# Patient Record
Sex: MALE | Race: WHITE | NOT HISPANIC OR LATINO | Employment: OTHER | URBAN - METROPOLITAN AREA
[De-identification: names, ages, dates, MRNs, and addresses within clinical notes are randomized per-mention and may not be internally consistent; named-entity substitution may affect disease eponyms.]

---

## 2017-01-17 ENCOUNTER — TELEPHONE (OUTPATIENT)
Dept: CARDIOLOGY | Facility: CLINIC | Age: 82
End: 2017-01-17

## 2017-01-17 DIAGNOSIS — E78.5 HYPERLIPIDEMIA, UNSPECIFIED HYPERLIPIDEMIA TYPE: Primary | ICD-10-CM

## 2017-01-17 NOTE — TELEPHONE ENCOUNTER
Pt would like to restart Praluent he is willing to pay the extra cost till deductable met. Informed him would message DR Newton. Pt is also on livalo. TIANA Painter RN

## 2017-01-25 ENCOUNTER — ANTICOAGULATION THERAPY VISIT (OUTPATIENT)
Dept: CARDIOLOGY | Facility: CLINIC | Age: 82
End: 2017-01-25
Payer: COMMERCIAL

## 2017-01-25 ENCOUNTER — OFFICE VISIT (OUTPATIENT)
Dept: CARDIOLOGY | Facility: CLINIC | Age: 82
End: 2017-01-25
Attending: NURSE PRACTITIONER
Payer: COMMERCIAL

## 2017-01-25 VITALS
DIASTOLIC BLOOD PRESSURE: 60 MMHG | WEIGHT: 121.3 LBS | BODY MASS INDEX: 22.32 KG/M2 | HEART RATE: 66 BPM | SYSTOLIC BLOOD PRESSURE: 130 MMHG | HEIGHT: 62 IN

## 2017-01-25 DIAGNOSIS — I25.10 CORONARY ARTERY DISEASE INVOLVING NATIVE CORONARY ARTERY OF NATIVE HEART WITHOUT ANGINA PECTORIS: Primary | ICD-10-CM

## 2017-01-25 DIAGNOSIS — Z79.01 LONG-TERM (CURRENT) USE OF ANTICOAGULANTS: Primary | ICD-10-CM

## 2017-01-25 DIAGNOSIS — I25.10 CORONARY ARTERY DISEASE INVOLVING NATIVE CORONARY ARTERY OF NATIVE HEART WITHOUT ANGINA PECTORIS: ICD-10-CM

## 2017-01-25 DIAGNOSIS — G45.9 TRANSIENT CEREBRAL ISCHEMIA, UNSPECIFIED TYPE: ICD-10-CM

## 2017-01-25 DIAGNOSIS — I51.3 LEFT VENTRICULAR THROMBUS: ICD-10-CM

## 2017-01-25 LAB
CHOLEST SERPL-MCNC: 160 MG/DL
HDLC SERPL-MCNC: 55 MG/DL
INR POINT OF CARE: 1.9 (ref 0.86–1.14)
LDLC SERPL CALC-MCNC: 81 MG/DL
NONHDLC SERPL-MCNC: 105 MG/DL
TRIGL SERPL-MCNC: 118 MG/DL

## 2017-01-25 PROCEDURE — 80061 LIPID PANEL: CPT | Performed by: NURSE PRACTITIONER

## 2017-01-25 PROCEDURE — 99214 OFFICE O/P EST MOD 30 MIN: CPT | Performed by: NURSE PRACTITIONER

## 2017-01-25 PROCEDURE — 85610 PROTHROMBIN TIME: CPT | Mod: QW | Performed by: INTERNAL MEDICINE

## 2017-01-25 PROCEDURE — 36416 COLLJ CAPILLARY BLOOD SPEC: CPT | Performed by: INTERNAL MEDICINE

## 2017-01-25 NOTE — MR AVS SNAPSHOT
Sawyer Renteria   1/25/2017 11:40 AM   Anticoagulation Therapy Visit    Description:  82 year old male   Provider:   ANTICOAGULATION   Department:  [facility]           INR as of 1/25/2017     Selected INR 1.9! (1/25/2017)      Anticoagulation Summary as of 1/25/2017     INR goal 2.0-3.0   Selected INR 1.9! 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]  Transient cerebral ischemia [G45.9]  Long-term (current) use of anticoagulants [Z79.01] [Z79.01]         Your next Anticoagulation Clinic appointment(s)     Jan 25, 2017 11:40 AM   Anticoagulation Visit with  ANTICOAGULATION   [facility] ([facility])    [address]  [place] [facility] [address]   [phone]            Feb 06, 2017 10:40 AM   Anticoagulation Visit with  ANTICOAGULATION   [facility] ([facility])    [id number]  [facility] [address]   [phone]              Contact Numbers     Anticoagulant (INR) Clinic Number: [phone]          January 2017 Details    Sun Mon Tue Wed Thu Fri Sat     1               2               3               4               5               6               7                 8               9               10               11               12               13               14                 15               16               17               18               19               20               21                 22               23               24               25      7.5 mg   See details      26      2.5 mg         27      5 mg         28      5 mg           29      5 mg         30      2.5 mg         31      5 mg              Date Details   01/25 This INR check               How to take your warfarin dose     To take:  2.5 mg Take 0.5 of a 5  mg tablet.    To take:  5 mg Take 1 of the 5 mg tablets.    To take:  7.5 mg Take 1.5 of the 5 mg tablets.           February 2017 Details    Sun Mon Tue Wed Thu Fri Sat        1      5 mg         2      2.5 mg         3      5 mg         4      5 mg           5      5 mg         6            7               8               9               10               11                 12               13               14               15               16               17               18                 19               20               21               22               23               24               25                 26               27               28                    Date Details   No additional details    Date of next INR:  2/6/2017         How to take your warfarin dose     To take:  2.5 mg Take 0.5 of a 5 mg tablet.    To take:  5 mg Take 1 of the 5 mg tablets.

## 2017-01-25 NOTE — PROGRESS NOTES
ANTICOAGULATION FOLLOW-UP CLINIC VISIT    Patient Name:  Sawyer Renteria  Date:  1/25/2017  Contact Type:  Face to Face    SUBJECTIVE:     Patient Findings     Positives Medication Changes (not taking Praluent at this time (waiting for approval), taking 1/4 tab of Livalo 3x/wk), Diet Changes (ate large amount of spinach and mustard greens 2x this week)           OBJECTIVE    INR PROTIME   Date Value Ref Range Status   01/25/2017 1.9* 0.86 - 1.14 Final       ASSESSMENT / PLAN  INR assessment THER    Recheck INR In: 2 WEEKS    INR Location Clinic      Anticoagulation Summary as of 1/25/2017     INR goal 2.0-3.0   Selected INR 1.9! 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 Hasn't been taking Praluent (still awaiting insurance authorization for the med). Taking Livalo 1/4 tab 3x/week. No abnormal bleeding or bruising. Will boost today only to 7.5 mg then resume his usual dosing of 2.5 mg MTh and 5 mg all other days with recheck in 2 weeks.  See the Encounter Report to view Anticoagulation Flowsheet and Dosing Calendar (Go to Encounters tab in chart review, and find the Anticoagulation Therapy  Visit)    Dosage adjustment made based on physician directed care plan.    Donna Colby RN

## 2017-01-25 NOTE — MR AVS SNAPSHOT
After Visit Summary   1/25/2017    Sawyer Renteria    MRN: [id number]           Patient Information     Date Of Birth          3/24/1934        Visit Information        Provider Department      1/25/2017 12:30 PM Randell Izaguirre APRN CNP [facility] [place]        Today's Diagnoses     Coronary artery disease involving native coronary artery of native heart without angina pectoris    -  1       Care Instructions    No changes      See us back in April with an echocardiogram and labs    Stay on Livalo and start praluent when you get it        Follow-ups after your visit        Your next 10 appointments already scheduled     Feb 06, 2017 10:40 AM   Anticoagulation Visit with MAJANO ANTICOAGULATION   [facility] ([facility])    [address]  [place] [address] [phone]              Who to contact     If you have questions or need follow up information about today's clinic visit or your schedule please contact [facility] directly at [phone].  Normal or non-critical lab and imaging results will be communicated to you by [employer], letter or phone within 4 business days after the clinic has received the results. If you do not hear from us within 7 days, please contact the clinic through [employer] or phone. If you have a critical or abnormal lab result, we will notify you by phone as soon as possible.  Submit refill requests through [employer] or call your pharmacy and they will forward the refill request to us. Please allow 3 business days for your refill to be completed.          Additional Information About Your Visit        [employer] Information     [employer] gives you secure access to your electronic health record. If you see a primary care provider, you can also send messages to your care team and make appointments. If you have questions, please call your  "primary care clinic.  If you do not have a primary care provider, please call [phone] and they will assist you.        Care EveryWhere ID     This is your Care EveryWhere ID. This could be used by other organizations to access your [facility] medical records  [id number]        Your Vitals Were     Pulse Height BMI (Body Mass Index)             66 1.575 m (5' 2\") 22.18 kg/m2          Blood Pressure from Last 3 Encounters:   01/25/17 130/60   11/18/16 130/48   07/18/16 128/58    Weight from Last 3 Encounters:   01/25/17 55.021 kg (121 lb 4.8 oz)   11/18/16 53.887 kg (118 lb 12.8 oz)   07/18/16 54.432 kg (120 lb)              We Performed the Following     Follow-Up with Cardiac Advanced Practice Provider        Primary Care Provider Office Phone # Fax #    Robbie Bhatti [phone]       [facility] [address]  [facility] [address]        Thank you!     Thank you for choosing [facility] [place]  for your care. Our goal is always to provide you with excellent care. Hearing back from our patients is one way we can continue to improve our services. Please take a few minutes to complete the written survey that you may receive in the mail after your visit with us. Thank you!             Your Updated Medication List - Protect others around you: Learn how to safely use, store and throw away your medicines at www.disposemymeds.org.          This list is accurate as of: 1/25/17  1:02 PM.  Always use your most recent med list.                   Brand Name Dispense Instructions for use    alirocumab 75 MG/ML injectable pen    PRALUENT    2 mL    Inject 1 mL (75 mg) Subcutaneous every 14 days       aspirin 81 MG tablet      Take 81 mg by mouth daily       AVODART 0.5 MG capsule   Generic drug:  dutasteride      Take 0.5 mg by mouth daily       FISH OIL + D3 3335-0861 MG-UNIT Caps      Take 2 tablets by mouth daily       lisinopril 2.5 MG tablet    " PRINIVIL/Zestril    90 tablet    Take 1 tablet (2.5 mg) by mouth daily       LIVALO 4 MG Tabs tablet   Generic drug:  pitavastatin     30 tablet    1/4th tablet three days per week on Monday, Wednesday, Friday       melatonin 5 MG tablet      Take 5 mg by mouth nightly as needed for sleep       metoprolol 25 MG 24 hr tablet    TOPROL-XL    45 tablet    Take 0.5 tablets (12.5 mg) by mouth daily       nitroglycerin 0.4 MG sublingual tablet    NITROSTAT    25 tablet    Place 1 tablet (0.4 mg) under the tongue every 5 minutes as needed for chest pain Take as directed       vitamin D3 2000 UNITS Caps      Take by mouth daily       warfarin 5 MG tablet    COUMADIN    90 tablet    Take 2.5mg on Mondays and Thursdays and 5mg the rest of the days or as directed by INR clinic, Coumadin SHIKHA

## 2017-01-25 NOTE — PROGRESS NOTES
HPI and Plan:   See dictation  [id number]  No orders of the defined types were placed in this encounter.       No orders of the defined types were placed in this encounter.       There are no discontinued medications.      Encounter Diagnosis   Name Primary?     Coronary artery disease involving native coronary artery of native heart without angina pectoris Yes       CURRENT MEDICATIONS:  Current Outpatient Prescriptions   Medication Sig Dispense Refill     alirocumab (PRALUENT) 75 MG/ML injectable pen Inject 1 mL (75 mg) Subcutaneous every 14 days 2 mL 11     warfarin (COUMADIN) 5 MG tablet Take 2.5mg on Mondays and Thursdays and 5mg the rest of the days or as directed by INR clinic, Coumadin SHIKHA 90 tablet 1     pitavastatin (LIVALO) 4 MG TABS tablet 1/4th tablet three days per week on Monday, Wednesday, Friday 30 tablet      metoprolol (TOPROL-XL) 25 MG 24 hr tablet Take 0.5 tablets (12.5 mg) by mouth daily 45 tablet 3     lisinopril (PRINIVIL,ZESTRIL) 2.5 MG tablet Take 1 tablet (2.5 mg) by mouth daily 90 tablet 3     dutasteride (AVODART) 0.5 MG capsule Take 0.5 mg by mouth daily       melatonin 5 MG tablet Take 5 mg by mouth nightly as needed for sleep       Fish Oil-Cholecalciferol (FISH OIL + D3) 2701-6579 MG-UNIT CAPS Take 2 tablets by mouth daily       Cholecalciferol (VITAMIN D3) 2000 UNITS CAPS Take by mouth daily       aspirin 81 MG tablet Take 81 mg by mouth daily       nitroglycerin (NITROSTAT) 0.4 MG SL tablet Place 1 tablet (0.4 mg) under the tongue every 5 minutes as needed for chest pain Take as directed 25 tablet 0       ALLERGIES     Allergies   Allergen Reactions     Flomax [Tamsulosin]      Weakness and dizzyness     Carvedilol      Colesevelam      Epigastric pain     Ezetimibe      Lisinopril      Hyperkalemia and inc. Creat. At 20 mg daily     Pravastatin      Abdominal pain     Rosuvastatin      Simvastatin        PAST MEDICAL HISTORY:  Past Medical History   Diagnosis Date     Cardiomyopathy,  "ischemic      EF 49% by cardiac MRI 1/15/2014     Left ventricular thrombus (H)      TIA (transient ischaemic attack)      BPH (benign prostatic hyperplasia)      Hyperlipidaemia      Hypertension      Gastro-oesophageal reflux disease      Coronary artery disease 1/24/06     Cypher CANDIDA to LAD     Myocardial infarction (H) 1/2006     Anterior       PAST SURGICAL HISTORY:  Past Surgical History   Procedure Laterality Date     Tonsillectomy & adenoidectomy       Heart cath, angioplasty  1/2006     Cypher CANDIDA to LAD     Coronary angiography adult order  1/24/06     Cypher CANDIDA to LAD       FAMILY HISTORY:  Family History   Problem Relation Age of Onset     HEART DISEASE Mother      heart failure     HEART DISEASE Brother        SOCIAL HISTORY:  Social History     Social History     Marital Status:      Spouse Name: N/A     Number of Children: N/A     Years of Education: N/A     Social History Main Topics     Smoking status: Never Smoker      Smokeless tobacco: Never Used     Alcohol Use: No     Drug Use: None     Sexual Activity: Not Asked     Other Topics Concern     Parent/Sibling W/ Cabg, Mi Or Angioplasty Before 65f 55m? No     Caffeine Concern No     Sleep Concern No     Stress Concern No     Weight Concern Yes     weight loss     Special Diet No     Exercise Yes     bicycle 10 min, walking, 3 days week     Seat Belt Yes     Social History Narrative       Review of Systems:  Skin:  Negative       Eyes:  Positive for   reading glasses  ENT:  Negative      Respiratory:  Negative       Cardiovascular:  Negative      Gastroenterology: Negative      Genitourinary:  Positive for nocturia    Musculoskeletal:  Negative      Neurologic:  Negative      Psychiatric:  Positive for sleep disturbances    Heme/Lymph/Imm:  Positive for weight loss;allergies    Endocrine:  Negative        Physical Exam:  Vitals: /60 mmHg  Pulse 66  Ht 1.575 m (5' 2\")  Wt 55.021 kg (121 lb 4.8 oz)  BMI 22.18 kg/m2    Constitutional: "  cooperative;alert and oriented        Skin:  warm and dry to the touch        Head:           Eyes:  pupils equal and round        ENT:  no pallor or cyanosis        Neck:  carotid pulses are full and equal bilaterally        Chest:  clear to auscultation          Cardiac: normal S1 and S2   S4 no presence of murmur systolic murmur;grade 2;apical          Abdomen:  abdomen soft;BS normoactive        Vascular: not assessed this visit                                        Extremities and Back:  no edema              Neurological:  no gross motor deficits              CC  Randell Izaguirre, APRN CNP   [facility]  [address]  [place] [address]

## 2017-01-25 NOTE — LETTER
1/25/2017    [facility]   [address]  [place] [address]    RE: Sawyer Renteria       Dear Colleague,    I had the pleasure of seeing Sawyer Garsiar in the [facility].    JANIE Renteria is a very pleasant 82-year-old gentleman, patient of Dr. Escamilla who looks much younger than his stated age.  In the first part of 2006, he suffered an anterior wall MI with an LAD stent.  He developed an apical thrombus in the left ventricle and subsequently had a TIA.  He has been on warfarin since that time.  He has an ongoing apical scar.  At the time of his LAD stent, he also underwent angioplasty to the diagonal.  Ejection fraction by echocardiogram in April of this year was 45%-50%.  He has done very well on medical therapy with no heart failure admissions.      He has been quite statin intolerant over the years.  He is also intolerant to Zetia.  He went on Praluent for a period of time, and his LDL dropped into the 50s.  However, it then became affordable.  Soon he will be able to restart this again, and in the interim, he has tolerated Livalo 1 mg 3 days a week with now LDLs of 81, HDL 55, triglycerides normal.  Carotid ultrasounds in 2009 showed minimal plaque.  He exercises routinely, watches his diet, keeps his weight down.  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  He has no shortness of breath or chest pain.  His blood pressure is reasonably controlled.  His weight is up a few pounds but certainly within acceptable limits with a BMI of 22.2.  He remains on low-dose metoprolol and lisinopril.     Outpatient Encounter Prescriptions as of 1/25/2017   Medication Sig Dispense Refill     alirocumab (PRALUENT) 75 MG/ML injectable pen Inject 1 mL (75 mg) Subcutaneous every 14 days 2 mL 11     warfarin (COUMADIN) 5 MG tablet Take 2.5mg on Mondays and Thursdays  and 5mg the rest of the days or as directed by INR clinic, Coumadin SHIKHA 90 tablet 1     pitavastatin (LIVALO) 4 MG TABS tablet 1/4th tablet three days per week on Monday, Wednesday, Friday 30 tablet      metoprolol (TOPROL-XL) 25 MG 24 hr tablet Take 0.5 tablets (12.5 mg) by mouth daily 45 tablet 3     lisinopril (PRINIVIL,ZESTRIL) 2.5 MG tablet Take 1 tablet (2.5 mg) by mouth daily 90 tablet 3     dutasteride (AVODART) 0.5 MG capsule Take 0.5 mg by mouth daily       melatonin 5 MG tablet Take 5 mg by mouth nightly as needed for sleep       Fish Oil-Cholecalciferol (FISH OIL + D3) 8898-5777 MG-UNIT CAPS Take 2 tablets by mouth daily       Cholecalciferol (VITAMIN D3) 2000 UNITS CAPS Take by mouth daily       aspirin 81 MG tablet Take 81 mg by mouth daily       nitroglycerin (NITROSTAT) 0.4 MG SL tablet Place 1 tablet (0.4 mg) under the tongue every 5 minutes as needed for chest pain Take as directed 25 tablet 0     No facility-administered encounter medications on file as of 1/25/2017.       IMPRESSION AND PLAN:   1.  Anterior wall MI with LAD stenting and diagonal angioplasty in 2006.  He suffered an LV apical thrombus with TIA and will remain on warfarin.  He will see Dr. Newton back in April and repeat his ejection fraction by echocardiogram, which was estimated at 45%-50% last visit.  Continue medical therapy.   2.  Statin intolerant; however, he is doing fairly well on Livalo 1 mg Monday, Wednesday, Friday.  His Praluent is now affordable again, and he plans to resume this.  We will repeat a lipid profile in April.   3.  Minimal carotid plaque in 2009.  This should be repeated at some point.  I would defer to Dr. Newton to decide.      As always, it has been a pleasure seeing JANIE     Sincerely,    JAE Jones [facility]

## 2017-01-25 NOTE — PATIENT INSTRUCTIONS
No changes      See us back in April with an echocardiogram and labs    Stay on Livalo and start praluent when you get it  
lower abd

## 2017-01-26 NOTE — PROGRESS NOTES
HISTORY OF PRESENT ILLNESS:  JANIE Renteria is a very pleasant 82-year-old gentleman, patient of Dr. Newton's who looks much younger than his stated age.  In the first part of 2006, he suffered an anterior wall MI with an LAD stent.  He developed an apical thrombus in the left ventricle and subsequently had a TIA.  He has been on warfarin since that time.  He has an ongoing apical scar.  At the time of his LAD stent, he also underwent angioplasty to the diagonal.  Ejection fraction by echocardiogram in April of this year was 45%-50%.  He has done very well on medical therapy with no heart failure admissions.      He has been quite statin intolerant over the years.  He is also intolerant to Zetia.  He went on Praluent for a period of time, and his LDL dropped into the 50s.  However, it then became affordable.  Soon he will be able to restart this again, and in the interim, he has tolerated Livalo 1 mg 3 days a week with now LDLs of 81, HDL 55, triglycerides normal.  Carotid ultrasounds in 2009 showed minimal plaque.  He exercises routinely, watches his diet, keeps his weight down.  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  He has no shortness of breath or chest pain.  His blood pressure is reasonably controlled.  His weight is up a few pounds but certainly within acceptable limits with a BMI of 22.2.  He remains on low-dose metoprolol and lisinopril.      IMPRESSION AND PLAN:   1.  Anterior wall MI with LAD stenting and diagonal angioplasty in 2006.  He suffered an LV apical thrombus with TIA and will remain on warfarin.  He will see Dr. Newton back in April and repeat his ejection fraction by echocardiogram, which was estimated at 45%-50% last visit.  Continue medical therapy.   2.  Statin intolerant; however, he is doing fairly well on Livalo 1 mg Monday, Wednesday, Friday.  His Praluent is now affordable again, and he plans  to resume this.  We will repeat a lipid profile in April.   3.  Minimal carotid plaque in .  This should be repeated at some point.  I would defer to Dr. Newton to decide.      As always, it has been a pleasure seeing JANIE PABLO NP             D: 2017 14:04   T: 2017 22:46   MT: VICTORIA      Name:     DAVIE RIVERA   MRN:      [id number]        Account:      [id number]   :      1934           Service Date: 2017      Document: [id number]

## 2017-02-06 ENCOUNTER — ANTICOAGULATION THERAPY VISIT (OUTPATIENT)
Dept: CARDIOLOGY | Facility: CLINIC | Age: 82
End: 2017-02-06
Payer: COMMERCIAL

## 2017-02-06 DIAGNOSIS — I51.3 LEFT VENTRICULAR THROMBUS: ICD-10-CM

## 2017-02-06 DIAGNOSIS — G45.9 TRANSIENT CEREBRAL ISCHEMIA, UNSPECIFIED TYPE: ICD-10-CM

## 2017-02-06 DIAGNOSIS — Z79.01 LONG-TERM (CURRENT) USE OF ANTICOAGULANTS: Primary | ICD-10-CM

## 2017-02-06 LAB — INR POINT OF CARE: 3 (ref 0.86–1.14)

## 2017-02-06 PROCEDURE — 85610 PROTHROMBIN TIME: CPT | Mod: QW | Performed by: INTERNAL MEDICINE

## 2017-02-06 PROCEDURE — 36416 COLLJ CAPILLARY BLOOD SPEC: CPT | Performed by: INTERNAL MEDICINE

## 2017-02-06 NOTE — PROGRESS NOTES
ANTICOAGULATION FOLLOW-UP CLINIC VISIT    Patient Name:  Sawyer Renteria  Date:  2/6/2017  Contact Type:  Face to Face    SUBJECTIVE:     Patient Findings     Positives Medication Changes (restarting Praluent injections today), Diet Changes (not eating many greens last week)           OBJECTIVE    INR PROTIME   Date Value Ref Range Status   02/06/2017 3.0* 0.86 - 1.14 Final       ASSESSMENT / PLAN  INR assessment THER    Recheck INR In: 3 WEEKS    INR Location Clinic      Anticoagulation Summary as of 2/6/2017     INR goal 2.0-3.0   Selected INR 3.0 (2/6/2017)   Maintenance plan 2.5 mg (5 mg x 0.5) on Mon, Thu; 5 mg (5 mg x 1) all other days   Full instructions 2.5 mg on Mon, Thu; 5 mg all other days   Weekly total 30 mg   No change documented Donna Colby, OKSANA   Plan last modified Penfield, Lynette E, RN (6/2/2016)   Next INR check 2/27/2017   Target end date Indefinite    Indications   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]  Transient cerebral ischemia [G45.9]  Long-term (current) use of anticoagulants [Z79.01] [Z79.01]         Anticoagulation Episode Summary     INR check location     Preferred lab     Send INR reminders to [facility] N[facility]    Comments       Anticoagulation Care Providers     Provider Role Specialty Phone number    Satya Newton MD Responsible Cardiology [phone]            See the Encounter Report to view Anticoagulation Flowsheet and Dosing Calendar (Go to Encounters tab in chart review, and find the Anticoagulation Therapy Visit)    INR 3.0 Not eating many greens last week. He resumed taking Praluent injections today. No abnormal bleeding or bruising. Will continue current dosing of 2.5 mg MTh and 5 mg all other days with recheck in 3 weeks. He will resume his usual diet. Dosage adjustment made based on physician directed care plan.    Donna Colby, OKSANA

## 2017-02-06 NOTE — MR AVS SNAPSHOT
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]  Transient cerebral ischemia [G45.9]  Long-term (current) use of anticoagulants [Z79.01] [Z79.01]         Your next Anticoagulation Clinic appointment(s)     Feb 27, 2017 10:40 AM   Anticoagulation Visit with  ANTICOAGULATION   [facility] [place] ([facility])    [address] [place] [id number]   [phone]              Contact Numbers     Anticoagulant (INR) Clinic Number: [phone]          February 2017 Details    Sun Mon Tue Wed Thu Fri Sat        1               2               3               4                 5               6      2.5 mg   See details      7      5 mg         8      5 mg         9      2.5 mg         10      5 mg         11      5 mg           12      5 mg         13      2.5 mg         14      5 mg         15      5 mg         16      2.5 mg         17      5 mg         18      5 mg           19      5 mg         20      2.5 mg         21      5 mg         22      5 mg         23      2.5 mg         24      5 mg         25      5 mg           26      5 mg         27            28                    Date Details   02/06 This INR check       Date of next INR:  2/27/2017         How to take your warfarin dose     To take:  2.5 mg Take 0.5 of a 5 mg tablet.    To take:  5 mg Take 1 of the 5 mg tablets.

## 2017-02-27 ENCOUNTER — ANTICOAGULATION THERAPY VISIT (OUTPATIENT)
Dept: CARDIOLOGY | Facility: CLINIC | Age: 82
End: 2017-02-27
Payer: COMMERCIAL

## 2017-02-27 DIAGNOSIS — I51.3 LEFT VENTRICULAR THROMBUS: ICD-10-CM

## 2017-02-27 DIAGNOSIS — Z79.01 LONG-TERM (CURRENT) USE OF ANTICOAGULANTS: ICD-10-CM

## 2017-02-27 LAB — INR POINT OF CARE: 2.6 (ref 0.86–1.14)

## 2017-02-27 PROCEDURE — 36416 COLLJ CAPILLARY BLOOD SPEC: CPT | Performed by: INTERNAL MEDICINE

## 2017-02-27 PROCEDURE — 85610 PROTHROMBIN TIME: CPT | Mod: QW | Performed by: INTERNAL MEDICINE

## 2017-02-27 NOTE — PROGRESS NOTES
ANTICOAGULATION FOLLOW-UP CLINIC VISIT    Patient Name:  Sawyer Renteria  Date:  2/27/2017  Contact Type:  Face to Face    SUBJECTIVE:     Patient Findings     Positives No Problem Findings           OBJECTIVE    INR Protime   Date Value Ref Range Status   02/27/2017 2.6 (A) 0.86 - 1.14 Final       ASSESSMENT / PLAN  INR assessment THER    Recheck INR In: 3 WEEKS    INR Location Clinic      Anticoagulation Summary as of 2/27/2017     INR goal 2.0-3.0   Today's INR 2.6   Maintenance plan 2.5 mg (5 mg x 0.5) on Mon, Thu; 5 mg (5 mg x 1) all other days   Full instructions 2.5 mg on Mon, Thu; 5 mg all other days   Weekly total 30 mg   No change documented Tayla Antonio RN   Plan last modified Penfield, Lynette E, RN (6/2/2016)   Next INR check 3/17/2017   Target end date Indefinite    Indications   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  No major changes.  He is back to giving himself the praulent injections and wants to coordinate his INR checks with those if possible.  Continue same schedule and recheck in about 3 weeks.  Turner Antonio RN

## 2017-02-27 NOTE — MR AVS SNAPSHOT
Sawyer Renteria   2/27/2017 10:40 AM   Anticoagulation Therapy Visit    Description:  82 year old male   Provider:  GRETEL ANTICOAGULATION   Department:  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]  Transient cerebral ischemia [G45.9]  Long-term (current) use of anticoagulants [Z79.01] [Z79.01]         Your next Anticoagulation Clinic appointment(s)     Mar 17, 2017 10:40 AM CDT   Anticoagulation Visit with  ANTICOAGULATION   [facility] ([facility])    [address] [place] [id number]   [phone]              Contact Numbers     Anticoagulant (INR) Clinic Number: [id number]          February 2017 Details    Sun Mon Tue Wed Thu Fri Sat        1               2               3               4                 5               6               7               8               9               10               11                 12               13               14               15               16               17               18                 19               20               21               22               23               24               25                 26               27      2.5 mg   See details      28      5 mg              Date Details   02/27 This INR check               How to take your warfarin dose     To take:  2.5 mg Take 0.5 of a 5 mg tablet.    To take:  5 mg Take 1 of the 5 mg tablets.           March 2017 Details    Sun Mon Tue Wed Thu Fri Sat        1      5 mg         2      2.5 mg         3      5 mg         4      5 mg           5      5 mg         6      2.5 mg         7      5 mg         8      5 mg         9      2.5 mg         10      5 mg         11      5 mg            12      5 mg         13      2.5 mg         14      5 mg         15      5 mg         16      2.5 mg         17            18                 19               20               21               22               23               24               25                 26               27               28               29               30               31                 Date Details   No additional details    Date of next INR:  3/17/2017         How to take your warfarin dose     To take:  2.5 mg Take 0.5 of a 5 mg tablet.    To take:  5 mg Take 1 of the 5 mg tablets.

## 2017-03-17 ENCOUNTER — ANTICOAGULATION THERAPY VISIT (OUTPATIENT)
Dept: CARDIOLOGY | Facility: CLINIC | Age: 82
End: 2017-03-17
Payer: COMMERCIAL

## 2017-03-17 DIAGNOSIS — G45.9 TRANSIENT CEREBRAL ISCHEMIA: ICD-10-CM

## 2017-03-17 DIAGNOSIS — Z79.01 LONG-TERM (CURRENT) USE OF ANTICOAGULANTS: ICD-10-CM

## 2017-03-17 DIAGNOSIS — I51.3 LEFT VENTRICULAR THROMBUS: ICD-10-CM

## 2017-03-17 LAB — INR POINT OF CARE: 3 (ref 0.86–1.14)

## 2017-03-17 PROCEDURE — 36416 COLLJ CAPILLARY BLOOD SPEC: CPT | Performed by: INTERNAL MEDICINE

## 2017-03-17 PROCEDURE — 85610 PROTHROMBIN TIME: CPT | Mod: QW | Performed by: INTERNAL MEDICINE

## 2017-03-17 NOTE — PROGRESS NOTES
ANTICOAGULATION FOLLOW-UP CLINIC VISIT    Patient Name:  Sawyer Renteria  Date:  3/17/2017  Contact Type:  Face to Face    SUBJECTIVE:     Patient Findings     Positives No Problem Findings           OBJECTIVE    INR Protime   Date Value Ref Range Status   03/17/2017 3.0 (A) 0.86 - 1.14 Final       ASSESSMENT / PLAN  INR assessment THER    Recheck INR In: 4 WEEKS    INR Location Clinic      Anticoagulation Summary as of 3/17/2017     INR goal 2.0-3.0   Today's INR 3.0   Maintenance plan 2.5 mg (5 mg x 0.5) on Mon, Thu; 5 mg (5 mg x 1) all other days   Full instructions 2.5 mg on Mon, Thu; 5 mg all other days   Weekly total 30 mg   No change documented Donna Colby RN   Plan last modified Penfield, Lynette E, RN (6/2/2016)   Next INR check 4/17/2017   Target end date Indefinite    Indications   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]  Transient cerebral ischemia [G45.9]  Long-term (current) use of anticoagulants [Z79.01] [Z79.01]         Anticoagulation Episode Summary     INR check location     Preferred lab     Send INR reminders to [facility] HEART INR NURSE    Comments       Anticoagulation Care Providers     Provider Role Specialty Phone number    Satya Newton MD Responsible Cardiology [phone]            See the Encounter Report to view Anticoagulation Flowsheet and Dosing Calendar (Go to Encounters tab in chart review, and find the Anticoagulation Therapy Visit)    INR 3.0 No change in meds or diet. No abnormal bleeding or bruising. Will eat a serving of spinach today then resume his usual diet. Continue current dosing of 2.5 mg MTh and 5 mg all other days with recheck in 4 weeks when he is here for lab and Echo appt. Dosage adjustment made based on physician directed care plan.    Donna Colby, OKSANA

## 2017-03-17 NOTE — MR AVS SNAPSHOT
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]  Transient cerebral ischemia [G45.9]  Long-term (current) use of anticoagulants [Z79.01] [Z79.01]         Your next Anticoagulation Clinic appointment(s)     Mar 17, 2017 10:40 AM CDT   Anticoagulation Visit with  ANTICOAGULATION   [facility] ([facility])    [address] [place] [id number]   [phone]            Apr 17, 2017 11:20 AM CDT   Anticoagulation Visit with  ANTICOAGULATION   [facility] ([facility])    [address] [place], [place] [id number]  [facility] [address]   [phone]              Contact Numbers     Anticoagulant (INR) Clinic Number: [id number]          March 2017 Details    Sun Mon Tue Wed Thu Fri Sat        1               2               3               4                 5               6               7               8               9               10               11                 12               13               14               15               16               17      5 mg   See details      18      5 mg           19      5 mg         20      2.5 mg         21      5 mg         22      5 mg         23      2.5 mg         24      5 mg         25      5 mg           26      5 mg         27      2.5 mg         28      5 mg         29      5 mg         30      2.5 mg         31      5 mg           Date Details   03/17 This INR check               How to take your warfarin dose     To take:  2.5 mg Take 0.5 of a 5 mg tablet.     To take:  5 mg Take 1 of the 5 mg tablets.           April 2017 Details    Sun Mon Tue Wed Thu Fri Sat           1      5 mg           2      5 mg         3      2.5 mg         4      5 mg         5      5 mg         6      2.5 mg         7      5 mg         8      5 mg           9      5 mg         10      2.5 mg         11      5 mg         12      5 mg         13      2.5 mg         14      5 mg         15      5 mg           16      5 mg         17            18               19               20               21               22                 23               24               25               26               27               28               29                 30                      Date Details   No additional details    Date of next INR:  4/17/2017         How to take your warfarin dose     To take:  2.5 mg Take 0.5 of a 5 mg tablet.    To take:  5 mg Take 1 of the 5 mg tablets.

## 2017-04-17 ENCOUNTER — HOSPITAL ENCOUNTER (OUTPATIENT)
Dept: CARDIOLOGY | Facility: CLINIC | Age: 82
Discharge: HOME | End: 2017-04-17
Attending: NURSE PRACTITIONER | Admitting: NURSE PRACTITIONER
Payer: MEDICARE

## 2017-04-17 ENCOUNTER — ANTICOAGULATION THERAPY VISIT (OUTPATIENT)
Dept: CARDIOLOGY | Facility: CLINIC | Age: 82
End: 2017-04-17
Payer: COMMERCIAL

## 2017-04-17 DIAGNOSIS — E78.2 MIXED HYPERLIPIDEMIA: ICD-10-CM

## 2017-04-17 DIAGNOSIS — I25.5 CARDIOMYOPATHY, ISCHEMIC: ICD-10-CM

## 2017-04-17 DIAGNOSIS — G45.9 TRANSIENT CEREBRAL ISCHEMIA: ICD-10-CM

## 2017-04-17 DIAGNOSIS — Z79.01 LONG-TERM (CURRENT) USE OF ANTICOAGULANTS: ICD-10-CM

## 2017-04-17 DIAGNOSIS — I51.3 LEFT VENTRICULAR THROMBUS: ICD-10-CM

## 2017-04-17 LAB
ALT SERPL W P-5'-P-CCNC: <5 U/L (ref 5–30)
ANION GAP SERPL CALCULATED.3IONS-SCNC: 14.1 MMOL/L (ref 6–17)
BUN SERPL-MCNC: 21 MG/DL (ref 7–30)
CALCIUM SERPL-MCNC: 8.5 MG/DL (ref 8.5–10.5)
CHLORIDE SERPL-SCNC: 108 MMOL/L (ref 98–107)
CHOLEST SERPL-MCNC: 157 MG/DL
CO2 SERPL-SCNC: 25 MMOL/L (ref 23–29)
CREAT SERPL-MCNC: 1.22 MG/DL (ref 0.7–1.3)
GFR SERPL CREATININE-BSD FRML MDRD: 57 ML/MIN/1.7M2
GLUCOSE SERPL-MCNC: 120 MG/DL (ref 70–105)
HDLC SERPL-MCNC: 51 MG/DL
INR POINT OF CARE: 3.5 (ref 0.86–1.14)
LDLC SERPL CALC-MCNC: 74 MG/DL
NONHDLC SERPL-MCNC: 106 MG/DL
POTASSIUM SERPL-SCNC: 4.1 MMOL/L (ref 3.5–5.1)
SODIUM SERPL-SCNC: 143 MMOL/L (ref 136–145)
TRIGL SERPL-MCNC: 159 MG/DL

## 2017-04-17 PROCEDURE — 85610 PROTHROMBIN TIME: CPT | Mod: QW | Performed by: INTERNAL MEDICINE

## 2017-04-17 PROCEDURE — 80061 LIPID PANEL: CPT | Performed by: NURSE PRACTITIONER

## 2017-04-17 PROCEDURE — 36416 COLLJ CAPILLARY BLOOD SPEC: CPT | Performed by: INTERNAL MEDICINE

## 2017-04-17 PROCEDURE — 93306 TTE W/DOPPLER COMPLETE: CPT | Mod: 26 | Performed by: INTERNAL MEDICINE

## 2017-04-17 PROCEDURE — 84460 ALANINE AMINO (ALT) (SGPT): CPT | Performed by: NURSE PRACTITIONER

## 2017-04-17 PROCEDURE — 40000264 ECHO COMPLETE WITH LUMASON

## 2017-04-17 PROCEDURE — 25500064 ZZH RX 255 OP 636: Performed by: NURSE PRACTITIONER

## 2017-04-17 PROCEDURE — 80048 BASIC METABOLIC PNL TOTAL CA: CPT | Performed by: NURSE PRACTITIONER

## 2017-04-17 RX ADMIN — SULFUR HEXAFLUORIDE 5 ML: KIT at 11:15

## 2017-04-17 NOTE — PROGRESS NOTES
ANTICOAGULATION FOLLOW-UP CLINIC VISIT    Patient Name:  Sawyer Renteria  Date:  4/17/2017  Contact Type:  Face to Face    SUBJECTIVE:     Patient Findings     Positives Change in medications (he might have taken an extra dose of warfarin), OTC meds (takes Tylenol at bedtime and PRN (more often recently - 1 tab every other day))           OBJECTIVE    INR Protime   Date Value Ref Range Status   04/17/2017 3.5 (A) 0.86 - 1.14 Final       ASSESSMENT / PLAN  INR assessment SUPRA    Recheck INR In: 1 WEEK    INR Location Clinic      Anticoagulation Summary as of 4/17/2017     INR goal 2.0-3.0   Today's INR 3.5!   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]  Transient cerebral ischemia [G45.9]  Long-term (current) use of anticoagulants [Z79.01] [Z79.01]         Anticoagulation Episode Summary     INR check location     Preferred lab     Send INR reminders to [facility] HEART INR NURSE    Comments       Anticoagulation Care Providers     Provider Role Specialty Phone number    Satya Newton MD Responsible Cardiology [phone]            See the Encounter Report to view Anticoagulation Flowsheet and Dosing Calendar (Go to Encounters tab in chart review, and find the Anticoagulation Therapy Visit)    INR 3.5 He has been taking Tylenol more regularly recently (daily at bedtime and every other day for leg aching). He is not sure if he may have taken an extra dose of warfarin one day. He has a pillbox but doesn't use it. Advised that he should start using the pillbox. No change in diet. Will hold today's 2.5 mg dose then resume his usual dosing of 2.5 mg MTh and 5 mg all other days with recheck in 1 week prior to OV  with Dr Newton. He will eat greens today then resume his usual diet. Dosage adjustment made based on physician directed care plan.    Donna Colby RN

## 2017-04-17 NOTE — MR AVS SNAPSHOT
Sawyer S Myra   4/17/2017 11:20 AM   Anticoagulation Therapy Visit    Description:  83 year old male   Provider:  GRETEL ANTICOAGULATION   Department:  [facility]           INR as of 4/17/2017     Today's INR 3.5!      Anticoagulation Summary as of 4/17/2017     INR goal 2.0-3.0   Today's INR 3.5!   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 [place] ([facility])    [address] [place] [address]   [phone]              Contact Numbers     Anticoagulant (INR) Clinic Number: [id number]          April 2017 Details    Sun Mon Tue Wed Thu Fri Sat           1                 2               3               4               5               6               7               8                 9               10               11               12               13               14               15                 16               17      Hold   See details      18      5 mg         19      5 mg         20      2.5 mg         21      5 mg         22      5 mg           23      5 mg         24            25               26               27               28               29                 30                      Date Details   04/17 This INR check       Date of next INR:  4/24/2017         How to take your warfarin dose     To take:  2.5 mg Take 0.5 of a 5 mg tablet.    To take:  5 mg Take 1 of the 5 mg tablets.    Hold Do not take your warfarin dose. See the Details table to the right for additional instructions.

## 2017-04-20 ENCOUNTER — TELEPHONE (OUTPATIENT)
Dept: CARDIOLOGY | Facility: CLINIC | Age: 82
End: 2017-04-20

## 2017-04-20 NOTE — TELEPHONE ENCOUNTER
Pt is seeing DR Newton 4/24/17. Per Pt is on Praluent, but no longer taking Livalo. Pt reports great stiffness/pain in legs that he is using advil for. Pt drove INR up to 3.5. Informed Pt he should not be using advil and he needs to inform DR Newton at OV of leg issues. Pt may need to stop Praluent.TIANA Painter RN

## 2017-04-24 ENCOUNTER — ANTICOAGULATION THERAPY VISIT (OUTPATIENT)
Dept: CARDIOLOGY | Facility: CLINIC | Age: 82
End: 2017-04-24
Payer: COMMERCIAL

## 2017-04-24 ENCOUNTER — OFFICE VISIT (OUTPATIENT)
Dept: CARDIOLOGY | Facility: CLINIC | Age: 82
End: 2017-04-24
Attending: NURSE PRACTITIONER
Payer: COMMERCIAL

## 2017-04-24 VITALS — SYSTOLIC BLOOD PRESSURE: 127 MMHG | HEIGHT: 62 IN | HEART RATE: 72 BPM | DIASTOLIC BLOOD PRESSURE: 63 MMHG

## 2017-04-24 DIAGNOSIS — E78.2 MIXED HYPERLIPIDEMIA: ICD-10-CM

## 2017-04-24 DIAGNOSIS — Z79.01 LONG-TERM (CURRENT) USE OF ANTICOAGULANTS: ICD-10-CM

## 2017-04-24 DIAGNOSIS — I25.10 CORONARY ARTERY DISEASE INVOLVING NATIVE CORONARY ARTERY OF NATIVE HEART WITHOUT ANGINA PECTORIS: Primary | ICD-10-CM

## 2017-04-24 DIAGNOSIS — I25.5 CARDIOMYOPATHY, ISCHEMIC: ICD-10-CM

## 2017-04-24 DIAGNOSIS — I51.3 LEFT VENTRICULAR THROMBUS: ICD-10-CM

## 2017-04-24 DIAGNOSIS — G45.9 TRANSIENT CEREBRAL ISCHEMIA: ICD-10-CM

## 2017-04-24 LAB
CK SERPL-CCNC: 88 U/L (ref 30–300)
INR POINT OF CARE: 1.5 (ref 0.86–1.14)

## 2017-04-24 PROCEDURE — 99214 OFFICE O/P EST MOD 30 MIN: CPT | Performed by: INTERNAL MEDICINE

## 2017-04-24 PROCEDURE — 99207 ZZC NO CHARGE NURSE ONLY: CPT | Performed by: INTERNAL MEDICINE

## 2017-04-24 PROCEDURE — 36416 COLLJ CAPILLARY BLOOD SPEC: CPT | Performed by: INTERNAL MEDICINE

## 2017-04-24 PROCEDURE — 85610 PROTHROMBIN TIME: CPT | Mod: QW | Performed by: INTERNAL MEDICINE

## 2017-04-24 PROCEDURE — 82550 ASSAY OF CK (CPK): CPT | Performed by: INTERNAL MEDICINE

## 2017-04-24 NOTE — PROGRESS NOTES
ANTICOAGULATION FOLLOW-UP CLINIC VISIT    Patient Name:  Sawyer Renteria  Date:  4/24/2017  Contact Type:  Face to Face    SUBJECTIVE:     Patient Findings     Positives No Problem Findings           OBJECTIVE    INR Protime   Date Value Ref Range Status   04/24/2017 1.5 (A) 0.86 - 1.14 Final       ASSESSMENT / PLAN  INR assessment SUB    Recheck INR In: 2 WEEKS    INR Location Clinic      Anticoagulation Summary as of 4/24/2017     INR goal 2.0-3.0   Today's INR 1.5!   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 He said he has been eating spinach salads 2-3 times per week, however, he said he missed one dose last week and didn't make it up.He is still taking a Tylenol for body aches or when he can't sleep.No bleeding or bruising issues. He will take 7.5 mg today only, then resume his normal warfarin dosing of 2.5 mg M/Th and 5 mg the other days. Recheck in 2 weeks. He self administered his Praluent injection today. He Dosage adjustment made based on physician directed care plan.    Daija Santiago RN

## 2017-04-24 NOTE — PROGRESS NOTES
HPI and Plan:   See dictation  [id number]  Orders Placed This Encounter   Procedures     CK total     Follow-Up with Cardiac Advanced Practice Provider       No orders of the defined types were placed in this encounter.      Medications Discontinued During This Encounter   Medication Reason     pitavastatin (LIVALO) 4 MG TABS tablet Therapy completed         Encounter Diagnoses   Name Primary?     Cardiomyopathy, ischemic      Coronary artery disease involving native coronary artery of native heart without angina pectoris Yes     Mixed hyperlipidemia        CURRENT MEDICATIONS:  Current Outpatient Prescriptions   Medication Sig Dispense Refill     alirocumab (PRALUENT) 75 MG/ML injectable pen Inject 1 mL (75 mg) Subcutaneous every 14 days 2 mL 11     warfarin (COUMADIN) 5 MG tablet Take 2.5mg on Mondays and Thursdays and 5mg the rest of the days or as directed by INR clinic, Coumadin SHIKHA 90 tablet 1     metoprolol (TOPROL-XL) 25 MG 24 hr tablet Take 0.5 tablets (12.5 mg) by mouth daily 45 tablet 3     lisinopril (PRINIVIL,ZESTRIL) 2.5 MG tablet Take 1 tablet (2.5 mg) by mouth daily 90 tablet 3     dutasteride (AVODART) 0.5 MG capsule Take 0.5 mg by mouth daily       melatonin 5 MG tablet Take 5 mg by mouth nightly as needed for sleep       Fish Oil-Cholecalciferol (FISH OIL + D3) 1694-4690 MG-UNIT CAPS Take 2 tablets by mouth daily       Cholecalciferol (VITAMIN D3) 2000 UNITS CAPS Take by mouth daily       aspirin 81 MG tablet Take 81 mg by mouth daily       nitroglycerin (NITROSTAT) 0.4 MG SL tablet Place 1 tablet (0.4 mg) under the tongue every 5 minutes as needed for chest pain Take as directed 25 tablet 0       ALLERGIES     Allergies   Allergen Reactions     Flomax [Tamsulosin]      Weakness and dizzyness     Carvedilol      Colesevelam      Epigastric pain     Ezetimibe      Lisinopril      Hyperkalemia and inc. Creat. At 20 mg daily     Pravastatin      Abdominal pain     Rosuvastatin      Simvastatin   "      PAST MEDICAL HISTORY:  Past Medical History:   Diagnosis Date     BPH (benign prostatic hyperplasia)      Cardiomyopathy, ischemic     EF 49% by cardiac MRI 1/15/2014     Coronary artery disease 1/24/06    Cypher CANDIDA to LAD     Gastro-oesophageal reflux disease      Hyperlipidaemia      Hypertension      Left ventricular thrombus (H)      Myocardial infarction (H) 1/2006    Anterior     TIA (transient ischaemic attack)        PAST SURGICAL HISTORY:  Past Surgical History:   Procedure Laterality Date     CORONARY ANGIOGRAPHY ADULT ORDER  1/24/06    Cypher CANDIDA to LAD     HEART CATH, ANGIOPLASTY  1/2006    Cypher CANDIDA to LAD     TONSILLECTOMY & ADENOIDECTOMY         FAMILY HISTORY:  Family History   Problem Relation Age of Onset     HEART DISEASE Mother      heart failure     HEART DISEASE Brother        SOCIAL HISTORY:  Social History     Social History     Marital status:      Spouse name: N/A     Number of children: N/A     Years of education: N/A     Social History Main Topics     Smoking status: Never Smoker     Smokeless tobacco: Never Used     Alcohol use No     Drug use: None     Sexual activity: Not Asked     Other Topics Concern     Parent/Sibling W/ Cabg, Mi Or Angioplasty Before 65f 55m? No     Caffeine Concern No     Sleep Concern No     Stress Concern No     Weight Concern Yes     weight loss     Special Diet No     Exercise Yes     bicycle 10 min, walking, 3 days week     Seat Belt Yes     Social History Narrative       Review of Systems:  Skin:  Negative       Eyes:  Negative      ENT:  Negative      Respiratory:  Negative       Cardiovascular:  Negative      Gastroenterology: Negative      Genitourinary:  Negative      Musculoskeletal:  Positive for joint pain stiffness in legs  Neurologic:  Negative      Psychiatric:  Negative      Heme/Lymph/Imm:  Negative      Endocrine:  Negative        Physical Exam:  Vitals: /63  Pulse 72  Ht 1.575 m (5' 2\")    Constitutional:  " cooperative;alert and oriented        Skin:  warm and dry to the touch        Head:           Eyes:  pupils equal and round        ENT:  no pallor or cyanosis        Neck:  carotid pulses are full and equal bilaterally        Chest:  clear to auscultation          Cardiac: normal S1 and S2   S4 no presence of murmur systolic murmur;grade 2;apical          Abdomen:  abdomen soft;BS normoactive        Vascular: not assessed this visit                                        Extremities and Back:  no edema              Neurological:  no gross motor deficits              CC  Randell Izaguirre, APRN CNP   [facility]  [address]  [place] [address]

## 2017-04-24 NOTE — MR AVS SNAPSHOT
Sawyer Renteria   4/24/2017 1:20 PM   Anticoagulation Therapy Visit    Description:  83 year old male   Provider:  MAJANO ANTICOAGULATION   Department:  [facility]           INR as of 4/24/2017     Today's INR 1.5!      Anticoagulation Summary as of 4/24/2017     INR goal 2.0-3.0   Today's INR 1.5!   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]  Transient cerebral ischemia [G45.9]  Long-term (current) use of anticoagulants [Z79.01] [Z79.01]         Your next Anticoagulation Clinic appointment(s)     May 08, 2017 10:40 AM CDT   Anticoagulation Visit with  ANTICOAGULATION   [facility] ([facility])    [address] [place] [address]   [phone]              Contact Numbers     Anticoagulant (INR) Clinic Number: [phone]          April 2017 Details    Sun Mon Tue Wed Thu Fri Sat           1                 2               3               4               5               6               7               8                 9               10               11               12               13               14               15                 16               17               18               19               20               21               22                 23               24      7.5 mg   See details      25      5 mg         26      5 mg         27      2.5 mg         28      5 mg         29      5 mg           30      5 mg                Date Details   04/24 This INR check               How to take your warfarin dose     To take:  2.5 mg Take 0.5 of a 5 mg tablet.    To take:  5 mg Take 1 of the 5 mg tablets.    To take:  7.5 mg Take 1.5 of the 5 mg tablets.           May 2017 Details    Sun Mon Tue Wed Thu Fri Sat      1      2.5 mg         2      5 mg         3      5 mg         4      2.5 mg         5      5  mg         6      5 mg           7      5 mg         8            9               10               11               12               13                 14               15               16               17               18               19               20                 21               22               23               24               25               26               27                 28               29               30               31                   Date Details   No additional details    Date of next INR:  5/8/2017         How to take your warfarin dose     To take:  2.5 mg Take 0.5 of a 5 mg tablet.    To take:  5 mg Take 1 of the 5 mg tablets.

## 2017-04-24 NOTE — LETTER
4/24/2017    [facility]   [address]    [facility] [address]    RE: Sawyer DOLAN Myra       Dear Colleague,    I had the pleasure of seeing Mr. Renteria in Cardiology Clinic for a history of coronary artery disease.  He has had a remote anterior wall myocardial infarction in 2006 when he had an LAD stent.  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  Since then he has been on warfarin.  His ejection fraction has stabilized at 45%-50%.  Echocardiogram done on 04/17 confirms EF again at 45%-50% with apical wall motion abnormalities.  There is no significant change compared to prior study.  He has been intolerant to several statins.  He finally was on Livalo, which also he was not able to continue at a higher dose.  Finally, he was able to be on intravenous PCSK9 inhibitor, Praluent.  Initially he took it for a few months and then had to stop because of cost, but now he is able to afford it again and is back on it.  With that, his LDL now is 74, HDL is in the 50s.  This is the best cholesterol he has had.  His triglycerides are slightly elevated.  He does have some feeling of stiffness in his muscles intermittently with occasional spasm-like feeling.  He usually takes Advil once every 2-3 days, which helps with relief.  He wonders why this is happening.  It actually gets better with Advil or with walking.  I reviewed the possibility of side effects with Praluent, there is a 3%-4% chance of muscle spasms and occasional muscle aches with this medication.      PHYSICAL EXAMINATION:   VITAL SIGNS:  Blood pressure 127/63, pulse 72 per minute and regular.   CARDIAC:  Regular S1, S2, with no significant murmurs.  There is a soft systolic murmur at the apex.   CHEST:  Clear to auscultation.     Outpatient Encounter Prescriptions as of 4/24/2017   Medication Sig Dispense Refill     alirocumab (PRALUENT) 75 MG/ML injectable pen Inject 1 mL (75 mg) Subcutaneous every  14 days 2 mL 11     warfarin (COUMADIN) 5 MG tablet Take 2.5mg on Mondays and Thursdays and 5mg the rest of the days or as directed by INR clinic, Coumadin SHIKHA 90 tablet 1     metoprolol (TOPROL-XL) 25 MG 24 hr tablet Take 0.5 tablets (12.5 mg) by mouth daily 45 tablet 3     lisinopril (PRINIVIL,ZESTRIL) 2.5 MG tablet Take 1 tablet (2.5 mg) by mouth daily 90 tablet 3     dutasteride (AVODART) 0.5 MG capsule Take 0.5 mg by mouth daily       melatonin 5 MG tablet Take 5 mg by mouth nightly as needed for sleep       Fish Oil-Cholecalciferol (FISH OIL + D3) 2446-1912 MG-UNIT CAPS Take 2 tablets by mouth daily       Cholecalciferol (VITAMIN D3) 2000 UNITS CAPS Take by mouth daily       aspirin 81 MG tablet Take 81 mg by mouth daily       nitroglycerin (NITROSTAT) 0.4 MG SL tablet Place 1 tablet (0.4 mg) under the tongue every 5 minutes as needed for chest pain Take as directed 25 tablet 0     [DISCONTINUED] pitavastatin (LIVALO) 4 MG TABS tablet 1/4th tablet three days per week on Monday, Wednesday, Friday 30 tablet      No facility-administered encounter medications on file as of 4/24/2017.       IMPRESSION:   1.  Coronary artery disease with remote anterior wall myocardial infarction, status post LAD stent and balloon angioplasty of diagonal branch in 2006.  His echocardiogram shows stable ejection of 45%-50%.  Given previous cardioembolic episode with LV apical thrombus, he has continued on Coumadin.  We will continue the beta blocker and lisinopril at the same dose.  In the past with higher dose of lisinopril, he has had dizziness and low blood pressures.   2.  Hyperlipidemia, not tolerant of any statin.  Therefore, he is on injectable Praluent.  He has been able to afford that, and the cholesterol profile is now optimal.  I am wondering if some of his muscle spasm and stiffness is related to Praluent.  He tells me it is not a big issue and is not affecting his quality of life.  To make sure there is no muscle  damage, I will draw a CPK today.   3.  He will return to see my nurse practitioner in the Cardiology Clinic in 4 months and with me in a year.  Prior to my visit with him, I would like to do an exercise stress nuclear study, which would be about 4 years since his last one.      Again, thank you for allowing me to participate in the care of your patient.      Sincerely,    Satya Newton MD     [facility]

## 2017-04-24 NOTE — MR AVS SNAPSHOT
After Visit Summary   4/24/2017    Sawyer Renteria    MRN: [id number]           Patient Information     Date Of Birth          3/24/1934        Visit Information        Provider Department      4/24/2017 1:45 PM Satya Newton MD [facility]ART [facility]        Today's Diagnoses     Coronary artery disease involving native coronary artery of native heart without angina pectoris    -  1    Cardiomyopathy, ischemic        Mixed hyperlipidemia           Follow-ups after your visit        Additional Services     Follow-Up with Cardiac Advanced Practice Provider                 Your next 10 appointments already scheduled     May 08, 2017 10:40 AM CDT   Anticoagulation Visit with MAJANO ANTICOAGULATION   [facility] ([facility] Clinics)    [address] [place], [place] [id number]  [facility] [address] [phone]              Future tests that were ordered for you today     Open Future Orders        Priority Expected Expires Ordered    Follow-Up with Cardiac Advanced Practice Provider Routine 8/22/2017 4/24/2018 4/24/2017            Who to contact     If you have questions or need follow up information about today's clinic visit or your schedule please contact [facility] directly at [phone].  Normal or non-critical lab and imaging results will be communicated to you by MyChart, letter or phone within 4 business days after the clinic has received the results. If you do not hear from us within 7 days, please contact the clinic through MyChart or phone. If you have a critical or abnormal lab result, we will notify you by phone as soon as possible.  Submit refill requests through [employer] or call your pharmacy and they will forward the refill request to us. Please allow 3 business days for your refill to be completed.          Additional Information About Your Visit        MyChart Information      "[facility] gives you secure access to your electronic health record. If you see a primary care provider, you can also send messages to your care team and make appointments. If you have questions, please call your primary care clinic.  If you do not have a primary care provider, please call [phone] and they will assist you.        Care EveryWhere ID     This is your Care EveryWhere ID. This could be used by other organizations to access your [place] medical records  [id number]        Your Vitals Were     Pulse Height                72 1.575 m (5' 2\")           Blood Pressure from Last 3 Encounters:   04/24/17 127/63   01/25/17 130/60   11/18/16 130/48    Weight from Last 3 Encounters:   01/25/17 55 kg (121 lb 4.8 oz)   11/18/16 53.9 kg (118 lb 12.8 oz)   07/18/16 54.4 kg (120 lb)              We Performed the Following     Follow-Up with Cardiologist        Primary Care Provider Office Phone # Fax #    Francisco Doshi [phone]       [facility] [address]    [facility] [address]        Thank you!     Thank you for choosing [facility] [place]  for your care. Our goal is always to provide you with excellent care. Hearing back from our patients is one way we can continue to improve our services. Please take a few minutes to complete the written survey that you may receive in the mail after your visit with us. Thank you!             Your Updated Medication List - Protect others around you: Learn how to safely use, store and throw away your medicines at www.disposemymeds.org.          This list is accurate as of: 4/24/17  2:28 PM.  Always use your most recent med list.                   Brand Name Dispense Instructions for use    alirocumab 75 MG/ML injectable pen    PRALUENT    2 mL    Inject 1 mL (75 mg) Subcutaneous every 14 days       aspirin 81 MG tablet      Take 81 mg by mouth daily       AVODART 0.5 MG capsule   Generic drug:  dutasteride "      Take 0.5 mg by mouth daily       FISH OIL + D3 9625-0088 MG-UNIT Caps      Take 2 tablets by mouth daily       lisinopril 2.5 MG tablet    PRINIVIL/Zestril    90 tablet    Take 1 tablet (2.5 mg) by mouth daily       melatonin 5 MG tablet      Take 5 mg by mouth nightly as needed for sleep       metoprolol 25 MG 24 hr tablet    TOPROL-XL    45 tablet    Take 0.5 tablets (12.5 mg) by mouth daily       nitroglycerin 0.4 MG sublingual tablet    NITROSTAT    25 tablet    Place 1 tablet (0.4 mg) under the tongue every 5 minutes as needed for chest pain Take as directed       vitamin D3 2000 UNITS Caps      Take by mouth daily       warfarin 5 MG tablet    COUMADIN    90 tablet    Take 2.5mg on Mondays and Thursdays and 5mg the rest of the days or as directed by INR clinic, Coumadin SHIKHA

## 2017-04-25 NOTE — PROGRESS NOTES
HISTORY OF PRESENT ILLNESS:  I had the pleasure of seeing Mr. Renteria in Cardiology Clinic for a history of coronary artery disease.  He has had a remote anterior wall myocardial infarction in 2006 when he had an LAD stent.  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  Since then he has been on warfarin.  His ejection fraction has stabilized at 45%-50%.  Echocardiogram done on 04/17 confirms EF again at 45%-50% with apical wall motion abnormalities.  There is no significant change compared to prior study.  He has been intolerant to several statins.  He finally was on Livalo, which also he was not able to continue at a higher dose.  Finally, he was able to be on intravenous PCSK9 inhibitor, Praluent.  Initially he took it for a few months and then had to stop because of cost, but now he is able to afford it again and is back on it.  With that, his LDL now is 74, HDL is in the 50s.  This is the best cholesterol he has had.  His triglycerides are slightly elevated.  He does have some feeling of stiffness in his muscles intermittently with occasional spasm-like feeling.  He usually takes Advil once every 2-3 days, which helps with relief.  He wonders why this is happening.  It actually gets better with Advil or with walking.  I reviewed the possibility of side effects with Praluent, there is a 3%-4% chance of muscle spasms and occasional muscle aches with this medication.      PHYSICAL EXAMINATION:   VITAL SIGNS:  Blood pressure 127/63, pulse 72 per minute and regular.   CARDIAC:  Regular S1, S2, with no significant murmurs.  There is a soft systolic murmur at the apex.   CHEST:  Clear to auscultation.      IMPRESSION:   1.  Coronary artery disease with remote anterior wall myocardial infarction, status post LAD stent and balloon angioplasty of diagonal branch in 2006.  His echocardiogram shows stable ejection of 45%-50%.  Given previous cardioembolic episode with LV apical thrombus, he has  continued on Coumadin.  We will continue the beta blocker and lisinopril at the same dose.  In the past with higher dose of lisinopril, he has had dizziness and low blood pressures.   2.  Hyperlipidemia, not tolerant of any statin.  Therefore, he is on injectable Praluent.  He has been able to afford that, and the cholesterol profile is now optimal.  I am wondering if some of his muscle spasm and stiffness is related to Praluent.  He tells me it is not a big issue and is not affecting his quality of life.  To make sure there is no muscle damage, I will draw a CPK today.   3.  He will return to see my nurse practitioner in the Cardiology Clinic in 4 months and with me in a year.  Prior to my visit with him, I would like to do an exercise stress nuclear study, which would be about 4 years since his last one.      cc:   Francisco Doshi MD    [facility]    [address] [place] [address], [address] [place] [address]         TALI WEATHERS MD             D: 2017 14:28   T: 2017 03:46   MT: VICTORIA      Name:     DAVIE RIVERA   MRN:      [id number]        Account:      [id number]   :      1934           Service Date: 2017      Document: [id number]

## 2017-04-27 ENCOUNTER — TELEPHONE (OUTPATIENT)
Dept: CARDIOLOGY | Facility: CLINIC | Age: 82
End: 2017-04-27

## 2017-04-27 NOTE — TELEPHONE ENCOUNTER
Call from patient states he was called with results of CK, but with muscle aches he is unsure if he should remain on injections due to muscles aches in his thighs.  Attempted to reassure patient - see DR Newton comments on labs results and his office note 4/24/2017 w/ plan.  Patient requests Dr Newton review and further advise if he should remain on injections.    Orders Only on 04/24/2017   Component Date Value Ref Range Status     CK Total 04/24/2017 88  30 - 300 U/L Final      Vashti Riggins RN

## 2017-05-01 NOTE — TELEPHONE ENCOUNTER
Called to patient w/ Dr Newton comments and recommendations to stay on injections.  Patient agrees to call if muscle aches worsen. Reviewed again CK results - normal.  Patient states understanding and compliance.  Vashti Riggins RN

## 2017-05-08 ENCOUNTER — ANTICOAGULATION THERAPY VISIT (OUTPATIENT)
Dept: CARDIOLOGY | Facility: CLINIC | Age: 82
End: 2017-05-08
Payer: COMMERCIAL

## 2017-05-08 DIAGNOSIS — Z79.01 LONG-TERM (CURRENT) USE OF ANTICOAGULANTS: ICD-10-CM

## 2017-05-08 DIAGNOSIS — I51.3 LEFT VENTRICULAR THROMBUS: ICD-10-CM

## 2017-05-08 LAB — INR POINT OF CARE: 2.4 (ref 0.86–1.14)

## 2017-05-08 PROCEDURE — 36416 COLLJ CAPILLARY BLOOD SPEC: CPT | Performed by: INTERNAL MEDICINE

## 2017-05-08 PROCEDURE — 85610 PROTHROMBIN TIME: CPT | Mod: QW | Performed by: INTERNAL MEDICINE

## 2017-05-08 PROCEDURE — 99207 ZZC NO CHARGE NURSE ONLY: CPT | Performed by: INTERNAL MEDICINE

## 2017-05-08 NOTE — MR AVS SNAPSHOT
Sawyer Renteria   5/8/2017 10:40 AM   Anticoagulation Therapy Visit    Description:  83 year old male   Provider:  GRETEL ANTICOAGULATION   Department:  [facility]           INR as of 5/8/2017     Today's INR 2.4      Anticoagulation Summary as of 5/8/2017     INR goal 2.0-3.0   Today's INR 2.4   Full instructions 2.5 mg on Mon, Thu; 5 mg all other days   Next INR check 5/31/2017    Indications   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]  Transient cerebral ischemia [G45.9]  Long-term (current) use of anticoagulants [Z79.01] [Z79.01]         Your next Anticoagulation Clinic appointment(s)     May 31, 2017 10:40 AM CDT   Anticoagulation Visit with  ANTICOAGULATION   [facility] ([facility] Clin[facility])    [address] [place] [id number]   [phone]              Contact Numbers     Anticoagulant (INR) Clinic Number: [phone]          May 2017 Details    Sun Mon Tue Wed Thu Fri Sat      1               2               3               4               5               6                 7               8      2.5 mg   See details      9      5 mg         10      5 mg         11      2.5 mg         12      5 mg         13      5 mg           14      5 mg         15      2.5 mg         16      5 mg         17      5 mg         18      2.5 mg         19      5 mg         20      5 mg           21      5 mg         22      2.5 mg         23      5 mg         24      5 mg         25      2.5 mg         26      5 mg         27      5 mg           28      5 mg         29      2.5 mg         30      5 mg         31                Date Details   05/08 This INR check       Date of next INR:  5/31/2017         How to take your warfarin dose     To take:  2.5 mg Take 0.5 of a 5 mg tablet.    To take:  5 mg Take 1 of the 5 mg tablets.

## 2017-05-08 NOTE — PROGRESS NOTES
ANTICOAGULATION FOLLOW-UP CLINIC VISIT    Patient Name:  Sawyer Renteria  Date:  5/8/2017  Contact Type:  Face to Face    SUBJECTIVE:     Patient Findings     Positives No Problem Findings           OBJECTIVE    INR Protime   Date Value Ref Range Status   05/08/2017 2.4 (A) 0.86 - 1.14 Final       ASSESSMENT / PLAN  INR assessment THER    Recheck INR In: 2 WEEKS    INR Location Clinic      Anticoagulation Summary as of 5/8/2017     INR goal 2.0-3.0   Today's INR 2.4   Maintenance plan 2.5 mg (5 mg x 0.5) on Mon, Thu; 5 mg (5 mg x 1) all other days   Full instructions 2.5 mg on Mon, Thu; 5 mg all other days   Weekly total 30 mg   No change documented Tayla Antonio RN   Plan last modified Penfield, Lynette E, RN (6/2/2016)   Next INR check 5/31/2017   Target end date Indefinite    Indications   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]  Transient cerebral ischemia [G45.9]  Long-term (current) use of anticoagulants [Z79.01] [Z79.01]         Anticoagulation Episode Summary     INR check location     Preferred lab     Send INR reminders to [facility] HEART INR NURSE    Comments       Anticoagulation Care Providers     Provider Role Specialty Phone number    Satya Newton MD Responsible Cardiology [phone]            See the Encounter Report to view Anticoagulation Flowsheet and Dosing Calendar (Go to Encounters tab in chart review, and find the Anticoagulation Therapy Visit)    INR 2.4.  Back in range after missing a dose and increased amount of spinach.  He would like to eat spinach about 1x per week for his normal routine.  Continue normal dosing and recheck in 2 weeks.  He gave himself his praulent injection in his thigh.  Turner Antonio, RN

## 2017-05-09 DIAGNOSIS — I25.10 CORONARY ARTERY DISEASE INVOLVING NATIVE CORONARY ARTERY OF NATIVE HEART WITHOUT ANGINA PECTORIS: ICD-10-CM

## 2017-05-09 DIAGNOSIS — I25.5 CARDIOMYOPATHY, ISCHEMIC: ICD-10-CM

## 2017-05-09 RX ORDER — METOPROLOL SUCCINATE 25 MG/1
12.5 TABLET, EXTENDED RELEASE ORAL DAILY
Qty: 45 TABLET | Refills: 3 | Status: SHIPPED | OUTPATIENT
Start: 2017-05-09 | End: 2017-08-28

## 2017-05-09 RX ORDER — LISINOPRIL 2.5 MG/1
2.5 TABLET ORAL DAILY
Qty: 90 TABLET | Refills: 3 | Status: SHIPPED | OUTPATIENT
Start: 2017-05-09 | End: 2017-08-28

## 2017-05-31 ENCOUNTER — ANTICOAGULATION THERAPY VISIT (OUTPATIENT)
Dept: CARDIOLOGY | Facility: CLINIC | Age: 82
End: 2017-05-31
Payer: COMMERCIAL

## 2017-05-31 DIAGNOSIS — Z79.01 LONG-TERM (CURRENT) USE OF ANTICOAGULANTS: ICD-10-CM

## 2017-05-31 DIAGNOSIS — G45.9 TRANSIENT CEREBRAL ISCHEMIA: ICD-10-CM

## 2017-05-31 DIAGNOSIS — I51.3 LEFT VENTRICULAR THROMBUS: ICD-10-CM

## 2017-05-31 LAB — INR POINT OF CARE: 2.4 (ref 0.86–1.14)

## 2017-05-31 PROCEDURE — 36416 COLLJ CAPILLARY BLOOD SPEC: CPT | Performed by: INTERNAL MEDICINE

## 2017-05-31 PROCEDURE — 85610 PROTHROMBIN TIME: CPT | Mod: QW | Performed by: INTERNAL MEDICINE

## 2017-05-31 NOTE — PROGRESS NOTES
ANTICOAGULATION FOLLOW-UP CLINIC VISIT    Patient Name:  Sawyer Renteria  Date:  5/31/2017  Contact Type:  Face to Face    SUBJECTIVE:        OBJECTIVE    INR Protime   Date Value Ref Range Status   05/31/2017 2.4 (A) 0.86 - 1.14 Final       ASSESSMENT / PLAN  INR assessment THER    Recheck INR In: 2 WEEKS    INR Location Clinic      Anticoagulation Summary as of 5/31/2017     INR goal 2.0-3.0   Today's INR 2.4   Maintenance plan 2.5 mg (5 mg x 0.5) on Mon, Thu; 5 mg (5 mg x 1) all other days   Full instructions 2.5 mg on Mon, Thu; 5 mg all other days   Weekly total 30 mg   No change documented Daija Santiago RN   Plan last modified Penfield, Lynette E, RN (6/2/2016)   Next INR check 6/14/2017   Target end date Indefinite    Indications   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]  Transient cerebral ischemia [G45.9]  Long-term (current) use of anticoagulants [Z79.01] [Z79.01]         Anticoagulation Episode Summary     INR check location     Preferred lab     Send INR reminders to [facility] HEART INR NURSE    Comments       Anticoagulation Care Providers     Provider Role Specialty Phone number    Satya Newton MD Responsible Cardiology [phone]            See the Encounter Report to view Anticoagulation Flowsheet and Dosing Calendar (Go to Encounters tab in chart review, and find the Anticoagulation Therapy Visit)     INR 2.4. No diet or medication changes. No bleeding or bruising issues reported. He will continue with his current warfarin dosing of 2.5 mg M/Th and 5 mg the other days. He self administered his Praluent injection to his left thigh. Recheck inr in 2 weeks.Dosage adjustment made based on physician directed care plan.    Daija Santiago RN

## 2017-06-08 ENCOUNTER — TELEPHONE (OUTPATIENT)
Dept: CARDIOLOGY | Facility: CLINIC | Age: 82
End: 2017-06-08

## 2017-06-08 ENCOUNTER — HOSPITAL ENCOUNTER (EMERGENCY)
Facility: CLINIC | Age: 82
Discharge: HOME | End: 2017-06-08
Attending: EMERGENCY MEDICINE | Admitting: EMERGENCY MEDICINE
Payer: MEDICARE

## 2017-06-08 VITALS
SYSTOLIC BLOOD PRESSURE: 157 MMHG | DIASTOLIC BLOOD PRESSURE: 52 MMHG | RESPIRATION RATE: 14 BRPM | HEIGHT: 62 IN | OXYGEN SATURATION: 98 % | BODY MASS INDEX: 22.63 KG/M2 | TEMPERATURE: 98.2 F | WEIGHT: 123 LBS

## 2017-06-08 DIAGNOSIS — Z79.01 CHRONIC ANTICOAGULATION: ICD-10-CM

## 2017-06-08 LAB — INR PPP: 2.94 (ref 0.86–1.14)

## 2017-06-08 PROCEDURE — 85610 PROTHROMBIN TIME: CPT | Performed by: EMERGENCY MEDICINE

## 2017-06-08 PROCEDURE — 99283 EMERGENCY DEPT VISIT LOW MDM: CPT

## 2017-06-08 ASSESSMENT — ENCOUNTER SYMPTOMS: COLOR CHANGE: 0

## 2017-06-08 NOTE — ED AVS SNAPSHOT
Emergency Department    [address] [facility] [address]    Phone:  [phone]    Fax:  [phone]                                       Sawyer Renteria   MRN: [id number]    Department:   Emergency Department   Date of Visit:  6/8/2017           Patient Information     Date Of Birth          3/24/1934        Your diagnoses for this visit were:     Chronic anticoagulation        You were seen by Rina Reyes MD.      Follow-up Information     Follow up with Francisco Doshi MD. Call in 1 day.    Specialties:  Family Practice, Obstetrics    Contact information:    [facility]  [address] [id number] [phone]          Follow up with  Emergency Department.    Specialty:  EMERGENCY MEDICINE    Why:  As needed, If symptoms worsen    Contact information:    [address] [facility] [id number] [phone]        Discharge Instructions       Call your primary tomorrow to let him know that your INR was 2.9.  Return with concerns for bleeding, lightheadedness, or any other complaints.    Future Appointments        Provider Department Dept Phone Center    6/14/2017 11:40 AM MAJANO P Anticoagulation Check [facility]ART AT [place] [phone] [facility] PSA CLIN      24 Hour Appointment Hotline       To make an appointment at any [facility] clinic, call [phone] ([phone]). If you don't have a family doctor or clinic, we will help you find one. [facility] clinics are conveniently located to serve the needs of you and your family.             Review of your medicines      Our records show that you are taking the medicines listed below. If these are incorrect, please call your family doctor or clinic.        Dose / Directions Last dose taken    alirocumab 75 MG/ML injectable pen   Commonly known as:  PRALUENT   Dose:  75 mg   Quantity:  2 mL        Inject 1 mL (75 mg) Subcutaneous every 14 days   Refills:  11         aspirin 81 MG tablet   Dose:  81 mg        Take 81 mg by mouth daily   Refills:  0        AVODART 0.5 MG capsule   Dose:  0.5 mg   Generic drug:  dutasteride        Take 0.5 mg by mouth daily   Refills:  0        FISH OIL + D3 3208-3465 MG-UNIT Caps   Dose:  2 tablet        Take 2 tablets by mouth daily   Refills:  0        lisinopril 2.5 MG tablet   Commonly known as:  PRINIVIL/Zestril   Dose:  2.5 mg   Quantity:  90 tablet        Take 1 tablet (2.5 mg) by mouth daily   Refills:  3        melatonin 5 MG tablet   Dose:  5 mg        Take 5 mg by mouth nightly as needed for sleep   Refills:  0        metoprolol 25 MG 24 hr tablet   Commonly known as:  TOPROL-XL   Dose:  12.5 mg   Quantity:  45 tablet        Take 0.5 tablets (12.5 mg) by mouth daily   Refills:  3        nitroglycerin 0.4 MG sublingual tablet   Commonly known as:  NITROSTAT   Dose:  0.4 mg   Quantity:  25 tablet        Place 1 tablet (0.4 mg) under the tongue every 5 minutes as needed for chest pain Take as directed   Refills:  0        vitamin D3 2000 UNITS Caps        Take by mouth daily   Refills:  0        warfarin 5 MG tablet   Commonly known as:  COUMADIN   Quantity:  90 tablet        Take 2.5mg on Mondays and Thursdays and 5mg the rest of the days or as directed by INR clinic, Coumadin SHIKHA   Refills:  1                Procedures and tests performed during your visit     INR      Orders Needing Specimen Collection     None      Pending Results     No orders found from 6/6/2017 to 6/9/2017.            Pending Culture Results     No orders found from 6/6/2017 to 6/9/2017.            Pending Results Instructions     If you had any lab results that were not finalized at the time of your Discharge, you can call the ED Lab Result RN at [phone]. You will be contacted by this team for any positive Lab results or changes in treatment. The nurses are available 7 days a week from 10A to 6:30P.  You can leave a message 24 hours per day and they will  return your call.        Test Results From Your Hospital Stay        6/8/2017  7:37 PM      Component Results     Component Value Ref Range & Units Status    INR 2.94 (H) 0.86 - 1.14 Final                Clinical Quality Measure: Blood Pressure Screening     Your blood pressure was checked while you were in the emergency department today. The last reading we obtained was  BP: 157/52 . Please read the guidelines below about what these numbers mean and what you should do about them.  If your systolic blood pressure (the top number) is less than 120 and your diastolic blood pressure (the bottom number) is less than 80, then your blood pressure is normal. There is nothing more that you need to do about it.  If your systolic blood pressure (the top number) is 120-139 or your diastolic blood pressure (the bottom number) is 80-89, your blood pressure may be higher than it should be. You should have your blood pressure rechecked within a year by a primary care provider.  If your systolic blood pressure (the top number) is 140 or greater or your diastolic blood pressure (the bottom number) is 90 or greater, you may have high blood pressure. High blood pressure is treatable, but if left untreated over time it can put you at risk for heart attack, stroke, or kidney failure. You should have your blood pressure rechecked by a primary care provider within the next 4 weeks.  If your provider in the emergency department today gave you specific instructions to follow-up with your doctor or provider even sooner than that, you should follow that instruction and not wait for up to 4 weeks for your follow-up visit.        Thank you for choosing [facility]       Thank you for choosing [facility] for your care. Our goal is always to provide you with excellent care. Hearing back from our patients is one way we can continue to improve our services. Please take a few minutes to complete the written survey that you may receive in the mail after  you visit with us. Thank you!        [employer] Information     "[employer] gives you secure access to your electronic health record. If you see a primary care provider, you can also send messages to your care team and make appointments. If you have questions, please call your primary care clinic.  If you do not have a primary care provider, please call [phone] and they will assist you.        Care EveryWhere ID     This is your Care EveryWhere ID. This could be used by other organizations to access your [facility] medical records  [id number]        After Visit Summary       This is your record. Keep this with you and show to your community pharmacist(s) and doctor(s) at your next visit.

## 2017-06-08 NOTE — ED AVS SNAPSHOT
Emergency Department    [address] [place] [address]    Phone:  [phone]    Fax:  [phone]                                       Sawyer Renteria   MRN: [id number]    Department:   Emergency Department   Date of Visit:  6/8/2017           After Visit Summary Signature Page     I have received my discharge instructions, and my questions have been answered. I have discussed any challenges I see with this plan with the nurse or doctor.    ..........................................................................................................................................  Patient/Patient Representative Signature      ..........................................................................................................................................  Patient Representative Print Name and Relationship to Patient    ..................................................               ................................................  Date                                            Time    ..........................................................................................................................................  Reviewed by Signature/Title    ...................................................              ..............................................  Date                                                            Time

## 2017-06-08 NOTE — TELEPHONE ENCOUNTER
Patient called and reported that he was brushing his tongue and began to notice bleeding form that area. Patient reported this as he takes warfarin and was concerned. Patient denied any current active bleeding from tongue. Patient denied any bleeding from his gums or any other part of his body. Patient denied any bruising on his body and confirmed that he is taking his warfarin as rx. Patient's last INR was 2.4 on 5/30/17. Patient scheduled for next INR on 6/14/17. RN advised patient to call clinic if bleeding returns or if he has any further concerns prior to his INR apt on 6/14/17. Patient acknowledged understanding and agreed with plan.

## 2017-06-09 ENCOUNTER — ANTICOAGULATION THERAPY VISIT (OUTPATIENT)
Dept: CARDIOLOGY | Facility: CLINIC | Age: 82
End: 2017-06-09
Payer: COMMERCIAL

## 2017-06-09 DIAGNOSIS — I51.3 LEFT VENTRICULAR THROMBUS: ICD-10-CM

## 2017-06-09 DIAGNOSIS — Z79.01 LONG-TERM (CURRENT) USE OF ANTICOAGULANTS: ICD-10-CM

## 2017-06-09 DIAGNOSIS — G45.9 TRANSIENT CEREBRAL ISCHEMIA: ICD-10-CM

## 2017-06-09 PROCEDURE — 99207 ZZC NO CHARGE LOS: CPT | Performed by: INTERNAL MEDICINE

## 2017-06-09 NOTE — PROGRESS NOTES
ANTICOAGULATION FOLLOW-UP CLINIC VISIT    Patient Name:  Sawyer Renteria  Date:  6/9/2017  Contact Type:  Telephone/ patient    SUBJECTIVE:     Patient Findings     Positives Other complaints           OBJECTIVE    INR   Date Value Ref Range Status   06/08/2017 2.94 (H) 0.86 - 1.14 Final       ASSESSMENT / PLAN  INR assessment THER    Recheck INR In: 5 DAYS    INR Location Clinic      Anticoagulation Summary as of 6/9/2017     INR goal 2.0-3.0   Today's INR 2.94 (6/8/2017)   Maintenance plan 2.5 mg (5 mg x 0.5) on Mon, Thu; 5 mg (5 mg x 1) all other days   Full instructions 6/9: 2.5 mg; Otherwise 2.5 mg on Mon, Thu; 5 mg all other days   Weekly total 30 mg   Plan last modified Penfield, Lynette E, RN (6/2/2016)   Next INR check 6/14/2017   Target end date Indefinite    Indications   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]  Transient cerebral ischemia [G45.9]  Long-term (current) use of anticoagulants [Z79.01] [Z79.01]         Anticoagulation Episode Summary     INR check location     Preferred lab     Send INR reminders to [facility] HEART INR NURSE    Comments       Anticoagulation Care Providers     Provider Role Specialty Phone number    Satya Newton MD Responsible Cardiology [phone]            See the Encounter Report to view Anticoagulation Flowsheet and Dosing Calendar (Go to Encounters tab in chart review, and find the Anticoagulation Therapy Visit)    Pt calling to report that he had bleeding from his tongue again last evening so he went to ER for evaluation. INR was 2.94. He initially noted bleeding from his tongue after he had brushed his tongue with a toothbrush. He doesn't think he has any cuts or infection on his tongue. Will decrease dosing tonight to 2.5 mg then resume usual dosing of 2.5 mg MTh and 5 mg all other days. Advised that he should not brush his tongue for the next week and eat only soft nonabrasive foods. Advised that if the bleeding occurs again then he should have it evaluated  by his PMD. Dosage adjustment made based on physician directed care plan.    Donna Colby RN

## 2017-06-09 NOTE — DISCHARGE INSTRUCTIONS
Call your primary tomorrow to let him know that your INR was 2.9.  Return with concerns for bleeding, lightheadedness, or any other complaints.

## 2017-06-09 NOTE — ED PROVIDER NOTES
"  History     Chief Complaint:  Abnormal Labs     HPI:  Sawyer Renteria is a 83 year old male with a history of CAD, MI, amongst others, and is on Coumadin who presents with abnormal laboratory values. The patient has a history of long-term anticoagulant use. His INR was last checked on 5/31 and was within normal limits. Today though, he noticed bleeding in his gums and tongue and became concerned about is INR. He contacted his physician who encouraged him to visit the ED if he was concerned about the amount of bleeding. He denies blood in his stool, excessive bruising, or any other bleeding.     Allergies:  Flomax [Tamsulosin]  Carvedilol  Colesevelam  Ezetimibe  Lisinopril  Pravastatin  Rosuvastatin  Simvastatin      Medications:    Metoprolol  Lisinopril  Praluent  Coumadin  Avodart  Melatonin  Fish Oil  Vitamin D3  Aspirin  Nitroglycerin     Past Medical History:    TIA  Myocardial infarction  Benign prostatic hyperplasia  Cardiomyopathy  CAD  GERD  Hyperlipidemia  Hypertension  Left ventricular thrombus    Past Surgical History:    Coronary angiography  Heart catheterization  Tonsillectomy/Adenoidectomy    Family History:    Heart Disease- Mother, Brother  CHF- Mother    Social History:  Smoking status: Never Smoker  Alcohol use: No   Marital Status:        Review of Systems   Constitutional:        Bleeding gums   Skin: Negative for color change.   All other systems reviewed and are negative.      Physical Exam     Patient Vitals for the past 24 hrs:   BP Temp Temp src Heart Rate Resp SpO2 Height Weight   06/08/17 1839 157/52 98.2  F (36.8  C) Oral 77 14 98 % 1.575 m (5' 2\") 55.8 kg (123 lb)      Physical Exam:  General: Resting on the gurney, appears comfortable  Head:  The scalp, face, and head appear normal  Mouth/Throat: Mucus membranes are moist.  No pallor.  No bleeding of the tongue or gums  CV:  Regular rate    Normal S1 and S2  No pathological murmur   Resp:  Breath sounds clear and equal " bilaterally    Non-labored, no retractions or accessory muscle use    No coarseness    No wheezing   GI:  Abdomen is soft, no rigidity    No tenderness to palpation  MS:  Normal motor assessment of all extremities.    Good capillary refill noted.  Skin:  No bruising or pallor.  No rash or lesions noted.  Neuro:  Speech is normal and fluent. No apparent deficit.  Psych: Awake. Alert.  Normal affect.      Appropriate interactions.      Emergency Department Course     Laboratory:  INR: 2.94    Emergency Department Course:  Past medical records, nursing notes, and vitals reviewed.  1903: I performed an exam of the patient and obtained history, as documented above.    Blood drawn.     I rechecked the patient.  Findings and plan explained to the Patient. Patient discharged home with instructions regarding supportive care, medications, and reasons to return. The importance of close follow-up was reviewed.      Impression & Plan      Medical Decision Making:  Sawyer Renteria is a 83 year old male who presents to have his INR checked as he was quite concerned oit may be elevated because he had bleeding form his tongue.  His INR was at goal when checked 9 days ago.  He has not noted any bruising, does not feel lightheaded, has not had chest pain, shortness of breath, or any other concerns.  His doctor recommended expectant manaagement but he was worried and chose to come to the ED to have his INR checked.  It was 2.94, therefore no changes needed.  He will contact his PCP tomorrow.       Diagnosis:    ICD-10-CM    1. Chronic anticoagulation Z79.01        Disposition:  discharged to home        Pat Ogden  6/8/2017    EMERGENCY DEPARTMENT    IPat am serving as a scribe at 7:03 PM on 6/8/2017 to document services personally performed by Rina Reyes MD based on my observations and the provider's statements to me.       Rina Reyes MD  06/08/17 9908

## 2017-06-09 NOTE — MR AVS SNAPSHOT
Sawyer Renetria   6/9/2017   Anticoagulation Therapy Visit    Description:  83 year old male   Provider:  Donna Colby, RN   Department:  [facility]           INR as of 6/9/2017     Today's INR 2.94 (6/8/2017)      Anticoagulation Summary as of 6/9/2017     INR goal 2.0-3.0   Today's INR 2.94 (6/8/2017)   Full instructions 6/9: 2.5 mg; Otherwise 2.5 mg on Mon, Thu; 5 mg all other days   Next INR check 6/14/2017    Indications   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]  Transient cerebral ischemia [G45.9]  Long-term (current) use of anticoagulants [Z79.01] [Z79.01]         Your next Anticoagulation Clinic appointment(s)     Jun 14, 2017 11:40 AM CDT   Anticoagulation Visit with MAJANO ANTICOAGULATION   [facility] ([facility])    [address] [place] [address]   [phone]              Contact Numbers     Anticoagulant (INR) Clinic Number: [phone]          June 2017 Details    Sun Mon Tue Wed Thu Fri Sat         1               2               3                 4               5               6               7               8               9      2.5 mg   See details      10      5 mg           11      5 mg         12      2.5 mg         13      5 mg         14            15               16               17                 18               19               20               21               22               23               24                 25               26               27               28               29               30                 Date Details   06/09 This INR check       Date of next INR:  6/14/2017         How to take your warfarin dose     To take:  2.5 mg Take 0.5 of a 5 mg tablet.    To take:  5 mg Take 1 of the 5 mg tablets.

## 2017-06-14 ENCOUNTER — TELEPHONE (OUTPATIENT)
Dept: CARDIOLOGY | Facility: CLINIC | Age: 82
End: 2017-06-14

## 2017-06-14 ENCOUNTER — ANTICOAGULATION THERAPY VISIT (OUTPATIENT)
Dept: CARDIOLOGY | Facility: CLINIC | Age: 82
End: 2017-06-14
Payer: COMMERCIAL

## 2017-06-14 DIAGNOSIS — I51.3 LEFT VENTRICULAR THROMBUS: ICD-10-CM

## 2017-06-14 DIAGNOSIS — G45.9 TRANSIENT CEREBRAL ISCHEMIA: ICD-10-CM

## 2017-06-14 DIAGNOSIS — Z79.01 LONG-TERM (CURRENT) USE OF ANTICOAGULANTS: ICD-10-CM

## 2017-06-14 LAB — INR POINT OF CARE: 1.3 (ref 0.86–1.14)

## 2017-06-14 PROCEDURE — 36416 COLLJ CAPILLARY BLOOD SPEC: CPT | Performed by: INTERNAL MEDICINE

## 2017-06-14 PROCEDURE — 85610 PROTHROMBIN TIME: CPT | Mod: QW | Performed by: INTERNAL MEDICINE

## 2017-06-14 NOTE — TELEPHONE ENCOUNTER
Pt called in he says he has some areas on arm that are very swollen. Pt asking RN to look at bites. Informed Pt to have nurse called when he is in clinic today. TIANA Painter RN

## 2017-06-14 NOTE — MR AVS SNAPSHOT
Sawyer Renteria   6/14/2017 11:40 AM   Anticoagulation Therapy Visit    Description:  83 year old male   Provider:  [facility]   Department:  [facility]           INR as of 6/14/2017     Today's INR 1.3!      Anticoagulation Summary as of 6/14/2017     INR goal 2.0-3.0   Today's INR 1.3!   Full instructions 6/14: 7.5 mg; 6/15: 5 mg; Otherwise 2.5 mg on Mon, Thu; 5 mg all other days   Next INR check 6/16/2017    Indications   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]  Transient cerebral ischemia [G45.9]  Long-term (current) use of anticoagulants [Z79.01] [Z79.01]         Your next Anticoagulation Clinic appointment(s)     Jun 16, 2017 10:00 AM CDT   Anticoagulation Visit with  ANTICOAGULATION   [facility] PHYSICIANS [facility] ([facility] Clin[facility])    [address] [place] [address]   [phone]              Contact Numbers     Anticoagulant (INR) Clinic Number: [phone]          June 2017 Details    Sun Mon Tue Wed Thu Fri Sat         1               2               3                 4               5               6               7               8               9               10                 11               12               13               14      7.5 mg   See details      15      5 mg         16            17                 18               19               20               21               22               23               24                 25               26               27               28               29               30                 Date Details   06/14 This INR check       Date of next INR:  6/16/2017         How to take your warfarin dose     To take:  5 mg Take 1 of the 5 mg tablets.    To take:  7.5 mg Take 1.5 of the 5 mg tablets.

## 2017-06-14 NOTE — PROGRESS NOTES
ANTICOAGULATION FOLLOW-UP CLINIC VISIT    Patient Name:  Sawyer Renteria  Date:  6/14/2017  Contact Type:  Face to Face    SUBJECTIVE:        OBJECTIVE    INR Protime   Date Value Ref Range Status   06/14/2017 1.3 (A) 0.86 - 1.14 Final       ASSESSMENT / PLAN  INR assessment SUB    Recheck INR In: 2 DAYS    INR Location Clinic      Anticoagulation Summary as of 6/14/2017     INR goal 2.0-3.0   Today's INR 1.3!   Maintenance plan 2.5 mg (5 mg x 0.5) on Mon, Thu; 5 mg (5 mg x 1) all other days   Full instructions 6/14: 7.5 mg; 6/15: 5 mg; Otherwise 2.5 mg on Mon, Thu; 5 mg all other days   Weekly total 30 mg   Plan last modified Penfield, Lynette E, RN (6/2/2016)   Next INR check 6/16/2017   Target end date Indefinite    Indications   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]  Transient cerebral ischemia [G45.9]  Long-term (current) use of anticoagulants [Z79.01] [Z79.01]         Anticoagulation Episode Summary     INR check location     Preferred lab     Send INR reminders to [facility] HEART INR NURSE    Comments       Anticoagulation Care Providers     Provider Role Specialty Phone number    Satya Newton MD Responsible Cardiology [phone]            See the Encounter Report to view Anticoagulation Flowsheet and Dosing Calendar (Go to Encounters tab in chart review, and find the Anticoagulation Therapy Visit)    INR 1.3. He is very anxious today. He was working in his garden a couple of days ago and ended up with multiple bug bites on both forearms. He had some swelling along his veins so he went to the ED, no treatment, he was sent home. His inr was 2.9, someone told him it was high and he became anxious so he held to doses on his own. I explained that an inr of 2.9 in normal for him as it is within his range of 2-3. He also reported bleeding from his mouth this morning. He denied brushing his teeth and tongue to hard. He said it did stop with rinsing. He will take 7.5 mg today, and 5 mg tomorrow. Recheck inr  on Friday. Praluent injection witnessed. He gave it IM in his right thigh.Dosage adjustment made based on physician directed care plan.    Daija Santiago RN

## 2017-06-16 ENCOUNTER — ANTICOAGULATION THERAPY VISIT (OUTPATIENT)
Dept: CARDIOLOGY | Facility: CLINIC | Age: 82
End: 2017-06-16
Payer: COMMERCIAL

## 2017-06-16 DIAGNOSIS — Z79.01 LONG-TERM (CURRENT) USE OF ANTICOAGULANTS: ICD-10-CM

## 2017-06-16 DIAGNOSIS — G45.9 TRANSIENT CEREBRAL ISCHEMIA: ICD-10-CM

## 2017-06-16 DIAGNOSIS — I51.3 LEFT VENTRICULAR THROMBUS: ICD-10-CM

## 2017-06-16 LAB — INR POINT OF CARE: 1.6 (ref 0.86–1.14)

## 2017-06-16 PROCEDURE — 36416 COLLJ CAPILLARY BLOOD SPEC: CPT | Performed by: INTERNAL MEDICINE

## 2017-06-16 PROCEDURE — 85610 PROTHROMBIN TIME: CPT | Mod: QW | Performed by: INTERNAL MEDICINE

## 2017-06-16 NOTE — PROGRESS NOTES
ANTICOAGULATION FOLLOW-UP CLINIC VISIT    Patient Name:  Sawyer Renteria  Date:  6/16/2017  Contact Type:  Face to Face    SUBJECTIVE:     Patient Findings     Positives Missed doses (pt had held 2 days dosing within the last week (on his own))           OBJECTIVE    INR Protime   Date Value Ref Range Status   06/16/2017 1.6 (A) 0.86 - 1.14 Final       ASSESSMENT / PLAN  INR assessment SUB    Recheck INR In: 1 WEEK    INR Location Clinic      Anticoagulation Summary as of 6/16/2017     INR goal 2.0-3.0   Today's INR 1.6!   Maintenance plan 2.5 mg (5 mg x 0.5) on Mon, Thu; 5 mg (5 mg x 1) all other days   Full instructions 2.5 mg on Mon, Thu; 5 mg all other days   Weekly total 30 mg   Plan last modified Penfield, Lynette E, RN (6/2/2016)   Next INR check 6/23/2017   Target end date Indefinite    Indications   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]  Transient cerebral ischemia [G45.9]  Long-term (current) use of anticoagulants [Z79.01] [Z79.01]         Anticoagulation Episode Summary     INR check location     Preferred lab     Send INR reminders to [facility] HEART INR NURSE    Comments       Anticoagulation Care Providers     Provider Role Specialty Phone number    Satya Newton MD Responsible Cardiology [phone]            See the Encounter Report to view Anticoagulation Flowsheet and Dosing Calendar (Go to Encounters tab in chart review, and find the Anticoagulation Therapy Visit)    INR 1.6 INR coming up after boost in dosing this week. He had noted bleeding from his mouth last week (?gums or tongue) so he went to ER for evaluation. INR was 2.94. Last Friday there was a one time decrease in dosing by 2.5 mg but then pt also held 2.5 mg on 2 other days during the week and INR fell to 1.3 on 6/14. No further bleeding noted. Not seeing the effect of yesterday's boost in dosing yet so will continue his usual dosing of 2.5 mg MTh and 5 mg all other days (taking larger dosing of 5 mg daily for the next 3 days)  with recheck in 1 week. Dosage adjustment made based on physician directed care plan.    Donna Colby RN

## 2017-06-16 NOTE — MR AVS SNAPSHOT
Sawyer Renteria   6/16/2017 10:00 AM   Anticoagulation Therapy Visit    Description:  83 year old male   Provider:  RGETEL ANTICOAGULATION   Department:  [facility]           INR as of 6/16/2017     Today's INR 1.6!      Anticoagulation Summary as of 6/16/2017     INR goal 2.0-3.0   Today's INR 1.6!   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]  Transient cerebral ischemia [G45.9]  Long-term (current) use of anticoagulants [Z79.01] [Z79.01]         Your next Anticoagulation Clinic appointment(s)     Jun 23, 2017 10:40 AM CDT   Anticoagulation Visit with  ANTICOAGULATION   [facility] ([facility])    [address] [place] [address]   [phone]              Contact Numbers     Anticoagulant (INR) Clinic Number: [id number]          June 2017 Details    Sun Mon Tue Wed Thu Fri Sat         1               2               3                 4               5               6               7               8               9               10                 11               12               13               14               15               16      5 mg   See details      17      5 mg           18      5 mg         19      2.5 mg         20      5 mg         21      5 mg         22      2.5 mg         23            24                 25               26               27               28               29               30                 Date Details   06/16 This INR check       Date of next INR:  6/23/2017         How to take your warfarin dose     To take:  2.5 mg Take 0.5 of a 5 mg tablet.    To take:  5 mg Take 1 of the 5 mg tablets.

## 2017-06-23 ENCOUNTER — ANTICOAGULATION THERAPY VISIT (OUTPATIENT)
Dept: CARDIOLOGY | Facility: CLINIC | Age: 82
End: 2017-06-23
Payer: COMMERCIAL

## 2017-06-23 DIAGNOSIS — Z79.01 LONG-TERM (CURRENT) USE OF ANTICOAGULANTS: ICD-10-CM

## 2017-06-23 DIAGNOSIS — G45.9 TRANSIENT CEREBRAL ISCHEMIA: ICD-10-CM

## 2017-06-23 DIAGNOSIS — I51.3 LEFT VENTRICULAR THROMBUS: ICD-10-CM

## 2017-06-23 LAB — INR POINT OF CARE: 3.3 (ref 0.86–1.14)

## 2017-06-23 PROCEDURE — 85610 PROTHROMBIN TIME: CPT | Mod: QW | Performed by: INTERNAL MEDICINE

## 2017-06-23 PROCEDURE — 36416 COLLJ CAPILLARY BLOOD SPEC: CPT | Performed by: INTERNAL MEDICINE

## 2017-06-23 NOTE — MR AVS SNAPSHOT
Sawyer Renteria   6/23/2017 10:40 AM   Anticoagulation Therapy Visit    Description:  83 year old male   Provider:   ANTICOAGULATION   Department:  [facility]           INR as of 6/23/2017     Today's INR 3.3!      Anticoagulation Summary as of 6/23/2017     INR goal 2.0-3.0   Today's INR 3.3!   Full instructions 6/23: 2.5 mg; Otherwise 2.5 mg on Mon, Thu; 5 mg all other days   Next INR check 6/30/2017    Indications   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]  Transient cerebral ischemia [G45.9]  Long-term (current) use of anticoagulants [Z79.01] [Z79.01]         Your next Anticoagulation Clinic appointment(s)     Jun 23, 2017 10:40 AM CDT   Anticoagulation Visit with  ANTICOAGULATION   [facility] ([facility])    [address] [place] [id number]   [phone]            Jun 30, 2017 10:40 AM CDT   Anticoagulation Visit with  ANTICOAGULATION   [facility] ([facility])    [address] [place] [id number]   [phone]              Contact Numbers     Anticoagulant (INR) Clinic Number: [id number]          June 2017 Details    Sun Mon Tue Wed Thu Fri Sat         1               2               3                 4               5               6               7               8               9               10                 11               12               13               14               15               16               17                 18               19               20               21               22               23      2.5 mg   See details      24      5 mg           25      5 mg         26      2.5 mg         27      5 mg         28      5 mg         29      2.5 mg         30              Date Details   06/23 This INR check       Date of next INR:  6/30/2017         How to take your warfarin dose     To take:  2.5 mg Take 0.5 of a 5 mg tablet.     To take:  5 mg Take 1 of the 5 mg tablets.

## 2017-06-23 NOTE — PROGRESS NOTES
ANTICOAGULATION FOLLOW-UP CLINIC VISIT    Patient Name:  Sawyer Renteria  Date:  6/23/2017  Contact Type:  Face to Face    SUBJECTIVE:        OBJECTIVE    INR Protime   Date Value Ref Range Status   06/23/2017 3.3 (A) 0.86 - 1.14 Final       ASSESSMENT / PLAN  INR assessment SUPRA    Recheck INR In: 1 WEEK    INR Location Clinic      Anticoagulation Summary as of 6/23/2017     INR goal 2.0-3.0   Today's INR 3.3!   Maintenance plan 2.5 mg (5 mg x 0.5) on Mon, Thu; 5 mg (5 mg x 1) all other days   Full instructions 6/23: 2.5 mg; Otherwise 2.5 mg on Mon, Thu; 5 mg all other days   Weekly total 30 mg   Plan last modified Penfield, Lynette E, RN (6/2/2016)   Next INR check 6/30/2017   Target end date Indefinite    Indications   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]  Transient cerebral ischemia [G45.9]  Long-term (current) use of anticoagulants [Z79.01] [Z79.01]         Anticoagulation Episode Summary     INR check location     Preferred lab     Send INR reminders to [facility] HEART INR NURSE    Comments       Anticoagulation Care Providers     Provider Role Specialty Phone number    Satya Newton MD Responsible Cardiology [phone]          INR 3.3. Pt said he has been taking 5 mg daily,but was instructed to go back to taking his normal dosing of 2.5 mg M,Th and 5 mg ROW last time. Pt did not remember to do this. No bleeding or bruising issues. He said he has been eating a lot of spinach this week and INR still elevated. Will have him decrease today to 2.5 mg then resume taking 2.5 mg M,Th and 5 mg ROW. Pt requested INR check in one week so he can also bring his PCSK9 injection to do at that time.  See the Encounter Report to view Anticoagulation Flowsheet and Dosing Calendar (Go to Encounters tab in chart review, and find the Anticoagulation Therapy Visit)    Dosage adjustment made based on physician directed care plan.    Tena Tran RN

## 2017-06-30 ENCOUNTER — ANTICOAGULATION THERAPY VISIT (OUTPATIENT)
Dept: CARDIOLOGY | Facility: CLINIC | Age: 82
End: 2017-06-30
Payer: COMMERCIAL

## 2017-06-30 DIAGNOSIS — G45.9 TRANSIENT CEREBRAL ISCHEMIA: ICD-10-CM

## 2017-06-30 DIAGNOSIS — Z79.01 LONG-TERM (CURRENT) USE OF ANTICOAGULANTS: ICD-10-CM

## 2017-06-30 LAB — INR POINT OF CARE: 3.2 (ref 0.86–1.14)

## 2017-06-30 PROCEDURE — 85610 PROTHROMBIN TIME: CPT | Mod: QW | Performed by: INTERNAL MEDICINE

## 2017-06-30 PROCEDURE — 36416 COLLJ CAPILLARY BLOOD SPEC: CPT | Performed by: INTERNAL MEDICINE

## 2017-06-30 PROCEDURE — 99207 ZZC NO CHARGE NURSE ONLY: CPT | Performed by: INTERNAL MEDICINE

## 2017-06-30 NOTE — PROGRESS NOTES
ANTICOAGULATION FOLLOW-UP CLINIC VISIT    Patient Name:  Sawyer Renteria  Date:  6/30/2017  Contact Type:  Face to Face    SUBJECTIVE:     Patient Findings     Positives No Problem Findings           OBJECTIVE    INR Protime   Date Value Ref Range Status   06/30/2017 3.2 (A) 0.86 - 1.14 Final       ASSESSMENT / PLAN  INR assessment SUPRA    Recheck INR In: 2 WEEKS    INR Location Clinic      Anticoagulation Summary as of 6/30/2017     INR goal 2.0-3.0   Today's INR 3.2!   Maintenance plan 2.5 mg (5 mg x 0.5) on Mon, Thu; 5 mg (5 mg x 1) all other days   Full instructions 6/30: 2.5 mg; Otherwise 2.5 mg on Mon, Thu; 5 mg all other days   Weekly total 30 mg   Plan last modified Penfield, Lynette E, RN (6/2/2016)   Next INR check 7/14/2017   Target end date Indefinite    Indications   Left ventricular thrombus [OUR5311]  Transient cerebral ischemia [G45.9]  Long-term (current) use of anticoagulants [Z79.01] [Z79.01]         Anticoagulation Episode Summary     INR check location     Preferred lab     Send INR reminders to [facility] HEART INR NURSE    Comments       Anticoagulation Care Providers     Provider Role Specialty Phone number    Satya Newton MD Responsible Cardiology [phone]            See the Encounter Report to view Anticoagulation Flowsheet and Dosing Calendar (Go to Encounters tab in chart review, and find the Anticoagulation Therapy Visit)    INR 3.2, he was 3.3 last ov. He has been taking one ES tylenol for the past 3 nights. States he needs this to help him sleep. No diet changes. No bleeding or unusual bruising issues reported. He will take 2.5 mg today, then resume his normal warfarin dosing of 2.5 mg M/Th and 5 mg the other days. He gave himself his Praluent injection in left thigh. Recheck inr in 2 weeks. Dosage adjustment made based on physician directed care plan.    Daija Santiago RN

## 2017-06-30 NOTE — MR AVS SNAPSHOT
Sawyer Renteria   6/30/2017 10:40 AM   Anticoagulation Therapy Visit    Description:  83 year old male   Provider:  GRETEL ANTICOAGULATION   Department:  [facility]           INR as of 6/30/2017     Today's INR 3.2!      Anticoagulation Summary as of 6/30/2017     INR goal 2.0-3.0   Today's INR 3.2!   Full instructions 6/30: 2.5 mg; Otherwise 2.5 mg on Mon, Thu; 5 mg all other days   Next INR check 7/14/2017    Indications   Left ventricular thrombus [MIW9550]  Transient cerebral ischemia [G45.9]  Long-term (current) use of anticoagulants [Z79.01] [Z79.01]         Your next Anticoagulation Clinic appointment(s)     Jul 14, 2017 10:40 AM CDT   Anticoagulation Visit with  ANTICOAGULATION   [facility] ([facility])    [address] [place] [id number]   [phone]              Contact Numbers     Anticoagulant (INR) Clinic Number: [id number]          June 2017 Details    Sun Mon Tue Wed Thu Fri Sat         1               2               3                 4               5               6               7               8               9               10                 11               12               13               14               15               16               17                 18               19               20               21               22               23               24                 25               26               27               28               29               30      2.5 mg   See details        Date Details   06/30 This INR check               How to take your warfarin dose     To take:  2.5 mg Take 0.5 of a 5 mg tablet.           July 2017 Details    Sun Mon Tue Wed Thu Fri Sat           1      5 mg           2      5 mg         3      2.5 mg         4      5 mg         5      5 mg         6      2.5 mg         7      5 mg         8      5 mg           9      5 mg         10      2.5 mg         11      5  mg         12      5 mg         13      2.5 mg         14            15                 16               17               18               19               20               21               22                 23               24               25               26               27               28               29                 30               31                     Date Details   No additional details    Date of next INR:  7/14/2017         How to take your warfarin dose     To take:  2.5 mg Take 0.5 of a 5 mg tablet.    To take:  5 mg Take 1 of the 5 mg tablets.

## 2017-07-14 ENCOUNTER — ANTICOAGULATION THERAPY VISIT (OUTPATIENT)
Dept: CARDIOLOGY | Facility: CLINIC | Age: 82
End: 2017-07-14
Payer: COMMERCIAL

## 2017-07-14 DIAGNOSIS — Z79.01 LONG-TERM (CURRENT) USE OF ANTICOAGULANTS: ICD-10-CM

## 2017-07-14 DIAGNOSIS — G45.9 TRANSIENT CEREBRAL ISCHEMIA: ICD-10-CM

## 2017-07-14 LAB — INR POINT OF CARE: 1.5 (ref 0.86–1.14)

## 2017-07-14 PROCEDURE — 36416 COLLJ CAPILLARY BLOOD SPEC: CPT | Performed by: INTERNAL MEDICINE

## 2017-07-14 PROCEDURE — 99207 ZZC NO CHARGE NURSE ONLY: CPT | Performed by: INTERNAL MEDICINE

## 2017-07-14 PROCEDURE — 85610 PROTHROMBIN TIME: CPT | Mod: QW | Performed by: INTERNAL MEDICINE

## 2017-07-14 NOTE — PROGRESS NOTES
ANTICOAGULATION FOLLOW-UP CLINIC VISIT    Patient Name:  Sawyer Renteria  Date:  7/14/2017  Contact Type:  Face to Face    SUBJECTIVE:     Patient Findings     Positives No Problem Findings           OBJECTIVE    INR Protime   Date Value Ref Range Status   07/14/2017 1.5 (A) 0.86 - 1.14 Final       ASSESSMENT / PLAN  INR assessment SUB    Recheck INR In: 2 WEEKS    INR Location Clinic      Anticoagulation Summary as of 7/14/2017     INR goal 2.0-3.0   Today's INR 1.5!   Maintenance plan 2.5 mg (5 mg x 0.5) on Mon, Thu; 5 mg (5 mg x 1) all other days   Full instructions 7/14: 7.5 mg; Otherwise 2.5 mg on Mon, Thu; 5 mg all other days   Weekly total 30 mg   Plan last modified Penfield, Lynette E, RN (6/2/2016)   Next INR check 7/31/2017   Target end date Indefinite    Indications   Left ventricular thrombus [DLI7933]  Transient cerebral ischemia [G45.9]  Long-term (current) use of anticoagulants [Z79.01] [Z79.01]         Anticoagulation Episode Summary     INR check location     Preferred lab     Send INR reminders to [facility] INR NURSE    Comments       Anticoagulation Care Providers     Provider Role Specialty Phone number    Satya Newton MD Responsible Cardiology [phone]            See the Encounter Report to view Anticoagulation Flowsheet and Dosing Calendar (Go to Encounters tab in chart review, and find the Anticoagulation Therapy Visit)    INR 1.5 No complications noted. No med changes. INR was high last two visits and a 1x dose decrease was instructed at last visit two weeks ago. He also ate a lot of spinach this week ( 3 large servings). He will avoid spinach x 3 days and take a 1x boost tonight (7.5 mg) then continue usual 2.5mg MonThur and 5mg ROW. Recheck in 2 weeks.    He self-administered his Praluent injection in left thigh today.     Tatiana Avalos RN

## 2017-07-14 NOTE — MR AVS SNAPSHOT
Sawyer Renteria   7/14/2017 10:40 AM   Anticoagulation Therapy Visit    Description:  83 year old male   Provider:  GRETEL ANTICOAGULATION   Department:  [facility]           INR as of 7/14/2017     Today's INR 1.5!      Anticoagulation Summary as of 7/14/2017     INR goal 2.0-3.0   Today's INR 1.5!   Full instructions 7/14: 7.5 mg; Otherwise 2.5 mg on Mon, Thu; 5 mg all other days   Next INR check 7/31/2017    Indications   Left ventricular thrombus [XGD9720]  Transient cerebral ischemia [G45.9]  Long-term (current) use of anticoagulants [Z79.01] [Z79.01]         Your next Anticoagulation Clinic appointment(s)     Jul 31, 2017 10:40 AM CDT   Anticoagulation Visit with  ANTICOAGULATION   [facility] [place] ([facility])    [address] [place] [phone]   [phone]              Contact Numbers     Anticoagulant (INR) Clinic Number: [id number]          July 2017 Details    Sun Mon Tue Wed Thu Fri Sat           1                 2               3               4               5               6               7               8                 9               10               11               12               13               14      7.5 mg   See details      15      5 mg           16      5 mg         17      2.5 mg         18      5 mg         19      5 mg         20      2.5 mg         21      5 mg         22      5 mg           23      5 mg         24      2.5 mg         25      5 mg         26      5 mg         27      2.5 mg         28      5 mg         29      5 mg           30      5 mg         31                  Date Details   07/14 This INR check       Date of next INR:  7/31/2017         How to take your warfarin dose     To take:  2.5 mg Take 0.5 of a 5 mg tablet.    To take:  5 mg Take 1 of the 5 mg tablets.    To take:  7.5 mg Take 1.5 of the 5 mg tablets.

## 2017-07-25 ENCOUNTER — TRANSFERRED RECORDS (OUTPATIENT)
Dept: CARDIOLOGY | Facility: CLINIC | Age: 82
End: 2017-07-25

## 2017-07-25 LAB
ALBUMIN SERPL-MCNC: 4 G/DL
ALP SERPL-CCNC: 68 U/L
ALT SERPL-CCNC: 14 U/L
ANION GAP SERPL CALCULATED.3IONS-SCNC: NORMAL MMOL/L
AST SERPL-CCNC: 21 U/L
BILIRUB SERPL-MCNC: 0.4 MG/DL
BUN SERPL-MCNC: 20 MG/DL
CALCIUM SERPL-MCNC: 8.7 MG/DL
CHLORIDE SERPLBLD-SCNC: 105 MMOL/L
CHOLEST SERPL-MCNC: 155 MG/DL
CO2 SERPL-SCNC: 23 MMOL/L
CREAT SERPL-MCNC: 1.22 MG/DL
GFR SERPL CREATININE-BSD FRML MDRD: 54 ML/MIN/1.73M2
GLUCOSE SERPL-MCNC: 95 MG/DL (ref 70–99)
HDLC SERPL-MCNC: 59 MG/DL
LDLC SERPL CALC-MCNC: 70 MG/DL
POTASSIUM SERPL-SCNC: 4.7 MMOL/L
PROT SERPL-MCNC: 6.5 G/DL
SODIUM SERPL-SCNC: 143 MMOL/L
TRIGL SERPL-MCNC: 129 MG/DL
VLDL CHOLESTEROL: 26 MG/DL

## 2017-07-31 ENCOUNTER — ANTICOAGULATION THERAPY VISIT (OUTPATIENT)
Dept: CARDIOLOGY | Facility: CLINIC | Age: 82
End: 2017-07-31
Payer: COMMERCIAL

## 2017-07-31 DIAGNOSIS — Z79.01 LONG-TERM (CURRENT) USE OF ANTICOAGULANTS: ICD-10-CM

## 2017-07-31 LAB — INR POINT OF CARE: 2.8 (ref 0.86–1.14)

## 2017-07-31 PROCEDURE — 36416 COLLJ CAPILLARY BLOOD SPEC: CPT | Performed by: INTERNAL MEDICINE

## 2017-07-31 PROCEDURE — 85610 PROTHROMBIN TIME: CPT | Mod: QW | Performed by: INTERNAL MEDICINE

## 2017-07-31 NOTE — MR AVS SNAPSHOT
Sawyer Renteria   7/31/2017 10:40 AM   Anticoagulation Therapy Visit    Description:  83 year old male   Provider:  MAJANO ANTICOAGULATION   Department:  [facility]           INR as of 7/31/2017     Today's INR 2.8      Anticoagulation Summary as of 7/31/2017     INR goal 2.0-3.0   Today's INR 2.8   Full instructions 2.5 mg on Mon, Thu; 5 mg all other days   Next INR check 8/14/2017    Indications   Left ventricular thrombus [WYB4151]  Transient cerebral ischemia [G45.9]  Long-term (current) use of anticoagulants [Z79.01] [Z79.01]         Your next Anticoagulation Clinic appointment(s)     Jul 31, 2017 10:40 AM CDT   Anticoagulation Visit with  ANTICOAGULATION   [facility] ([facility])    [address] [place] [phone]   [phone]            Aug 14, 2017 10:20 AM CDT   Anticoagulation Visit with  ANTICOAGULATION   [facility] ([facility])    [address] [place] [id number]   [phone]              Contact Numbers     Anticoagulant (INR) Clinic Number: [id number]          July 2017 Details    Sun Mon Tue Wed Thu Fri Sat           1                 2               3               4               5               6               7               8                 9               10               11               12               13               14               15                 16               17               18               19               20               21               22                 23               24               25               26               27               28               29                 30               31      2.5 mg   See details            Date Details   07/31 This INR check               How to take your warfarin dose     To take:  2.5 mg Take 0.5 of a 5 mg tablet.           August 2017 Details    Sun Mon Tue Wed Thu Fri  Sat       1      5 mg         2      5 mg         3      2.5 mg         4      5 mg         5      5 mg           6      5 mg         7      2.5 mg         8      5 mg         9      5 mg         10      2.5 mg         11      5 mg         12      5 mg           13      5 mg         14            15               16               17               18               19                 20               21               22               23               24               25               26                 27               28               29               30               31                  Date Details   No additional details    Date of next INR:  8/14/2017         How to take your warfarin dose     To take:  2.5 mg Take 0.5 of a 5 mg tablet.    To take:  5 mg Take 1 of the 5 mg tablets.

## 2017-07-31 NOTE — PROGRESS NOTES
ANTICOAGULATION FOLLOW-UP CLINIC VISIT    Patient Name:  Sawyer Renteria  Date:  7/31/2017  Contact Type:  Face to Face    SUBJECTIVE:     Patient Findings     Positives No Problem Findings           OBJECTIVE    INR Protime   Date Value Ref Range Status   07/31/2017 2.8 (A) 0.86 - 1.14 Final       ASSESSMENT / PLAN  INR assessment THER    Recheck INR In: 2 WEEKS    INR Location Clinic      Anticoagulation Summary as of 7/31/2017     INR goal 2.0-3.0   Today's INR 2.8   Maintenance plan 2.5 mg (5 mg x 0.5) on Mon, Thu; 5 mg (5 mg x 1) all other days   Full instructions 2.5 mg on Mon, Thu; 5 mg all other days   Weekly total 30 mg   No change documented Tayla Antonio RN   Plan last modified Penfield, Lynette E, RN (6/2/2016)   Next INR check 8/14/2017   Target end date Indefinite    Indications   Left ventricular thrombus [NHO6183]  Transient cerebral ischemia [G45.9]  Long-term (current) use of anticoagulants [Z79.01] [Z79.01]         Anticoagulation Episode Summary     INR check location     Preferred lab     Send INR reminders to [facility] HEART INR NURSE    Comments       Anticoagulation Care Providers     Provider Role Specialty Phone number    Satya Newton MD Responsible Cardiology [phone]            See the Encounter Report to view Anticoagulation Flowsheet and Dosing Calendar (Go to Encounters tab in chart review, and find the Anticoagulation Therapy Visit)    INR 2.8.  Back in range after 1x boost.  He has been avoiding greens but will add back greens 1x each week now.  Will continue the normal schedule and recheck in 2 weeks then again 2 weeks later after seeing Dr. Newton.  He gave himself his praulent injection in his right thigh.  Turner Antonio, RN

## 2017-08-01 DIAGNOSIS — I25.10 CORONARY ARTERY DISEASE INVOLVING NATIVE CORONARY ARTERY OF NATIVE HEART WITHOUT ANGINA PECTORIS: Primary | ICD-10-CM

## 2017-08-14 ENCOUNTER — ANTICOAGULATION THERAPY VISIT (OUTPATIENT)
Dept: CARDIOLOGY | Facility: CLINIC | Age: 82
End: 2017-08-14
Payer: COMMERCIAL

## 2017-08-14 DIAGNOSIS — G45.9 TRANSIENT CEREBRAL ISCHEMIA, UNSPECIFIED TYPE: ICD-10-CM

## 2017-08-14 DIAGNOSIS — Z79.01 LONG-TERM (CURRENT) USE OF ANTICOAGULANTS: ICD-10-CM

## 2017-08-14 LAB — INR POINT OF CARE: 3.4 (ref 0.86–1.14)

## 2017-08-14 PROCEDURE — 85610 PROTHROMBIN TIME: CPT | Mod: QW | Performed by: INTERNAL MEDICINE

## 2017-08-14 PROCEDURE — 36416 COLLJ CAPILLARY BLOOD SPEC: CPT | Performed by: INTERNAL MEDICINE

## 2017-08-14 NOTE — MR AVS SNAPSHOT
Sawyer Renteria   8/14/2017 10:20 AM   Anticoagulation Therapy Visit    Description:  83 year old male   Provider:  MAJANO ANTICOAGULATION   Department:  [facility] Card[facility]           INR as of 8/14/2017     Today's INR 3.4!      Anticoagulation Summary as of 8/14/2017     INR goal 2.0-3.0   Today's INR 3.4!   Full instructions 8/14: Hold; Otherwise 2.5 mg on Mon, Thu; 5 mg all other days   Next INR check 8/28/2017    Indications   Left ventricular thrombus [AJH3636]  Transient cerebral ischemia [G45.9]  Long-term (current) use of anticoagulants [Z79.01] [Z79.01]         Your next Anticoagulation Clinic appointment(s)     Aug 14, 2017 10:20 AM CDT   Anticoagulation Visit with  ANTICOAGULATION   [facility] ([facility])    [address] [place] [phone]   [phone]            Aug 28, 2017  3:00 PM CDT   Anticoagulation Visit with  ANTICOAGULATION   [facility] ([facility])    [address] [place] [address]   [phone]              Contact Numbers     Anticoagulant (INR) Clinic Number: [id number]          August 2017 Details    Sun Mon Tue Wed Thu Fri Sat       1               2               3               4               5                 6               7               8               9               10               11               12                 13               14      Hold   See details      15      5 mg         16      5 mg         17      2.5 mg         18      5 mg         19      5 mg           20      5 mg         21      2.5 mg         22      5 mg         23      5 mg         24      2.5 mg         25      5 mg         26      5 mg           27      5 mg         28            29               30               31                  Date Details   08/14 This INR check       Date of next INR:  8/28/2017         How to take your warfarin dose     To  take:  2.5 mg Take 0.5 of a 5 mg tablet.    To take:  5 mg Take 1 of the 5 mg tablets.    Hold Do not take your warfarin dose. See the Details table to the right for additional instructions.

## 2017-08-14 NOTE — PROGRESS NOTES
ANTICOAGULATION FOLLOW-UP CLINIC VISIT    Patient Name:  Sawyer Renteria  Date:  8/14/2017  Contact Type:  Face to Face    SUBJECTIVE:     Patient Findings     Positives Change in diet/appetite (eating less greens this week), OTC meds (Taking Tylenol for sleep several times and Tyl #3 twice for jaw pain (dental pain))           OBJECTIVE    INR Protime   Date Value Ref Range Status   08/14/2017 3.4 (A) 0.86 - 1.14 Final       ASSESSMENT / PLAN  INR assessment SUPRA    Recheck INR In: 2 WEEKS    INR Location Clinic      Anticoagulation Summary as of 8/14/2017     INR goal 2.0-3.0   Today's INR 3.4!   Maintenance plan 2.5 mg (5 mg x 0.5) on Mon, Thu; 5 mg (5 mg x 1) all other days   Full instructions 8/14: Hold; Otherwise 2.5 mg on Mon, Thu; 5 mg all other days   Weekly total 30 mg   Plan last modified Penfield, Lynette E, RN (6/2/2016)   Next INR check 8/28/2017   Target end date Indefinite    Indications   Left ventricular thrombus [BQW7134]  Transient cerebral ischemia [G45.9]  Long-term (current) use of anticoagulants [Z79.01] [Z79.01]         Anticoagulation Episode Summary     INR check location     Preferred lab     Send INR reminders to [facility] HEART INR NURSE    Comments       Anticoagulation Care Providers     Provider Role Specialty Phone number    Satya Newton MD Responsible Cardiology [phone]            See the Encounter Report to view Anticoagulation Flowsheet and Dosing Calendar (Go to Encounters tab in chart review, and find the Anticoagulation Therapy Visit)    INR 3.4 He ate fewer greens last week. Took Tylenol several times for sleep and took Tyl #3 twice for dental pain. No abnormal bleeding or bruising. Will hold today's dose then resume usual dosing of 2.5 mg MTh and 5 mg all other days . He will eat greens today then resume usual diet. Recheck in 2 weeks after OV with Dr Newton. Pt administered a Praluent injection today. Dosage adjustment made based on physician directed care  plan.    Donna Colby RN

## 2017-08-28 ENCOUNTER — ANTICOAGULATION THERAPY VISIT (OUTPATIENT)
Dept: CARDIOLOGY | Facility: CLINIC | Age: 82
End: 2017-08-28
Payer: COMMERCIAL

## 2017-08-28 ENCOUNTER — OFFICE VISIT (OUTPATIENT)
Dept: CARDIOLOGY | Facility: CLINIC | Age: 82
End: 2017-08-28
Attending: INTERNAL MEDICINE
Payer: COMMERCIAL

## 2017-08-28 VITALS
SYSTOLIC BLOOD PRESSURE: 138 MMHG | BODY MASS INDEX: 22.82 KG/M2 | HEIGHT: 62 IN | HEART RATE: 66 BPM | DIASTOLIC BLOOD PRESSURE: 62 MMHG | WEIGHT: 124 LBS

## 2017-08-28 DIAGNOSIS — I10 ESSENTIAL HYPERTENSION: ICD-10-CM

## 2017-08-28 DIAGNOSIS — I25.5 CARDIOMYOPATHY, ISCHEMIC: Primary | ICD-10-CM

## 2017-08-28 DIAGNOSIS — E78.2 MIXED HYPERLIPIDEMIA: ICD-10-CM

## 2017-08-28 DIAGNOSIS — Z79.01 LONG-TERM (CURRENT) USE OF ANTICOAGULANTS: ICD-10-CM

## 2017-08-28 DIAGNOSIS — I25.10 CORONARY ARTERY DISEASE INVOLVING NATIVE CORONARY ARTERY OF NATIVE HEART WITHOUT ANGINA PECTORIS: ICD-10-CM

## 2017-08-28 LAB — INR POINT OF CARE: 2.5 (ref 0.86–1.14)

## 2017-08-28 PROCEDURE — 99214 OFFICE O/P EST MOD 30 MIN: CPT | Performed by: INTERNAL MEDICINE

## 2017-08-28 PROCEDURE — 36416 COLLJ CAPILLARY BLOOD SPEC: CPT | Performed by: INTERNAL MEDICINE

## 2017-08-28 PROCEDURE — 85610 PROTHROMBIN TIME: CPT | Mod: QW | Performed by: INTERNAL MEDICINE

## 2017-08-28 PROCEDURE — 99207 ZZC NO CHARGE NURSE ONLY: CPT | Performed by: INTERNAL MEDICINE

## 2017-08-28 RX ORDER — DUTASTERIDE 0.5 MG/1
0.5 CAPSULE, LIQUID FILLED ORAL DAILY
Qty: 30 CAPSULE | Refills: 11 | Status: SHIPPED | OUTPATIENT
Start: 2017-08-28

## 2017-08-28 RX ORDER — LISINOPRIL 2.5 MG/1
2.5 TABLET ORAL 2 TIMES DAILY
Qty: 180 TABLET | Refills: 3 | Status: SHIPPED | OUTPATIENT
Start: 2017-08-28 | End: 2017-10-04

## 2017-08-28 RX ORDER — METOPROLOL SUCCINATE 25 MG/1
12.5 TABLET, EXTENDED RELEASE ORAL DAILY
Qty: 45 TABLET | Refills: 3 | Status: SHIPPED | OUTPATIENT
Start: 2017-08-28 | End: 2018-08-08

## 2017-08-28 NOTE — MR AVS SNAPSHOT
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]  Transient cerebral ischemia [G45.9]  Long-term (current) use of anticoagulants [Z79.01] [Z79.01]         Your next Anticoagulation Clinic appointment(s)     Aug 28, 2017  3:00 PM CDT   Anticoagulation Visit with  ANTICOAGULATION   [facility] ([facility])    [address] [place] [id number]   [phone]            Sep 14, 2017 11:20 AM CDT   Anticoagulation Visit with  ANTICOAGULATION   [facility] ([facility])    [address] [place] [id number]   [phone]              Contact Numbers     Anticoagulant (INR) Clinic Number: [phone]          August 2017 Details    Sun Mon Tue Wed Thu Fri Sat       1               2               3               4               5                 6               7               8               9               10               11               12                 13               14               15               16               17               18               19                 20               21               22               23               24               25               26                 27               28      2.5 mg   See details      29      5 mg         30      5 mg         31      2.5 mg            Date Details   08/28 This INR check               How to take your warfarin dose     To take:  2.5 mg Take 0.5 of a 5 mg tablet.    To take:  5 mg Take 1 of the 5 mg tablets.            September 2017 Details    Sun Mon Tue Wed Thu Fri Sat          1      5 mg         2      5 mg           3      5 mg         4      2.5 mg         5      5 mg         6      5 mg         7      2.5 mg         8      5 mg         9      5 mg           10      5 mg         11      2.5 mg         12      5 mg         13      5 mg         14            15               16                 17               18               19               20               21               22               23                 24               25               26               27               28               29               30                Date Details   No additional details    Date of next INR:  9/14/2017         How to take your warfarin dose     To take:  2.5 mg Take 0.5 of a 5 mg tablet.    To take:  5 mg Take 1 of the 5 mg tablets.

## 2017-08-28 NOTE — PROGRESS NOTES
HPI and Plan:   I had the pleasure of seeing Mr. Renteria in Cardiology Clinic for a history of coronary artery disease.  He has had a remote anterior wall myocardial infarction in 2006 when he had an LAD stent.  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  Since then he has been on warfarin.  His ejection fraction has stabilized at 45%-50%.  Echocardiogram done on 04/17 confirms EF again at 45%-50% with apical wall motion abnormalities.  There is no significant change compared to prior study.      He has been intolerant to several statins.  He finally was on Livalo, which also he was not able to continue at a higher dose.  Finally, he was able to be on intravenous PCSK9 inhibitor, Praluent.  Initially he took it for a few months and then had to stop because of cost, but now he is able to afford it again and is back on it.  With that, his LDL now is 70, HDL 59.  This is the best cholesterol he has had.  His triglycerides are 129.        PHYSICAL EXAMINATION:   VITAL SIGNS:  Blood pressure 138/62, pulse 66 per minute and regular.   CARDIAC:  Regular S1, S2, with no significant murmurs.  There is a soft systolic murmur at the apex.   CHEST:  Clear to auscultation.       IMPRESSION:   1.  Coronary artery disease with remote anterior wall myocardial infarction, status post LAD stent and balloon angioplasty of diagonal branch in 2006.  His echocardiogram shows stable ejection of 45%-50%.  Given previous cardioembolic episode with LV apical thrombus, he has continued on Coumadin.  We will continue the beta blocker and lisinopril but will increase his lisinopril due to somewhat elevated blood pressures. His recent potassium was 4.7. In the past she's had some elevated potassium levels and I reviewed with him the importance of eliminating hypertension containing foods especially bananas which he is taking at present. Lisinopril dose will be increased to 2.5 twice daily. We'll repeat a BMP in 6  weeks.   we will also watch for worsening dizziness with increased dose of lisinopril.     2.  Hyperlipidemia, not tolerant of any statin.  Therefore, he is on injectable Praluent.  He has been able to afford that, and the cholesterol profile is now optimal.      3.  He will return to see my nurse practitioner in the Cardiology Clinic in 6 weeks with a repeat BMP to reassess blood pressure and potassium. I'll see him in April with a repeat stress nuclear study prior to visit with me.     cc:   Francisco Doshi MD    [facility]    [address], [address]    [place] [address]           TALI WEATHERS MD      Orders Placed This Encounter   Procedures     Basic metabolic panel     Follow-Up with Cardiac Advanced Practice Provider       Orders Placed This Encounter   Medications     metoprolol (TOPROL-XL) 25 MG 24 hr tablet     Sig: Take 0.5 tablets (12.5 mg) by mouth daily     Dispense:  45 tablet     Refill:  3     lisinopril (PRINIVIL/ZESTRIL) 2.5 MG tablet     Sig: Take 1 tablet (2.5 mg) by mouth 2 times daily     Dispense:  180 tablet     Refill:  3     dutasteride (AVODART) 0.5 MG capsule     Sig: Take 1 capsule (0.5 mg) by mouth daily     Dispense:  30 capsule     Refill:  11       Medications Discontinued During This Encounter   Medication Reason     metoprolol (TOPROL-XL) 25 MG 24 hr tablet Reorder     lisinopril (PRINIVIL/ZESTRIL) 2.5 MG tablet Reorder     dutasteride (AVODART) 0.5 MG capsule Reorder         Encounter Diagnoses   Name Primary?     Cardiomyopathy, ischemic Yes     Coronary artery disease involving native coronary artery of native heart without angina pectoris      Essential hypertension      Mixed hyperlipidemia        CURRENT MEDICATIONS:  Current Outpatient Prescriptions   Medication Sig Dispense Refill     metoprolol (TOPROL-XL) 25 MG 24 hr tablet Take 0.5 tablets (12.5 mg) by mouth daily 45 tablet 3     lisinopril (PRINIVIL/ZESTRIL) 2.5 MG tablet Take 1 tablet  (2.5 mg) by mouth 2 times daily 180 tablet 3     dutasteride (AVODART) 0.5 MG capsule Take 1 capsule (0.5 mg) by mouth daily 30 capsule 11     alirocumab (PRALUENT) 75 MG/ML injectable pen Inject 1 mL (75 mg) Subcutaneous every 14 days 2 mL 11     warfarin (COUMADIN) 5 MG tablet Take 2.5mg on Mondays and Thursdays and 5mg the rest of the days or as directed by INR clinic, Coumadin SHIKHA 90 tablet 1     melatonin 5 MG tablet Take 5 mg by mouth nightly as needed for sleep       Fish Oil-Cholecalciferol (FISH OIL + D3) 1675-9298 MG-UNIT CAPS Take 2 tablets by mouth daily       Cholecalciferol (VITAMIN D3) 2000 UNITS CAPS Take by mouth daily       aspirin 81 MG tablet Take 81 mg by mouth daily       nitroglycerin (NITROSTAT) 0.4 MG SL tablet Place 1 tablet (0.4 mg) under the tongue every 5 minutes as needed for chest pain Take as directed 25 tablet 0     [DISCONTINUED] metoprolol (TOPROL-XL) 25 MG 24 hr tablet Take 0.5 tablets (12.5 mg) by mouth daily 45 tablet 3     [DISCONTINUED] lisinopril (PRINIVIL/ZESTRIL) 2.5 MG tablet Take 1 tablet (2.5 mg) by mouth daily 90 tablet 3       ALLERGIES     Allergies   Allergen Reactions     Flomax [Tamsulosin]      Weakness and dizzyness     Carvedilol      Colesevelam      Epigastric pain     Ezetimibe      Lisinopril      Hyperkalemia and inc. Creat. At 20 mg daily     Pravastatin      Abdominal pain     Rosuvastatin      Simvastatin        PAST MEDICAL HISTORY:  Past Medical History:   Diagnosis Date     BPH (benign prostatic hyperplasia)      Cardiomyopathy, ischemic     EF 49% by cardiac MRI 1/15/2014     Coronary artery disease 1/24/06    Cypher CANDIDA to LAD     Gastro-oesophageal reflux disease      Hyperlipidaemia      Hypertension      Left ventricular thrombus      Myocardial infarction (H) 1/2006    Anterior     TIA (transient ischaemic attack)        PAST SURGICAL HISTORY:  Past Surgical History:   Procedure Laterality Date     CORONARY ANGIOGRAPHY ADULT ORDER  1/24/06     "Cypher CANDIDA to LAD     HEART CATH, ANGIOPLASTY  1/2006    Cypher CANDIDA to LAD     TONSILLECTOMY & ADENOIDECTOMY         FAMILY HISTORY:  Family History   Problem Relation Age of Onset     HEART DISEASE Mother      heart failure     HEART DISEASE Brother        SOCIAL HISTORY:  Social History     Social History     Marital status:      Spouse name: N/A     Number of children: N/A     Years of education: N/A     Social History Main Topics     Smoking status: Never Smoker     Smokeless tobacco: Never Used     Alcohol use No     Drug use: None     Sexual activity: Not Asked     Other Topics Concern     Parent/Sibling W/ Cabg, Mi Or Angioplasty Before 65f 55m? No     Caffeine Concern No     Sleep Concern No     Stress Concern No     Weight Concern Yes     weight loss     Special Diet No     Exercise Yes     bicycle 10 min, walking, 3 days week     Seat Belt Yes     Social History Narrative       Review of Systems:  Skin:  Negative       Eyes:  Negative      ENT:  Negative      Respiratory:  Negative       Cardiovascular:  Negative      Gastroenterology: Negative      Genitourinary:  Negative      Musculoskeletal:  Positive for joint pain stiffness in legs  Neurologic:  Negative      Psychiatric:  Negative      Heme/Lymph/Imm:  Negative      Endocrine:  Negative        Physical Exam:  Vitals: /62  Pulse 66  Ht 1.575 m (5' 2\")  Wt 56.2 kg (124 lb)  BMI 22.68 kg/m2    Constitutional:  cooperative;alert and oriented        Skin:  warm and dry to the touch        Head:           Eyes:  pupils equal and round        ENT:  no pallor or cyanosis        Neck:  carotid pulses are full and equal bilaterally        Chest:  clear to auscultation          Cardiac: normal S1 and S2   S4 no presence of murmur systolic murmur;grade 2;apical          Abdomen:  abdomen soft;BS normoactive        Vascular: not assessed this visit                                        Extremities and Back:  no edema              Neurological:  " no gross motor deficits              CC  Satya Newton MD  [address]  [address]  [place] [id number]

## 2017-08-28 NOTE — MR AVS SNAPSHOT
After Visit Summary   8/28/2017    Sawyer Renteria    MRN: [id number]           Patient Information     Date Of Birth          3/24/1934        Visit Information        Provider Department      8/28/2017 2:15 PM Satya Newton MD [facility]        Today's Diagnoses     Cardiomyopathy, ischemic    -  1    Coronary artery disease involving native coronary artery of native heart without angina pectoris        Essential hypertension        Mixed hyperlipidemia           Follow-ups after your visit        Additional Services     Follow-Up with Cardiac Advanced Practice Provider                 Your next 10 appointments already scheduled     Aug 28, 2017  3:00 PM CDT   Anticoagulation Visit with MAJANO ANTICOAGULATION   [facility] ([facility])    [address] [place] [id number] [phone]              Future tests that were ordered for you today     Open Future Orders        Priority Expected Expires Ordered    Basic metabolic panel Routine 10/9/2017 8/28/2018 8/28/2017    Follow-Up with Cardiac Advanced Practice Provider Routine 10/9/2017 8/28/2018 8/28/2017            Who to contact     If you have questions or need follow up information about today's clinic visit or your schedule please contact [facility] directly at [phone].  Normal or non-critical lab and imaging results will be communicated to you by MyChart, letter or phone within 4 business days after the clinic has received the results. If you do not hear from us within 7 days, please contact the clinic through MyChart or phone. If you have a critical or abnormal lab result, we will notify you by phone as soon as possible.  Submit refill requests through [employer] or call your pharmacy and they will forward the refill request to us. Please allow 3 business days for your refill to be  "completed.          Additional Information About Your Visit        [employer] Information     [employer] gives you secure access to your electronic health record. If you see a primary care provider, you can also send messages to your care team and make appointments. If you have questions, please call your primary care clinic.  If you do not have a primary care provider, please call [phone] and they will assist you.        Care EveryWhere ID     This is your Care EveryWhere ID. This could be used by other organizations to access your [facility] medical records  [id number]        Your Vitals Were     Pulse Height BMI (Body Mass Index)             66 1.575 m (5' 2\") 22.68 kg/m2          Blood Pressure from Last 3 Encounters:   08/28/17 138/62   06/08/17 157/52   04/24/17 127/63    Weight from Last 3 Encounters:   08/28/17 56.2 kg (124 lb)   06/08/17 55.8 kg (123 lb)   01/25/17 55 kg (121 lb 4.8 oz)              We Performed the Following     Follow-Up with Cardiac Advanced Practice Provider          Today's Medication Changes          These changes are accurate as of: 8/28/17  2:42 PM.  If you have any questions, ask your nurse or doctor.               These medicines have changed or have updated prescriptions.        Dose/Directions    lisinopril 2.5 MG tablet   Commonly known as:  PRINIVIL/Zestril   This may have changed:  when to take this   Used for:  Coronary artery disease involving native coronary artery of native heart without angina pectoris   Changed by:  Satya Newton MD        Dose:  2.5 mg   Take 1 tablet (2.5 mg) by mouth 2 times daily   Quantity:  180 tablet   Refills:  3            Where to get your medicines      These medications were sent to [facility] - [place], [place] - [address]   [address], [facility] [address]     Phone:  [phone]     dutasteride 0.5 MG capsule    lisinopril 2.5 MG tablet    metoprolol 25 MG 24 hr tablet                Primary Care " Provider Office Phone # Fax #    Francisco Doshi [phone] [address] [address]        Equal Access to Services     BARLISA KAITLIN : Hadsherie la braun susana Andrade, wasonyada luqbatool, qaybta kaalmada chelsie, harry sellers laByronchristelle flores. So [facility] [phone].    ATENCIÓN: Si habla español, tiene a roy disposición servicios gratuitos de asistencia lingüística. Llame al [phone].    We comply with applicable federal civil rights laws and [place] laws. We do not discriminate on the basis of race, color, national origin, age, disability sex, sexual orientation or gender identity.            Thank you!     Thank you for choosing [facility] [place]  for your care. Our goal is always to provide you with excellent care. Hearing back from our patients is one way we can continue to improve our services. Please take a few minutes to complete the written survey that you may receive in the mail after your visit with us. Thank you!             Your Updated Medication List - Protect others around you: Learn how to safely use, store and throw away your medicines at www.disposemymeds.org.          This list is accurate as of: 8/28/17  2:42 PM.  Always use your most recent med list.                   Brand Name Dispense Instructions for use Diagnosis    alirocumab 75 MG/ML injectable pen    PRALUENT    2 mL    Inject 1 mL (75 mg) Subcutaneous every 14 days    Hyperlipidemia, unspecified hyperlipidemia type       aspirin 81 MG tablet      Take 81 mg by mouth daily        dutasteride 0.5 MG capsule    AVODART    30 capsule    Take 1 capsule (0.5 mg) by mouth daily    Cardiomyopathy, ischemic       FISH OIL + D3 [id number] MG-UNIT Caps      Take 2 tablets by mouth daily        lisinopril 2.5 MG tablet    PRINIVIL/Zestril    180 tablet    Take 1 tablet (2.5 mg) by mouth 2 times daily    Coronary artery disease involving native coronary artery of  native heart without angina pectoris       melatonin 5 MG tablet      Take 5 mg by mouth nightly as needed for sleep        metoprolol 25 MG 24 hr tablet    TOPROL-XL    45 tablet    Take 0.5 tablets (12.5 mg) by mouth daily    Cardiomyopathy, ischemic       nitroGLYcerin 0.4 MG sublingual tablet    NITROSTAT    25 tablet    Place 1 tablet (0.4 mg) under the tongue every 5 minutes as needed for chest pain Take as directed    Cardiomyopathy, ischemic       vitamin D3 2000 UNITS Caps      Take by mouth daily        warfarin 5 MG tablet    COUMADIN    90 tablet    Take 2.5mg on Mondays and Thursdays and 5mg the rest of the days or as directed by INR clinic, Coumadin SHIKHA    Left ventricular thrombus

## 2017-08-28 NOTE — LETTER
8/28/2017    Francisco Doshi MD  [address]  [facility] [address]    RE: Sawyer Renteria       Dear Colleague,    I had the pleasure of seeing Sawyer Renteria in the [facility].    HPI and Plan:   I had the pleasure of seeing Mr. Renteria in Cardiology Clinic for a history of coronary artery disease.  He has had a remote anterior wall myocardial infarction in 2006 when he had an LAD stent.  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  Since then he has been on warfarin.  His ejection fraction has stabilized at 45%-50%.  Echocardiogram done on 04/17 confirms EF again at 45%-50% with apical wall motion abnormalities.  There is no significant change compared to prior study.      He has been intolerant to several statins.  He finally was on Livalo, which also he was not able to continue at a higher dose.  Finally, he was able to be on intravenous PCSK9 inhibitor, Praluent.  Initially he took it for a few months and then had to stop because of cost, but now he is able to afford it again and is back on it.  With that, his LDL now is 70, HDL 59.  This is the best cholesterol he has had.  His triglycerides are 129.        PHYSICAL EXAMINATION:   VITAL SIGNS:  Blood pressure 138/62, pulse 66 per minute and regular.   CARDIAC:  Regular S1, S2, with no significant murmurs.  There is a soft systolic murmur at the apex.   CHEST:  Clear to auscultation.       IMPRESSION:   1.  Coronary artery disease with remote anterior wall myocardial infarction, status post LAD stent and balloon angioplasty of diagonal branch in 2006.  His echocardiogram shows stable ejection of 45%-50%.  Given previous cardioembolic episode with LV apical thrombus, he has continued on Coumadin.  We will continue the beta blocker and lisinopril but will increase his lisinopril due to somewhat elevated blood pressures. His recent potassium was 4.7. In the past she's had some elevated potassium levels  and I reviewed with him the importance of eliminating hypertension containing foods especially bananas which he is taking at present. Lisinopril dose will be increased to 2.5 twice daily. We'll repeat a BMP in 6 weeks.    we will also watch for worsening dizziness with increased dose of lisinopril.     2.  Hyperlipidemia, not tolerant of any statin.  Therefore, he is on injectable Praluent.  He has been able to afford that, and the cholesterol profile is now optimal.      3.  He will return to see my nurse practitioner in the Cardiology Clinic in 6 weeks with a repeat BMP to reassess blood pressure and potassium. I'll see him in April with a repeat stress nuclear study prior to visit with me.     cc:   Francisco Doshi MD    [facility]    [address], [address]    [place] [address]           TALI WEATHERS MD      Orders Placed This Encounter   Procedures     Basic metabolic panel     Follow-Up with Cardiac Advanced Practice Provider       Orders Placed This Encounter   Medications     metoprolol (TOPROL-XL) 25 MG 24 hr tablet     Sig: Take 0.5 tablets (12.5 mg) by mouth daily     Dispense:  45 tablet     Refill:  3     lisinopril (PRINIVIL/ZESTRIL) 2.5 MG tablet     Sig: Take 1 tablet (2.5 mg) by mouth 2 times daily     Dispense:  180 tablet     Refill:  3     dutasteride (AVODART) 0.5 MG capsule     Sig: Take 1 capsule (0.5 mg) by mouth daily     Dispense:  30 capsule     Refill:  11       Medications Discontinued During This Encounter   Medication Reason     metoprolol (TOPROL-XL) 25 MG 24 hr tablet Reorder     lisinopril (PRINIVIL/ZESTRIL) 2.5 MG tablet Reorder     dutasteride (AVODART) 0.5 MG capsule Reorder         Encounter Diagnoses   Name Primary?     Cardiomyopathy, ischemic Yes     Coronary artery disease involving native coronary artery of native heart without angina pectoris      Essential hypertension      Mixed hyperlipidemia        CURRENT MEDICATIONS:  Current Outpatient  Prescriptions   Medication Sig Dispense Refill     metoprolol (TOPROL-XL) 25 MG 24 hr tablet Take 0.5 tablets (12.5 mg) by mouth daily 45 tablet 3     lisinopril (PRINIVIL/ZESTRIL) 2.5 MG tablet Take 1 tablet (2.5 mg) by mouth 2 times daily 180 tablet 3     dutasteride (AVODART) 0.5 MG capsule Take 1 capsule (0.5 mg) by mouth daily 30 capsule 11     alirocumab (PRALUENT) 75 MG/ML injectable pen Inject 1 mL (75 mg) Subcutaneous every 14 days 2 mL 11     warfarin (COUMADIN) 5 MG tablet Take 2.5mg on Mondays and Thursdays and 5mg the rest of the days or as directed by INR clinic, Coumadin SHIKHA 90 tablet 1     melatonin 5 MG tablet Take 5 mg by mouth nightly as needed for sleep       Fish Oil-Cholecalciferol (FISH OIL + D3) 6160-0912 MG-UNIT CAPS Take 2 tablets by mouth daily       Cholecalciferol (VITAMIN D3) 2000 UNITS CAPS Take by mouth daily       aspirin 81 MG tablet Take 81 mg by mouth daily       nitroglycerin (NITROSTAT) 0.4 MG SL tablet Place 1 tablet (0.4 mg) under the tongue every 5 minutes as needed for chest pain Take as directed 25 tablet 0     [DISCONTINUED] metoprolol (TOPROL-XL) 25 MG 24 hr tablet Take 0.5 tablets (12.5 mg) by mouth daily 45 tablet 3     [DISCONTINUED] lisinopril (PRINIVIL/ZESTRIL) 2.5 MG tablet Take 1 tablet (2.5 mg) by mouth daily 90 tablet 3       ALLERGIES     Allergies   Allergen Reactions     Flomax [Tamsulosin]      Weakness and dizzyness     Carvedilol      Colesevelam      Epigastric pain     Ezetimibe      Lisinopril      Hyperkalemia and inc. Creat. At 20 mg daily     Pravastatin      Abdominal pain     Rosuvastatin      Simvastatin        PAST MEDICAL HISTORY:  Past Medical History:   Diagnosis Date     BPH (benign prostatic hyperplasia)      Cardiomyopathy, ischemic     EF 49% by cardiac MRI 1/15/2014     Coronary artery disease 1/24/06    Cypher CANDIDA to LAD     Gastro-oesophageal reflux disease      Hyperlipidaemia      Hypertension      Left ventricular thrombus       "Myocardial infarction (H) 1/2006    Anterior     TIA (transient ischaemic attack)        PAST SURGICAL HISTORY:  Past Surgical History:   Procedure Laterality Date     CORONARY ANGIOGRAPHY ADULT ORDER  1/24/06    Cypher CANDIDA to LAD     HEART CATH, ANGIOPLASTY  1/2006    Cypher CANDIDA to LAD     TONSILLECTOMY & ADENOIDECTOMY         FAMILY HISTORY:  Family History   Problem Relation Age of Onset     HEART DISEASE Mother      heart failure     HEART DISEASE Brother        SOCIAL HISTORY:  Social History     Social History     Marital status:      Spouse name: N/A     Number of children: N/A     Years of education: N/A     Social History Main Topics     Smoking status: Never Smoker     Smokeless tobacco: Never Used     Alcohol use No     Drug use: None     Sexual activity: Not Asked     Other Topics Concern     Parent/Sibling W/ Cabg, Mi Or Angioplasty Before 65f 55m? No     Caffeine Concern No     Sleep Concern No     Stress Concern No     Weight Concern Yes     weight loss     Special Diet No     Exercise Yes     bicycle 10 min, walking, 3 days week     Seat Belt Yes     Social History Narrative       Review of Systems:  Skin:  Negative       Eyes:  Negative      ENT:  Negative      Respiratory:  Negative       Cardiovascular:  Negative      Gastroenterology: Negative      Genitourinary:  Negative      Musculoskeletal:  Positive for joint pain stiffness in legs  Neurologic:  Negative      Psychiatric:  Negative      Heme/Lymph/Imm:  Negative      Endocrine:  Negative        Physical Exam:  Vitals: /62  Pulse 66  Ht 1.575 m (5' 2\")  Wt 56.2 kg (124 lb)  BMI 22.68 kg/m2    Constitutional:  cooperative;alert and oriented        Skin:  warm and dry to the touch        Head:           Eyes:  pupils equal and round        ENT:  no pallor or cyanosis        Neck:  carotid pulses are full and equal bilaterally        Chest:  clear to auscultation          Cardiac: normal S1 and S2   S4 no presence of murmur " systolic murmur;grade 2;apical          Abdomen:  abdomen soft;BS normoactive        Vascular: not assessed this visit                                        Extremities and Back:  no edema              Neurological:  no gross motor deficits        Thank you for allowing me to participate in the care of your patient.    Sincerely,     Satya Newton MD     [facility]

## 2017-09-06 DIAGNOSIS — I23.6 LV (LEFT VENTRICULAR) MURAL THROMBUS FOLLOWING MI (H): Primary | ICD-10-CM

## 2017-09-06 RX ORDER — WARFARIN SODIUM 5 MG/1
TABLET ORAL
Qty: 90 TABLET | Refills: 1 | Status: SHIPPED | OUTPATIENT
Start: 2017-09-06 | End: 2017-09-12

## 2017-09-12 DIAGNOSIS — I23.6 LV (LEFT VENTRICULAR) MURAL THROMBUS FOLLOWING MI (H): ICD-10-CM

## 2017-09-12 RX ORDER — WARFARIN SODIUM 5 MG/1
TABLET ORAL
Qty: 10 TABLET | Refills: 0 | Status: SHIPPED | OUTPATIENT
Start: 2017-09-12 | End: 2017-10-04

## 2017-09-12 NOTE — TELEPHONE ENCOUNTER
Received call from team 6 nurse (OKSANA Fowler) that patient needs warfarin refill. Contacted patient, he is waiting for his warfarin from [employer] mail order, has not received it yet and he only has 2 pills left. I told him medication was e-scripted on 9/6/17. Bridged him with 10 tablets of warfarin (5 mg) until he receives his mail order medications. Medication e-scripted to [facility] on [address], [place].

## 2017-09-14 ENCOUNTER — ANTICOAGULATION THERAPY VISIT (OUTPATIENT)
Dept: CARDIOLOGY | Facility: CLINIC | Age: 82
End: 2017-09-14
Payer: COMMERCIAL

## 2017-09-14 DIAGNOSIS — G45.9 TRANSIENT CEREBRAL ISCHEMIA: ICD-10-CM

## 2017-09-14 DIAGNOSIS — Z79.01 LONG-TERM (CURRENT) USE OF ANTICOAGULANTS: ICD-10-CM

## 2017-09-14 LAB — INR POINT OF CARE: 2.8 (ref 0.86–1.14)

## 2017-09-14 PROCEDURE — 85610 PROTHROMBIN TIME: CPT | Mod: QW | Performed by: INTERNAL MEDICINE

## 2017-09-14 PROCEDURE — 36416 COLLJ CAPILLARY BLOOD SPEC: CPT | Performed by: INTERNAL MEDICINE

## 2017-09-14 NOTE — MR AVS SNAPSHOT
Sawyer Renteria   9/14/2017 11:20 AM   Anticoagulation Therapy Visit    Description:  83 year old male   Provider:  [facility]   Department:  [facility]           INR as of 9/14/2017     Today's INR 2.8      Anticoagulation Summary as of 9/14/2017     INR goal 2.0-3.0   Today's INR 2.8   Full instructions 2.5 mg on Mon, Thu; 5 mg all other days   Next INR check 9/29/2017    Indications   Left ventricular thrombus [KEF3744]  Transient cerebral ischemia [G45.9]  Long-term (current) use of anticoagulants [Z79.01] [Z79.01]         Your next Anticoagulation Clinic appointment(s)     Sep 29, 2017 10:20 AM CDT   Anticoagulation Visit with  ANTICOAGULATION   [facility] ([facility])    [address] [place] [id number]   [phone]              Contact Numbers     Anticoagulant (INR) Clinic Number: [phone]          September 2017 Details    Sun Mon Tue Wed Thu Fri Sat          1               2                 3               4               5               6               7               8               9                 10               11               12               13               14      2.5 mg   See details      15      5 mg         16      5 mg           17      5 mg         18      2.5 mg         19      5 mg         20      5 mg         21      2.5 mg         22      5 mg         23      5 mg           24      5 mg         25      2.5 mg         26      5 mg         27      5 mg         28      2.5 mg         29            30                Date Details   09/14 This INR check       Date of next INR:  9/29/2017         How to take your warfarin dose     To take:  2.5 mg Take 0.5 of a 5 mg tablet.    To take:  5 mg Take 1 of the 5 mg tablets.

## 2017-09-14 NOTE — PROGRESS NOTES
ANTICOAGULATION FOLLOW-UP CLINIC VISIT    Patient Name:  Sawyer Renteria  Date:  9/14/2017  Contact Type:  Face to Face    SUBJECTIVE:     Patient Findings     Positives No Problem Findings           OBJECTIVE    INR Protime   Date Value Ref Range Status   09/14/2017 2.8 (A) 0.86 - 1.14 Final       ASSESSMENT / PLAN  INR assessment THER    Recheck INR In: 2 WEEKS    INR Location Clinic      Anticoagulation Summary as of 9/14/2017     INR goal 2.0-3.0   Today's INR 2.8   Maintenance plan 2.5 mg (5 mg x 0.5) on Mon, Thu; 5 mg (5 mg x 1) all other days   Full instructions 2.5 mg on Mon, Thu; 5 mg all other days   Weekly total 30 mg   No change documented Tena Tran RN   Plan last modified Penfield, Lynette E, RN (6/2/2016)   Next INR check 9/29/2017   [place] end date Indefinite    Indications   Left ventricular thrombus [[id number]]  Transient cerebral ischemia [G45.9]  Long-term (current) use of anticoagulants [Z79.01] [Z79.01]         Anticoagulation Episode Summary     INR check location     Preferred lab     Send INR reminders to [facility] HEART INR NURSE    Comments       Anticoagulation Care Providers     Provider Role Specialty Phone number    Satya Newton MD Responsible Cardiology [phone]          INR 2.8. No changes to diet or medications. Will continue same dosing of 2.5 mg M,Th and 5 mg ROW. Recheck INR in 2 weeks so pt can give himself his praluent injection.  See the Encounter Report to view Anticoagulation Flowsheet and Dosing Calendar (Go to Encounters tab in chart review, and find the Anticoagulation Therapy Visit)    Dosage adjustment made based on physician directed care plan.    Tena Tran RN

## 2017-09-19 ENCOUNTER — TELEPHONE (OUTPATIENT)
Dept: CARDIOLOGY | Facility: CLINIC | Age: 82
End: 2017-09-19

## 2017-09-19 NOTE — TELEPHONE ENCOUNTER
Pt called in stating he ate tomatoes last night and took his lisinopril and woke up last night with a rash.  Pt thinks it is because of lisinopril. Informed Pt he was told to start taking it two weeks ago. Pt said he just started it a week ago, and last night was first reaction. Asked Pt if anything else has been new like food or laundry detergent. Pt says he has been eating a lot of tomatoes because he grew so many. Informed Pt he needs to cut back on tomatoes to decrease his potassium intake. Told Pt to call later today after he takes his morning dose of lisinopril and say if he has had further itching.  TIANA Painter Rn

## 2017-09-20 NOTE — TELEPHONE ENCOUNTER
Called Pt he was fine today no rash and did take his lisinopril. He now has cut back on tomatoes.  TIANA Painter RN

## 2017-09-29 ENCOUNTER — ANTICOAGULATION THERAPY VISIT (OUTPATIENT)
Dept: CARDIOLOGY | Facility: CLINIC | Age: 82
End: 2017-09-29
Payer: COMMERCIAL

## 2017-09-29 DIAGNOSIS — Z79.01 LONG-TERM (CURRENT) USE OF ANTICOAGULANTS: ICD-10-CM

## 2017-09-29 DIAGNOSIS — G45.9 TRANSIENT CEREBRAL ISCHEMIA: ICD-10-CM

## 2017-09-29 LAB — INR POINT OF CARE: 3.4 (ref 0.86–1.14)

## 2017-09-29 PROCEDURE — 85610 PROTHROMBIN TIME: CPT | Mod: QW | Performed by: INTERNAL MEDICINE

## 2017-09-29 PROCEDURE — 36416 COLLJ CAPILLARY BLOOD SPEC: CPT | Performed by: INTERNAL MEDICINE

## 2017-09-29 NOTE — MR AVS SNAPSHOT
Sawyer Renteria   9/29/2017 10:20 AM   Anticoagulation Therapy Visit    Description:  83 year old male   Provider:  [facility]   Department:  [facility]           INR as of 9/29/2017     Today's INR 3.4!      Anticoagulation Summary as of 9/29/2017     INR goal 2.0-3.0   Today's INR 3.4!   Full instructions 9/29: 2.5 mg; Otherwise 2.5 mg on Mon, Thu; 5 mg all other days   Next INR check 10/13/2017    Indications   Left ventricular thrombus [[id number]]  Transient cerebral ischemia [G45.9]  Long-term (current) use of anticoagulants [Z79.01] [Z79.01]         Your next Anticoagulation Clinic appointment(s)     Oct 13, 2017 10:20 AM CDT   Anticoagulation Visit with  ANTICOAGULATION   [facility] ([facility])    [address] [place] [id number]   [phone]              Contact Numbers     Anticoagulant (INR) Clinic Number: [phone]          September 2017 Details    Sun Mon Tue Wed Thu Fri Sat          1               2                 3               4               5               6               7               8               9                 10               11               12               13               14               15               16                 17               18               19               20               21               22               23                 24               25               26               27               28               29      2.5 mg   See details      30      5 mg          Date Details   09/29 This INR check               How to take your warfarin dose     To take:  2.5 mg Take 0.5 of a 5 mg tablet.    To take:  5 mg Take 1 of the 5 mg tablets.           October 2017 Details    Sun Mon Tue Wed Thu Fri Sat     1      5 mg         2      2.5 mg         3      5 mg         4      5 mg         5      2.5 mg         6      5 mg         7      5 mg           8      5 mg         9       2.5 mg         10      5 mg         11      5 mg         12      2.5 mg         13            14                 15               16               17               18               19               20               21                 22               23               24               25               26               27               28                 29               30               31                    Date Details   No additional details    Date of next INR:  10/13/2017         How to take your warfarin dose     To take:  2.5 mg Take 0.5 of a 5 mg tablet.    To take:  5 mg Take 1 of the 5 mg tablets.

## 2017-09-29 NOTE — PROGRESS NOTES
ANTICOAGULATION FOLLOW-UP CLINIC VISIT    Patient Name:  Sawyer Renteria  Date:  9/29/2017  Contact Type:  Face to Face    SUBJECTIVE:     Patient Findings     Positives Change in diet/appetite           OBJECTIVE    INR Protime   Date Value Ref Range Status   09/29/2017 3.4 (A) 0.86 - 1.14 Final       ASSESSMENT / PLAN  INR assessment SUPRA    Recheck INR In: 2 WEEKS    INR Location Clinic      Anticoagulation Summary as of 9/29/2017     INR goal 2.0-3.0   Today's INR 3.4!   Maintenance plan 2.5 mg (5 mg x 0.5) on Mon, Thu; 5 mg (5 mg x 1) all other days   Full instructions 9/29: 2.5 mg; Otherwise 2.5 mg on Mon, Thu; 5 mg all other days   Weekly total 30 mg   Plan last modified Penfield, Lynette E, RN (6/2/2016)   Next INR check 10/13/2017   Target end date Indefinite    Indications   Left ventricular thrombus [[id number]]  Transient cerebral ischemia [G45.9]  Long-term (current) use of anticoagulants [Z79.01] [Z79.01]         Anticoagulation Episode Summary     INR check location     Preferred lab     Send INR reminders to [facility] HEART INR NURSE    Comments       Anticoagulation Care Providers     Provider Role Specialty Phone number    Satya Newton MD Responsible Cardiology [phone]            See the Encounter Report to view Anticoagulation Flowsheet and Dosing Calendar (Go to Encounters tab in chart review, and find the Anticoagulation Therapy Visit)    INR 3.4. No diet or medication changes. No bleeding or bruising issues. He will continue with his current warfarin dosing of 2.5 mg M/TH and 5 mg the other days. Recheck in 2 weeks. He self administered his Praluent injection to his left thigh.Dosage adjustment made based on physician directed care plan.    Daija Santiago RN

## 2017-10-04 ENCOUNTER — OFFICE VISIT (OUTPATIENT)
Dept: CARDIOLOGY | Facility: CLINIC | Age: 82
End: 2017-10-04
Attending: INTERNAL MEDICINE
Payer: COMMERCIAL

## 2017-10-04 VITALS
HEIGHT: 62 IN | WEIGHT: 123 LBS | DIASTOLIC BLOOD PRESSURE: 68 MMHG | HEART RATE: 70 BPM | BODY MASS INDEX: 22.63 KG/M2 | SYSTOLIC BLOOD PRESSURE: 136 MMHG

## 2017-10-04 DIAGNOSIS — I23.6 LV (LEFT VENTRICULAR) MURAL THROMBUS FOLLOWING MI (H): ICD-10-CM

## 2017-10-04 DIAGNOSIS — I25.10 CORONARY ARTERY DISEASE INVOLVING NATIVE CORONARY ARTERY OF NATIVE HEART WITHOUT ANGINA PECTORIS: ICD-10-CM

## 2017-10-04 DIAGNOSIS — I25.5 CARDIOMYOPATHY, ISCHEMIC: Primary | ICD-10-CM

## 2017-10-04 DIAGNOSIS — I25.5 CARDIOMYOPATHY, ISCHEMIC: ICD-10-CM

## 2017-10-04 LAB
ANION GAP SERPL CALCULATED.3IONS-SCNC: 12.6 MMOL/L (ref 6–17)
BUN SERPL-MCNC: 24 MG/DL (ref 7–30)
CALCIUM SERPL-MCNC: 8.8 MG/DL (ref 8.5–10.5)
CHLORIDE SERPL-SCNC: 106 MMOL/L (ref 98–107)
CO2 SERPL-SCNC: 25 MMOL/L (ref 23–29)
CREAT SERPL-MCNC: 1.18 MG/DL (ref 0.7–1.3)
GFR SERPL CREATININE-BSD FRML MDRD: 59 ML/MIN/1.7M2
GLUCOSE SERPL-MCNC: 98 MG/DL (ref 70–105)
POTASSIUM SERPL-SCNC: 4.6 MMOL/L (ref 3.5–5.1)
SODIUM SERPL-SCNC: 139 MMOL/L (ref 136–145)

## 2017-10-04 PROCEDURE — 80048 BASIC METABOLIC PNL TOTAL CA: CPT | Performed by: INTERNAL MEDICINE

## 2017-10-04 PROCEDURE — 99214 OFFICE O/P EST MOD 30 MIN: CPT | Performed by: NURSE PRACTITIONER

## 2017-10-04 PROCEDURE — 36415 COLL VENOUS BLD VENIPUNCTURE: CPT | Performed by: INTERNAL MEDICINE

## 2017-10-04 RX ORDER — LISINOPRIL 2.5 MG/1
TABLET ORAL
Qty: 270 TABLET | Refills: 3 | Status: SHIPPED | OUTPATIENT
Start: 2017-10-04 | End: 2017-11-07

## 2017-10-04 RX ORDER — WARFARIN SODIUM 5 MG/1
TABLET ORAL
Qty: 90 TABLET | Refills: 3 | Status: SHIPPED | OUTPATIENT
Start: 2017-10-04 | End: 2018-05-04

## 2017-10-04 NOTE — LETTER
10/4/2017    Francisco Doshi MD  [address]  [facility] [address]    RE: Sawyer Renteria       Dear Colleague,    I had the pleasure of seeing Sawyer Renteria in the [facility].    History of Present Illness:    Sawyer Renteria is a 83 year old male followed here by Dr. Newton. He has a history of an anterior wall MI in 2006 resulting in an LAD stent and angioplasty to the diagonal. 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 Ejection fraction has waxed and waned a bit over the years but most recently is 45-50% with an ongoing apical wall motion abnormality    He has statin intolerance and has been on Praluent which he tolerates well. His lipids look the best they have been quite some time with an LDL at 70 HDL 59 triglycerides 129.    He saw Dr. Newton in follow-up approximately one month ago and his blood pressure was high. His lisinopril was increased to 2.5 mg twice daily.    He called our office 7-10 days ago with complaints of a rash but as it turns out he was eating tomatoes 3-4 times per day which was a change. He was counseled to reduce or eliminate the tomatoes and his rash actually disappeared-along with a dose of hydrocortisone cream. It has not recurred on the higher dose lisinopril. His basic metabolic panel looks fine today.    On exam his JVP is not elevated, lungs are clear. He has no activity intolerance. He has an S4 gallop which is unchanged.    He denies orthopnea PND. Weight is stable        Impression/Plan:     1. Coronary artery disease with anterior wall MI status post LAD stenting with the balloon angioplasty to the diagonal branch. Ejection fraction is stable at 45-50%. I will try to increase his lisinopril to 5 mg in the evening and 2.5 mg in the morning as his blood pressure is still suboptimal. We will recheck a basic metabolic panel and see him in a month. I counseled him to limit high potassium foods  such as bananas, potatoes, tomatoes, and citrus fruit.  2. Cardiomyopathy with no heart failure  3. 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 He uses brand name and I did refill his medication today. INR's have been therapeutic  4. Dyslipidemia with statin intolerance. He is tolerating Praluent with excellent lipid profile.    I will see him back in approximately one month. He will need a nuclear stress test and lab work and a visit with Dr. Newton in April which I have also ordered.    It has been a pleasure seeing Sawyer Renteria today as always.      Randell Izaguirre, MSN, APRN-BC, CNP  Cardiology    Orders Placed This Encounter   Procedures     NM Exercise stress test (nuc card)     Basic metabolic panel     Lipid Profile     Basic metabolic panel     Lipid Profile     ALT     Follow-Up with Cardiac Advanced Practice Provider     Orders Placed This Encounter   Medications     lisinopril (PRINIVIL/ZESTRIL) 2.5 MG tablet     Sig: One tablet in am; two tablets in pm     Dispense:  270 tablet     Refill:  3     warfarin (COUMADIN) 5 MG tablet     Sig: Take 1/2 tab on Mondays and Thursdays and 1 tab all other days or as directed by INR clinic, Coumadin SHIKHA     Dispense:  90 tablet     Refill:  3     Medications Discontinued During This Encounter   Medication Reason     lisinopril (PRINIVIL/ZESTRIL) 2.5 MG tablet Reorder     warfarin (COUMADIN) 5 MG tablet Reorder         Encounter Diagnoses   Name Primary?     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        CURRENT MEDICATIONS:  Current Outpatient Prescriptions   Medication Sig Dispense Refill     lisinopril (PRINIVIL/ZESTRIL) 2.5 MG tablet One tablet in am; two tablets in pm 270 tablet 3     warfarin (COUMADIN) 5 MG tablet Take 1/2 tab on Mondays and Thursdays and 1 tab all  other days or as directed by INR clinic, Coumadin SHIKHA 90 tablet 3     metoprolol (TOPROL-XL) 25 MG 24 hr tablet Take 0.5 tablets (12.5 mg) by mouth daily 45 tablet 3     dutasteride (AVODART) 0.5 MG capsule Take 1 capsule (0.5 mg) by mouth daily 30 capsule 11     alirocumab (PRALUENT) 75 MG/ML injectable pen Inject 1 mL (75 mg) Subcutaneous every 14 days 2 mL 11     melatonin 5 MG tablet Take 5 mg by mouth nightly as needed for sleep       Fish Oil-Cholecalciferol (FISH OIL + D3) 3341-6643 MG-UNIT CAPS Take 2 tablets by mouth daily       Cholecalciferol (VITAMIN D3) 2000 UNITS CAPS Take by mouth daily       aspirin 81 MG tablet Take 81 mg by mouth daily       nitroglycerin (NITROSTAT) 0.4 MG SL tablet Place 1 tablet (0.4 mg) under the tongue every 5 minutes as needed for chest pain Take as directed 25 tablet 0     [DISCONTINUED] warfarin (COUMADIN) 5 MG tablet Take 1/2 tab on Mondays and Thursdays and 1 tab all other days or as directed by INR clinic, Coumadin SHIKHA 10 tablet 0     [DISCONTINUED] lisinopril (PRINIVIL/ZESTRIL) 2.5 MG tablet Take 1 tablet (2.5 mg) by mouth 2 times daily 180 tablet 3       ALLERGIES     Allergies   Allergen Reactions     Flomax [Tamsulosin]      Weakness and dizzyness     Carvedilol      Colesevelam      Epigastric pain     Ezetimibe      Lisinopril Itching     Hyperkalemia and inc. Creat. At 20 mg daily, itching , skin red when dose goes above 2.5 mg a day     Pravastatin      Abdominal pain     Rosuvastatin      Simvastatin        PAST MEDICAL HISTORY:  Past Medical History:   Diagnosis Date     BPH (benign prostatic hyperplasia)      Cardiomyopathy, ischemic     EF 49% by cardiac MRI 1/15/2014     Coronary artery disease 1/24/06    Cypher CANDIDA to LAD     Gastro-oesophageal reflux disease      Hyperlipidaemia      Hypertension      Left ventricular thrombus      Myocardial infarction 1/2006    Anterior     TIA (transient ischaemic attack)        PAST SURGICAL HISTORY:  Past Surgical  "History:   Procedure Laterality Date     CORONARY ANGIOGRAPHY ADULT ORDER  1/24/06    Cypher CANDIDA to LAD     HEART CATH, ANGIOPLASTY  1/2006    Cypher CANDIDA to LAD     TONSILLECTOMY & ADENOIDECTOMY         FAMILY HISTORY:  Family History   Problem Relation Age of Onset     HEART DISEASE Mother      heart failure     HEART DISEASE Brother        SOCIAL HISTORY:  Social History     Social History     Marital status:      Spouse name: N/A     Number of children: N/A     Years of education: N/A     Social History Main Topics     Smoking status: Never Smoker     Smokeless tobacco: Never Used     Alcohol use No     Drug use: None     Sexual activity: Not Asked     Other Topics Concern     Parent/Sibling W/ Cabg, Mi Or Angioplasty Before 65f 55m? No     Caffeine Concern No     Sleep Concern No     Stress Concern No     Weight Concern Yes     weight loss     Special Diet No     Exercise Yes     bicycle 10 min, walking, 3 days week     Seat Belt Yes     Social History Narrative       Review of Systems:  Skin:  Negative     Eyes:  Negative    ENT:  Negative    Respiratory:  Positive for    Cardiovascular:  Negative fatigue;Positive for  Gastroenterology: Negative    Genitourinary:  Negative nocturia (2-3 times per night)  Musculoskeletal:  Positive for joint pain  Neurologic:  Negative    Psychiatric:  Negative sleep disturbances  Heme/Lymph/Imm:  Negative weight loss;allergies  Endocrine:  Negative      Physical Exam:  Vitals: /68  Pulse 70  Ht 1.575 m (5' 2\")  Wt 55.8 kg (123 lb)  BMI 22.5 kg/m2    Constitutional:  cooperative;alert and oriented        Skin:  warm and dry to the touch        Head:           Eyes:  pupils equal and round        ENT:  no pallor or cyanosis        Neck:  carotid pulses are full and equal bilaterally        Chest:  clear to auscultation        Cardiac: normal S1 and S2   S4 no presence of murmur systolic murmur;grade 2;apical          Abdomen:  abdomen soft;BS normoactive    "     Vascular: not assessed this visit                                      Extremities and Back:  no edema   ecchymosis over left flank following fall at home    Neurological:  no gross motor deficits          Recent Lab Results:  LIPID RESULTS:  Lab Results   Component Value Date    CHOL 155 07/25/2017    HDL 59 07/25/2017    LDL 70 07/25/2017    TRIG 129 07/25/2017    CHOLHDLRATIO 4.3 10/13/2015       LIVER ENZYME RESULTS:  Lab Results   Component Value Date    AST 21 07/25/2017    ALT 14 07/25/2017       CBC RESULTS:  Lab Results   Component Value Date    WBC 5.6 06/23/2015    RBC 4.25 (L) 06/23/2015    HGB 12.7 (L) 06/23/2015    HCT 37.9 (L) 06/23/2015    MCV 89 06/23/2015    MCH 29.9 06/23/2015    MCHC 33.5 06/23/2015    RDW 13.6 06/23/2015     06/23/2015       BMP RESULTS:  Lab Results   Component Value Date     10/04/2017    POTASSIUM 4.6 10/04/2017    CHLORIDE 106 10/04/2017    CO2 25 10/04/2017    ANIONGAP 12.6 10/04/2017    GLC 98 10/04/2017    BUN 24 10/04/2017    CR 1.18 10/04/2017    GFRESTIMATED 59 (L) 10/04/2017    GFRESTBLACK 71 10/04/2017    AURORA 8.8 10/04/2017        A1C RESULTS:  No results found for: A1C    INR RESULTS:  Lab Results   Component Value Date    INR 3.4 (A) 09/29/2017    INR 2.8 (A) 09/14/2017    INR 2.94 (H) 06/08/2017    INR 2.8 05/09/2014     Thank you for allowing me to participate in the care of your patient.    Sincerely,     JAE Jones CNP     [facility]

## 2017-10-04 NOTE — PATIENT INSTRUCTIONS
Increase Lisinopril to 2 tablets in evening and one tablet in am    See me in one month with fasting labs    My nurses number is [phone]

## 2017-10-04 NOTE — MR AVS SNAPSHOT
After Visit Summary   10/4/2017    Sawyer Renteria    MRN: [id number]           Patient Information     Date Of Birth          3/24/1934        Visit Information        Provider Department      10/4/2017 3:10 PM Randell Izaguirre APRN [facility]        Today's Diagnoses     Cardiomyopathy, ischemic        Coronary artery disease involving native coronary artery of native heart without angina pectoris        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          Care Instructions    Increase Lisinopril to 2 tablets in evening and one tablet in am    See me in one month with fasting labs    My nurses number is [phone]          Follow-ups after your visit        Additional Services     Follow-Up with Cardiac Advanced Practice Provider                 Your next 10 appointments already scheduled     Oct 13, 2017 10:20 AM CDT   Anticoagulation Visit with MAJANO ANTICOAGULATION   [facility] ([facility])    [address] [place] [id number]   [phone]            Nov 07, 2017 10:00 AM CST   LAB with MAJANO LAB   [facility] ([facility])    [address] [place] [id number]   [phone]           Patient must bring picture ID. Patient should be prepared to give a urine specimen  Please do not eat 10-12 hours before your appointment if you are coming in fasting for labs on lipids, cholesterol, or glucose (sugar). Pregnant women should follow their Care Team instructions. Water with medications is okay. Do not drink coffee or other fluids. If you have concerns about taking  your medications, please ask at office or if scheduling via [employer], send a message by clicking on Secure Messaging, Message Your Care Team.            Nov 07, 2017 10:50 AM CST   Return Visit with JAE Jones [facility]  [facility] [place] ([facility])    [id number]  [place] [id number] [phone]              Future tests that were ordered for you today     Open Future Orders        Priority Expected Expires Ordered    Basic metabolic panel Routine 11/3/2017 10/4/2018 10/4/2017    Follow-Up with Cardiac Advanced Practice Provider Routine 11/3/2017 10/4/2018 10/4/2017    Lipid Profile Routine 11/3/2017 10/4/2018 10/4/2017    ALT Routine 11/3/2017 10/4/2018 10/4/2017    NM Exercise stress test (nuc card) Routine 4/2/2018 10/4/2018 10/4/2017    Basic metabolic panel Routine 4/2/2018 10/4/2018 10/4/2017    Lipid Profile Routine 4/2/2018 10/4/2018 10/4/2017            Who to contact     If you have questions or need follow up information about today's clinic visit or your schedule please contact [facility] directly at [phone].  Normal or non-critical lab and imaging results will be communicated to you by [employer], letter or phone within 4 business days after the clinic has received the results. If you do not hear from us within 7 days, please contact the clinic through [employer] or phone. If you have a critical or abnormal lab result, we will notify you by phone as soon as possible.  Submit refill requests through [facility] or call your pharmacy and they will forward the refill request to us. Please allow 3 business days for your refill to be completed.          Additional Information About Your Visit        [employer] Information     [facility] gives you secure access to your electronic health record. If you see a primary care provider, you can also send messages to your care team and make appointments. If you have questions, please call your primary care clinic.  If you do not have a primary care provider, please call [phone] and they will assist you.        Care EveryWhere ID     This is your Care EveryWhere ID. This could be used by  "other organizations to access your [facility] medical records  [id number]        Your Vitals Were     Pulse Height BMI (Body Mass Index)             70 1.575 m (5' 2\") 22.5 kg/m2          Blood Pressure from Last 3 Encounters:   10/04/17 136/68   08/28/17 138/62   06/08/17 157/52    Weight from Last 3 Encounters:   10/04/17 55.8 kg (123 lb)   08/28/17 56.2 kg (124 lb)   06/08/17 55.8 kg (123 lb)              We Performed the Following     Follow-Up with Cardiac Advanced Practice Provider          Today's Medication Changes          These changes are accurate as of: 10/4/17  3:58 PM.  If you have any questions, ask your nurse or doctor.               These medicines have changed or have updated prescriptions.        Dose/Directions    lisinopril 2.5 MG tablet   Commonly known as:  PRINIVIL/Zestril   This may have changed:    - how much to take  - how to take this  - when to take this  - additional instructions   Used for:  Coronary artery disease involving native coronary artery of native heart without angina pectoris   Changed by:  Randell Izaguirre, APRN CNP        One tablet in am; two tablets in pm   Quantity:  270 tablet   Refills:  3            Where to get your medicines      These medications were sent to [facility] [address]  [address], [facility] [address]     Phone:  [phone]     lisinopril 2.5 MG tablet    warfarin 5 MG tablet                Primary Care Provider Office Phone # Fax #    Francisco Doshi [phone] [address] [address]        Equal Access to Services     [facility]: Hadii la meza Sokimber, waaxda luqadaha, qaybta kaalmada chelsie, harry flores. So [facility] [phone].    ATENCIÓN: Si habla español, tiene a roy disposición servicios gratuitos de asistencia lingüística. Yanick al [phone].    We comply with applicable federal civil rights laws " and [place] laws. We do not discriminate on the basis of race, color, national origin, age, disability, sex, sexual orientation, or gender identity.            Thank you!     Thank you for choosing [facility] [place]  for your care. Our goal is always to provide you with excellent care. Hearing back from our patients is one way we can continue to improve our services. Please take a few minutes to complete the written survey that you may receive in the mail after your visit with us. Thank you!             Your Updated Medication List - Protect others around you: Learn how to safely use, store and throw away your medicines at www.disposemymeds.org.          This list is accurate as of: 10/4/17  3:58 PM.  Always use your most recent med list.                   Brand Name Dispense Instructions for use Diagnosis    alirocumab 75 MG/ML injectable pen    PRALUENT    2 mL    Inject 1 mL (75 mg) Subcutaneous every 14 days    Hyperlipidemia, unspecified hyperlipidemia type       aspirin 81 MG tablet      Take 81 mg by mouth daily        dutasteride 0.5 MG capsule    AVODART    30 capsule    Take 1 capsule (0.5 mg) by mouth daily    Cardiomyopathy, ischemic       FISH OIL + D3 7052-0608 MG-UNIT Caps      Take 2 tablets by mouth daily        lisinopril 2.5 MG tablet    PRINIVIL/Zestril    270 tablet    One tablet in am; two tablets in pm    Coronary artery disease involving native coronary artery of native heart without angina pectoris       melatonin 5 MG tablet      Take 5 mg by mouth nightly as needed for sleep        metoprolol 25 MG 24 hr tablet    TOPROL-XL    45 tablet    Take 0.5 tablets (12.5 mg) by mouth daily    Cardiomyopathy, ischemic       nitroGLYcerin 0.4 MG sublingual tablet    NITROSTAT    25 tablet    Place 1 tablet (0.4 mg) under the tongue every 5 minutes as needed for chest pain Take as directed    Cardiomyopathy, ischemic       vitamin D3 2000 UNITS Caps      Take by  mouth daily        warfarin 5 MG tablet    COUMADIN    90 tablet    Take 1/2 tab on Mondays and Thursdays and 1 tab all other days or as directed by INR clinic, Coumadin SHIKHA    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

## 2017-10-04 NOTE — PROGRESS NOTES
History of Present Illness:    Sawyer Renteria is a 83 year old male followed here by Dr. Newton. He has a history of an anterior wall MI in 2006 resulting in an LAD stent and angioplasty to the diagonal. 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 Ejection fraction has waxed and waned a bit over the years but most recently is 45-50% with an ongoing apical wall motion abnormality    He has statin intolerance and has been on Praluent which he tolerates well. His lipids look the best they have been quite some time with an LDL at 70 HDL 59 triglycerides 129.    He saw Dr. Newton in follow-up approximately one month ago and his blood pressure was high. His lisinopril was increased to 2.5 mg twice daily.    He called our office 7-10 days ago with complaints of a rash but as it turns out he was eating tomatoes 3-4 times per day which was a change. He was counseled to reduce or eliminate the tomatoes and his rash actually disappeared-along with a dose of hydrocortisone cream. It has not recurred on the higher dose lisinopril. His basic metabolic panel looks fine today.    On exam his JVP is not elevated, lungs are clear. He has no activity intolerance. He has an S4 gallop which is unchanged.    He denies orthopnea PND. Weight is stable        Impression/Plan:     1. Coronary artery disease with anterior wall MI status post LAD stenting with the balloon angioplasty to the diagonal branch. Ejection fraction is stable at 45-50%. I will try to increase his lisinopril to 5 mg in the evening and 2.5 mg in the morning as his blood pressure is still suboptimal. We will recheck a basic metabolic panel and see him in a month. I counseled him to limit high potassium foods such as bananas, potatoes, tomatoes, and citrus fruit.  2. Cardiomyopathy with no heart failure  3. 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 He uses brand name and I did  refill his medication today. INR's have been therapeutic  4. Dyslipidemia with statin intolerance. He is tolerating Praluent with excellent lipid profile.    I will see him back in approximately one month. He will need a nuclear stress test and lab work and a visit with Dr. Newton in April which I have also ordered.    It has been a pleasure seeing Sawyer Renteria today as always.      Randell Izaguirre, MSN, APRN-BC, CNP  Cardiology    Orders Placed This Encounter   Procedures     NM Exercise stress test (nuc card)     Basic metabolic panel     Lipid Profile     Basic metabolic panel     Lipid Profile     ALT     Follow-Up with Cardiac Advanced Practice Provider     Orders Placed This Encounter   Medications     lisinopril (PRINIVIL/ZESTRIL) 2.5 MG tablet     Sig: One tablet in am; two tablets in pm     Dispense:  270 tablet     Refill:  3     warfarin (COUMADIN) 5 MG tablet     Sig: Take 1/2 tab on Mondays and Thursdays and 1 tab all other days or as directed by INR clinic, Coumadin SHIKHA     Dispense:  90 tablet     Refill:  3     Medications Discontinued During This Encounter   Medication Reason     lisinopril (PRINIVIL/ZESTRIL) 2.5 MG tablet Reorder     warfarin (COUMADIN) 5 MG tablet Reorder         Encounter Diagnoses   Name Primary?     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        CURRENT MEDICATIONS:  Current Outpatient Prescriptions   Medication Sig Dispense Refill     lisinopril (PRINIVIL/ZESTRIL) 2.5 MG tablet One tablet in am; two tablets in pm 270 tablet 3     warfarin (COUMADIN) 5 MG tablet Take 1/2 tab on Mondays and Thursdays and 1 tab all other days or as directed by INR clinic, Coumadin SHIKHA 90 tablet 3     metoprolol (TOPROL-XL) 25 MG 24 hr tablet Take 0.5 tablets (12.5 mg) by mouth daily 45 tablet 3     dutasteride (AVODART) 0.5 MG capsule Take 1  capsule (0.5 mg) by mouth daily 30 capsule 11     alirocumab (PRALUENT) 75 MG/ML injectable pen Inject 1 mL (75 mg) Subcutaneous every 14 days 2 mL 11     melatonin 5 MG tablet Take 5 mg by mouth nightly as needed for sleep       Fish Oil-Cholecalciferol (FISH OIL + D3) 7480-4587 MG-UNIT CAPS Take 2 tablets by mouth daily       Cholecalciferol (VITAMIN D3) 2000 UNITS CAPS Take by mouth daily       aspirin 81 MG tablet Take 81 mg by mouth daily       nitroglycerin (NITROSTAT) 0.4 MG SL tablet Place 1 tablet (0.4 mg) under the tongue every 5 minutes as needed for chest pain Take as directed 25 tablet 0     [DISCONTINUED] warfarin (COUMADIN) 5 MG tablet Take 1/2 tab on Mondays and Thursdays and 1 tab all other days or as directed by INR clinic, Coumadin SHIKHA 10 tablet 0     [DISCONTINUED] lisinopril (PRINIVIL/ZESTRIL) 2.5 MG tablet Take 1 tablet (2.5 mg) by mouth 2 times daily 180 tablet 3       ALLERGIES     Allergies   Allergen Reactions     Flomax [Tamsulosin]      Weakness and dizzyness     Carvedilol      Colesevelam      Epigastric pain     Ezetimibe      Lisinopril Itching     Hyperkalemia and inc. Creat. At 20 mg daily, itching , skin red when dose goes above 2.5 mg a day     Pravastatin      Abdominal pain     Rosuvastatin      Simvastatin        PAST MEDICAL HISTORY:  Past Medical History:   Diagnosis Date     BPH (benign prostatic hyperplasia)      Cardiomyopathy, ischemic     EF 49% by cardiac MRI 1/15/2014     Coronary artery disease 1/24/06    Cypher CANDIDA to LAD     Gastro-oesophageal reflux disease      Hyperlipidaemia      Hypertension      Left ventricular thrombus      Myocardial infarction 1/2006    Anterior     TIA (transient ischaemic attack)        PAST SURGICAL HISTORY:  Past Surgical History:   Procedure Laterality Date     CORONARY ANGIOGRAPHY ADULT ORDER  1/24/06    Cypher CANDIDA to LAD     HEART CATH, ANGIOPLASTY  1/2006    Cypher CANDIDA to LAD     TONSILLECTOMY & ADENOIDECTOMY         FAMILY  "HISTORY:  Family History   Problem Relation Age of Onset     HEART DISEASE Mother      heart failure     HEART DISEASE Brother        SOCIAL HISTORY:  Social History     Social History     Marital status:      Spouse name: N/A     Number of children: N/A     Years of education: N/A     Social History Main Topics     Smoking status: Never Smoker     Smokeless tobacco: Never Used     Alcohol use No     Drug use: None     Sexual activity: Not Asked     Other Topics Concern     Parent/Sibling W/ Cabg, Mi Or Angioplasty Before 65f 55m? No     Caffeine Concern No     Sleep Concern No     Stress Concern No     Weight Concern Yes     weight loss     Special Diet No     Exercise Yes     bicycle 10 min, walking, 3 days week     Seat Belt Yes     Social History Narrative       Review of Systems:  Skin:  Negative     Eyes:  Negative    ENT:  Negative    Respiratory:  Positive for    Cardiovascular:  Negative fatigue;Positive for  Gastroenterology: Negative    Genitourinary:  Negative nocturia (2-3 times per night)  Musculoskeletal:  Positive for joint pain  Neurologic:  Negative    Psychiatric:  Negative sleep disturbances  Heme/Lymph/Imm:  Negative weight loss;allergies  Endocrine:  Negative      Physical Exam:  Vitals: /68  Pulse 70  Ht 1.575 m (5' 2\")  Wt 55.8 kg (123 lb)  BMI 22.5 kg/m2    Constitutional:  cooperative;alert and oriented        Skin:  warm and dry to the touch        Head:           Eyes:  pupils equal and round        ENT:  no pallor or cyanosis        Neck:  carotid pulses are full and equal bilaterally        Chest:  clear to auscultation        Cardiac: normal S1 and S2   S4 no presence of murmur systolic murmur;grade 2;apical          Abdomen:  abdomen soft;BS normoactive        Vascular: not assessed this visit                                      Extremities and Back:  no edema   ecchymosis over left flank following fall at home    Neurological:  no gross motor deficits    "       Recent Lab Results:  LIPID RESULTS:  Lab Results   Component Value Date    CHOL 155 07/25/2017    HDL 59 07/25/2017    LDL 70 07/25/2017    TRIG 129 07/25/2017    CHOLHDLRATIO 4.3 10/13/2015       LIVER ENZYME RESULTS:  Lab Results   Component Value Date    AST 21 07/25/2017    ALT 14 07/25/2017       CBC RESULTS:  Lab Results   Component Value Date    WBC 5.6 06/23/2015    RBC 4.25 (L) 06/23/2015    HGB 12.7 (L) 06/23/2015    HCT 37.9 (L) 06/23/2015    MCV 89 06/23/2015    MCH 29.9 06/23/2015    MCHC 33.5 06/23/2015    RDW 13.6 06/23/2015     06/23/2015       BMP RESULTS:  Lab Results   Component Value Date     10/04/2017    POTASSIUM 4.6 10/04/2017    CHLORIDE 106 10/04/2017    CO2 25 10/04/2017    ANIONGAP 12.6 10/04/2017    GLC 98 10/04/2017    BUN 24 10/04/2017    CR 1.18 10/04/2017    GFRESTIMATED 59 (L) 10/04/2017    GFRESTBLACK 71 10/04/2017    AURORA 8.8 10/04/2017        A1C RESULTS:  No results found for: A1C    INR RESULTS:  Lab Results   Component Value Date    INR 3.4 (A) 09/29/2017    INR 2.8 (A) 09/14/2017    INR 2.94 (H) 06/08/2017    INR 2.8 05/09/2014           CC  Satya Newton MD  [id number]  [id number]  [place] [id number]

## 2017-10-13 ENCOUNTER — ANTICOAGULATION THERAPY VISIT (OUTPATIENT)
Dept: CARDIOLOGY | Facility: CLINIC | Age: 82
End: 2017-10-13
Payer: COMMERCIAL

## 2017-10-13 DIAGNOSIS — G45.9 TRANSIENT CEREBRAL ISCHEMIA: ICD-10-CM

## 2017-10-13 DIAGNOSIS — Z79.01 LONG TERM CURRENT USE OF ANTICOAGULANT THERAPY: Primary | ICD-10-CM

## 2017-10-13 DIAGNOSIS — Z79.01 LONG-TERM (CURRENT) USE OF ANTICOAGULANTS: ICD-10-CM

## 2017-10-13 LAB — INR POINT OF CARE: 3.2 (ref 0.86–1.14)

## 2017-10-13 PROCEDURE — 85610 PROTHROMBIN TIME: CPT | Mod: QW | Performed by: INTERNAL MEDICINE

## 2017-10-13 PROCEDURE — 36416 COLLJ CAPILLARY BLOOD SPEC: CPT | Performed by: INTERNAL MEDICINE

## 2017-10-13 PROCEDURE — 99207 ZZC NO CHARGE NURSE ONLY: CPT | Performed by: INTERNAL MEDICINE

## 2017-10-13 NOTE — MR AVS SNAPSHOT
Sawyer Renteria   10/13/2017 10:20 AM   Anticoagulation Therapy Visit    Description:  83 year old male   Provider:  [facility]   Department:  [facility]           INR as of 10/13/2017     Today's INR 3.2!      Anticoagulation Summary as of 10/13/2017     INR goal 2.0-3.0   Today's INR 3.2!   Full instructions 2.5 mg on Mon, Wed, Fri; 5 mg all other days   Next INR check 10/20/2017    Indications   Left ventricular thrombus [ASJ9652]  Transient cerebral ischemia [G45.9]  Long-term (current) use of anticoagulants [Z79.01] [Z79.01]         Your next Anticoagulation Clinic appointment(s)     Oct 20, 2017 10:40 AM CDT   Anticoagulation Visit with  ANTICOAGULATION   [facility] ([facility])    [address] [place] [id number]   [phone]              Contact Numbers     Anticoagulant (INR) Clinic Number: [phone]          October 2017 Details    Sun Mon Tue Wed Thu Fri Sat     1               2               3               4               5               6               7                 8               9               10               11               12               13      2.5 mg   See details      14      5 mg           15      5 mg         16      2.5 mg         17      5 mg         18      2.5 mg         19      5 mg         20            21                 22               23               24               25               26               27               28                 29               30               31                    Date Details   10/13 This INR check       Date of next INR:  10/20/2017         How to take your warfarin dose     To take:  2.5 mg Take 0.5 of a 5 mg tablet.    To take:  5 mg Take 1 of the 5 mg tablets.

## 2017-10-13 NOTE — PROGRESS NOTES
ANTICOAGULATION FOLLOW-UP CLINIC VISIT    Patient Name:  Sawyer Renteria  Date:  10/13/2017  Contact Type:  Face to Face    SUBJECTIVE:  Bleeding Signs/Symptoms: None  Thromboembolic Signs/Symptoms: None    Medication Changes:  No  Dietary Changes:  No  Bacterial/Viral Infection:  No    Missed Coumadin Doses:  None  Other Concerns:  No      ASSESSMENT/PLAN:  See: ANTICOAGULATION QIC flow sheet.    INR=3.2  Denies med changes, he had spinach twice this week. Denies bleeding or increased bruising. He needs to get a tooth pulled but dentist needs INR between 2.0-2.5 before he will pull his tooth. Will decrease weekly dose by 2.5 mg and recheck in a week. LPenfield RN   Dosage adjustment made based on physician directed care plan.    MA[facility]ANO ANTICOAGULATION

## 2017-10-20 ENCOUNTER — ANTICOAGULATION THERAPY VISIT (OUTPATIENT)
Dept: CARDIOLOGY | Facility: CLINIC | Age: 82
End: 2017-10-20
Payer: COMMERCIAL

## 2017-10-20 DIAGNOSIS — G45.9 TRANSIENT CEREBRAL ISCHEMIA: ICD-10-CM

## 2017-10-20 DIAGNOSIS — Z79.01 LONG-TERM (CURRENT) USE OF ANTICOAGULANTS: ICD-10-CM

## 2017-10-20 DIAGNOSIS — Z79.01 LONG TERM CURRENT USE OF ANTICOAGULANT THERAPY: Primary | ICD-10-CM

## 2017-10-20 LAB — INR POINT OF CARE: 2.4 (ref 0.86–1.14)

## 2017-10-20 PROCEDURE — 36416 COLLJ CAPILLARY BLOOD SPEC: CPT | Performed by: INTERNAL MEDICINE

## 2017-10-20 PROCEDURE — 99207 ZZC NO CHARGE NURSE ONLY: CPT | Performed by: INTERNAL MEDICINE

## 2017-10-20 PROCEDURE — 85610 PROTHROMBIN TIME: CPT | Mod: QW | Performed by: INTERNAL MEDICINE

## 2017-10-20 NOTE — MR AVS SNAPSHOT
Sawyer Renteria   10/20/2017 10:40 AM   Anticoagulation Therapy Visit    Description:  83 year old male   Provider:  [facility]O [facility]TICOAG[facility]ATION   Department:  [facility]           INR as of 10/20/2017     Today's INR 2.4      Anticoagulation Summary as of 10/20/2017     INR goal 2.0-3.0   Today's INR 2.4   Full instructions 2.5 mg on Mon, Wed, Fri; 5 mg all other days   Next INR check 10/27/2017    Indications   Left ventricular thrombus [DRM6414]  Transient cerebral ischemia [G45.9]  Long-term (current) use of anticoagulants [Z79.01] [Z79.01]         Your next Anticoagulation Clinic appointment(s)     Oct 20, 2017 10:40 AM CDT   Anticoagulation Visit with  ANTICOAGULATION   [facility] ([facility])    [address] [place] [id number]   [phone]            Oct 27, 2017 10:20 AM CDT   Anticoagulation Visit with  ANTICOAGULATION   [facility] ([facility])    [address] [place], [place] [id number]  [facility] [id number]   [phone]              Contact Numbers     Anticoagulant (INR) Clinic Number: [phone]          October 2017 Details    Sun Mon Tue Wed Thu Fri Sat     1               2               3               4               5               6               7                 8               9               10               11               12               13               14                 15               16               17               18               19               20      2.5 mg   See details      21      5 mg           22      5 mg         23      2.5 mg         24      5 mg         25      2.5 mg         26      5 mg         27            28                 29               30               31                    Date Details   10/20 This INR check       Date of next INR:  10/27/2017         How to take your warfarin dose     To take:  2.5 mg Take 0.5 of a 5 mg tablet.     To take:  5 mg Take 1 of the 5 mg tablets.

## 2017-10-20 NOTE — PROGRESS NOTES
ANTICOAGULATION FOLLOW-UP CLINIC VISIT    Patient Name:  Sawyer Renteria  Date:  10/20/2017  Contact Type:  Face to Face    SUBJECTIVE:  Bleeding Signs/Symptoms: None  Thromboembolic Signs/Symptoms: None    Medication Changes:  No  Dietary Changes:  No  Bacterial/Viral Infection:  No    Missed Coumadin Doses:  None  Other Concerns:  No      ASSESSMENT/PLAN:  See: ANTICOAGULATION QIC flow sheet.    INR=2.4  Pt in range to get his tooth pulled. No changes or complications. Will continue same confirmed dose and recheck in 1 weeks per pt request as he will need to give himself the Praluent injection & her likes to do that here. LPenfield RN    Dosage adjustment made based on physician directed care plan.    MAJANO ANTICOAGULATION

## 2017-10-27 ENCOUNTER — ANTICOAGULATION THERAPY VISIT (OUTPATIENT)
Dept: CARDIOLOGY | Facility: CLINIC | Age: 82
End: 2017-10-27
Payer: COMMERCIAL

## 2017-10-27 DIAGNOSIS — G45.9 TRANSIENT CEREBRAL ISCHEMIA: ICD-10-CM

## 2017-10-27 DIAGNOSIS — Z79.01 LONG-TERM (CURRENT) USE OF ANTICOAGULANTS: ICD-10-CM

## 2017-10-27 LAB — INR POINT OF CARE: 2.7 (ref 0.86–1.14)

## 2017-10-27 PROCEDURE — 85610 PROTHROMBIN TIME: CPT | Mod: QW | Performed by: INTERNAL MEDICINE

## 2017-10-27 PROCEDURE — 36416 COLLJ CAPILLARY BLOOD SPEC: CPT | Performed by: INTERNAL MEDICINE

## 2017-10-27 NOTE — MR AVS SNAPSHOT
Sawyer Renteria   10/27/2017 10:20 AM   Anticoagulation Therapy Visit    Description:  83 year old male   Provider:  MAJANO ANTICOAGULATION   Department:  [facility]           INR as of 10/27/2017     Today's INR 2.7      Anticoagulation Summary as of 10/27/2017     INR goal 2.0-3.0   Today's INR 2.7   Full instructions 10/27: Hold; Otherwise 2.5 mg on Mon, Wed, Fri; 5 mg all other days   Next INR check 11/7/2017    Indications   Left ventricular thrombus [OLP2571]  Transient cerebral ischemia [G45.9]  Long-term (current) use of anticoagulants [Z79.01] [Z79.01]         Your next Anticoagulation Clinic appointment(s)     Nov 07, 2017 10:20 AM CST   Anticoagulation Visit with  ANTICOAGULATION   [facility] ([facility])    [address] [place] [id number]   [phone]              Contact Numbers     Anticoagulant (INR) Clinic Number: [phone]          October 2017 Details    Sun Mon Tue Wed Thu Fri Sat     1               2               3               4               5               6               7                 8               9               10               11               12               13               14                 15               16               17               18               19               20               21                 22               23               24               25               26               27      Hold   See details      28      5 mg           29      5 mg         30      2.5 mg         31      5 mg              Date Details   10/27 This INR check               How to take your warfarin dose     To take:  2.5 mg Take 0.5 of a 5 mg tablet.    To take:  5 mg Take 1 of the 5 mg tablets.    Hold Do not take your warfarin dose. See the Details table to the right for additional instructions.                November 2017 Details    Sun Mon Tue Wed Thu Fri Sat        1      2.5 mg         2       5 mg         3      2.5 mg         4      5 mg           5      5 mg         6      2.5 mg         7            8               9               10               11                 12               13               14               15               16               17               18                 19               20               21               22               23               24               25                 26               27               28               29               30                  Date Details   No additional details    Date of next INR:  11/7/2017         How to take your warfarin dose     To take:  2.5 mg Take 0.5 of a 5 mg tablet.    To take:  5 mg Take 1 of the 5 mg tablets.

## 2017-10-27 NOTE — PROGRESS NOTES
ANTICOAGULATION FOLLOW-UP CLINIC VISIT    Patient Name:  Sawyer Renteria  Date:  10/27/2017  Contact Type:  Face to Face    SUBJECTIVE:        OBJECTIVE    INR Protime   Date Value Ref Range Status   10/27/2017 2.7 (A) 0.86 - 1.14 Final       ASSESSMENT / PLAN  INR assessment THER    Recheck INR In: 2 WEEKS    INR Location Clinic      Anticoagulation Summary as of 10/27/2017     INR goal 2.0-3.0   Today's INR 2.7   Maintenance plan 2.5 mg (5 mg x 0.5) on Mon, Wed, Fri; 5 mg (5 mg x 1) all other days   Full instructions 10/27: Hold; Otherwise 2.5 mg on Mon, Wed, Fri; 5 mg all other days   Weekly total 27.5 mg   Plan last modified Penfield, Lynette E, RN (10/13/2017)   Next INR check 11/7/2017   Target end date Indefinite    Indications   Left ventricular thrombus [DBC7137]  Transient cerebral ischemia [G45.9]  Long-term (current) use of anticoagulants [Z79.01] [Z79.01]         Anticoagulation Episode Summary     INR check location     Preferred lab     Send INR reminders to [facility] HEART INR NURSE    Comments       Anticoagulation Care Providers     Provider Role Specialty Phone number    Satya Newton MD Responsible Cardiology [phone]          INR 2.7. No changes to diet or medications. Pt plans to have a tooth extracted next week and INR needs to be below 2.5. He doesn't actually have a date set yet, but expects it to be next week. Will have him hold dose today, then resume taking 2.5 mg M,W,Fri and 5 mg ROW. Recheck INR in 10 days prior to OV with Sarah.     See the Encounter Report to view Anticoagulation Flowsheet and Dosing Calendar (Go to Encounters tab in chart review, and find the Anticoagulation Therapy Visit)    Dosage adjustment made based on physician directed care plan.    Tena Tran RN

## 2017-11-02 PROBLEM — Z78.9 STATIN INTOLERANCE: Status: ACTIVE | Noted: 2017-11-02

## 2017-11-07 ENCOUNTER — ANTICOAGULATION THERAPY VISIT (OUTPATIENT)
Dept: CARDIOLOGY | Facility: CLINIC | Age: 82
End: 2017-11-07
Payer: COMMERCIAL

## 2017-11-07 ENCOUNTER — OFFICE VISIT (OUTPATIENT)
Dept: CARDIOLOGY | Facility: CLINIC | Age: 82
End: 2017-11-07
Attending: NURSE PRACTITIONER
Payer: COMMERCIAL

## 2017-11-07 VITALS
SYSTOLIC BLOOD PRESSURE: 140 MMHG | HEART RATE: 62 BPM | DIASTOLIC BLOOD PRESSURE: 54 MMHG | WEIGHT: 126 LBS | BODY MASS INDEX: 23.19 KG/M2 | HEIGHT: 62 IN

## 2017-11-07 DIAGNOSIS — I25.10 CORONARY ARTERY DISEASE INVOLVING NATIVE CORONARY ARTERY OF NATIVE HEART WITHOUT ANGINA PECTORIS: ICD-10-CM

## 2017-11-07 DIAGNOSIS — I25.5 CARDIOMYOPATHY, ISCHEMIC: Primary | ICD-10-CM

## 2017-11-07 DIAGNOSIS — G45.9 TRANSIENT CEREBRAL ISCHEMIA: ICD-10-CM

## 2017-11-07 DIAGNOSIS — I25.5 CARDIOMYOPATHY, ISCHEMIC: ICD-10-CM

## 2017-11-07 DIAGNOSIS — Z79.01 LONG-TERM (CURRENT) USE OF ANTICOAGULANTS: ICD-10-CM

## 2017-11-07 LAB
ALT SERPL W P-5'-P-CCNC: 7 U/L (ref 5–30)
ANION GAP SERPL CALCULATED.3IONS-SCNC: 13.6 MMOL/L (ref 6–17)
BUN SERPL-MCNC: 20 MG/DL (ref 7–30)
CALCIUM SERPL-MCNC: 8.9 MG/DL (ref 8.5–10.5)
CHLORIDE SERPL-SCNC: 107 MMOL/L (ref 98–107)
CHOLEST SERPL-MCNC: 151 MG/DL
CO2 SERPL-SCNC: 24 MMOL/L (ref 23–29)
CREAT SERPL-MCNC: 1.42 MG/DL (ref 0.7–1.3)
GFR SERPL CREATININE-BSD FRML MDRD: 48 ML/MIN/1.7M2
GLUCOSE SERPL-MCNC: 109 MG/DL (ref 70–105)
HDLC SERPL-MCNC: 54 MG/DL
INR POINT OF CARE: 3.1 (ref 0.86–1.14)
LDLC SERPL CALC-MCNC: 68 MG/DL
NONHDLC SERPL-MCNC: 97 MG/DL
POTASSIUM SERPL-SCNC: 4.6 MMOL/L (ref 3.5–5.1)
SODIUM SERPL-SCNC: 140 MMOL/L (ref 136–145)
TRIGL SERPL-MCNC: 145 MG/DL

## 2017-11-07 PROCEDURE — 85610 PROTHROMBIN TIME: CPT | Mod: QW | Performed by: INTERNAL MEDICINE

## 2017-11-07 PROCEDURE — 84460 ALANINE AMINO (ALT) (SGPT): CPT | Performed by: NURSE PRACTITIONER

## 2017-11-07 PROCEDURE — 80061 LIPID PANEL: CPT | Performed by: NURSE PRACTITIONER

## 2017-11-07 PROCEDURE — 99214 OFFICE O/P EST MOD 30 MIN: CPT | Performed by: NURSE PRACTITIONER

## 2017-11-07 PROCEDURE — 80048 BASIC METABOLIC PNL TOTAL CA: CPT | Performed by: NURSE PRACTITIONER

## 2017-11-07 PROCEDURE — 36416 COLLJ CAPILLARY BLOOD SPEC: CPT | Performed by: INTERNAL MEDICINE

## 2017-11-07 RX ORDER — LISINOPRIL 2.5 MG/1
TABLET ORAL
Qty: 270 TABLET | Refills: 3 | COMMUNITY
Start: 2017-11-07 | End: 2018-08-08

## 2017-11-07 RX ORDER — NITROGLYCERIN 0.4 MG/1
0.4 TABLET SUBLINGUAL EVERY 5 MIN PRN
Qty: 25 TABLET | Refills: 3 | Status: SHIPPED | OUTPATIENT
Start: 2017-11-07 | End: 2022-03-07

## 2017-11-07 NOTE — MR AVS SNAPSHOT
After Visit Summary   11/7/2017    Sawyer Renteria    MRN: [id number]           Patient Information     Date Of Birth          3/24/1934        Visit Information        Provider Department      11/7/2017 10:50 AM Randell Izaguirre APRN CNP [facility]        Today's Diagnoses     Cardiomyopathy, ischemic        Coronary artery disease involving native coronary artery of native heart without angina pectoris          Care Instructions    Reduce your Lisinpril to one pill twice daily     Check your labs, and blood pressure--at your INR visits 11-21 and I will review kidney test then and blood pressure and let you know the plan    See Dr. Newton in April          Follow-ups after your visit        Additional Services     Follow-Up with Nurse           Follow-Up with Cardiologist                 Your next 10 appointments already scheduled     Nov 21, 2017 10:00 AM CST   Anticoagulation Visit with MAJANO ANTICOAGULATION   [facility] ([facility])    [address] [place] [id number] [phone]              Future tests that were ordered for you today     Open Future Orders        Priority Expected Expires Ordered    Follow-Up with Nurse Routine 11/21/2017 11/7/2019 11/7/2017    Follow-Up with Cardiologist Routine 4/11/2018 11/7/2018 11/7/2017    Basic metabolic panel Routine 11/21/2017 11/7/2018 11/7/2017            Who to contact     If you have questions or need follow up information about today's clinic visit or your schedule please contact [facility] directly at [phone].  Normal or non-critical lab and imaging results will be communicated to you by MyChart, letter or phone within 4 business days after the clinic has received the results. If you do not hear from us within 7 days, please contact the clinic through MyChart or phone. If you have a critical or  "abnormal lab result, we will notify you by phone as soon as possible.  Submit refill requests through [employer] or call your pharmacy and they will forward the refill request to us. Please allow 3 business days for your refill to be completed.          Additional Information About Your Visit        [email or web address] Information     [employer] gives you secure access to your electronic health record. If you see a primary care provider, you can also send messages to your care team and make appointments. If you have questions, please call your primary care clinic.  If you do not have a primary care provider, please call [phone] and they will assist you.        Care EveryWhere ID     This is your Care EveryWhere ID. This could be used by other organizations to access your [facility] medical records  [id number]        Your Vitals Were     Pulse Height BMI (Body Mass Index)             62 1.575 m (5' 2\") 23.05 kg/m2          Blood Pressure from Last 3 Encounters:   11/07/17 140/54   10/04/17 136/68   08/28/17 138/62    Weight from Last 3 Encounters:   11/07/17 57.2 kg (126 lb)   10/04/17 55.8 kg (123 lb)   08/28/17 56.2 kg (124 lb)              We Performed the Following     Follow-Up with Cardiac Advanced Practice Provider          Today's Medication Changes          These changes are accurate as of: 11/7/17 11:25 AM.  If you have any questions, ask your nurse or doctor.               These medicines have changed or have updated prescriptions.        Dose/Directions    lisinopril 2.5 MG tablet   Commonly known as:  PRINIVIL/Zestril   This may have changed:  additional instructions   Used for:  Coronary artery disease involving native coronary artery of native heart without angina pectoris   Changed by:  Randell Izaguirre, APRN CNP        One tablet twice per day   Quantity:  270 tablet   Refills:  3            Where to get your medicines      These medications were sent to [facility] - [place], [place] - " [address]  [address], [facility] [address]     Phone:  [phone]     nitroGLYcerin 0.4 MG sublingual tablet                Primary Care Provider Office Phone # Fax #    Francisco Doshi [phone] [address] [address]        Equal Access to Services     [facility]: Hadii aad ku hadasho Soomaali, waaxda luqadaha, qaybta kaalmada adeegyada, waxay idiin hayaan adeeg kharash laByronaan . So [facility] [phone].    ATENCIÓN: Si habla español, tiene a roy disposición servicios gratuitos de asistencia lingüística. Yanick al [phone].    We comply with applicable federal civil rights laws and [place] laws. We do not discriminate on the basis of race, color, national origin, age, disability, sex, sexual orientation, or gender identity.            Thank you!     Thank you for choosing [facility]  for your care. Our goal is always to provide you with excellent care. Hearing back from our patients is one way we can continue to improve our services. Please take a few minutes to complete the written survey that you may receive in the mail after your visit with us. Thank you!             Your Updated Medication List - Protect others around you: Learn how to safely use, store and throw away your medicines at www.disposemymeds.org.          This list is accurate as of: 11/7/17 11:25 AM.  Always use your most recent med list.                   Brand Name Dispense Instructions for use Diagnosis    alirocumab 75 MG/ML injectable pen    PRALUENT    2 mL    Inject 1 mL (75 mg) Subcutaneous every 14 days    Hyperlipidemia, unspecified hyperlipidemia type       aspirin 81 MG tablet      Take 81 mg by mouth daily        dutasteride 0.5 MG capsule    AVODART    30 capsule    Take 1 capsule (0.5 mg) by mouth daily    Cardiomyopathy, ischemic       FISH OIL + D3 5392-0978 MG-UNIT Caps      Take 2 tablets by mouth daily        lisinopril 2.5 MG  tablet    PRINIVIL/Zestril    270 tablet    One tablet twice per day    Coronary artery disease involving native coronary artery of native heart without angina pectoris       melatonin 5 MG tablet      Take 5 mg by mouth nightly as needed for sleep        metoprolol 25 MG 24 hr tablet    TOPROL-XL    45 tablet    Take 0.5 tablets (12.5 mg) by mouth daily    Cardiomyopathy, ischemic       nitroGLYcerin 0.4 MG sublingual tablet    NITROSTAT    25 tablet    Place 1 tablet (0.4 mg) under the tongue every 5 minutes as needed for chest pain Take as directed    Cardiomyopathy, ischemic       vitamin D3 2000 UNITS Caps      Take by mouth daily        warfarin 5 MG tablet    COUMADIN    90 tablet    Take 1/2 tab on Mondays and Thursdays and 1 tab all other days or as directed by INR clinic, Coumadin SHIKHA    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

## 2017-11-07 NOTE — MR AVS SNAPSHOT
Sawyer Renteria   11/7/2017 10:20 AM   Anticoagulation Therapy Visit    Description:  83 year old male   Provider:  MAJANO ANTICOAGULATION   Department:  [facility]           INR as of 11/7/2017     Today's INR 3.1!      Anticoagulation Summary as of 11/7/2017     INR goal 2.0-3.0   Today's INR 3.1!   Full instructions 2.5 mg on Mon, Wed, Fri; 5 mg all other days   Next INR check     Indications   Left ventricular thrombus [AYP6909]  Transient cerebral ischemia [G45.9]  Long-term (current) use of anticoagulants [Z79.01] [Z79.01]         Your next Anticoagulation Clinic appointment(s)     Nov 07, 2017 10:20 AM CST   Anticoagulation Visit with  ANTICOAGULATION   [facility] ([facility])    [id number]  [facility] [address]   [phone]            Nov 21, 2017 10:00 AM CST   Anticoagulation Visit with  ANTICOAGULATION   [facility] ([facility])    [id number]  [facility] [address]   [phone]              Contact Numbers     Anticoagulant (INR) Clinic Number: [phone]          November 2017 Details    Sun Mon Tue Wed Thu Fri Sat        1               2               3               4                 5               6               7      5 mg   See details      8      2.5 mg         9      5 mg         10      2.5 mg         11      5 mg           12      5 mg         13      2.5 mg         14      5 mg         15      2.5 mg         16      5 mg         17      2.5 mg         18      5 mg           19      5 mg         20      2.5 mg         21      5 mg         22      2.5 mg         23      5 mg         24      2.5 mg         25      5 mg           26      5 mg         27      2.5 mg         28      5 mg         29      2.5 mg         30      5 mg            Date Details   11/07 This INR check      Date of next INR: No date specified         How to take your  warfarin dose     To take:  2.5 mg Take 0.5 of a 5 mg tablet.    To take:  5 mg Take 1 of the 5 mg tablets.

## 2017-11-07 NOTE — PROGRESS NOTES
ANTICOAGULATION FOLLOW-UP CLINIC VISIT    Patient Name:  Sawyer Renteria  Date:  11/7/2017  Contact Type:  Face to Face    SUBJECTIVE:     Patient Findings     Positives No Problem Findings           OBJECTIVE    INR Protime   Date Value Ref Range Status   11/07/2017 3.1 (A) 0.86 - 1.14 Final       ASSESSMENT / PLAN  INR assessment SUPRA    Recheck INR In: 2 WEEKS    INR Location Clinic      Anticoagulation Summary as of 11/7/2017     INR goal 2.0-3.0   Today's INR 3.1!   Maintenance plan 2.5 mg (5 mg x 0.5) on Mon, Wed, Fri; 5 mg (5 mg x 1) all other days   Full instructions 2.5 mg on Mon, Wed, Fri; 5 mg all other days   Weekly total 27.5 mg   No change documented Daija Santiago, RN   Plan last modified Penfield, Lynette E, RN (10/13/2017)   Next INR check    Target end date Indefinite    Indications   Left ventricular thrombus [GUR7236]  Transient cerebral ischemia [G45.9]  Long-term (current) use of anticoagulants [Z79.01] [Z79.01]         Anticoagulation Episode Summary     INR check location     Preferred lab     Send INR reminders to [facility] HEART INR NURSE    Comments       Anticoagulation Care Providers     Provider Role Specialty Phone number    Satya Newton MD Responsible Cardiology [phone]            See the Encounter Report to view Anticoagulation Flowsheet and Dosing Calendar (Go to Encounters tab in chart review, and find the Anticoagulation Therapy Visit)    INR 3.1 He denies any diet or med change in the last week. No bleeding or bruising issues. He is scheduled this Thursday to have a tooth extraction.The oral surgeon told him inr needs to be at 2.5, was told he doesn't have to hold warfarin. He will continue his current warfarin dosing of 2.5 mg MWF and 5 mg the other days. He will eat spinach tonight. He self injected Praluent 75 mg sq in his right thigh. He is scheduled to see Randell today. Recheck in 2 weeks. Dosage adjustment made based on physician directed care  plan.    Daija Santiago RN

## 2017-11-07 NOTE — LETTER
11/7/2017    Francisco Doshi MD  [address]  [place] [address]    RE: Sawyer Renteria       Dear Colleague,    I had the pleasure of seeing Sawyer Renteria in the [facility].    History of Present Illness:     Sawyer Renteria is a 83 year old male followed here by Dr. Newton. He has a history of an anterior wall MI in 2006 resulting in an LAD stent and angioplasty to the diagonal. 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 Ejection fraction has waxed and waned a bit over the years but most recently is 45-50% with an ongoing apical wall motion abnormality     He has statin intolerance and has been on Praluent which he tolerates well. His lipids look much improved with total cholesterol 151, HDL 54, LDL 68, triglyceride 145.     When he saw Dr. Newton in follow-up most recently, and his blood pressure was elevated and lisinopril was increased. I increased his lisinopril slightly again at the last office visit for elevated blood pressures. Unfortunately, his creatinine has elevated to 1.42 from his baseline of 1.18-1.22. He has had hyperkalemia in the past on high dose lisinopril. He's also had hyperkalemia and regression of his renal function when lisinopril was combined with Spironalactone.     Potassium today is normal.     On exam his JVP is not elevated, lungs are clear. He has no activity intolerance. He has an S4 gallop which is unchanged.     He denies orthopnea PND. Weight is stable. He denies chest discomfort.           Impression/Plan:      1. Coronary artery disease with anterior wall MI status post LAD stenting with the balloon angioplasty to the diagonal branch. Ejection fraction is stable at 45-50%. Based on elevation of creatinine I will reduce the lisinopril back to 2.5 mg twice daily. His blood pressure today is borderline, however he has not taken any of his medications today. When he returns for an INR check on  November 21, I will have the nurses check his blood pressure and I will repeat a basic metabolic panel. At that time we will determine if he requires more antihypertensive therapy. I am  unable to increase metoprolol as his heart rates are slow and he has had side effects on higher dose metoprolol in the past.. We have tried carvedilol in the past with side effects as well    2. Cardiomyopathy with no heart failure.    3. 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 He uses brand name and I did refill his medication today. INR's have been therapeutic.    4. Dyslipidemia with statin intolerance. He is tolerating Praluent with excellent lipid profile.      I will contact him with his lab work and blood pressure readings after his November 21st appointment. He will need a nuclear stress test and lab work and a visit with Dr. Newton in April which has been ordered.     AJ once again had many questions today about his lab results medication refills blood pressure goals.     Greater than 50% of this 30 minute visit today was spent in counseling  Randell Izaguirre, MSN, APRN-BC, CNP  Cardiology    Orders Placed This Encounter   Procedures     Basic metabolic panel     Follow-Up with Cardiologist     Follow-Up with Nurse     Orders Placed This Encounter   Medications     lisinopril (PRINIVIL/ZESTRIL) 2.5 MG tablet     Sig: One tablet twice per day     Dispense:  270 tablet     Refill:  3     nitroGLYcerin (NITROSTAT) 0.4 MG sublingual tablet     Sig: Place 1 tablet (0.4 mg) under the tongue every 5 minutes as needed for chest pain Take as directed     Dispense:  25 tablet     Refill:  3     Medications Discontinued During This Encounter   Medication Reason     lisinopril (PRINIVIL/ZESTRIL) 2.5 MG tablet Reorder     nitroglycerin (NITROSTAT) 0.4 MG SL tablet Reorder         Encounter Diagnoses   Name Primary?     Cardiomyopathy, ischemic      Coronary artery disease involving native  coronary artery of native heart without angina pectoris        CURRENT MEDICATIONS:  Current Outpatient Prescriptions   Medication Sig Dispense Refill     lisinopril (PRINIVIL/ZESTRIL) 2.5 MG tablet One tablet twice per day 270 tablet 3     nitroGLYcerin (NITROSTAT) 0.4 MG sublingual tablet Place 1 tablet (0.4 mg) under the tongue every 5 minutes as needed for chest pain Take as directed 25 tablet 3     warfarin (COUMADIN) 5 MG tablet Take 1/2 tab on Mondays and Thursdays and 1 tab all other days or as directed by INR clinic, Coumadin SHIKHA 90 tablet 3     metoprolol (TOPROL-XL) 25 MG 24 hr tablet Take 0.5 tablets (12.5 mg) by mouth daily 45 tablet 3     dutasteride (AVODART) 0.5 MG capsule Take 1 capsule (0.5 mg) by mouth daily 30 capsule 11     alirocumab (PRALUENT) 75 MG/ML injectable pen Inject 1 mL (75 mg) Subcutaneous every 14 days 2 mL 11     melatonin 5 MG tablet Take 5 mg by mouth nightly as needed for sleep       Fish Oil-Cholecalciferol (FISH OIL + D3) 0519-6814 MG-UNIT CAPS Take 2 tablets by mouth daily       Cholecalciferol (VITAMIN D3) 2000 UNITS CAPS Take by mouth daily       aspirin 81 MG tablet Take 81 mg by mouth daily       [DISCONTINUED] lisinopril (PRINIVIL/ZESTRIL) 2.5 MG tablet One tablet in am; two tablets in pm 270 tablet 3     [DISCONTINUED] nitroglycerin (NITROSTAT) 0.4 MG SL tablet Place 1 tablet (0.4 mg) under the tongue every 5 minutes as needed for chest pain Take as directed 25 tablet 0       ALLERGIES     Allergies   Allergen Reactions     Flomax [Tamsulosin]      Weakness and dizzyness     Aldactone [Spironolactone]      High potassium and worsening creat when on lisinopril and spironalactone     Carvedilol      Colesevelam      Epigastric pain     Ezetimibe      Lisinopril Itching     Hyperkalemia and inc. Creat. At 20 mg daily, itching , skin red when dose goes above 2.5 mg a day--elevated creat      Pravastatin      Abdominal pain     Rosuvastatin      Simvastatin        PAST  "MEDICAL HISTORY:  Past Medical History:   Diagnosis Date     BPH (benign prostatic hyperplasia)      Cardiomyopathy, ischemic     EF 49% by cardiac MRI 1/15/2014     Coronary artery disease 1/24/06    Cypher CANDIDA to LAD     Gastro-oesophageal reflux disease      Hyperlipidaemia      Hypertension      Left ventricular thrombus      Myocardial infarction 1/2006    Anterior     TIA (transient ischaemic attack)        PAST SURGICAL HISTORY:  Past Surgical History:   Procedure Laterality Date     CORONARY ANGIOGRAPHY ADULT ORDER  1/24/06    Cypher CANDIDA to LAD     HEART CATH, ANGIOPLASTY  1/2006    Cypher CANDIDA to LAD     TONSILLECTOMY & ADENOIDECTOMY         FAMILY HISTORY:  Family History   Problem Relation Age of Onset     HEART DISEASE Mother      heart failure     HEART DISEASE Brother        SOCIAL HISTORY:  Social History     Social History     Marital status:      Spouse name: N/A     Number of children: N/A     Years of education: N/A     Social History Main Topics     Smoking status: Never Smoker     Smokeless tobacco: Never Used     Alcohol use No     Drug use: None     Sexual activity: Not Asked     Other Topics Concern     Parent/Sibling W/ Cabg, Mi Or Angioplasty Before 65f 55m? No     Caffeine Concern No     Sleep Concern No     Stress Concern No     Weight Concern Yes     weight loss     Special Diet No     Exercise Yes     bicycle 10 min, walking, 3 days week     Seat Belt Yes     Social History Narrative       Review of Systems:  Skin:  Negative     Eyes:  Positive for glasses  ENT:  Positive for postnasal drainage  Respiratory:  Negative    Cardiovascular:  Negative    Gastroenterology: Negative    Genitourinary:  Positive for nocturia (2-3 times per night)  Musculoskeletal:  Negative    Neurologic:  Positive for numbness or tingling of feet  Psychiatric:  Negative    Heme/Lymph/Imm:  Positive for allergies  Endocrine:  Negative      Physical Exam:  Vitals: /54  Pulse 62  Ht 1.575 m (5' 2\")  " Wt 57.2 kg (126 lb)  BMI 23.05 kg/m2    Constitutional:           Skin:  warm and dry to the touch        Head:           Eyes:  pupils equal and round        ENT:  no pallor or cyanosis        Neck:  carotid pulses are full and equal bilaterally        Chest:  clear to auscultation        Cardiac: normal S1 and S2   S4 no presence of murmur systolic murmur;grade 2;apical          Abdomen:  abdomen soft;BS normoactive        Vascular: not assessed this visit                                      Extremities and Back:  no edema   ecchymosis over left flank following fall at home    Neurological:  no gross motor deficits          Recent Lab Results:  LIPID RESULTS:  Lab Results   Component Value Date    CHOL 151 11/07/2017    HDL 54 11/07/2017    LDL 68 11/07/2017    TRIG 145 11/07/2017    CHOLHDLRATIO 4.3 10/13/2015       LIVER ENZYME RESULTS:  Lab Results   Component Value Date    AST 21 07/25/2017    ALT 7 11/07/2017       CBC RESULTS:  Lab Results   Component Value Date    WBC 5.6 06/23/2015    RBC 4.25 (L) 06/23/2015    HGB 12.7 (L) 06/23/2015    HCT 37.9 (L) 06/23/2015    MCV 89 06/23/2015    MCH 29.9 06/23/2015    MCHC 33.5 06/23/2015    RDW 13.6 06/23/2015     06/23/2015       BMP RESULTS:  Lab Results   Component Value Date     11/07/2017    POTASSIUM 4.6 11/07/2017    CHLORIDE 107 11/07/2017    CO2 24 11/07/2017    ANIONGAP 13.6 11/07/2017     (H) 11/07/2017    BUN 20 11/07/2017    CR 1.42 (H) 11/07/2017    GFRESTIMATED 48 (L) 11/07/2017    GFRESTBLACK 58 (L) 11/07/2017    AURORA 8.9 11/07/2017        A1C RESULTS:  No results found for: A1C    INR RESULTS:  Lab Results   Component Value Date    INR 3.1 (A) 11/07/2017    INR 2.7 (A) 10/27/2017    INR 2.94 (H) 06/08/2017    INR 2.8 05/09/2014     Thank you for allowing me to participate in the care of your patient.    Sincerely,     JAE Jones [facility]

## 2017-11-07 NOTE — PROGRESS NOTES
History of Present Illness:     Sawyer Renteria is a 83 year old male followed here by Dr. Newton. He has a history of an anterior wall MI in 2006 resulting in an LAD stent and angioplasty to the diagonal. 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 Ejection fraction has waxed and waned a bit over the years but most recently is 45-50% with an ongoing apical wall motion abnormality     He has statin intolerance and has been on Praluent which he tolerates well. His lipids look much improved with total cholesterol 151, HDL 54, LDL 68, triglyceride 145.     When he saw Dr. Newton in follow-up most recently, and his blood pressure was elevated and lisinopril was increased. I increased his lisinopril slightly again at the last office visit for elevated blood pressures. Unfortunately, his creatinine has elevated to 1.42 from his baseline of 1.18-1.22. He has had hyperkalemia in the past on high dose lisinopril. He's also had hyperkalemia and regression of his renal function when lisinopril was combined with Spironalactone.     Potassium today is normal.     On exam his JVP is not elevated, lungs are clear. He has no activity intolerance. He has an S4 gallop which is unchanged.     He denies orthopnea PND. Weight is stable. He denies chest discomfort.           Impression/Plan:      1. Coronary artery disease with anterior wall MI status post LAD stenting with the balloon angioplasty to the diagonal branch. Ejection fraction is stable at 45-50%. Based on elevation of creatinine I will reduce the lisinopril back to 2.5 mg twice daily. His blood pressure today is borderline, however he has not taken any of his medications today. When he returns for an INR check on November 21, I will have the nurses check his blood pressure and I will repeat a basic metabolic panel. At that time we will determine if he requires more antihypertensive therapy. I am  unable to increase metoprolol as  his heart rates are slow and he has had side effects on higher dose metoprolol in the past.. We have tried carvedilol in the past with side effects as well    2. Cardiomyopathy with no heart failure.    3. 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 He uses brand name and I did refill his medication today. INR's have been therapeutic.    4. Dyslipidemia with statin intolerance. He is tolerating Praluent with excellent lipid profile.      I will contact him with his lab work and blood pressure readings after his November 21st appointment. He will need a nuclear stress test and lab work and a visit with Dr. Newton in April which has been ordered.     AJ once again had many questions today about his lab results medication refills blood pressure goals.     Greater than 50% of this 30 minute visit today was spent in counseling  Randell Izaguirre, MSN, APRN-BC, CNP  Cardiology    Orders Placed This Encounter   Procedures     Basic metabolic panel     Follow-Up with Cardiologist     Follow-Up with Nurse     Orders Placed This Encounter   Medications     lisinopril (PRINIVIL/ZESTRIL) 2.5 MG tablet     Sig: One tablet twice per day     Dispense:  270 tablet     Refill:  3     nitroGLYcerin (NITROSTAT) 0.4 MG sublingual tablet     Sig: Place 1 tablet (0.4 mg) under the tongue every 5 minutes as needed for chest pain Take as directed     Dispense:  25 tablet     Refill:  3     Medications Discontinued During This Encounter   Medication Reason     lisinopril (PRINIVIL/ZESTRIL) 2.5 MG tablet Reorder     nitroglycerin (NITROSTAT) 0.4 MG SL tablet Reorder         Encounter Diagnoses   Name Primary?     Cardiomyopathy, ischemic      Coronary artery disease involving native coronary artery of native heart without angina pectoris        CURRENT MEDICATIONS:  Current Outpatient Prescriptions   Medication Sig Dispense Refill     lisinopril (PRINIVIL/ZESTRIL) 2.5 MG tablet One tablet twice per  day 270 tablet 3     nitroGLYcerin (NITROSTAT) 0.4 MG sublingual tablet Place 1 tablet (0.4 mg) under the tongue every 5 minutes as needed for chest pain Take as directed 25 tablet 3     warfarin (COUMADIN) 5 MG tablet Take 1/2 tab on Mondays and Thursdays and 1 tab all other days or as directed by INR clinic, Coumadin SHIKHA 90 tablet 3     metoprolol (TOPROL-XL) 25 MG 24 hr tablet Take 0.5 tablets (12.5 mg) by mouth daily 45 tablet 3     dutasteride (AVODART) 0.5 MG capsule Take 1 capsule (0.5 mg) by mouth daily 30 capsule 11     alirocumab (PRALUENT) 75 MG/ML injectable pen Inject 1 mL (75 mg) Subcutaneous every 14 days 2 mL 11     melatonin 5 MG tablet Take 5 mg by mouth nightly as needed for sleep       Fish Oil-Cholecalciferol (FISH OIL + D3) 5489-9137 MG-UNIT CAPS Take 2 tablets by mouth daily       Cholecalciferol (VITAMIN D3) 2000 UNITS CAPS Take by mouth daily       aspirin 81 MG tablet Take 81 mg by mouth daily       [DISCONTINUED] lisinopril (PRINIVIL/ZESTRIL) 2.5 MG tablet One tablet in am; two tablets in pm 270 tablet 3     [DISCONTINUED] nitroglycerin (NITROSTAT) 0.4 MG SL tablet Place 1 tablet (0.4 mg) under the tongue every 5 minutes as needed for chest pain Take as directed 25 tablet 0       ALLERGIES     Allergies   Allergen Reactions     Flomax [Tamsulosin]      Weakness and dizzyness     Aldactone [Spironolactone]      High potassium and worsening creat when on lisinopril and spironalactone     Carvedilol      Colesevelam      Epigastric pain     Ezetimibe      Lisinopril Itching     Hyperkalemia and inc. Creat. At 20 mg daily, itching , skin red when dose goes above 2.5 mg a day--elevated creat      Pravastatin      Abdominal pain     Rosuvastatin      Simvastatin        PAST MEDICAL HISTORY:  Past Medical History:   Diagnosis Date     BPH (benign prostatic hyperplasia)      Cardiomyopathy, ischemic     EF 49% by cardiac MRI 1/15/2014     Coronary artery disease 1/24/06    Cypher CANDIDA to LAD      "Gastro-oesophageal reflux disease      Hyperlipidaemia      Hypertension      Left ventricular thrombus      Myocardial infarction 1/2006    Anterior     TIA (transient ischaemic attack)        PAST SURGICAL HISTORY:  Past Surgical History:   Procedure Laterality Date     CORONARY ANGIOGRAPHY ADULT ORDER  1/24/06    Cypher CANDIDA to LAD     HEART CATH, ANGIOPLASTY  1/2006    Cypher CANDIDA to LAD     TONSILLECTOMY & ADENOIDECTOMY         FAMILY HISTORY:  Family History   Problem Relation Age of Onset     HEART DISEASE Mother      heart failure     HEART DISEASE Brother        SOCIAL HISTORY:  Social History     Social History     Marital status:      Spouse name: N/A     Number of children: N/A     Years of education: N/A     Social History Main Topics     Smoking status: Never Smoker     Smokeless tobacco: Never Used     Alcohol use No     Drug use: None     Sexual activity: Not Asked     Other Topics Concern     Parent/Sibling W/ Cabg, Mi Or Angioplasty Before 65f 55m? No     Caffeine Concern No     Sleep Concern No     Stress Concern No     Weight Concern Yes     weight loss     Special Diet No     Exercise Yes     bicycle 10 min, walking, 3 days week     Seat Belt Yes     Social History Narrative       Review of Systems:  Skin:  Negative     Eyes:  Positive for glasses  ENT:  Positive for postnasal drainage  Respiratory:  Negative    Cardiovascular:  Negative    Gastroenterology: Negative    Genitourinary:  Positive for nocturia (2-3 times per night)  Musculoskeletal:  Negative    Neurologic:  Positive for numbness or tingling of feet  Psychiatric:  Negative    Heme/Lymph/Imm:  Positive for allergies  Endocrine:  Negative      Physical Exam:  Vitals: /54  Pulse 62  Ht 1.575 m (5' 2\")  Wt 57.2 kg (126 lb)  BMI 23.05 kg/m2    Constitutional:           Skin:  warm and dry to the touch        Head:           Eyes:  pupils equal and round        ENT:  no pallor or cyanosis        Neck:  carotid pulses are " full and equal bilaterally        Chest:  clear to auscultation        Cardiac: normal S1 and S2   S4 no presence of murmur systolic murmur;grade 2;apical          Abdomen:  abdomen soft;BS normoactive        Vascular: not assessed this visit                                      Extremities and Back:  no edema   ecchymosis over left flank following fall at home    Neurological:  no gross motor deficits          Recent Lab Results:  LIPID RESULTS:  Lab Results   Component Value Date    CHOL 151 11/07/2017    HDL 54 11/07/2017    LDL 68 11/07/2017    TRIG 145 11/07/2017    CHOLHDLRATIO 4.3 10/13/2015       LIVER ENZYME RESULTS:  Lab Results   Component Value Date    AST 21 07/25/2017    ALT 7 11/07/2017       CBC RESULTS:  Lab Results   Component Value Date    WBC 5.6 06/23/2015    RBC 4.25 (L) 06/23/2015    HGB 12.7 (L) 06/23/2015    HCT 37.9 (L) 06/23/2015    MCV 89 06/23/2015    MCH 29.9 06/23/2015    MCHC 33.5 06/23/2015    RDW 13.6 06/23/2015     06/23/2015       BMP RESULTS:  Lab Results   Component Value Date     11/07/2017    POTASSIUM 4.6 11/07/2017    CHLORIDE 107 11/07/2017    CO2 24 11/07/2017    ANIONGAP 13.6 11/07/2017     (H) 11/07/2017    BUN 20 11/07/2017    CR 1.42 (H) 11/07/2017    GFRESTIMATED 48 (L) 11/07/2017    GFRESTBLACK 58 (L) 11/07/2017    AURORA 8.9 11/07/2017        A1C RESULTS:  No results found for: A1C    INR RESULTS:  Lab Results   Component Value Date    INR 3.1 (A) 11/07/2017    INR 2.7 (A) 10/27/2017    INR 2.94 (H) 06/08/2017    INR 2.8 05/09/2014           CC  Randell Izaguirre, APRN CNP  [address]  [place] [address]

## 2017-11-07 NOTE — PATIENT INSTRUCTIONS
Reduce your Lisinopril to one pill twice daily     Check your labs, and blood pressure--at your INR visits 11-21 and I will review kidney test then and blood pressure and let you know the plan    See Dr. Newton in April

## 2017-11-21 ENCOUNTER — CARE COORDINATION (OUTPATIENT)
Dept: CARDIOLOGY | Facility: CLINIC | Age: 82
End: 2017-11-21

## 2017-11-21 ENCOUNTER — ANTICOAGULATION THERAPY VISIT (OUTPATIENT)
Dept: CARDIOLOGY | Facility: CLINIC | Age: 82
End: 2017-11-21
Payer: COMMERCIAL

## 2017-11-21 ENCOUNTER — ALLIED HEALTH/NURSE VISIT (OUTPATIENT)
Dept: CARDIOLOGY | Facility: CLINIC | Age: 82
End: 2017-11-21
Attending: NURSE PRACTITIONER
Payer: COMMERCIAL

## 2017-11-21 VITALS — DIASTOLIC BLOOD PRESSURE: 61 MMHG | SYSTOLIC BLOOD PRESSURE: 126 MMHG | HEART RATE: 60 BPM

## 2017-11-21 DIAGNOSIS — I10 ESSENTIAL HYPERTENSION: Primary | ICD-10-CM

## 2017-11-21 DIAGNOSIS — I10 BENIGN ESSENTIAL HYPERTENSION: Primary | ICD-10-CM

## 2017-11-21 DIAGNOSIS — G45.9 TRANSIENT CEREBRAL ISCHEMIA: ICD-10-CM

## 2017-11-21 DIAGNOSIS — I25.5 CARDIOMYOPATHY, ISCHEMIC: ICD-10-CM

## 2017-11-21 DIAGNOSIS — Z79.01 LONG-TERM (CURRENT) USE OF ANTICOAGULANTS: ICD-10-CM

## 2017-11-21 LAB
ANION GAP SERPL CALCULATED.3IONS-SCNC: 14.7 MMOL/L (ref 6–17)
BUN SERPL-MCNC: 33 MG/DL (ref 7–30)
CALCIUM SERPL-MCNC: 8.2 MG/DL (ref 8.5–10.5)
CHLORIDE SERPL-SCNC: 106 MMOL/L (ref 98–107)
CO2 SERPL-SCNC: 25 MMOL/L (ref 23–29)
CREAT SERPL-MCNC: 1.5 MG/DL (ref 0.7–1.3)
GFR SERPL CREATININE-BSD FRML MDRD: 45 ML/MIN/1.7M2
GLUCOSE SERPL-MCNC: 107 MG/DL (ref 70–105)
INR POINT OF CARE: 3.4 (ref 0.86–1.14)
POTASSIUM SERPL-SCNC: 4.7 MMOL/L (ref 3.5–5.1)
SODIUM SERPL-SCNC: 141 MMOL/L (ref 136–145)

## 2017-11-21 PROCEDURE — 85610 PROTHROMBIN TIME: CPT | Mod: QW | Performed by: INTERNAL MEDICINE

## 2017-11-21 PROCEDURE — 36416 COLLJ CAPILLARY BLOOD SPEC: CPT | Performed by: INTERNAL MEDICINE

## 2017-11-21 PROCEDURE — 99207 ZZC NO CHARGE LOS: CPT

## 2017-11-21 PROCEDURE — 80048 BASIC METABOLIC PNL TOTAL CA: CPT | Performed by: NURSE PRACTITIONER

## 2017-11-21 NOTE — PROGRESS NOTES
ANTICOAGULATION FOLLOW-UP CLINIC VISIT    Patient Name:  Sawyer Renteria  Date:  11/21/2017  Contact Type:  Face to Face    SUBJECTIVE:        OBJECTIVE    INR Protime   Date Value Ref Range Status   11/21/2017 3.4 (A) 0.86 - 1.14 Final       ASSESSMENT / PLAN  INR assessment SUPRA    Recheck INR In: 2 WEEKS    INR Location Clinic      Anticoagulation Summary as of 11/21/2017     INR goal 2.0-3.0   Today's INR 3.4!   Maintenance plan 2.5 mg (5 mg x 0.5) on Mon, Wed, Fri; 5 mg (5 mg x 1) all other days   Full instructions 11/21: 2.5 mg; Otherwise 2.5 mg on Mon, Wed, Fri; 5 mg all other days   Weekly total 27.5 mg   Plan last modified Penfield, Lynette E, RN (10/13/2017)   Next INR check 12/5/2017   Target end date Indefinite    Indications   Left ventricular thrombus [OLO4667]  Transient cerebral ischemia [G45.9]  Long-term (current) use of anticoagulants [Z79.01] [Z79.01]         Anticoagulation Episode Summary     INR check location     Preferred lab     Send INR reminders to [facility] HEART INR NURSE    Comments       Anticoagulation Care Providers     Provider Role Specialty Phone number    Satya Newton MD Responsible Cardiology [phone]          INR 3.4. No changes to diet. No bleeding or bruising issues. Pt had tooth pulled last week and has taken tylenol pm in last few days to help him sleep. He also ate less greens after tooth extraction. Pt will eat some spinach tonight. Will have him decrease dose today to 2.5 mg, then resume taking 2.5 mg M,W,Fri and 5 mg ROW. Pt administered his praluent injection into his left thigh.   See the Encounter Report to view Anticoagulation Flowsheet and Dosing Calendar (Go to Encounters tab in chart review, and find the Anticoagulation Therapy Visit)    Dosage adjustment made based on physician directed care plan.    Tena Tran RN

## 2017-11-21 NOTE — MR AVS SNAPSHOT
Sawyer Renteria   11/21/2017 10:00 AM   Anticoagulation Therapy Visit    Description:  83 year old male   Provider:   ANTICOAGULATION   Department:  [facility]           INR as of 11/21/2017     Today's INR 3.4!      Anticoagulation Summary as of 11/21/2017     INR goal 2.0-3.0   Today's INR 3.4!   Full instructions 11/21: 2.5 mg; Otherwise 2.5 mg on Mon, Wed, Fri; 5 mg all other days   Next INR check 12/5/2017    Indications   Left ventricular thrombus [JXJ8323]  Transient cerebral ischemia [G45.9]  Long-term (current) use of anticoagulants [Z79.01] [Z79.01]         Your next Anticoagulation Clinic appointment(s)     Nov 21, 2017 10:00 AM CST   Anticoagulation Visit with  ANTICOAGULATION   [facility] ([facility])    [id number]  [facility] [address]   [phone]            Dec 05, 2017 10:40 AM CST   Anticoagulation Visit with  ANTICOAGULATION   [facility] ([facility])    [id number]  [facility] [address]   [phone]              Contact Numbers     Anticoagulant (INR) Clinic Number: [id number]          November 2017 Details    Sun Mon Tue Wed Thu Fri Sat        1               2               3               4                 5               6               7               8               9               10               11                 12               13               14               15               16               17               18                 19               20               21      2.5 mg   See details      22      2.5 mg         23      5 mg         24      2.5 mg         25      5 mg           26      5 mg         27      2.5 mg         28      5 mg         29      2.5 mg         30      5 mg            Date Details   11/21 This INR check               How to take your warfarin dose     To take:  2.5 mg Take 0.5 of a 5 mg tablet.    To  take:  5 mg Take 1 of the 5 mg tablets.           December 2017 Details    Sun Mon Tue Wed Thu Fri Sat          1      2.5 mg         2      5 mg           3      5 mg         4      2.5 mg         5            6               7               8               9                 10               11               12               13               14               15               16                 17               18               19               20               21               22               23                 24               25               26               27               28               29               30                 31                      Date Details   No additional details    Date of next INR:  12/5/2017         How to take your warfarin dose     To take:  2.5 mg Take 0.5 of a 5 mg tablet.    To take:  5 mg Take 1 of the 5 mg tablets.

## 2017-11-21 NOTE — MR AVS SNAPSHOT
After Visit Summary   11/21/2017    Sawyer Renteria    MRN: [id number]           Patient Information     Date Of Birth          3/24/1934        Visit Information        Provider Department      11/21/2017 10:30 AM [facility] AMBULATORY MONITORING [facility]        Today's Diagnoses     Essential hypertension    -  1    Cardiomyopathy, ischemic           Follow-ups after your visit        Follow-up notes from your care team     Return for BP Recheck.      Your next 10 appointments already scheduled     Nov 21, 2017 10:00 AM CST   Anticoagulation Visit with [facility]JANO ANTICOAGULATION   [facility] ([facility])    [id number]  [facility] [address]   [phone]            Nov 21, 2017 10:30 AM CST   Nurse Only with MAJANO AMBULATORY MONITORING   [facility] ([facility])    [id number]  [facility] [address]   [phone]            Dec 05, 2017 10:40 AM CST   Anticoagulation Visit with MAJANO ANTICOAGULATION   [facility] ([facility])    [id number]  [facility] [address] [phone]              Who to contact     If you have questions or need follow up information about today's clinic visit or your schedule please contact [facility] directly at [phone].  Normal or non-critical lab and imaging results will be communicated to you by MyChart, letter or phone within 4 business days after the clinic has received the results. If you do not hear from us within 7 days, please contact the clinic through MyChart or phone. If you have a critical or abnormal lab result, we will notify you by phone as soon as possible.  Submit refill requests through [employer] or call your pharmacy and they will forward the refill request to us. Please allow 3 business days for  your refill to be completed.          Additional Information About Your Visit        MyChart Information     [employer] gives you secure access to your electronic health record. If you see a primary care provider, you can also send messages to your care team and make appointments. If you have questions, please call your primary care clinic.  If you do not have a primary care provider, please call [phone] and they will assist you.        Care EveryWhere ID     This is your Care EveryWhere ID. This could be used by other organizations to access your [facility] medical records  [id number]        Your Vitals Were     Pulse                   60            Blood Pressure from Last 3 Encounters:   11/21/17 126/61   11/07/17 140/54   10/04/17 136/68    Weight from Last 3 Encounters:   11/07/17 57.2 kg (126 lb)   10/04/17 55.8 kg (123 lb)   08/28/17 56.2 kg (124 lb)              We Performed the Following     Follow-Up with Nurse        Primary Care Provider Office Phone # Fax #    Francisco Doshi [phone] [address] [address]        Equal Access to Services     [facility]: Hadii aad ku hadasho Sokimber, waaxda luqadaha, qaybta kaalmada adealessio, harry welch . So [facility] [phone].    ATENCIÓN: Si habla español, tiene a roy disposición servicios gratmeganos de asistencia lingüística. Ll[facility] [phone].    We comply with applicable federal civil rights laws and [place] laws. We do not discriminate on the basis of race, color, national origin, age, disability, sex, sexual orientation, or gender identity.            Thank you!     Thank you for choosing [facility]  for your care. Our goal is always to provide you with excellent care. Hearing back from our patients is one way we can continue to improve our services. Please take a few minutes to complete the written survey that you may receive in the mail  after your visit with us. Thank you!             Your Updated Medication List - Protect others around you: Learn how to safely use, store and throw away your medicines at www.disposemymeds.org.          This list is accurate as of: 11/21/17  9:58 AM.  Always use your most recent med list.                   Brand Name Dispense Instructions for use Diagnosis    alirocumab 75 MG/ML injectable pen    PRALUENT    2 mL    Inject 1 mL (75 mg) Subcutaneous every 14 days    Hyperlipidemia, unspecified hyperlipidemia type       aspirin 81 MG tablet      Take 81 mg by mouth daily        dutasteride 0.5 MG capsule    AVODART    30 capsule    Take 1 capsule (0.5 mg) by mouth daily    Cardiomyopathy, ischemic       FISH OIL + D3 8354-5282 MG-UNIT Caps      Take 2 tablets by mouth daily        lisinopril 2.5 MG tablet    PRINIVIL/Zestril    270 tablet    One tablet twice per day    Coronary artery disease involving native coronary artery of native heart without angina pectoris       melatonin 5 MG tablet      Take 5 mg by mouth nightly as needed for sleep        metoprolol 25 MG 24 hr tablet    TOPROL-XL    45 tablet    Take 0.5 tablets (12.5 mg) by mouth daily    Cardiomyopathy, ischemic       nitroGLYcerin 0.4 MG sublingual tablet    NITROSTAT    25 tablet    Place 1 tablet (0.4 mg) under the tongue every 5 minutes as needed for chest pain Take as directed    Cardiomyopathy, ischemic       vitamin D3 2000 UNITS Caps      Take by mouth daily        warfarin 5 MG tablet    COUMADIN    90 tablet    Take 1/2 tab on Mondays and Thursdays and 1 tab all other days or as directed by INR clinic, Coumadin SHIKHA    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

## 2017-11-21 NOTE — PROGRESS NOTES
Pt last seen 11/7 in clinic by THELMA. At that visit, creatinine elevated and BP borderline. Lisinopril decreased to 2.5mg BID. Pt here today for BP check and repeat labs. See below.\    Blood pressure checked today in clinic 11/21/17 by LPN:  /61 P61 (Left arm)  /62 P62  /61 P60    BMP results:   Component      Latest Ref Rng & Units 10/4/2017 11/7/2017 11/21/2017   Sodium      136 - 145 mmol/L 139 140 141   Potassium      3.5 - 5.1 mmol/L 4.6 4.6 4.7   Chloride      98 - 107 mmol/L 106 107 106   Carbon Dioxide      23 - 29 mmol/L 25 24 25   Anion Gap      6 - 17 mmol/L 12.6 13.6 14.7   Glucose      70 - 105 mg/dL 98 109 (H) 107 (H)   Urea Nitrogen      7 - 30 mg/dL 24 20 33 (H)   Creatinine      0.70 - 1.30 mg/dL 1.18 1.42 (H) 1.50 (H)   GFR Estimate      >60 mL/min/1.7m2 59 (L) 48 (L) 45 (L)   GFR Estimate If Black      >60 mL/min/1.7m2 71 58 (L) 54 (L)   Calcium      8.5 - 10.5 mg/dL 8.8 8.9 8.2 (L)       Copy of lab results given to patient, and patient told we will call him today after lab and BP's reviewed with provider. Messaged to DTK for review. OKSANA Lamar 10:06 AM 11/21/17

## 2017-11-22 ENCOUNTER — DOCUMENTATION ONLY (OUTPATIENT)
Dept: CARDIOLOGY | Facility: CLINIC | Age: 82
End: 2017-11-22

## 2017-11-22 DIAGNOSIS — I10 BENIGN ESSENTIAL HYPERTENSION: Primary | ICD-10-CM

## 2017-11-22 NOTE — PROGRESS NOTES
Spoke w/ pt. He states he has been taking lisinopril 2.5mg QAM/5mg QPM. I instructed him to decrease dose to 2.5mg BID, and increase hydration by 2 cups/day. We scheduled him for BMP 12/6. He verbalized understanding of plan. Reminder sent to RN board to watch for results.    FYI to DTK.    Jessica Conway [facility] RN 4:29 PM 11/22/17  [phone]

## 2017-11-22 NOTE — PROGRESS NOTES
Attempted to call patient yesterday and today. He has not been home either time. I left a message with his wife to inform him to reduce lisinopril to 2.5 mg once daily. I would like repeat labs in 10-14 days. Increase hydration by 2 cups per day.I will also sent to my chart message but I'm uncertain if the patient knows how to read these as it does not appear that it has been reviewed. I will have my nurses attempt to contact the patient later to inform him that he needs to reduce lisinopril and schedule a repeat lab draw in 10-14 days. I will also have them inform him that this plan has been reviewed with Dr. Newton.

## 2017-12-06 ENCOUNTER — ANTICOAGULATION THERAPY VISIT (OUTPATIENT)
Dept: CARDIOLOGY | Facility: CLINIC | Age: 82
End: 2017-12-06
Payer: COMMERCIAL

## 2017-12-06 DIAGNOSIS — Z79.01 LONG-TERM (CURRENT) USE OF ANTICOAGULANTS: ICD-10-CM

## 2017-12-06 DIAGNOSIS — I10 BENIGN ESSENTIAL HYPERTENSION: ICD-10-CM

## 2017-12-06 DIAGNOSIS — G45.9 TRANSIENT CEREBRAL ISCHEMIA: ICD-10-CM

## 2017-12-06 LAB
ANION GAP SERPL CALCULATED.3IONS-SCNC: 13.2 MMOL/L (ref 6–17)
BUN SERPL-MCNC: 24 MG/DL (ref 7–30)
CALCIUM SERPL-MCNC: 9 MG/DL (ref 8.5–10.5)
CHLORIDE SERPL-SCNC: 106 MMOL/L (ref 98–107)
CO2 SERPL-SCNC: 24 MMOL/L (ref 23–29)
CREAT SERPL-MCNC: 1.39 MG/DL (ref 0.7–1.3)
GFR SERPL CREATININE-BSD FRML MDRD: 49 ML/MIN/1.7M2
GLUCOSE SERPL-MCNC: 103 MG/DL (ref 70–105)
INR POINT OF CARE: 2 (ref 0.86–1.14)
POTASSIUM SERPL-SCNC: 4.2 MMOL/L (ref 3.5–5.1)
SODIUM SERPL-SCNC: 139 MMOL/L (ref 136–145)

## 2017-12-06 PROCEDURE — 80048 BASIC METABOLIC PNL TOTAL CA: CPT | Performed by: NURSE PRACTITIONER

## 2017-12-06 PROCEDURE — 85610 PROTHROMBIN TIME: CPT | Mod: QW | Performed by: INTERNAL MEDICINE

## 2017-12-06 PROCEDURE — 36416 COLLJ CAPILLARY BLOOD SPEC: CPT | Performed by: INTERNAL MEDICINE

## 2017-12-06 NOTE — MR AVS SNAPSHOT
Sawyer Renteria   12/6/2017 11:40 AM   Anticoagulation Therapy Visit    Description:  83 year old male   Provider:   ANTICOAGULATION   Department:  [facility]           INR as of 12/6/2017     Today's INR 2.0      Anticoagulation Summary as of 12/6/2017     INR goal 2.0-3.0   Today's INR 2.0   Full instructions 2.5 mg on Mon, Wed, Fri; 5 mg all other days   Next INR check 12/20/2017    Indications   Left ventricular thrombus [ANS1692]  Transient cerebral ischemia [G45.9]  Long-term (current) use of anticoagulants [Z79.01] [Z79.01]         Your next Anticoagulation Clinic appointment(s)     Dec 06, 2017 11:40 AM CST   Anticoagulation Visit with  ANTICOAGULATION   [facility] ([facility])    [id number]  [facility] [address]   [phone]            Dec 20, 2017 11:20 AM CST   Anticoagulation Visit with  ANTICOAGULATION   [facility] ([facility])    [id number]  [facility] [address]   [phone]              Contact Numbers     Anticoagulant (INR) Clinic Number: [id number]          December 2017 Details    Sun Mon Tue Wed Thu Fri Sat          1               2                 3               4               5               6      2.5 mg   See details      7      5 mg         8      2.5 mg         9      5 mg           10      5 mg         11      2.5 mg         12      5 mg         13      2.5 mg         14      5 mg         15      2.5 mg         16      5 mg           17      5 mg         18      2.5 mg         19      5 mg         20            21               22               23                 24               25               26               27               28               29               30                 31                      Date Details   12/06 This INR check       Date of next INR:  12/20/2017         How to take your warfarin dose     To take:   2.5 mg Take 0.5 of a 5 mg tablet.    To take:  5 mg Take 1 of the 5 mg tablets.

## 2017-12-06 NOTE — PROGRESS NOTES
Here are patient's BMP (12/6) results after Lisinopril was decreased. Please review and I will contact him with your recommendations. Daija Santiago RN 1:49 PM 12/06/17

## 2017-12-06 NOTE — PROGRESS NOTES
ANTICOAGULATION FOLLOW-UP CLINIC VISIT    Patient Name:  Sawyer Renteria  Date:  12/6/2017  Contact Type:  Face to Face    SUBJECTIVE:        OBJECTIVE    INR Protime   Date Value Ref Range Status   12/06/2017 2.0 (A) 0.86 - 1.14 Final       ASSESSMENT / PLAN  INR assessment THER    Recheck INR In: 2 WEEKS    INR Location Clinic      Anticoagulation Summary as of 12/6/2017     INR goal 2.0-3.0   Today's INR 2.0   Maintenance plan 2.5 mg (5 mg x 0.5) on Mon, Wed, Fri; 5 mg (5 mg x 1) all other days   Full instructions 2.5 mg on Mon, Wed, Fri; 5 mg all other days   Weekly total 27.5 mg   No change documented Donna Colby RN   Plan last modified Penfield, Lynette E, RN (10/13/2017)   Next INR check 12/20/2017   Target end date Indefinite    Indications   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]  Transient cerebral ischemia [G45.9]  Long-term (current) use of anticoagulants [Z79.01] [Z79.01]         Anticoagulation Episode Summary     INR check location     Preferred lab     Send INR reminders to [facility] HEART INR NURSE    Comments       Anticoagulation Care Providers     Provider Role Specialty Phone number    Satya Newton MD Responsible Cardiology [phone]            See the Encounter Report to view Anticoagulation Flowsheet and Dosing Calendar (Go to Encounters tab in chart review, and find the Anticoagulation Therapy Visit)    INR 2.0 No change in meds. He had a tooth pulled in the last 2-3 weeks. Now able to eat his usual diet. He is eating spinachh 2x/wk. No abnormal bleeding or bruising. Will continue current dosing of 2.5 mg MWF and 5 mg all other days with recheck in 2 weeks per pt request. He wants INR checks on the days that he administers Praluent. He gave himself the Praluent injection in the right thigh today. Dosage adjustment made based on physician directed care plan.    Donna Colby, OKSANA

## 2017-12-07 NOTE — PROGRESS NOTES
Tried to call him--will you let him know that-no answer-no machine--not sure he checks my chart messages  Ask if he does as I will message him    I have reviewed labs with Aman Dempseyat is better but still slightly over baseline of 1.2    Same dose of Lisinopril for now  Recheck bmp on 12-20 with next INR    We could offer a kidney specialist if he wishes and I am happy to talk to him about this if you can find out when he will be home

## 2017-12-08 NOTE — PROGRESS NOTES
Talked to patient. Reviewed lab results.    Continue same dose of Lisinopirl  Recheck bmp in 2 weeks  Number given to him to call scheduling    Offered Neprhology consult with Dr. Oneill    He would like to proceed with that    --can you call and facilitate getting an appointment please  [phone]  thanks

## 2017-12-11 ENCOUNTER — TELEPHONE (OUTPATIENT)
Dept: CARDIOLOGY | Facility: CLINIC | Age: 82
End: 2017-12-11

## 2017-12-11 NOTE — TELEPHONE ENCOUNTER
Called patient to determine if he has changed his medicare coverage for next year. If he has, a prior authorization is needed. Asked patient to call back

## 2017-12-11 NOTE — PROGRESS NOTES
Called [facility]. Dr. Oneill's first available for new pt is 3/14/18 at 1400.     LVM for pt stating appt info, [facility] clinic name/number, that their clinic would mail out an info pkt, and asked him to call back to review.     Reminder sent to RN board to call pt again later this wk to confirm receipt of instructions.    Jessica Conway [facility] RN 12:52 PM 12/11/17  [phone]

## 2017-12-20 ENCOUNTER — ANTICOAGULATION THERAPY VISIT (OUTPATIENT)
Dept: CARDIOLOGY | Facility: CLINIC | Age: 82
End: 2017-12-20
Payer: COMMERCIAL

## 2017-12-20 DIAGNOSIS — I10 BENIGN ESSENTIAL HYPERTENSION: ICD-10-CM

## 2017-12-20 DIAGNOSIS — G45.9 TRANSIENT CEREBRAL ISCHEMIA: ICD-10-CM

## 2017-12-20 DIAGNOSIS — Z79.01 LONG-TERM (CURRENT) USE OF ANTICOAGULANTS: ICD-10-CM

## 2017-12-20 LAB
ANION GAP SERPL CALCULATED.3IONS-SCNC: 10.1 MMOL/L (ref 6–17)
BUN SERPL-MCNC: 23 MG/DL (ref 7–30)
CALCIUM SERPL-MCNC: 9 MG/DL (ref 8.5–10.5)
CHLORIDE SERPL-SCNC: 106 MMOL/L (ref 98–107)
CO2 SERPL-SCNC: 27 MMOL/L (ref 23–29)
CREAT SERPL-MCNC: 1.34 MG/DL (ref 0.7–1.3)
GFR SERPL CREATININE-BSD FRML MDRD: 51 ML/MIN/1.7M2
GLUCOSE SERPL-MCNC: 108 MG/DL (ref 70–105)
INR POINT OF CARE: 1.3 (ref 0.86–1.14)
POTASSIUM SERPL-SCNC: 4.1 MMOL/L (ref 3.5–5.1)
SODIUM SERPL-SCNC: 139 MMOL/L (ref 136–145)

## 2017-12-20 PROCEDURE — 36416 COLLJ CAPILLARY BLOOD SPEC: CPT | Performed by: INTERNAL MEDICINE

## 2017-12-20 PROCEDURE — 80048 BASIC METABOLIC PNL TOTAL CA: CPT | Performed by: NURSE PRACTITIONER

## 2017-12-20 PROCEDURE — 85610 PROTHROMBIN TIME: CPT | Mod: QW | Performed by: INTERNAL MEDICINE

## 2017-12-20 NOTE — PROGRESS NOTES
ANTICOAGULATION FOLLOW-UP CLINIC VISIT    Patient Name:  Sawyer Renteria  Date:  12/20/2017  Contact Type:  Face to Face    SUBJECTIVE:     Patient Findings     Positives Missed doses (he might have missed a dose; not using a pillbox)           OBJECTIVE    INR Protime   Date Value Ref Range Status   12/20/2017 1.3 (A) 0.86 - 1.14 Final       ASSESSMENT / PLAN  INR assessment SUB    Recheck INR In: 6 DAYS    INR Location Clinic      Anticoagulation Summary as of 12/20/2017     INR goal 2.0-3.0   Today's INR 1.3!   Maintenance plan 2.5 mg (5 mg x 0.5) on Mon, Wed, Fri; 5 mg (5 mg x 1) all other days   Full instructions 12/20: 7.5 mg; Otherwise 2.5 mg on Mon, Wed, Fri; 5 mg all other days   Weekly total 27.5 mg   Plan last modified Penfield, Lynette E, RN (10/13/2017)   Next INR check 12/26/2017   Target end date Indefinite    Indications   Left ventricular thrombus [OVX5548]  Transient cerebral ischemia [G45.9]  Long-term (current) use of anticoagulants [Z79.01] [Z79.01]         Anticoagulation Episode Summary     INR check location     Preferred lab     Send INR reminders to [facility] HEART INR NURSE    Comments       Anticoagulation Care Providers     Provider Role Specialty Phone number    Satya Newton MD Responsible Cardiology [phone]            See the Encounter Report to view Anticoagulation Flowsheet and Dosing Calendar (Go to Encounters tab in chart review, and find the Anticoagulation Therapy Visit)    INR 1.3  He may have missed 1 or more doses. He hasn't been using a pillbox.Hasn't eaten any spinach this week. No abnormal bleeding or bruising. Will boost today's dose to 7.5 mg then resume usual dosing of 2.5 mg MWF and 5 mg all other days with recheck in 6 days.  Advised that he should use a pillbox for warfarin to be sure he isn't missing doses of warfarin. Pt administered a dose of Praluent in his left thigh. Dosage adjustment made based on physician directed care plan.    Donna Colby,  RN

## 2017-12-20 NOTE — MR AVS SNAPSHOT
Sawyer Renteria   12/20/2017 11:20 AM   Anticoagulation Therapy Visit    Description:  83 year old male   Provider:  [facility]AGUL[facility]   Department:  [facility]           INR as of 12/20/2017     Today's INR 1.3!      Anticoagulation Summary as of 12/20/2017     INR goal 2.0-3.0   Today's INR 1.3!   Full instructions 12/20: 7.5 mg; Otherwise 2.5 mg on Mon, Wed, Fri; 5 mg all other days   Next INR check 12/26/2017    Indications   Left ventricular thrombus [PXA1021]  Transient cerebral ischemia [G45.9]  Long-term (current) use of anticoagulants [Z79.01] [Z79.01]         Your next Anticoagulation Clinic appointment(s)     Dec 26, 2017 11:00 AM CST   Anticoagulation Visit with  ANTICOAGULATION   [facility] ([facility])    [id number]  [facility] [address]   [phone]              Contact Numbers     Anticoagulant (INR) Clinic Number: [id number]          December 2017 Details    Sun Mon Tue Wed Thu Fri Sat          1               2                 3               4               5               6               7               8               9                 10               11               12               13               14               15               16                 17               18               19               20      7.5 mg   See details      21      5 mg         22      2.5 mg         23      5 mg           24      5 mg         25      2.5 mg         26            27               28               29               30                 31                      Date Details   12/20 This INR check       Date of next INR:  12/26/2017         How to take your warfarin dose     To take:  2.5 mg Take 0.5 of a 5 mg tablet.    To take:  5 mg Take 1 of the 5 mg tablets.    To take:  7.5 mg Take 1.5 of the 5 mg tablets.

## 2017-12-26 ENCOUNTER — ANTICOAGULATION THERAPY VISIT (OUTPATIENT)
Dept: CARDIOLOGY | Facility: CLINIC | Age: 82
End: 2017-12-26
Payer: COMMERCIAL

## 2017-12-26 DIAGNOSIS — G45.9 TRANSIENT CEREBRAL ISCHEMIA: ICD-10-CM

## 2017-12-26 DIAGNOSIS — I25.5 CARDIOMYOPATHY, ISCHEMIC: Primary | ICD-10-CM

## 2017-12-26 DIAGNOSIS — Z79.01 LONG-TERM (CURRENT) USE OF ANTICOAGULANTS: ICD-10-CM

## 2017-12-26 LAB — INR POINT OF CARE: 2.2 (ref 0.86–1.14)

## 2017-12-26 PROCEDURE — 85610 PROTHROMBIN TIME: CPT | Mod: QW | Performed by: INTERNAL MEDICINE

## 2017-12-26 PROCEDURE — 36416 COLLJ CAPILLARY BLOOD SPEC: CPT | Performed by: INTERNAL MEDICINE

## 2017-12-26 NOTE — PROGRESS NOTES
Order placed for BMP. Messaged scheduling to arrange.    Jessica Conway [facility] RN 2:24 PM 12/26/17  [phone]

## 2017-12-26 NOTE — PROGRESS NOTES
ANTICOAGULATION FOLLOW-UP CLINIC VISIT    Patient Name:  Sawyer Renteria  Date:  12/26/2017  Contact Type:  Face to Face    SUBJECTIVE:     Patient Findings     Positives No Problem Findings           OBJECTIVE    INR Protime   Date Value Ref Range Status   12/26/2017 2.2 (A) 0.86 - 1.14 Final       ASSESSMENT / PLAN  INR assessment THER    Recheck INR In: 2 WEEKS    INR Location Clinic      Anticoagulation Summary as of 12/26/2017     INR goal 2.0-3.0   Today's INR 2.2   Maintenance plan 2.5 mg (5 mg x 0.5) on Mon, Wed, Fri; 5 mg (5 mg x 1) all other days   Full instructions 2.5 mg on Mon, Wed, Fri; 5 mg all other days   Weekly total 27.5 mg   No change documented Donna Colby RN   Plan last modified Penfield, Lynette E, RN (10/13/2017)   Next INR check 1/8/2018   Target end date Indefinite    Indications   Left ventricular thrombus [COT7256]  Transient cerebral ischemia [G45.9]  Long-term (current) use of anticoagulants [Z79.01] [Z79.01]         Anticoagulation Episode Summary     INR check location     Preferred lab     Send INR reminders to [facility] HEART INR NURSE    Comments       Anticoagulation Care Providers     Provider Role Specialty Phone number    Satya Newton MD Responsible Cardiology [phone]            See the Encounter Report to view Anticoagulation Flowsheet and Dosing Calendar (Go to Encounters tab in chart review, and find the Anticoagulation Therapy Visit)    INR 2.2 INR back in range after one time boost in dosing (previous INR thought to have been low due to a missed dose). No change in meds or diet. No abnormal bleeding or bruising. Will resume usual dosing schedule of 2.5 mg MWF and 5 mg all other days with recheck in 2 weeks. Dosage adjustment made based on physician directed care plan.    Donna Colby, RN

## 2017-12-26 NOTE — MR AVS SNAPSHOT
Sawyer Renteria   12/26/2017 11:00 AM   Anticoagulation Therapy Visit    Description:  83 year old male   Provider:   ANTICOAGULATION   Department:  [facility]           INR as of 12/26/2017     Today's INR 2.2      Anticoagulation Summary as of 12/26/2017     INR goal 2.0-3.0   Today's INR 2.2   Full instructions 2.5 mg on Mon, Wed, Fri; 5 mg all other days   Next INR check 1/8/2018    Indications   Left ventricular thrombus [UWR2221]  Transient cerebral ischemia [G45.9]  Long-term (current) use of anticoagulants [Z79.01] [Z79.01]         Your next Anticoagulation Clinic appointment(s)     Dec 26, 2017 11:00 AM CST   Anticoagulation Visit with  ANTICOAGULATION   [facility] ([facility])    [address] [place], [place] [id number]  [facility] [address]   [phone]            Jan 08, 2018 10:40 AM CST   Anticoagulation Visit with  ANTICOAGULATION   [facility] ([facility])    [id number]  [facility] [address]   [phone]              Contact Numbers     Anticoagulant (INR) Clinic Number: [id number]          December 2017 Details    Sun Mon Tue Wed Thu Fri Sat          1               2                 3               4               5               6               7               8               9                 10               11               12               13               14               15               16                 17               18               19               20               21               22               23                 24               25               26      5 mg   See details      27      2.5 mg         28      5 mg         29      2.5 mg         30      5 mg           31      5 mg                Date Details   12/26 This INR check               How to take your warfarin dose     To take:  2.5 mg Take 0.5 of a 5 mg tablet.    To take:  5 mg Take 1 of  the 5 mg tablets.           January 2018 Details    Sun Mon Tue Wed Thu Fri Sat      1      2.5 mg         2      5 mg         3      2.5 mg         4      5 mg         5      2.5 mg         6      5 mg           7      5 mg         8            9               10               11               12               13                 14               15               16               17               18               19               20                 21               22               23               24               25               26               27                 28               29               30               31                   Date Details   No additional details    Date of next INR:  1/8/2018         How to take your warfarin dose     To take:  2.5 mg Take 0.5 of a 5 mg tablet.    To take:  5 mg Take 1 of the 5 mg tablets.

## 2018-01-08 ENCOUNTER — ANTICOAGULATION THERAPY VISIT (OUTPATIENT)
Dept: CARDIOLOGY | Facility: CLINIC | Age: 83
End: 2018-01-08
Payer: COMMERCIAL

## 2018-01-08 DIAGNOSIS — Z79.01 LONG-TERM (CURRENT) USE OF ANTICOAGULANTS: ICD-10-CM

## 2018-01-08 DIAGNOSIS — E78.5 HYPERLIPIDEMIA LDL GOAL <70: Primary | ICD-10-CM

## 2018-01-08 LAB — INR POINT OF CARE: 2.5 (ref 0.86–1.14)

## 2018-01-08 PROCEDURE — 36416 COLLJ CAPILLARY BLOOD SPEC: CPT | Performed by: INTERNAL MEDICINE

## 2018-01-08 PROCEDURE — 85610 PROTHROMBIN TIME: CPT | Mod: QW | Performed by: INTERNAL MEDICINE

## 2018-01-08 NOTE — PROGRESS NOTES
ANTICOAGULATION FOLLOW-UP CLINIC VISIT    Patient Name:  Sawyer Renteria  Date:  1/8/2018  Contact Type:  Face to Face    SUBJECTIVE:     Patient Findings     Positives No Problem Findings           OBJECTIVE    INR Protime   Date Value Ref Range Status   01/08/2018 2.5 (A) 0.86 - 1.14 Final       ASSESSMENT / PLAN  INR assessment THER    Recheck INR In: 3 WEEKS    INR Location Clinic      Anticoagulation Summary as of 1/8/2018     INR goal 2.0-3.0   Today's INR 2.5   Maintenance plan 2.5 mg (5 mg x 0.5) on Mon, Wed, Fri; 5 mg (5 mg x 1) all other days   Full instructions 2.5 mg on Mon, Wed, Fri; 5 mg all other days   Weekly total 27.5 mg   No change documented Tayla Antonio RN   Plan last modified Penfield, Lynette E, RN (10/13/2017)   Next INR check 1/29/2018   Target end date Indefinite    Indications   Left ventricular thrombus [USM6396]  Transient cerebral ischemia [G45.9]  Long-term (current) use of anticoagulants [Z79.01] [Z79.01]         Anticoagulation Episode Summary     INR check location     Preferred lab     Send INR reminders to [facility] HEART INR NURSE    Comments       Anticoagulation Care Providers     Provider Role Specialty Phone number    Satya Newton MD Responsible Cardiology [phone]            See the Encounter Report to view Anticoagulation Flowsheet and Dosing Calendar (Go to Encounters tab in chart review, and find the Anticoagulation Therapy Visit)    INR 2.5.  He gave himself the praluent shot in his left leg today.  Continue same Warfarin dosing and recheck in 3 weeks.  Turner Antonio, OKSANA

## 2018-01-08 NOTE — MR AVS SNAPSHOT
Sawyer Renteria   1/8/2018 10:40 AM   Anticoagulation Therapy Visit    Description:  83 year old male   Provider:  [facility] ANTICOAGULATION   Department:  [facility]           INR as of 1/8/2018     Today's INR 2.5      Anticoagulation Summary as of 1/8/2018     INR goal 2.0-3.0   Today's INR 2.5   Full instructions 2.5 mg on Mon, Wed, Fri; 5 mg all other days   Next INR check 1/29/2018    Indications   Left ventricular thrombus [ZJS3228]  Transient cerebral ischemia [G45.9]  Long-term (current) use of anticoagulants [Z79.01] [Z79.01]         Your next Anticoagulation Clinic appointment(s)     Jan 08, 2018 10:40 AM CST   Anticoagulation Visit with  ANTICOAGULATION   [facility] ([facility])    [id number]  [facility] [address]   [phone]            Jan 29, 2018 10:40 AM CST   Anticoagulation Visit with  ANTICOAGULATION   [facility] ([facility])    [id number]  [facility] [address]   [phone]              Contact Numbers     Anticoagulant (INR) Clinic Number: [id number]          January 2018 Details    Sun Mon Tue Wed Thu Fri Sat      1               2               3               4               5               6                 7               8      2.5 mg   See details      9      5 mg         10      2.5 mg         11      5 mg         12      2.5 mg         13      5 mg           14      5 mg         15      2.5 mg         16      5 mg         17      2.5 mg         18      5 mg         19      2.5 mg         20      5 mg           21      5 mg         22      2.5 mg         23      5 mg         24      2.5 mg         25      5 mg         26      2.5 mg         27      5 mg           28      5 mg         29            30               31                   Date Details   01/08 This INR check       Date of next INR:  1/29/2018         How to take your  warfarin dose     To take:  2.5 mg Take 0.5 of a 5 mg tablet.    To take:  5 mg Take 1 of the 5 mg tablets.

## 2018-01-29 ENCOUNTER — ANTICOAGULATION THERAPY VISIT (OUTPATIENT)
Dept: CARDIOLOGY | Facility: CLINIC | Age: 83
End: 2018-01-29
Payer: COMMERCIAL

## 2018-01-29 DIAGNOSIS — Z79.01 LONG-TERM (CURRENT) USE OF ANTICOAGULANTS: ICD-10-CM

## 2018-01-29 DIAGNOSIS — I25.5 CARDIOMYOPATHY, ISCHEMIC: ICD-10-CM

## 2018-01-29 LAB
ANION GAP SERPL CALCULATED.3IONS-SCNC: 12.2 MMOL/L (ref 6–17)
BUN SERPL-MCNC: 26 MG/DL (ref 7–30)
CALCIUM SERPL-MCNC: 9.4 MG/DL (ref 8.5–10.5)
CHLORIDE SERPL-SCNC: 106 MMOL/L (ref 98–107)
CO2 SERPL-SCNC: 27 MMOL/L (ref 23–29)
CREAT SERPL-MCNC: 1.46 MG/DL (ref 0.7–1.3)
GFR SERPL CREATININE-BSD FRML MDRD: 46 ML/MIN/1.7M2
GLUCOSE SERPL-MCNC: 109 MG/DL (ref 70–105)
INR POINT OF CARE: 2.5 (ref 0.86–1.14)
POTASSIUM SERPL-SCNC: 4.2 MMOL/L (ref 3.5–5.1)
SODIUM SERPL-SCNC: 141 MMOL/L (ref 136–145)

## 2018-01-29 PROCEDURE — 80048 BASIC METABOLIC PNL TOTAL CA: CPT | Performed by: NURSE PRACTITIONER

## 2018-01-29 PROCEDURE — 36416 COLLJ CAPILLARY BLOOD SPEC: CPT | Performed by: INTERNAL MEDICINE

## 2018-01-29 PROCEDURE — 85610 PROTHROMBIN TIME: CPT | Mod: QW | Performed by: INTERNAL MEDICINE

## 2018-01-29 NOTE — PROGRESS NOTES
ANTICOAGULATION FOLLOW-UP CLINIC VISIT    Patient Name:  Sawyer Renteria  Date:  1/29/2018  Contact Type:  Face to Face    SUBJECTIVE:     Patient Findings     Positives No Problem Findings           OBJECTIVE    INR Protime   Date Value Ref Range Status   01/29/2018 2.5 (A) 0.86 - 1.14 Final       ASSESSMENT / PLAN  INR assessment THER    Recheck INR In: 2 WEEKS    INR Location Clinic      Anticoagulation Summary as of 1/29/2018     INR goal 2.0-3.0   Today's INR 2.5   Maintenance plan 2.5 mg (5 mg x 0.5) on Mon, Wed, Fri; 5 mg (5 mg x 1) all other days   Full instructions 2.5 mg on Mon, Wed, Fri; 5 mg all other days   Weekly total 27.5 mg   No change documented Tayla Antonio RN   Plan last modified Penfield, Lynette E, RN (10/13/2017)   Next INR check 2/12/2018   Target end date Indefinite    Indications   Left ventricular thrombus [LNJ0505]  Transient cerebral ischemia [G45.9]  Long-term (current) use of anticoagulants [Z79.01] [Z79.01]         Anticoagulation Episode Summary     INR check location     Preferred lab     Send INR reminders to [facility] HEART INR NURSE    Comments       Anticoagulation Care Providers     Provider Role Specialty Phone number    Satya Newton MD Responsible Cardiology [phone]            See the Encounter Report to view Anticoagulation Flowsheet and Dosing Calendar (Go to Encounters tab in chart review, and find the Anticoagulation Therapy Visit)    INR 2.5.  No changes.  Continue same schedule and recheck in 2 weeks per patient request because he wants us to watch his injection.  Turner Antonio, OKSANA

## 2018-02-12 ENCOUNTER — ANTICOAGULATION THERAPY VISIT (OUTPATIENT)
Dept: CARDIOLOGY | Facility: CLINIC | Age: 83
End: 2018-02-12
Payer: COMMERCIAL

## 2018-02-12 DIAGNOSIS — Z79.01 LONG-TERM (CURRENT) USE OF ANTICOAGULANTS: ICD-10-CM

## 2018-02-12 LAB — INR POINT OF CARE: 2.1 (ref 0.86–1.14)

## 2018-02-12 PROCEDURE — 85610 PROTHROMBIN TIME: CPT | Mod: QW | Performed by: INTERNAL MEDICINE

## 2018-02-12 PROCEDURE — 36416 COLLJ CAPILLARY BLOOD SPEC: CPT | Performed by: INTERNAL MEDICINE

## 2018-02-12 NOTE — PROGRESS NOTES
ANTICOAGULATION FOLLOW-UP CLINIC VISIT    Patient Name:  Sawyer Renteria  Date:  2/12/2018  Contact Type:  Face to Face    SUBJECTIVE:     Patient Findings     Positives No Problem Findings           OBJECTIVE    INR Protime   Date Value Ref Range Status   02/12/2018 2.1 (A) 0.86 - 1.14 Final       ASSESSMENT / PLAN  INR assessment THER    Recheck INR In: 2 WEEKS    INR Location Clinic      Anticoagulation Summary as of 2/12/2018     INR goal 2.0-3.0   Today's INR 2.1   Maintenance plan 2.5 mg (5 mg x 0.5) on Mon, Wed, Fri; 5 mg (5 mg x 1) all other days   Full instructions 2.5 mg on Mon, Wed, Fri; 5 mg all other days   Weekly total 27.5 mg   No change documented Tayla Antonio RN   Plan last modified Penfield, Lynette E, RN (10/13/2017)   Next INR check 2/26/2018   Target end date Indefinite    Indications   Left ventricular thrombus [KKW7079]  Transient cerebral ischemia [G45.9]  Long-term (current) use of anticoagulants [Z79.01] [Z79.01]         Anticoagulation Episode Summary     INR check location     Preferred lab     Send INR reminders to [facility] HEART INR NURSE    Comments       Anticoagulation Care Providers     Provider Role Specialty Phone number    Satya Newotn MD Responsible Cardiology [phone]            See the Encounter Report to view Anticoagulation Flowsheet and Dosing Calendar (Go to Encounters tab in chart review, and find the Anticoagulation Therapy Visit)    INR 2.1.  NO major changes.  He had broccoli 3x this past week.  Continue same schedule and recheck in 2 weeks.  He gave himself the praulent injection in his left thigh today.  Turner Antonio, OKSANA

## 2018-02-12 NOTE — MR AVS SNAPSHOT
Sawyer Renteria   2/12/2018 10:40 AM   Anticoagulation Therapy Visit    Description:  83 year old male   Provider:   ANTICOAGULATION   Department:  [facility]           INR as of 2/12/2018     Today's INR 2.1      Anticoagulation Summary as of 2/12/2018     INR goal 2.0-3.0   Today's INR 2.1   Full instructions 2.5 mg on Mon, Wed, Fri; 5 mg all other days   Next INR check 2/26/2018    Indications   Left ventricular thrombus [TYU0609]  Transient cerebral ischemia [G45.9]  Long-term (current) use of anticoagulants [Z79.01] [Z79.01]         Your next Anticoagulation Clinic appointment(s)     Feb 12, 2018 10:40 AM CST   Anticoagulation Visit with  ANTICOAGULATION   [facility] ([facility])    [id number]  [facility] [address]   [phone]            Feb 26, 2018 10:40 AM CST   Anticoagulation Visit with  ANTICOAGULATION   [facility] ([facility])    [id number]  [facility] [address]   [phone]              Contact Numbers     Anticoagulant (INR) Clinic Number: [id number]          February 2018 Details    Sun Mon Tue Wed Thu Fri Sat         1               2               3                 4               5               6               7               8               9               10                 11               12      2.5 mg   See details      13      5 mg         14      2.5 mg         15      5 mg         16      2.5 mg         17      5 mg           18      5 mg         19      2.5 mg         20      5 mg         21      2.5 mg         22      5 mg         23      2.5 mg         24      5 mg           25      5 mg         26            27               28                   Date Details   02/12 This INR check       Date of next INR:  2/26/2018         How to take your warfarin dose     To take:  2.5 mg Take 0.5 of a 5 mg tablet.    To take:  5 mg Take  1 of the 5 mg tablets.

## 2018-02-26 ENCOUNTER — ANTICOAGULATION THERAPY VISIT (OUTPATIENT)
Dept: CARDIOLOGY | Facility: CLINIC | Age: 83
End: 2018-02-26
Payer: COMMERCIAL

## 2018-02-26 DIAGNOSIS — Z79.01 LONG-TERM (CURRENT) USE OF ANTICOAGULANTS: ICD-10-CM

## 2018-02-26 LAB — INR POINT OF CARE: 2.3 (ref 0.86–1.14)

## 2018-02-26 PROCEDURE — 36416 COLLJ CAPILLARY BLOOD SPEC: CPT | Performed by: INTERNAL MEDICINE

## 2018-02-26 PROCEDURE — 99207 ZZC NO CHARGE NURSE ONLY: CPT | Performed by: INTERNAL MEDICINE

## 2018-02-26 PROCEDURE — 85610 PROTHROMBIN TIME: CPT | Mod: QW | Performed by: INTERNAL MEDICINE

## 2018-02-26 NOTE — MR AVS SNAPSHOT
Sawyer Renteria   2/26/2018 10:40 AM   Anticoagulation Therapy Visit    Description:  83 year old male   Provider:  GRETEL ANTICOAGULATION   Department:  [facility]           INR as of 2/26/2018     Today's INR 2.3      Anticoagulation Summary as of 2/26/2018     INR goal 2.0-3.0   Today's INR 2.3   Full instructions 2.5 mg on Mon, Wed, Fri; 5 mg all other days   Next INR check 3/16/2018    Indications   Left ventricular thrombus [XLJ2878]  Transient cerebral ischemia [G45.9]  Long-term (current) use of anticoagulants [Z79.01] [Z79.01]         Your next Anticoagulation Clinic appointment(s)     Mar 16, 2018 10:40 AM CDT   Anticoagulation Visit with MAJANO ANTICOAGULATION   [facility] ([facility])    [address] [place] [id number]   [phone]              Contact Numbers     Anticoagulant (INR) Clinic Number: [phone]          February 2018 Details    Sun Mon Tue Wed Thu Fri Sat         1               2               3                 4               5               6               7               8               9               10                 11               12               13               14               15               16               17                 18               19               20               21               22               23               24                 25               26      2.5 mg   See details      27      5 mg         28      2.5 mg             Date Details   02/26 This INR check               How to take your warfarin dose     To take:  2.5 mg Take 0.5 of a 5 mg tablet.    To take:  5 mg Take 1 of the 5 mg tablets.           March 2018 Details    Sun Mon Tue Wed Thu Fri Sat         1      5 mg         2      2.5 mg         3      5 mg           4      5 mg         5      2.5 mg         6      5 mg         7      2.5 mg         8      5 mg         9      2.5 mg         10      5 mg           11       5 mg         12      2.5 mg         13      5 mg         14      2.5 mg         15      5 mg         16            17                 18               19               20               21               22               23               24                 25               26               27               28               29               30               31                Date Details   No additional details    Date of next INR:  3/16/2018         How to take your warfarin dose     To take:  2.5 mg Take 0.5 of a 5 mg tablet.    To take:  5 mg Take 1 of the 5 mg tablets.

## 2018-02-26 NOTE — PROGRESS NOTES
ANTICOAGULATION FOLLOW-UP CLINIC VISIT    Patient Name:  Sawyer Renteria  Date:  2/26/2018  Contact Type:  Face to Face    SUBJECTIVE:     Patient Findings     Positives No Problem Findings           OBJECTIVE    INR Protime   Date Value Ref Range Status   02/26/2018 2.3 (A) 0.86 - 1.14 Final       ASSESSMENT / PLAN  INR assessment THER    Recheck INR In: 2 WEEKS    INR Location Clinic      Anticoagulation Summary as of 2/26/2018     INR goal 2.0-3.0   Today's INR 2.3   Maintenance plan 2.5 mg (5 mg x 0.5) on Mon, Wed, Fri; 5 mg (5 mg x 1) all other days   Full instructions 2.5 mg on Mon, Wed, Fri; 5 mg all other days   Weekly total 27.5 mg   No change documented Tayla Antonio RN   Plan last modified Penfield, Lynette E, RN (10/13/2017)   Next INR check 3/16/2018   Target end date Indefinite    Indications   Left ventricular thrombus [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  Praulent in his Right leg today.  No changes.  Some gum bleeding while he brushed his teeth.  Continue same schedule and recheck in 2-3 weeks per patient request.  Turner Antonio, RN

## 2018-03-12 ENCOUNTER — TRANSFERRED RECORDS (OUTPATIENT)
Dept: HEALTH INFORMATION MANAGEMENT | Facility: CLINIC | Age: 83
End: 2018-03-12

## 2018-03-12 LAB
ANION GAP SERPL CALCULATED.3IONS-SCNC: NORMAL MMOL/L
BUN SERPL-MCNC: 22 MG/DL
CALCIUM SERPL-MCNC: 8.8 MG/DL
CHLORIDE SERPLBLD-SCNC: 110 MMOL/L
CO2 SERPL-SCNC: 22 MMOL/L
CREAT SERPL-MCNC: 1.25 MG/DL
ERYTHROCYTE [DISTWIDTH] IN BLOOD BY AUTOMATED COUNT: 13 %
GFR SERPL CREATININE-BSD FRML MDRD: 53 ML/MIN/1.73M2
GLUCOSE SERPL-MCNC: 105 MG/DL (ref 70–99)
HCT VFR BLD AUTO: 34.4 %
HEMOGLOBIN: 12.1 G/DL
MCH RBC QN AUTO: 31 PG
MCHC RBC AUTO-ENTMCNC: 35.2 G/DL
MCV RBC AUTO: 88.2 FL
PLATELET # BLD AUTO: 242 10^9/L
POTASSIUM SERPL-SCNC: 4.5 MMOL/L
RBC # BLD AUTO: 3.9 10^12/L
SODIUM SERPL-SCNC: 142 MMOL/L
WBC # BLD AUTO: 6.1 10^9/L

## 2018-03-16 ENCOUNTER — ANTICOAGULATION THERAPY VISIT (OUTPATIENT)
Dept: CARDIOLOGY | Facility: CLINIC | Age: 83
End: 2018-03-16
Payer: COMMERCIAL

## 2018-03-16 DIAGNOSIS — Z79.01 LONG-TERM (CURRENT) USE OF ANTICOAGULANTS: ICD-10-CM

## 2018-03-16 DIAGNOSIS — G45.9 TRANSIENT CEREBRAL ISCHEMIA: ICD-10-CM

## 2018-03-16 LAB — INR POINT OF CARE: 2.4 (ref 0.86–1.14)

## 2018-03-16 PROCEDURE — 36416 COLLJ CAPILLARY BLOOD SPEC: CPT | Performed by: INTERNAL MEDICINE

## 2018-03-16 PROCEDURE — 85610 PROTHROMBIN TIME: CPT | Mod: QW | Performed by: INTERNAL MEDICINE

## 2018-03-16 PROCEDURE — 99207 ZZC NO CHARGE NURSE ONLY: CPT | Performed by: INTERNAL MEDICINE

## 2018-03-16 NOTE — MR AVS SNAPSHOT
Sawyer Renteria   3/16/2018 10:40 AM   Anticoagulation Therapy Visit    Description:  83 year old male   Provider:  GRETEL ANTICOAGULATION   Department:  [facility]           INR as of 3/16/2018     Today's INR 2.4      Anticoagulation Summary as of 3/16/2018     INR goal 2.0-3.0   Today's INR 2.4   Full instructions 2.5 mg on Mon, Wed, Fri; 5 mg all other days   Next INR check 3/30/2018    Indications   Left ventricular thrombus [TCT9728]  Transient cerebral ischemia [G45.9]  Long-term (current) use of anticoagulants [Z79.01] [Z79.01]         Your next Anticoagulation Clinic appointment(s)     Mar 30, 2018 10:40 AM CDT   Anticoagulation Visit with  ANTICOAGULATION   [facility] ([facility])    [address] [place] [id number]   [phone]              Contact Numbers     Anticoagulant (INR) Clinic Number: [phone]          March 2018 Details    Sun Mon Tue Wed Thu Fri Sat         1               2               3                 4               5               6               7               8               9               10                 11               12               13               14               15               16      2.5 mg   See details      17      5 mg           18      5 mg         19      2.5 mg         20      5 mg         21      2.5 mg         22      5 mg         23      2.5 mg         24      5 mg           25      5 mg         26      2.5 mg         27      5 mg         28      2.5 mg         29      5 mg         30            31                Date Details   03/16 This INR check       Date of next INR:  3/30/2018         How to take your warfarin dose     To take:  2.5 mg Take 0.5 of a 5 mg tablet.    To take:  5 mg Take 1 of the 5 mg tablets.

## 2018-03-16 NOTE — PROGRESS NOTES
ANTICOAGULATION FOLLOW-UP CLINIC VISIT    Patient Name:  Sawyer Renteria  Date:  3/16/2018  Contact Type:  Face to Face    SUBJECTIVE:     Patient Findings     Positives No Problem Findings           OBJECTIVE    INR Protime   Date Value Ref Range Status   03/16/2018 2.4 (A) 0.86 - 1.14 Final       ASSESSMENT / PLAN  INR assessment THER    Recheck INR In: 2 WEEKS    INR Location Clinic      Anticoagulation Summary as of 3/16/2018     INR goal 2.0-3.0   Today's INR 2.4   Maintenance plan 2.5 mg (5 mg x 0.5) on Mon, Wed, Fri; 5 mg (5 mg x 1) all other days   Full instructions 2.5 mg on Mon, Wed, Fri; 5 mg all other days   Weekly total 27.5 mg   No change documented Kasey Reyes RN   Plan last modified Penfield, Lynette E, RN (10/13/2017)   Next INR check 3/30/2018   Target end date Indefinite    Indications   Left ventricular thrombus [YIS3513]  Transient cerebral ischemia [G45.9]  Long-term (current) use of anticoagulants [Z79.01] [Z79.01]         Anticoagulation Episode Summary     INR check location     Preferred lab     Send INR reminders to [facility] HEART INR NURSE    Comments       Anticoagulation Care Providers     Provider Role Specialty Phone number    Satya Newton MD Responsible Cardiology [phone]            See the Encounter Report to view Anticoagulation Flowsheet and Dosing Calendar (Go to Encounters tab in chart review, and find the Anticoagulation Therapy Visit)    INR 2.4. No changes to dosing. Reports some bleeding w/brushing teeth but not different than before otherwise no other bleeding or bruising. Praluent injection per patient in left thigh. RN provided information/forms on the PASS program for Praluent. If pt qualifies he will receive drug at no cost. Next apt in 2 weeks.     Kasey Reyes, OKSANA

## 2018-03-22 DIAGNOSIS — I25.10 CORONARY ARTERY DISEASE INVOLVING NATIVE CORONARY ARTERY OF NATIVE HEART WITHOUT ANGINA PECTORIS: Primary | ICD-10-CM

## 2018-03-27 ENCOUNTER — TELEPHONE (OUTPATIENT)
Dept: CARDIOLOGY | Facility: CLINIC | Age: 83
End: 2018-03-27

## 2018-03-27 NOTE — TELEPHONE ENCOUNTER
RN called pt to let him know the PASS program for Praluent requires pt to have at least $300 out of pocket expenses to qualify. Pt will bring proof of out of pocket expenses to his next INR visit.

## 2018-03-30 ENCOUNTER — ANTICOAGULATION THERAPY VISIT (OUTPATIENT)
Dept: CARDIOLOGY | Facility: CLINIC | Age: 83
End: 2018-03-30
Payer: COMMERCIAL

## 2018-03-30 DIAGNOSIS — G45.9 TRANSIENT CEREBRAL ISCHEMIA: ICD-10-CM

## 2018-03-30 DIAGNOSIS — Z79.01 LONG-TERM (CURRENT) USE OF ANTICOAGULANTS: ICD-10-CM

## 2018-03-30 LAB — INR POINT OF CARE: 1.4 (ref 0.86–1.14)

## 2018-03-30 PROCEDURE — 99207 ZZC NO CHARGE NURSE ONLY: CPT | Performed by: INTERNAL MEDICINE

## 2018-03-30 PROCEDURE — 85610 PROTHROMBIN TIME: CPT | Mod: QW | Performed by: INTERNAL MEDICINE

## 2018-03-30 PROCEDURE — 36416 COLLJ CAPILLARY BLOOD SPEC: CPT | Performed by: INTERNAL MEDICINE

## 2018-03-30 NOTE — MR AVS SNAPSHOT
Sawyer Renteria   3/30/2018 10:40 AM   Anticoagulation Therapy Visit    Description:  84 year old male   Provider:  MAJANO ANTICOAGULATION   Department:  [facility]           INR as of 3/30/2018     Today's INR 1.4!      Anticoagulation Summary as of 3/30/2018     INR goal 2.0-3.0   Today's INR 1.4!   Full instructions 3/30: 7.5 mg; Otherwise 2.5 mg on Mon, Wed, Fri; 5 mg all other days   Next INR check 4/11/2018    Indications   Left ventricular thrombus [JOG4923]  Transient cerebral ischemia [G45.9]  Long-term (current) use of anticoagulants [Z79.01] [Z79.01]         Your next Anticoagulation Clinic appointment(s)     Mar 30, 2018 10:40 AM CDT   Anticoagulation Visit with  ANTICOAGULATION   [facility] ([facility])    [address] [place] [id number]   [phone] OPT 2            Apr 11, 2018 11:00 AM CDT   Anticoagulation Visit with  ANTICOAGULATION   [facility] ([facility])    [address] [place] [id number]   [phone] OPT 2              Contact Numbers     Anticoagulant (INR) Clinic Number: [phone]          March 2018 Details    Sun Mon Tue Wed Thu Fri Sat         1               2               3                 4               5               6               7               8               9               10                 11               12               13               14               15               16               17                 18               19               20               21               22               23               24                 25               26               27               28               29               30      7.5 mg   See details      31      5 mg          Date Details   03/30 This INR check               How to take your warfarin dose     To take:  5 mg Take 1 of the 5 mg tablets.    To take:  7.5 mg Take  1.5 of the 5 mg tablets.           April 2018 Details    Sun Mon Tue Wed Thu Fri Sat     1      5 mg         2      2.5 mg         3      5 mg         4      2.5 mg         5      5 mg         6      2.5 mg         7      5 mg           8      5 mg         9      2.5 mg         10      5 mg         11            12               13               14                 15               16               17               18               19               20               21                 22               23               24               25               26               27               28                 29               30                     Date Details   No additional details    Date of next INR:  4/11/2018         How to take your warfarin dose     To take:  2.5 mg Take 0.5 of a 5 mg tablet.    To take:  5 mg Take 1 of the 5 mg tablets.

## 2018-03-30 NOTE — PROGRESS NOTES
ANTICOAGULATION FOLLOW-UP CLINIC VISIT    Patient Name:  Sawyer Renteria  Date:  3/30/2018  Contact Type:  Face to Face    SUBJECTIVE:     Patient Findings     Positives Change in diet/appetite (ate alot of dark green veggies this week - spinach, broccoli, brussel sprouts)           OBJECTIVE    INR Protime   Date Value Ref Range Status   03/30/2018 1.4 (A) 0.86 - 1.14 Final       ASSESSMENT / PLAN  INR assessment SUB    Recheck INR In: 10 DAYS    INR Location Clinic      Anticoagulation Summary as of 3/30/2018     INR goal 2.0-3.0   Today's INR 1.4!   Maintenance plan 2.5 mg (5 mg x 0.5) on Mon, Wed, Fri; 5 mg (5 mg x 1) all other days   Full instructions 3/30: 7.5 mg; Otherwise 2.5 mg on Mon, Wed, Fri; 5 mg all other days   Weekly total 27.5 mg   Plan last modified Penfield, Lynette E, RN (10/13/2017)   Next INR check 4/11/2018   Target end date Indefinite    Indications   Left ventricular thrombus [[id number]]  Transient cerebral ischemia [G45.9]  Long-term (current) use of anticoagulants [Z79.01] [Z79.01]         Anticoagulation Episode Summary     INR check location     Preferred lab     Send INR reminders to [facility] HEART INR NURSE    Comments       Anticoagulation Care Providers     Provider Role Specialty Phone number    Satya Newton MD Responsible Cardiology [phone]            See the Encounter Report to view Anticoagulation Flowsheet and Dosing Calendar (Go to Encounters tab in chart review, and find the Anticoagulation Therapy Visit)    INR 1.4 He has eaten a lot of high vitamin K foods recently - broccoli, brussel sprouts and spinach on a few days this week. No change in meds. Denies missing doses. No abnormal bleeding or bruising. Will boost today's dose to 7.5 mg then resume usual dosing of 2.5 mg MWF and 5 mg all other days with recheck in 10 days after his OV with Dr Newton. He administered his Praluent injection in his right thigh. Dosage adjustment made based on physician directed care  plan.    Donna Colby RN

## 2018-04-04 ENCOUNTER — HOSPITAL ENCOUNTER (OUTPATIENT)
Dept: CARDIOLOGY | Facility: CLINIC | Age: 83
Discharge: HOME | End: 2018-04-04
Attending: NURSE PRACTITIONER | Admitting: NURSE PRACTITIONER
Payer: MEDICARE

## 2018-04-04 DIAGNOSIS — I25.5 CARDIOMYOPATHY, ISCHEMIC: ICD-10-CM

## 2018-04-04 LAB
ANION GAP SERPL CALCULATED.3IONS-SCNC: 13.3 MMOL/L (ref 6–17)
BUN SERPL-MCNC: 21 MG/DL (ref 7–30)
CALCIUM SERPL-MCNC: 8.1 MG/DL (ref 8.5–10.5)
CHLORIDE SERPL-SCNC: 106 MMOL/L (ref 98–107)
CHOLEST SERPL-MCNC: 136 MG/DL
CO2 SERPL-SCNC: 25 MMOL/L (ref 23–29)
CREAT SERPL-MCNC: 1.63 MG/DL (ref 0.7–1.3)
GFR SERPL CREATININE-BSD FRML MDRD: 41 ML/MIN/1.7M2
GLUCOSE SERPL-MCNC: 103 MG/DL (ref 70–105)
HDLC SERPL-MCNC: 55 MG/DL
LDLC SERPL CALC-MCNC: 66 MG/DL
NONHDLC SERPL-MCNC: 81 MG/DL
POTASSIUM SERPL-SCNC: 4.3 MMOL/L (ref 3.5–5.1)
SODIUM SERPL-SCNC: 140 MMOL/L (ref 136–145)
TRIGL SERPL-MCNC: 73 MG/DL

## 2018-04-04 PROCEDURE — 34300033 ZZH RX 343: Performed by: INTERNAL MEDICINE

## 2018-04-04 PROCEDURE — 93017 CV STRESS TEST TRACING ONLY: CPT

## 2018-04-04 PROCEDURE — A9502 TC99M TETROFOSMIN: HCPCS | Performed by: INTERNAL MEDICINE

## 2018-04-04 PROCEDURE — 93018 CV STRESS TEST I&R ONLY: CPT | Performed by: INTERNAL MEDICINE

## 2018-04-04 PROCEDURE — 93016 CV STRESS TEST SUPVJ ONLY: CPT | Performed by: INTERNAL MEDICINE

## 2018-04-04 PROCEDURE — 78452 HT MUSCLE IMAGE SPECT MULT: CPT | Mod: 26 | Performed by: INTERNAL MEDICINE

## 2018-04-04 PROCEDURE — 80048 BASIC METABOLIC PNL TOTAL CA: CPT | Performed by: NURSE PRACTITIONER

## 2018-04-04 PROCEDURE — 80061 LIPID PANEL: CPT | Performed by: NURSE PRACTITIONER

## 2018-04-04 PROCEDURE — 36415 COLL VENOUS BLD VENIPUNCTURE: CPT | Performed by: NURSE PRACTITIONER

## 2018-04-04 RX ADMIN — TETROFOSMIN 3.47 MCI.: 1.38 INJECTION, POWDER, LYOPHILIZED, FOR SOLUTION INTRAVENOUS at 09:40

## 2018-04-04 RX ADMIN — TETROFOSMIN 8.77 MCI.: 1.38 INJECTION, POWDER, LYOPHILIZED, FOR SOLUTION INTRAVENOUS at 11:22

## 2018-04-11 ENCOUNTER — OFFICE VISIT (OUTPATIENT)
Dept: CARDIOLOGY | Facility: CLINIC | Age: 83
End: 2018-04-11
Attending: NURSE PRACTITIONER
Payer: MEDICARE

## 2018-04-11 ENCOUNTER — ANTICOAGULATION THERAPY VISIT (OUTPATIENT)
Dept: CARDIOLOGY | Facility: CLINIC | Age: 83
End: 2018-04-11
Payer: COMMERCIAL

## 2018-04-11 VITALS
HEIGHT: 61 IN | SYSTOLIC BLOOD PRESSURE: 165 MMHG | WEIGHT: 126.9 LBS | BODY MASS INDEX: 23.96 KG/M2 | HEART RATE: 65 BPM | DIASTOLIC BLOOD PRESSURE: 62 MMHG

## 2018-04-11 DIAGNOSIS — I25.5 CARDIOMYOPATHY, ISCHEMIC: ICD-10-CM

## 2018-04-11 DIAGNOSIS — Z79.01 LONG-TERM (CURRENT) USE OF ANTICOAGULANTS: ICD-10-CM

## 2018-04-11 DIAGNOSIS — E78.2 MIXED HYPERLIPIDEMIA: ICD-10-CM

## 2018-04-11 DIAGNOSIS — G45.9 TRANSIENT CEREBRAL ISCHEMIA: ICD-10-CM

## 2018-04-11 DIAGNOSIS — N18.30 CHRONIC RENAL IMPAIRMENT, STAGE 3 (MODERATE) (H): ICD-10-CM

## 2018-04-11 DIAGNOSIS — I25.10 CORONARY ARTERY DISEASE INVOLVING NATIVE CORONARY ARTERY OF NATIVE HEART WITHOUT ANGINA PECTORIS: Primary | ICD-10-CM

## 2018-04-11 DIAGNOSIS — I10 ESSENTIAL HYPERTENSION: ICD-10-CM

## 2018-04-11 LAB — INR POINT OF CARE: 1.7 (ref 0.86–1.14)

## 2018-04-11 PROCEDURE — 36416 COLLJ CAPILLARY BLOOD SPEC: CPT | Performed by: INTERNAL MEDICINE

## 2018-04-11 PROCEDURE — 85610 PROTHROMBIN TIME: CPT | Mod: QW | Performed by: INTERNAL MEDICINE

## 2018-04-11 PROCEDURE — 99214 OFFICE O/P EST MOD 30 MIN: CPT | Performed by: INTERNAL MEDICINE

## 2018-04-11 RX ORDER — AMLODIPINE BESYLATE 2.5 MG/1
2.5 TABLET ORAL DAILY
Qty: 30 TABLET | Refills: 3 | Status: SHIPPED | OUTPATIENT
Start: 2018-04-11 | End: 2018-04-16

## 2018-04-11 NOTE — PROGRESS NOTES
HPI and Plan:   Sawyer Renteria is a 84 year old male followed here by Dr. Newton.     He has a history of an anterior wall MI in 2006 resulting in an LAD stent and angioplasty to the diagonal. 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 Ejection fraction has waxed and waned a bit over the years but in aprl 2017 is 45-50% with an ongoing apical wall motion abnormality.    He is doing well.  He denies any chest pain or shortness of breath.  He walks several days a week.  He had a recent Lexiscan stress nuclear study and results were reviewed with him.  It revealed anterior and anteroapical scar without ischemia.  The summed stress score was 13.  Ejection fraction at rest was 43% post-rest 39%.  I reviewed the images as well as echo images from last year.  I reviewed the images with him as well as the results.      He has statin intolerance and has been on Praluent which he tolerates well. His lipids look much improved with total cholesterol 136, HDL 55, LDL 66, triglyceride 145.     He now is chronic renal insufficiency and sees nephrology.  His last creatinine was 1.6.  Initial nephrology, they wanted him to continue lisinopril 2.5 g twice daily as creatinine was more or less stable.    His blood pressure this morning was initially 1 65 x 62 on repeat check was 1 45 x 62.  Otherwise examination was unremarkable.         Impression/Plan:       1. Coronary artery disease with anterior wall MI status post LAD stenting with the balloon angioplasty to the diagonal branch.   Ejection fraction last April was at 45-50%.  On recent nuclear study, it was around 43% at rest.  Given the elevated blood pressures, I am concerned that he may have further remodeling and enlargement of the ventricle.  Therefore, appropriate blood pressure control would be very useful.  With that in mind, I will add amlodipine 2.5 mg daily.  I am hesitant on increasing lisinopril because of renal  insufficiency.  We can also potentially change metoprolol to Coreg and future although he had some trouble with this medication in the past.  In absence of any ischemia, will continue optimization of medical therapy and conservative management.    2.  Hypertension  See discussion above.  We discussed importance of low-salt diet.    3.  Ischemic cardiomyopathy  No overt heart failure symptoms.    4. 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 He uses brand name and I did refill his medication today. INR's have been therapeutic.     5. Dyslipidemia with statin intolerance. He is tolerating Praluent with excellent lipid profile.      6.  Chronic renal insufficiency  Creatinine stable at 1.6 although will need to be monitored.  Will check a BMP in a month.  Continue follow with Dr. Oneill.    He will return to see my nurse practitioner month for a titrating his meds and optimizing his blood pressure.  I would like to see him back in follow-up in 6-9 months with a repeat echocardiogram prior to visit with me after the medications are optimized.     Sincerely,    Satya Netwon MD    Orders Placed This Encounter   Procedures     Basic metabolic panel     Follow-Up with Cardiac Advanced Practice Provider       Orders Placed This Encounter   Medications     amLODIPine (NORVASC) 2.5 MG tablet     Sig: Take 1 tablet (2.5 mg) by mouth daily     Dispense:  30 tablet     Refill:  3       Medications Discontinued During This Encounter   Medication Reason     alirocumab (PRALUENT) 75 MG/ML injectable pen Medication Reconciliation Clean Up         Encounter Diagnoses   Name Primary?     Cardiomyopathy, ischemic      Mixed hyperlipidemia      Essential hypertension      Coronary artery disease involving native coronary artery of native heart without angina pectoris Yes     Chronic renal impairment, stage 3 (moderate)        CURRENT MEDICATIONS:  Current Outpatient Prescriptions   Medication Sig  Dispense Refill     amLODIPine (NORVASC) 2.5 MG tablet Take 1 tablet (2.5 mg) by mouth daily 30 tablet 3     lisinopril (PRINIVIL/ZESTRIL) 2.5 MG tablet One tablet twice per day 270 tablet 3     nitroGLYcerin (NITROSTAT) 0.4 MG sublingual tablet Place 1 tablet (0.4 mg) under the tongue every 5 minutes as needed for chest pain Take as directed 25 tablet 3     warfarin (COUMADIN) 5 MG tablet Take 1/2 tab on Mondays and Thursdays and 1 tab all other days or as directed by INR clinic, Coumadin SHIKHA 90 tablet 3     metoprolol (TOPROL-XL) 25 MG 24 hr tablet Take 0.5 tablets (12.5 mg) by mouth daily 45 tablet 3     dutasteride (AVODART) 0.5 MG capsule Take 1 capsule (0.5 mg) by mouth daily 30 capsule 11     alirocumab (PRALUENT) 75 MG/ML injectable pen Inject 1 mL (75 mg) Subcutaneous every 14 days 2 mL 11     melatonin 5 MG tablet Take 5 mg by mouth nightly as needed for sleep       Fish Oil-Cholecalciferol (FISH OIL + D3) 5811-5959 MG-UNIT CAPS Take 2 tablets by mouth daily       Cholecalciferol (VITAMIN D3) 2000 UNITS CAPS Take by mouth daily       aspirin 81 MG tablet Take 81 mg by mouth daily         ALLERGIES     Allergies   Allergen Reactions     Flomax [Tamsulosin]      Weakness and dizzyness     Aldactone [Spironolactone]      High potassium and worsening creat when on lisinopril and spironalactone     Carvedilol      Colesevelam      Epigastric pain     Ezetimibe      Lisinopril Itching     Hyperkalemia and inc. Creat. At 20 mg daily, itching , skin red when dose goes above 2.5 mg a day--elevated creat      Pravastatin      Abdominal pain     Rosuvastatin      Simvastatin        PAST MEDICAL HISTORY:  Past Medical History:   Diagnosis Date     BPH (benign prostatic hyperplasia)      Cardiomyopathy, ischemic     EF 49% by cardiac MRI 1/15/2014     Coronary artery disease 1/24/06    Cypher CANDIDA to LAD     Gastro-oesophageal reflux disease      Hyperlipidaemia      Hypertension      Left ventricular thrombus       "Myocardial infarction 1/2006    Anterior     TIA (transient ischaemic attack)        PAST SURGICAL HISTORY:  Past Surgical History:   Procedure Laterality Date     CORONARY ANGIOGRAPHY ADULT ORDER  1/24/06    Cypher CANDIDA to LAD     HEART CATH, ANGIOPLASTY  1/2006    Cypher CANDIDA to LAD     TONSILLECTOMY & ADENOIDECTOMY         FAMILY HISTORY:  Family History   Problem Relation Age of Onset     HEART DISEASE Mother      heart failure     HEART DISEASE Brother        SOCIAL HISTORY:  Social History     Social History     Marital status:      Spouse name: N/A     Number of children: N/A     Years of education: N/A     Social History Main Topics     Smoking status: Never Smoker     Smokeless tobacco: Never Used     Alcohol use No     Drug use: None     Sexual activity: Not Asked     Other Topics Concern     Parent/Sibling W/ Cabg, Mi Or Angioplasty Before 65f 55m? No     Caffeine Concern No     Sleep Concern No     Stress Concern No     Weight Concern Yes     weight loss     Special Diet No     Exercise Yes     bicycle 10 min, walking, 3 days week     Seat Belt Yes     Social History Narrative       Review of Systems:  Skin:  Negative       Eyes:  Positive for glasses    ENT:  Negative      Respiratory:  Negative       Cardiovascular:  Negative;palpitations;chest pain;lightheadedness;dizziness;syncope or near-syncope;cyanosis;exercise intolerance Positive for;fatigue    Gastroenterology: Negative      Genitourinary:  Positive for nocturia;urinary frequency    Musculoskeletal:  Negative      Neurologic:  Negative      Psychiatric:  Positive for sleep disturbances    Heme/Lymph/Imm:  Positive for allergies    Endocrine:  Negative        Physical Exam:  Vitals: /62 (BP Location: Left arm, Cuff Size: Adult Regular)  Pulse 65  Ht 1.556 m (5' 1.25\")  Wt 57.6 kg (126 lb 14.4 oz)  BMI 23.78 kg/m2    Constitutional:  well developed        Skin:  warm and dry to the touch          Head:           Eyes:  pupils equal " and round        Lymph:      ENT:  no pallor or cyanosis        Neck:  carotid pulses are full and equal bilaterally        Respiratory:  clear to auscultation         Cardiac: normal S1 and S2   S4 no presence of murmur systolic murmur;grade 2;apical        not assessed this visit                                        GI:  abdomen soft;BS normoactive        Extremities and Muscular Skeletal:  no edema         ecchymosis over left flank following fall at home    Neurological:  no gross motor deficits        Psych:  Alert and Oriented x 3        CC  Randell Izaguirre, APRN CNP  [address]  [place] [address]

## 2018-04-11 NOTE — LETTER
4/11/2018    Francisco Doshi MD  [address]  [place] [address]    RE: Sawyer Renteria       Dear Colleague,    I had the pleasure of seeing Sawyer Renteria in the [facility].    HPI and Plan:   Sawyer Renteria is a 84 year old male followed here by Dr. Newton.     He has a history of an anterior wall MI in 2006 resulting in an LAD stent and angioplasty to the diagonal. 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 Ejection fraction has waxed and waned a bit over the years but in aprl 2017 is 45-50% with an ongoing apical wall motion abnormality.    He is doing well.  He denies any chest pain or shortness of breath.  He walks several days a week.  He had a recent Lexiscan stress nuclear study and results were reviewed with him.  It revealed anterior and anteroapical scar without ischemia.  The summed stress score was 13.  Ejection fraction at rest was 43% post-rest 39%.  I reviewed the images as well as echo images from last year.  I reviewed the images with him as well as the results.      He has statin intolerance and has been on Praluent which he tolerates well. His lipids look much improved with total cholesterol 136, HDL 55, LDL 66, triglyceride 145.     He now is chronic renal insufficiency and sees nephrology.  His last creatinine was 1.6.  Initial nephrology, they wanted him to continue lisinopril 2.5 g twice daily as creatinine was more or less stable.    His blood pressure this morning was initially 1 65 x 62 on repeat check was 1 45 x 62.  Otherwise examination was unremarkable.         Impression/Plan:       1. Coronary artery disease with anterior wall MI status post LAD stenting with the balloon angioplasty to the diagonal branch.   Ejection fraction last April was at 45-50%.  On recent nuclear study, it was around 43% at rest.  Given the elevated blood pressures, I am concerned that he may have further remodeling and  enlargement of the ventricle.  Therefore, appropriate blood pressure control would be very useful.  With that in mind, I will add amlodipine 2.5 mg daily.  I am hesitant on increasing lisinopril because of renal insufficiency.  We can also potentially change metoprolol to Coreg and future although he had some trouble with this medication in the past.  In absence of any ischemia, will continue optimization of medical therapy and conservative management.    2.  Hypertension  See discussion above.  We discussed importance of low-salt diet.    3.  Ischemic cardiomyopathy  No overt heart failure symptoms.    4. 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 He uses brand name and I did refill his medication today. INR's have been therapeutic.     5. Dyslipidemia with statin intolerance. He is tolerating Praluent with excellent lipid profile.      6.  Chronic renal insufficiency  Creatinine stable at 1.6 although will need to be monitored.  Will check a BMP in a month.  Continue follow with Dr. Oneill.    He will return to see my nurse practitioner month for a titrating his meds and optimizing his blood pressure.  I would like to see him back in follow-up in 6-9 months with a repeat echocardiogram prior to visit with me after the medications are optimized.     Sincerely,    Satya Newton MD    Orders Placed This Encounter   Procedures     Basic metabolic panel     Follow-Up with Cardiac Advanced Practice Provider       Orders Placed This Encounter   Medications     amLODIPine (NORVASC) 2.5 MG tablet     Sig: Take 1 tablet (2.5 mg) by mouth daily     Dispense:  30 tablet     Refill:  3       Medications Discontinued During This Encounter   Medication Reason     alirocumab (PRALUENT) 75 MG/ML injectable pen Medication Reconciliation Clean Up         Encounter Diagnoses   Name Primary?     Cardiomyopathy, ischemic      Mixed hyperlipidemia      Essential hypertension      Coronary artery  disease involving native coronary artery of native heart without angina pectoris Yes     Chronic renal impairment, stage 3 (moderate)        CURRENT MEDICATIONS:  Current Outpatient Prescriptions   Medication Sig Dispense Refill     amLODIPine (NORVASC) 2.5 MG tablet Take 1 tablet (2.5 mg) by mouth daily 30 tablet 3     lisinopril (PRINIVIL/ZESTRIL) 2.5 MG tablet One tablet twice per day 270 tablet 3     nitroGLYcerin (NITROSTAT) 0.4 MG sublingual tablet Place 1 tablet (0.4 mg) under the tongue every 5 minutes as needed for chest pain Take as directed 25 tablet 3     warfarin (COUMADIN) 5 MG tablet Take 1/2 tab on Mondays and Thursdays and 1 tab all other days or as directed by INR clinic, Coumadin SHIKHA 90 tablet 3     metoprolol (TOPROL-XL) 25 MG 24 hr tablet Take 0.5 tablets (12.5 mg) by mouth daily 45 tablet 3     dutasteride (AVODART) 0.5 MG capsule Take 1 capsule (0.5 mg) by mouth daily 30 capsule 11     alirocumab (PRALUENT) 75 MG/ML injectable pen Inject 1 mL (75 mg) Subcutaneous every 14 days 2 mL 11     melatonin 5 MG tablet Take 5 mg by mouth nightly as needed for sleep       Fish Oil-Cholecalciferol (FISH OIL + D3) 4126-6324 MG-UNIT CAPS Take 2 tablets by mouth daily       Cholecalciferol (VITAMIN D3) 2000 UNITS CAPS Take by mouth daily       aspirin 81 MG tablet Take 81 mg by mouth daily         ALLERGIES     Allergies   Allergen Reactions     Flomax [Tamsulosin]      Weakness and dizzyness     Aldactone [Spironolactone]      High potassium and worsening creat when on lisinopril and spironalactone     Carvedilol      Colesevelam      Epigastric pain     Ezetimibe      Lisinopril Itching     Hyperkalemia and inc. Creat. At 20 mg daily, itching , skin red when dose goes above 2.5 mg a day--elevated creat      Pravastatin      Abdominal pain     Rosuvastatin      Simvastatin        PAST MEDICAL HISTORY:  Past Medical History:   Diagnosis Date     BPH (benign prostatic hyperplasia)      Cardiomyopathy,  ischemic     EF 49% by cardiac MRI 1/15/2014     Coronary artery disease 1/24/06    Cypher CANDIDA to LAD     Gastro-oesophageal reflux disease      Hyperlipidaemia      Hypertension      Left ventricular thrombus      Myocardial infarction 1/2006    Anterior     TIA (transient ischaemic attack)        PAST SURGICAL HISTORY:  Past Surgical History:   Procedure Laterality Date     CORONARY ANGIOGRAPHY ADULT ORDER  1/24/06    Cypher CANDIDA to LAD     HEART CATH, ANGIOPLASTY  1/2006    Cypher CANDIDA to LAD     TONSILLECTOMY & ADENOIDECTOMY         FAMILY HISTORY:  Family History   Problem Relation Age of Onset     HEART DISEASE Mother      heart failure     HEART DISEASE Brother        SOCIAL HISTORY:  Social History     Social History     Marital status:      Spouse name: N/A     Number of children: N/A     Years of education: N/A     Social History Main Topics     Smoking status: Never Smoker     Smokeless tobacco: Never Used     Alcohol use No     Drug use: None     Sexual activity: Not Asked     Other Topics Concern     Parent/Sibling W/ Cabg, Mi Or Angioplasty Before 65f 55m? No     Caffeine Concern No     Sleep Concern No     Stress Concern No     Weight Concern Yes     weight loss     Special Diet No     Exercise Yes     bicycle 10 min, walking, 3 days week     Seat Belt Yes     Social History Narrative       Review of Systems:  Skin:  Negative       Eyes:  Positive for glasses    ENT:  Negative      Respiratory:  Negative       Cardiovascular:  Negative;palpitations;chest pain;lightheadedness;dizziness;syncope or near-syncope;cyanosis;exercise intolerance Positive for;fatigue    Gastroenterology: Negative      Genitourinary:  Positive for nocturia;urinary frequency    Musculoskeletal:  Negative      Neurologic:  Negative      Psychiatric:  Positive for sleep disturbances    Heme/Lymph/Imm:  Positive for allergies    Endocrine:  Negative        Physical Exam:  Vitals: /62 (BP Location: Left arm, Cuff Size:  "Adult Regular)  Pulse 65  Ht 1.556 m (5' 1.25\")  Wt 57.6 kg (126 lb 14.4 oz)  BMI 23.78 kg/m2    Constitutional:  well developed        Skin:  warm and dry to the touch          Head:           Eyes:  pupils equal and round        Lymph:      ENT:  no pallor or cyanosis        Neck:  carotid pulses are full and equal bilaterally        Respiratory:  clear to auscultation         Cardiac: normal S1 and S2   S4 no presence of murmur systolic murmur;grade 2;apical        not assessed this visit                                        GI:  abdomen soft;BS normoactive        Extremities and Muscular Skeletal:  no edema         ecchymosis over left flank following fall at home    Neurological:  no gross motor deficits        Psych:  Alert and Oriented x 3        Thank you for allowing me to participate in the care of your patient.    Sincerely,     Satya Newton MD     [facility]    "

## 2018-04-11 NOTE — MR AVS SNAPSHOT
After Visit Summary   4/11/2018    Sawyer Renteria    MRN: [id number]           Patient Information     Date Of Birth          3/24/1934        Visit Information        Provider Department      4/11/2018 8:15 AM Satya Newton MD [facility]        Today's Diagnoses     Coronary artery disease involving native coronary artery of native heart without angina pectoris    -  1    Cardiomyopathy, ischemic        Mixed hyperlipidemia        Essential hypertension           Follow-ups after your visit        Additional Services     Follow-Up with Cardiac Advanced Practice Provider                 Your next 10 appointments already scheduled     Apr 11, 2018 11:00 AM CDT   Anticoagulation Visit with [facility]   [facility] ([facility])    [facility] [id number]  [facility] [address] [phone] OPT 2              Future tests that were ordered for you today     Open Future Orders        Priority Expected Expires Ordered    Basic metabolic panel Routine 1/6/2019 4/11/2019 4/11/2018    Follow-Up with Cardiac Advanced Practice Provider Routine 1/6/2019 4/11/2019 4/11/2018            Who to contact     If you have questions or need follow up information about today's clinic visit or your schedule please contact [facility] directly at [phone].  Normal or non-critical lab and imaging results will be communicated to you by MyChart, letter or phone within 4 business days after the clinic has received the results. If you do not hear from us within 7 days, please contact the clinic through MyChart or phone. If you have a critical or abnormal lab result, we will notify you by phone as soon as possible.  Submit refill requests through [employer] or call your pharmacy and they will forward the refill request to us. Please allow 3 business days for your refill to be  "completed.          Additional Information About Your Visit        Pushkarth[employer] Information     [employer] gives you secure access to your electronic health record. If you see a primary care provider, you can also send messages to your care team and make appointments. If you have questions, please call your primary care clinic.  If you do not have a primary care provider, please call [phone] and they will assist you.        Care EveryWhere ID     This is your Care EveryWhere ID. This could be used by other organizations to access your [place] medical records  [id number]        Your Vitals Were     Pulse Height BMI (Body Mass Index)             65 1.556 m (5' 1.25\") 23.78 kg/m2          Blood Pressure from Last 3 Encounters:   04/11/18 165/62   11/21/17 126/61   11/07/17 140/54    Weight from Last 3 Encounters:   04/11/18 57.6 kg (126 lb 14.4 oz)   11/07/17 57.2 kg (126 lb)   10/04/17 55.8 kg (123 lb)              We Performed the Following     Follow-Up with Cardiologist          Today's Medication Changes          These changes are accurate as of 4/11/18  8:45 AM.  If you have any questions, ask your nurse or doctor.               Start taking these medicines.        Dose/Directions    amLODIPine 2.5 MG tablet   Commonly known as:  NORVASC   Used for:  Coronary artery disease involving native coronary artery of native heart without angina pectoris, Essential hypertension   Started by:  Satya Newton MD        Dose:  2.5 mg   Take 1 tablet (2.5 mg) by mouth daily   Quantity:  30 tablet   Refills:  3            Where to get your medicines      These medications were sent to [facility] - [place], [place] - [address]  [address] [facility] [address]     Phone:  [phone]     amLODIPine 2.5 MG tablet                Primary Care Provider Office Phone # Fax #    Francisco Doshi [phone] [address] [place] [address]        Equal Access to " Services     [facility]: Hadii al braun thiagomary ellen Lanteteali, waaxda luqadaha, qaybta kaalmada chelsie, harry welch . So [facility] [place] [facility] [phone].    ATENCIÓN: Si habla diana, tiene a roy disposición servicios gratuitos de asistencia lingüística. Llame al [phone].    We comply with applicable federal civil rights laws and [place] laws. We do not discriminate on the basis of race, color, national origin, age, disability, sex, sexual orientation, or gender identity.            Thank you!     Thank you for choosing [facility]  for your care. Our goal is always to provide you with excellent care. Hearing back from our patients is one way we can continue to improve our services. Please take a few minutes to complete the written survey that you may receive in the mail after your visit with us. Thank you!             Your Updated Medication List - Protect others around you: Learn how to safely use, store and throw away your medicines at www.disposemymeds.org.          This list is accurate as of 4/11/18  8:45 AM.  Always use your most recent med list.                   Brand Name Dispense Instructions for use Diagnosis    alirocumab 75 MG/ML injectable pen    PRALUENT    2 mL    Inject 1 mL (75 mg) Subcutaneous every 14 days    Hyperlipidemia, unspecified hyperlipidemia type       amLODIPine 2.5 MG tablet    NORVASC    30 tablet    Take 1 tablet (2.5 mg) by mouth daily    Coronary artery disease involving native coronary artery of native heart without angina pectoris, Essential hypertension       aspirin 81 MG tablet      Take 81 mg by mouth daily        dutasteride 0.5 MG capsule    AVODART    30 capsule    Take 1 capsule (0.5 mg) by mouth daily    Cardiomyopathy, ischemic       FISH OIL + D3 6466-3603 MG-UNIT Caps      Take 2 tablets by mouth daily        lisinopril 2.5 MG tablet    PRINIVIL/Zestril    270 tablet    One tablet twice per day    Coronary  artery disease involving native coronary artery of native heart without angina pectoris       melatonin 5 MG tablet      Take 5 mg by mouth nightly as needed for sleep        metoprolol succinate 25 MG 24 hr tablet    TOPROL-XL    45 tablet    Take 0.5 tablets (12.5 mg) by mouth daily    Cardiomyopathy, ischemic       nitroGLYcerin 0.4 MG sublingual tablet    NITROSTAT    25 tablet    Place 1 tablet (0.4 mg) under the tongue every 5 minutes as needed for chest pain Take as directed    Cardiomyopathy, ischemic       vitamin D3 2000 units Caps      Take by mouth daily        warfarin 5 MG tablet    COUMADIN    90 tablet    Take 1/2 tab on Mondays and Thursdays and 1 tab all other days or as directed by INR clinic, Coumadin SHIKHA    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

## 2018-04-11 NOTE — PROGRESS NOTES
ANTICOAGULATION FOLLOW-UP CLINIC VISIT    Patient Name:  Sawyer Renteria  Date:  4/11/2018  Contact Type:  Face to Face    SUBJECTIVE:     Patient Findings     Positives Change in diet/appetite (still not eating greens)           OBJECTIVE    INR Protime   Date Value Ref Range Status   04/11/2018 1.7 (A) 0.86 - 1.14 Final       ASSESSMENT / PLAN  INR assessment SUB    Recheck INR In: 2 WEEKS    INR Location Clinic      Anticoagulation Summary as of 4/11/2018     INR goal 2.0-3.0   Today's INR 1.7!   Maintenance plan 2.5 mg (5 mg x 0.5) on Mon, Fri; 5 mg (5 mg x 1) all other days   Full instructions 4/11: 5 mg; Otherwise 2.5 mg on Mon, Fri; 5 mg all other days   Weekly total 30 mg   Plan last modified Donna Colby RN (4/11/2018)   Next INR check 4/27/2018   Target end date Indefinite    Indications   Left ventricular thrombus [NFU1341]  Transient cerebral ischemia [G45.9]  Long-term (current) use of anticoagulants [Z79.01] [Z79.01]         Anticoagulation Episode Summary     INR check location     Preferred lab     Send INR reminders to [facility] HEART INR NURSE    Comments       Anticoagulation Care Providers     Provider Role Specialty Phone number    Satya Newton MD Responsible Cardiology [phone]            See the Encounter Report to view Anticoagulation Flowsheet and Dosing Calendar (Go to Encounters tab in chart review, and find the Anticoagulation Therapy Visit)    INR 1.7 INR still low. He hasn't resumed eating greens yet. No change in meds yet but will soon start Amlodipine. No abnormal bleeding or bruising. Will increase dosing schedule to 2.5 mg MF and 5 mg all other days with recheck in 2 weeks. Will avoid greens for another 2 days then resume eating his usual diet of greens 2-3x/wk. He will bring his praluent injection to the next appt. Dosage adjustment made based on physician directed care plan.    Donna Colby RN

## 2018-04-11 NOTE — MR AVS SNAPSHOT
Sawyer Renteria   4/11/2018 9:00 AM   Anticoagulation Therapy Visit    Description:  84 year old male   Provider:  [facility]   Department:  [facility]           INR as of 4/11/2018     Today's INR 1.7!      Anticoagulation Summary as of 4/11/2018     INR goal 2.0-3.0   Today's INR 1.7!   Full instructions 4/11: 5 mg; Otherwise 2.5 mg on Mon, Fri; 5 mg all other days   Next INR check 4/27/2018    Indications   Left ventricular thrombus [MNP0696]  Transient cerebral ischemia [G45.9]  Long-term (current) use of anticoagulants [Z79.01] [Z79.01]         Your next Anticoagulation Clinic appointment(s)     Apr 27, 2018 10:20 AM CDT   Anticoagulation Visit with  ANTICOAGULATION   [facility] ([facility])    [address] [place] [address]   [phone] OPT 2              Contact Numbers     Anticoagulant (INR) Clinic Number: [phone]          April 2018 Details    Sun Mon Tue Wed Thu Fri Sat     1               2               3               4               5               6               7                 8               9               10               11      5 mg   See details      12      5 mg         13      2.5 mg         14      5 mg           15      5 mg         16      2.5 mg         17      5 mg         18      5 mg         19      5 mg         20      2.5 mg         21      5 mg           22      5 mg         23      2.5 mg         24      5 mg         25      5 mg         26      5 mg         27            28                 29               30                     Date Details   04/11 This INR check       Date of next INR:  4/27/2018         How to take your warfarin dose     To take:  2.5 mg Take 0.5 of a 5 mg tablet.    To take:  5 mg Take 1 of the 5 mg tablets.

## 2018-04-11 NOTE — LETTER
4/11/2018    Francisco Doshi MD  [address]  [place] [address]    RE: Sawyer Renteria       Dear Colleague,    I had the pleasure of seeing Sawyer Renteria in the [facility].    HPI and Plan:   Sawyer Renteria is a 84 year old male followed here by Dr. Newton.     He has a history of an anterior wall MI in 2006 resulting in an LAD stent and angioplasty to the diagonal. 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 Ejection fraction has waxed and waned a bit over the years but in aprl 2017 is 45-50% with an ongoing apical wall motion abnormality.    He is doing well.  He denies any chest pain or shortness of breath.  He walks several days a week.  He had a recent Lexiscan stress nuclear study and results were reviewed with him.  It revealed anterior and anteroapical scar without ischemia.  The summed stress score was 13.  Ejection fraction at rest was 43% post-rest 39%.  I reviewed the images as well as echo images from last year.  I reviewed the images with him as well as the results.      He has statin intolerance and has been on Praluent which he tolerates well. His lipids look much improved with total cholesterol 136, HDL 55, LDL 66, triglyceride 145.     He now is chronic renal insufficiency and sees nephrology.  His last creatinine was 1.6.  Initial nephrology, they wanted him to continue lisinopril 2.5 g twice daily as creatinine was more or less stable.    His blood pressure this morning was initially 1 65 x 62 on repeat check was 1 45 x 62.  Otherwise examination was unremarkable.         Impression/Plan:       1. Coronary artery disease with anterior wall MI status post LAD stenting with the balloon angioplasty to the diagonal branch.   Ejection fraction last April was at 45-50%.  On recent nuclear study, it was around 43% at rest.  Given the elevated blood pressures, I am concerned that he may have further remodeling and  enlargement of the ventricle.  Therefore, appropriate blood pressure control would be very useful.  With that in mind, I will add amlodipine 2.5 mg daily.  I am hesitant on increasing lisinopril because of renal insufficiency.  We can also potentially change metoprolol to Coreg and future although he had some trouble with this medication in the past.  In absence of any ischemia, will continue optimization of medical therapy and conservative management.    2.  Hypertension  See discussion above.  We discussed importance of low-salt diet.    3.  Ischemic cardiomyopathy  No overt heart failure symptoms.    4. 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 He uses brand name and I did refill his medication today. INR's have been therapeutic.     5. Dyslipidemia with statin intolerance. He is tolerating Praluent with excellent lipid profile.      6.  Chronic renal insufficiency  Creatinine stable at 1.6 although will need to be monitored.  Will check a BMP in a month.  Continue follow with Dr. Oneill.    He will return to see my nurse practitioner month for a titrating his meds and optimizing his blood pressure.  I would like to see him back in follow-up in 6-9 months with a repeat echocardiogram prior to visit with me after the medications are optimized.     Sincerely,    Satya Newton MD    Orders Placed This Encounter   Procedures     Basic metabolic panel     Follow-Up with Cardiac Advanced Practice Provider       Orders Placed This Encounter   Medications     amLODIPine (NORVASC) 2.5 MG tablet     Sig: Take 1 tablet (2.5 mg) by mouth daily     Dispense:  30 tablet     Refill:  3       Medications Discontinued During This Encounter   Medication Reason     alirocumab (PRALUENT) 75 MG/ML injectable pen Medication Reconciliation Clean Up         Encounter Diagnoses   Name Primary?     Cardiomyopathy, ischemic      Mixed hyperlipidemia      Essential hypertension      Coronary artery  disease involving native coronary artery of native heart without angina pectoris Yes     Chronic renal impairment, stage 3 (moderate)        CURRENT MEDICATIONS:  Current Outpatient Prescriptions   Medication Sig Dispense Refill     amLODIPine (NORVASC) 2.5 MG tablet Take 1 tablet (2.5 mg) by mouth daily 30 tablet 3     lisinopril (PRINIVIL/ZESTRIL) 2.5 MG tablet One tablet twice per day 270 tablet 3     nitroGLYcerin (NITROSTAT) 0.4 MG sublingual tablet Place 1 tablet (0.4 mg) under the tongue every 5 minutes as needed for chest pain Take as directed 25 tablet 3     warfarin (COUMADIN) 5 MG tablet Take 1/2 tab on Mondays and Thursdays and 1 tab all other days or as directed by INR clinic, Coumadin SHIKHA 90 tablet 3     metoprolol (TOPROL-XL) 25 MG 24 hr tablet Take 0.5 tablets (12.5 mg) by mouth daily 45 tablet 3     dutasteride (AVODART) 0.5 MG capsule Take 1 capsule (0.5 mg) by mouth daily 30 capsule 11     alirocumab (PRALUENT) 75 MG/ML injectable pen Inject 1 mL (75 mg) Subcutaneous every 14 days 2 mL 11     melatonin 5 MG tablet Take 5 mg by mouth nightly as needed for sleep       Fish Oil-Cholecalciferol (FISH OIL + D3) 8708-4724 MG-UNIT CAPS Take 2 tablets by mouth daily       Cholecalciferol (VITAMIN D3) 2000 UNITS CAPS Take by mouth daily       aspirin 81 MG tablet Take 81 mg by mouth daily         ALLERGIES     Allergies   Allergen Reactions     Flomax [Tamsulosin]      Weakness and dizzyness     Aldactone [Spironolactone]      High potassium and worsening creat when on lisinopril and spironalactone     Carvedilol      Colesevelam      Epigastric pain     Ezetimibe      Lisinopril Itching     Hyperkalemia and inc. Creat. At 20 mg daily, itching , skin red when dose goes above 2.5 mg a day--elevated creat      Pravastatin      Abdominal pain     Rosuvastatin      Simvastatin        PAST MEDICAL HISTORY:  Past Medical History:   Diagnosis Date     BPH (benign prostatic hyperplasia)      Cardiomyopathy,  ischemic     EF 49% by cardiac MRI 1/15/2014     Coronary artery disease 1/24/06    Cypher CANDIDA to LAD     Gastro-oesophageal reflux disease      Hyperlipidaemia      Hypertension      Left ventricular thrombus      Myocardial infarction 1/2006    Anterior     TIA (transient ischaemic attack)        PAST SURGICAL HISTORY:  Past Surgical History:   Procedure Laterality Date     CORONARY ANGIOGRAPHY ADULT ORDER  1/24/06    Cypher CANDIDA to LAD     HEART CATH, ANGIOPLASTY  1/2006    Cypher CANDIDA to LAD     TONSILLECTOMY & ADENOIDECTOMY         FAMILY HISTORY:  Family History   Problem Relation Age of Onset     HEART DISEASE Mother      heart failure     HEART DISEASE Brother        SOCIAL HISTORY:  Social History     Social History     Marital status:      Spouse name: N/A     Number of children: N/A     Years of education: N/A     Social History Main Topics     Smoking status: Never Smoker     Smokeless tobacco: Never Used     Alcohol use No     Drug use: None     Sexual activity: Not Asked     Other Topics Concern     Parent/Sibling W/ Cabg, Mi Or Angioplasty Before 65f 55m? No     Caffeine Concern No     Sleep Concern No     Stress Concern No     Weight Concern Yes     weight loss     Special Diet No     Exercise Yes     bicycle 10 min, walking, 3 days week     Seat Belt Yes     Social History Narrative       Review of Systems:  Skin:  Negative       Eyes:  Positive for glasses    ENT:  Negative      Respiratory:  Negative       Cardiovascular:  Negative;palpitations;chest pain;lightheadedness;dizziness;syncope or near-syncope;cyanosis;exercise intolerance Positive for;fatigue    Gastroenterology: Negative      Genitourinary:  Positive for nocturia;urinary frequency    Musculoskeletal:  Negative      Neurologic:  Negative      Psychiatric:  Positive for sleep disturbances    Heme/Lymph/Imm:  Positive for allergies    Endocrine:  Negative        Physical Exam:  Vitals: /62 (BP Location: Left arm, Cuff Size:  "Adult Regular)  Pulse 65  Ht 1.556 m (5' 1.25\")  Wt 57.6 kg (126 lb 14.4 oz)  BMI 23.78 kg/m2    Constitutional:  well developed        Skin:  warm and dry to the touch          Head:           Eyes:  pupils equal and round        Lymph:      ENT:  no pallor or cyanosis        Neck:  carotid pulses are full and equal bilaterally        Respiratory:  clear to auscultation         Cardiac: normal S1 and S2   S4 no presence of murmur systolic murmur;grade 2;apical        not assessed this visit                                        GI:  abdomen soft;BS normoactive        Extremities and Muscular Skeletal:  no edema         ecchymosis over left flank following fall at home    Neurological:  no gross motor deficits        Psych:  Alert and Oriented x 3        CC  JAE Jones CNP  [address]  [place] [address]                Thank you for allowing me to participate in the care of your patient.      Sincerely,     Satya Newton MD     [facility]    cc:   JAE Jones CNP  [address]  [place] [address]        "

## 2018-04-16 DIAGNOSIS — I10 ESSENTIAL HYPERTENSION: ICD-10-CM

## 2018-04-16 DIAGNOSIS — I25.10 CORONARY ARTERY DISEASE INVOLVING NATIVE CORONARY ARTERY OF NATIVE HEART WITHOUT ANGINA PECTORIS: ICD-10-CM

## 2018-04-16 RX ORDER — AMLODIPINE BESYLATE 2.5 MG/1
2.5 TABLET ORAL DAILY
Qty: 90 TABLET | Refills: 1 | Status: SHIPPED | OUTPATIENT
Start: 2018-04-16 | End: 2018-05-11

## 2018-04-27 ENCOUNTER — ANTICOAGULATION THERAPY VISIT (OUTPATIENT)
Dept: CARDIOLOGY | Facility: CLINIC | Age: 83
End: 2018-04-27
Payer: COMMERCIAL

## 2018-04-27 DIAGNOSIS — Z79.01 LONG-TERM (CURRENT) USE OF ANTICOAGULANTS: ICD-10-CM

## 2018-04-27 DIAGNOSIS — G45.9 TRANSIENT CEREBRAL ISCHEMIA: ICD-10-CM

## 2018-04-27 LAB — INR POINT OF CARE: 1.7 (ref 0.86–1.14)

## 2018-04-27 PROCEDURE — 36416 COLLJ CAPILLARY BLOOD SPEC: CPT | Performed by: INTERNAL MEDICINE

## 2018-04-27 PROCEDURE — 85610 PROTHROMBIN TIME: CPT | Mod: QW | Performed by: INTERNAL MEDICINE

## 2018-04-27 NOTE — PROGRESS NOTES
ANTICOAGULATION FOLLOW-UP CLINIC VISIT    Patient Name:  Sawyer Renteria  Date:  4/27/2018  Contact Type:  Face to Face    SUBJECTIVE:     Patient Findings     Positives No Problem Findings           OBJECTIVE    INR Protime   Date Value Ref Range Status   04/27/2018 1.7 (A) 0.86 - 1.14 Final       ASSESSMENT / PLAN  INR assessment SUB    Recheck INR In: 1 WEEK    INR Location Clinic      Anticoagulation Summary as of 4/27/2018     INR goal 2.0-3.0   Today's INR 1.7!   Maintenance plan 2.5 mg (5 mg x 0.5) on Mon, Fri; 5 mg (5 mg x 1) all other days   Full instructions 4/27: 5 mg; Otherwise 2.5 mg on Mon, Fri; 5 mg all other days   Weekly total 30 mg   Plan last modified Donna Colby RN (4/11/2018)   Next INR check 5/4/2018   Target end date Indefinite    Indications   Left ventricular thrombus [YPK1302]  Transient cerebral ischemia [G45.9]  Long-term (current) use of anticoagulants [Z79.01] [Z79.01]         Anticoagulation Episode Summary     INR check location     Preferred lab     Send INR reminders to [facility] INR NURSE    Comments       Anticoagulation Care Providers     Provider Role Specialty Phone number    Satya Newton MD Responsible Cardiology [phone]            See the Encounter Report to view Anticoagulation Flowsheet and Dosing Calendar (Go to Encounters tab in chart review, and find the Anticoagulation Therapy Visit)    He thinks he might have taken a half dose (2.5 mg) on Wednesdays instead of the 5 mg dose. He also got a bag w/ broccoli & has eaten a lot of this the past week. Will dose as above and recheck in one week. He gave himself the Praluent injection in his right thigh without complications.     Lynette E. Penfield, RN

## 2018-04-27 NOTE — MR AVS SNAPSHOT
Sawyer Renteria   4/27/2018 10:20 AM   Anticoagulation Therapy Visit    Description:  84 year old male   Provider:  GRETEL ANTICOAGULATION   Department:  [facility]           INR as of 4/27/2018     Today's INR 1.7!      Anticoagulation Summary as of 4/27/2018     INR goal 2.0-3.0   Today's INR 1.7!   Full instructions 4/27: 5 mg; Otherwise 2.5 mg on Mon, Fri; 5 mg all other days   Next INR check 5/4/2018    Indications   Left ventricular thrombus [TUO8653]  Transient cerebral ischemia [G45.9]  Long-term (current) use of anticoagulants [Z79.01] [Z79.01]         Your next Anticoagulation Clinic appointment(s)     May 04, 2018 10:20 AM CDT   Anticoagulation Visit with MAJANO ANTICOAGULATION   [facility] ([facility])    [address] [place] [id number]   [phone] OPT 2              Contact Numbers     Anticoagulant (INR) Clinic Number: [phone]          April 2018 Details    Sun Mon Tue Wed Thu Fri Sat     1               2               3               4               5               6               7                 8               9               10               11               12               13               14                 15               16               17               18               19               20               21                 22               23               24               25               26               27      5 mg   See details      28      5 mg           29      5 mg         30      2.5 mg               Date Details   04/27 This INR check               How to take your warfarin dose     To take:  2.5 mg Take 0.5 of a 5 mg tablet.    To take:  5 mg Take 1 of the 5 mg tablets.           May 2018 Details    Sun Mon Tue Wed Thu Fri Sat       1      5 mg         2      5 mg         3      5 mg         4            5                 6               7               8               9               10                11               12                 13               14               15               16               17               18               19                 20               21               22               23               24               25               26                 27               28               29               30               31                  Date Details   No additional details    Date of next INR:  5/4/2018         How to take your warfarin dose     To take:  2.5 mg Take 0.5 of a 5 mg tablet.    To take:  5 mg Take 1 of the 5 mg tablets.

## 2018-05-04 ENCOUNTER — ANTICOAGULATION THERAPY VISIT (OUTPATIENT)
Dept: CARDIOLOGY | Facility: CLINIC | Age: 83
End: 2018-05-04
Payer: COMMERCIAL

## 2018-05-04 DIAGNOSIS — Z79.01 LONG-TERM (CURRENT) USE OF ANTICOAGULANTS: ICD-10-CM

## 2018-05-04 DIAGNOSIS — G45.9 TRANSIENT CEREBRAL ISCHEMIA: ICD-10-CM

## 2018-05-04 DIAGNOSIS — I23.6 LV (LEFT VENTRICULAR) MURAL THROMBUS FOLLOWING MI (H): ICD-10-CM

## 2018-05-04 LAB — INR POINT OF CARE: 1.5 (ref 0.86–1.14)

## 2018-05-04 PROCEDURE — 36416 COLLJ CAPILLARY BLOOD SPEC: CPT | Performed by: INTERNAL MEDICINE

## 2018-05-04 PROCEDURE — 85610 PROTHROMBIN TIME: CPT | Mod: QW | Performed by: INTERNAL MEDICINE

## 2018-05-04 RX ORDER — WARFARIN SODIUM 5 MG/1
TABLET ORAL
Qty: 15 TABLET | Refills: 0 | Status: SHIPPED | OUTPATIENT
Start: 2018-05-04 | End: 2018-10-17

## 2018-05-04 NOTE — PROGRESS NOTES
ANTICOAGULATION FOLLOW-UP CLINIC VISIT    Patient Name:  Sawyer Renteria  Date:  5/4/2018  Contact Type:  Face to Face    SUBJECTIVE:     Patient Findings     Positives Change in diet/appetite (Eating increase in broccoli and had protein powder this week )           OBJECTIVE    INR Protime   Date Value Ref Range Status   05/04/2018 1.5 (A) 0.86 - 1.14 Final       ASSESSMENT / PLAN  INR assessment SUB    Recheck INR In: 1 WEEK    INR Location Clinic      Anticoagulation Summary as of 5/4/2018     INR goal 2.0-3.0   Today's INR 1.5!   Maintenance plan 2.5 mg (5 mg x 0.5) on Mon, Fri; 5 mg (5 mg x 1) all other days   Full instructions 5/4: 7.5 mg; 5/5: 7.5 mg; Otherwise 2.5 mg on Mon, Fri; 5 mg all other days   Weekly total 30 mg   Plan last modified Donna Colby RN (4/11/2018)   Next INR check 5/11/2018   Target end date Indefinite    Indications   Left ventricular thrombus [RCR6553]  Transient cerebral ischemia [G45.9]  Long-term (current) use of anticoagulants [Z79.01] [Z79.01]         Anticoagulation Episode Summary     INR check location     Preferred lab     Send INR reminders to [facility] HEART INR NURSE    Comments       Anticoagulation Care Providers     Provider Role Specialty Phone number    Satya Newton MD Responsible Cardiology [phone]            See the Encounter Report to view Anticoagulation Flowsheet and Dosing Calendar (Go to Encounters tab in chart review, and find the Anticoagulation Therapy Visit)    INR 1.5 Pt to take 7.5 mg today and tomorrow then resume 2.5 mg on Mon, Fri; 5 mg  all other days.  Pt reports having soup with broccoli 3 time this week and protein powder once. RN reviewed foods that will affect the INR. A copy of the list of foods provided to patient. Pt to limit greens to once this week and not to drink protein powder. Pt reports some gum bleeding after brushing teeth. The bleeding stops after rinsing. No other bleeding or bruising noted. Dosage adjustment made  based on physician directed care plan.    Kasey Reyes RN

## 2018-05-04 NOTE — MR AVS SNAPSHOT
Sawyer Renteria   5/4/2018 10:20 AM   Anticoagulation Therapy Visit    Description:  84 year old male   Provider:  GRETEL ANTICOAGULATION   Department:  [facility]           INR as of 5/4/2018     Today's INR 1.5!      Anticoagulation Summary as of 5/4/2018     INR goal 2.0-3.0   Today's INR 1.5!   Full instructions 5/4: 7.5 mg; 5/5: 7.5 mg; Otherwise 2.5 mg on Mon, Fri; 5 mg all other days   Next INR check 5/11/2018    Indications   Left ventricular thrombus [[id number]]  Transient cerebral ischemia [G45.9]  Long-term (current) use of anticoagulants [Z79.01] [Z79.01]         Your next Anticoagulation Clinic appointment(s)     May 11, 2018  9:20 AM CDT   Anticoagulation Visit with MAJANO ANTICOAGULATION   [facility] ([facility])    [address]  [facility] [address]   [phone] OPT 2              Contact Numbers     Anticoagulant (INR) Clinic Number: [phone]          May 2018 Details    Sun Mon Tue Wed Thu Fri Sat       1               2               3               4      7.5 mg   See details      5      7.5 mg           6      5 mg         7      2.5 mg         8      5 mg         9      5 mg         10      5 mg         11            12                 13               14               15               16               17               18               19                 20               21               22               23               24               25               26                 27               28               29               30               31                  Date Details   05/04 This INR check       Date of next INR:  5/11/2018         How to take your warfarin dose     To take:  2.5 mg Take 0.5 of a 5 mg tablet.    To take:  5 mg Take 1 of the 5 mg tablets.    To take:  7.5 mg Take 1.5 of the 5 mg tablets.

## 2018-05-11 ENCOUNTER — OFFICE VISIT (OUTPATIENT)
Dept: CARDIOLOGY | Facility: CLINIC | Age: 83
End: 2018-05-11
Payer: COMMERCIAL

## 2018-05-11 ENCOUNTER — ANTICOAGULATION THERAPY VISIT (OUTPATIENT)
Dept: CARDIOLOGY | Facility: CLINIC | Age: 83
End: 2018-05-11
Payer: COMMERCIAL

## 2018-05-11 VITALS
HEIGHT: 61 IN | DIASTOLIC BLOOD PRESSURE: 48 MMHG | WEIGHT: 125 LBS | HEART RATE: 64 BPM | BODY MASS INDEX: 23.6 KG/M2 | SYSTOLIC BLOOD PRESSURE: 122 MMHG

## 2018-05-11 DIAGNOSIS — I25.10 CORONARY ARTERY DISEASE INVOLVING NATIVE CORONARY ARTERY OF NATIVE HEART WITHOUT ANGINA PECTORIS: Primary | ICD-10-CM

## 2018-05-11 DIAGNOSIS — Z79.01 LONG-TERM (CURRENT) USE OF ANTICOAGULANTS: ICD-10-CM

## 2018-05-11 DIAGNOSIS — G45.9 TRANSIENT CEREBRAL ISCHEMIA: ICD-10-CM

## 2018-05-11 DIAGNOSIS — I10 ESSENTIAL HYPERTENSION: ICD-10-CM

## 2018-05-11 DIAGNOSIS — I25.5 CARDIOMYOPATHY, ISCHEMIC: ICD-10-CM

## 2018-05-11 LAB — INR POINT OF CARE: 2.9 (ref 0.86–1.14)

## 2018-05-11 PROCEDURE — 85610 PROTHROMBIN TIME: CPT | Mod: QW | Performed by: INTERNAL MEDICINE

## 2018-05-11 PROCEDURE — 99214 OFFICE O/P EST MOD 30 MIN: CPT | Performed by: NURSE PRACTITIONER

## 2018-05-11 PROCEDURE — 99207 ZZC NO CHARGE NURSE ONLY: CPT | Performed by: INTERNAL MEDICINE

## 2018-05-11 PROCEDURE — 36416 COLLJ CAPILLARY BLOOD SPEC: CPT | Performed by: INTERNAL MEDICINE

## 2018-05-11 RX ORDER — AMLODIPINE BESYLATE 2.5 MG/1
2.5 TABLET ORAL DAILY
Qty: 90 TABLET | Refills: 3 | Status: SHIPPED | OUTPATIENT
Start: 2018-05-11 | End: 2018-10-18

## 2018-05-11 NOTE — MR AVS SNAPSHOT
Sawyer Renteria   5/11/2018 9:20 AM   Anticoagulation Therapy Visit    Description:  84 year old male   Provider:  GRETEL ANTICOAGULATION   Department:  [facility]           INR as of 5/11/2018     Today's INR 2.9      Anticoagulation Summary as of 5/11/2018     INR goal 2.0-3.0   Today's INR 2.9   Full instructions 2.5 mg on Mon, Fri; 5 mg all other days   Next INR check 5/25/2018    Indications   Left ventricular thrombus [SGX2028]  Transient cerebral ischemia [G45.9]  Long-term (current) use of anticoagulants [Z79.01] [Z79.01]         Your next Anticoagulation Clinic appointment(s)     May 25, 2018 10:20 AM CDT   Anticoagulation Visit with  ANTICOAGULATION   [facility] ([facility])    [address] [place] [id number]   [phone] OPT 2              Contact Numbers     Anticoagulant (INR) Clinic Number: [phone]          May 2018 Details    Sun Mon Tue Wed Thu Fri Sat       1               2               3               4               5                 6               7               8               9               10               11      2.5 mg   See details      12      5 mg           13      5 mg         14      2.5 mg         15      5 mg         16      5 mg         17      5 mg         18      2.5 mg         19      5 mg           20      5 mg         21      2.5 mg         22      5 mg         23      5 mg         24      5 mg         25            26                 27               28               29               30               31                  Date Details   05/11 This INR check       Date of next INR:  5/25/2018         How to take your warfarin dose     To take:  2.5 mg Take 0.5 of a 5 mg tablet.    To take:  5 mg Take 1 of the 5 mg tablets.

## 2018-05-11 NOTE — MR AVS SNAPSHOT
After Visit Summary   5/11/2018    Sawyer Renteria    MRN: [id number]           Patient Information     Date Of Birth          3/24/1934        Visit Information        Provider Department      5/11/2018 10:50 AM Randell Izaguirre APRN CNP [facility]        Today's Diagnoses     Coronary artery disease involving native coronary artery of native heart without angina pectoris        Essential hypertension          Care Instructions    See us back in October with echo and fasting labs          Follow-ups after your visit        Additional Services     Follow-Up with Cardiologist       Late October or early november                  Your next 10 appointments already scheduled     May 25, 2018 10:20 AM CDT   Anticoagulation Visit with [facility]AG[facility]   [facility] ([facility])    [facility]  [facility] [address] [phone] OPT [address]              Future tests that were ordered for you today     Open Future Orders        Priority Expected Expires Ordered    Basic metabolic panel Routine 11/7/2018 5/11/2019 5/11/2018    Lipid Profile Routine 11/7/2018 5/11/2019 5/11/2018    ALT Routine 11/7/2018 5/11/2019 5/11/2018    Follow-Up with Cardiologist Routine 10/17/2018 5/11/2019 5/11/2018    Echocardiogram Routine 10/24/2018 5/11/2019 5/11/2018            Who to contact     If you have questions or need follow up information about today's clinic visit or your schedule please contact [facility] directly at [phone].  Normal or non-critical lab and imaging results will be communicated to you by MyChart, letter or phone within 4 business days after the clinic has received the results. If you do not hear from us within 7 days, please contact the clinic through MyChart or phone. If you have a critical or abnormal lab result, we will notify you by phone as soon as  "possible.  Submit refill requests through [employer] or call your pharmacy and they will forward the refill request to us. Please allow 3 business days for your refill to be completed.          Additional Information About Your Visit        [employer] Information     [employer] gives you secure access to your electronic health record. If you see a primary care provider, you can also send messages to your care team and make appointments. If you have questions, please call your primary care clinic.  If you do not have a primary care provider, please call [phone] and they will assist you.        Care EveryWhere ID     This is your Care EveryWhere ID. This could be used by other organizations to access your [place] medical records  [id number]        Your Vitals Were     Pulse Height BMI (Body Mass Index)             64 1.556 m (5' 1.25\") 23.43 kg/m2          Blood Pressure from Last 3 Encounters:   05/11/18 122/48   04/11/18 165/62   11/21/17 126/61    Weight from Last 3 Encounters:   05/11/18 56.7 kg (125 lb)   04/11/18 57.6 kg (126 lb 14.4 oz)   11/07/17 57.2 kg (126 lb)                 Where to get your medicines      These medications were sent to [facility] [id number] [facility] [place] - [address]  [address] [place] [address]     Phone:  [phone]     amLODIPine 2.5 MG tablet          Primary Care Provider Office Phone # Fax #    Francisco Doshi [phone] [address]  [place] [address]        Equal Access to Services     [facility]: Hadii aad kade hadasho Soomaali, waaxda luqadaha, qaybta kaalmaharry puente . So [facility] [phone].    ATENCIÓN: Si habla español, tiene a roy disposición servicios gratuitos de asistencia lingüística. Llame al [phone].    We comply with applicable federal civil rights laws and [place] laws. We do not discriminate on the basis of race, color, national origin, age, disability, sex, sexual orientation, " or gender identity.            Thank you!     Thank you for choosing [facility]  for your care. Our goal is always to provide you with excellent care. Hearing back from our patients is one way we can continue to improve our services. Please take a few minutes to complete the written survey that you may receive in the mail after your visit with us. Thank you!             Your Updated Medication List - Protect others around you: Learn how to safely use, store and throw away your medicines at www.disposemymeds.org.          This list is accurate as of 5/11/18 11:15 AM.  Always use your most recent med list.                   Brand Name Dispense Instructions for use Diagnosis    alirocumab 75 MG/ML injectable pen    PRALUENT    2 mL    Inject 1 mL (75 mg) Subcutaneous every 14 days    Hyperlipidemia, unspecified hyperlipidemia type       amLODIPine 2.5 MG tablet    NORVASC    90 tablet    Take 1 tablet (2.5 mg) by mouth daily    Coronary artery disease involving native coronary artery of native heart without angina pectoris, Essential hypertension       aspirin 81 MG tablet      Take 81 mg by mouth daily        dutasteride 0.5 MG capsule    AVODART    30 capsule    Take 1 capsule (0.5 mg) by mouth daily    Cardiomyopathy, ischemic       FISH OIL + D3 8346-2432 MG-UNIT Caps      Take 2 tablets by mouth daily        lisinopril 2.5 MG tablet    PRINIVIL/Zestril    270 tablet    One tablet twice per day    Coronary artery disease involving native coronary artery of native heart without angina pectoris       melatonin 5 MG tablet      Take 5 mg by mouth nightly as needed for sleep        metoprolol succinate 25 MG 24 hr tablet    TOPROL-XL    45 tablet    Take 0.5 tablets (12.5 mg) by mouth daily    Cardiomyopathy, ischemic       nitroGLYcerin 0.4 MG sublingual tablet    NITROSTAT    25 tablet    Place 1 tablet (0.4 mg) under the tongue every 5 minutes as needed for chest pain Take as  directed    Cardiomyopathy, ischemic       vitamin D3 2000 units Caps      Take by mouth daily        warfarin 5 MG tablet    COUMADIN    15 tablet    Take 1/2 tab on Mondays and Thursdays and 1 tab all other days or as directed by INR clinic, Coumadin SHIKHA    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

## 2018-05-11 NOTE — PROGRESS NOTES
ANTICOAGULATION FOLLOW-UP CLINIC VISIT    Patient Name:  Sawyer Renteria  Date:  5/11/2018  Contact Type:  Face to Face    SUBJECTIVE:     Patient Findings     Positives No Problem Findings           OBJECTIVE    INR Protime   Date Value Ref Range Status   05/11/2018 2.9 (A) 0.86 - 1.14 Final       ASSESSMENT / PLAN  INR assessment THER    Recheck INR In: 2 WEEKS    INR Location Clinic      Anticoagulation Summary as of 5/11/2018     INR goal 2.0-3.0   Today's INR 2.9   Maintenance plan 2.5 mg (5 mg x 0.5) on Mon, Fri; 5 mg (5 mg x 1) all other days   Full instructions 2.5 mg on Mon, Fri; 5 mg all other days   Weekly total 30 mg   No change documented Tatiana Avalos RN   Plan last modified Donna Colby RN (4/11/2018)   Next INR check 5/25/2018   Target end date Indefinite    Indications   Left ventricular thrombus [DHX0812]  Transient cerebral ischemia [G45.9]  Long-term (current) use of anticoagulants [Z79.01] [Z79.01]         Anticoagulation Episode Summary     INR check location     Preferred lab     Send INR reminders to [facility] HEART INR NURSE    Comments       Anticoagulation Care Providers     Provider Role Specialty Phone number    Satya Newton MD Responsible Cardiology [phone]            See the Encounter Report to view Anticoagulation Flowsheet and Dosing Calendar (Go to Encounters tab in chart review, and find the Anticoagulation Therapy Visit)    INR 2.9 No complications noted, no signs/sx clots or bleeding. INR has been running low for awhile for no clear reason, possibly had been eating more greens. Denies changes to meds or diet. Back in range after a 7.5mg boost last week. Patient reports taking in less greens over the past week to help INR rise. Will resume usual diet and dosing of 2.5mg MonFri and 5mg ROW. Recheck in 2 weeks.    Tatiana Avalos RN

## 2018-05-11 NOTE — PROGRESS NOTES
History of Present Illness:      Sawyer Renteria is a 84 year old male followed here by Dr. Newton. He has a history of an anterior wall MI in 2006 resulting in an LAD stent and angioplasty to the diagonal. 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 Ejection fraction has waxed and waned a bit, being  45-50% by echocardiogram in April 2017 with an ongoing apical wall motion abnormality.  He underwent a recent Lexiscan nuclear stress test reviewed at the last office visit with Dr. Newton.  This reveals an anterior, anteroapical scar without ischemia.  Ejection fraction was 43% at rest 39% post stress.    He has statin intolerant and has been on Praluent which he tolerates well. His lipids look much improved with total cholesterol 136, HDL 55, LDL 66, triglyceride 145.     He has had elevated blood pressures intermittently and when lisinopril was increased his creatinine elevates.  He has also had hyperkalemia on the combination of lisinopril with spironolactone.  He recently saw Dr. Oneill from Nephrology.  He felt his renal function is reasonably stable and he was not uremic.  He will see him back in a year.  If his creatinine worsens in the interim he suggests a renal ultrasound.     Basic metabolic panel in April was stable with a sodium of 140 potassium 4.3 BUN 21 creatinine 1.63.  Nephrology suggested continuing lisinopril twice per day at low dose.    At the last office visit amlodipine 2.5 mg per day was added for blood pressure elevation.  His blood pressure today is 122/48 and in fact at home is 100-118.  He is tolerating this well with no side effects.    Note his INR has been subtherapeutic over the past several weeks.  Is therapeutic today.  He states he had been eating more green vegetables during this time.     On exam his JVP is not elevated, lungs are clear. He has no activity intolerance. He has an S4 gallop which is unchanged.      He denies orthopnea PND.  Weight is stable. He denies chest discomfort.  He has no other complaints today.              Impression/Plan:       1. Coronary artery disease with anterior wall MI status post LAD stenting with the balloon angioplasty to the diagonal branch. Ejection fraction is stable at 45-50%.  With no evidence of ischemia on a nuclear stress test recently.  An echocardiogram between October and January and he opts for October if possible.  This has been ordered     2.  Ischemic cardiomyopathy with no heart failure symptoms currently.     3. 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 He uses brand name warfarin.  INR is recently was subtherapeutic but dose has been adjusted they are not at goal.     4. Dyslipidemia with statin intolerance. He is tolerating Praluent with excellent lipid profile.    5.  Hypertension  - improved -->continue amlodipine, Toprol and lisinopril  -Dr. Newton mentioned switching metoprolol to carvedilol if need be,but the patient had significant dizziness on carvedilol in the past and was intolerant    6.  Chronic kidney disease  -Overall stable creatinines between 1.3 and 1.6  -He has no uremic symptoms  -Have on annual follow-up with Dr. Oneill        As always, it has been a pleasure seeing AJ in follow-up.  We will see him back this fall.    Randell Colon-Patti, MSN, APRN-BC, CNP  Cardiology    Orders Placed This Encounter   Procedures     Basic metabolic panel     Lipid Profile     ALT     Follow-Up with Cardiologist     Echocardiogram     Orders Placed This Encounter   Medications     amLODIPine (NORVASC) 2.5 MG tablet     Sig: Take 1 tablet (2.5 mg) by mouth daily     Dispense:  90 tablet     Refill:  3     Medications Discontinued During This Encounter   Medication Reason     amLODIPine (NORVASC) 2.5 MG tablet Reorder         Encounter Diagnoses   Name Primary?     Coronary artery disease involving native coronary artery of native heart without angina  pectoris      Essential hypertension        CURRENT MEDICATIONS:  Current Outpatient Prescriptions   Medication Sig Dispense Refill     alirocumab (PRALUENT) 75 MG/ML injectable pen Inject 1 mL (75 mg) Subcutaneous every 14 days 2 mL 11     amLODIPine (NORVASC) 2.5 MG tablet Take 1 tablet (2.5 mg) by mouth daily 90 tablet 3     aspirin 81 MG tablet Take 81 mg by mouth daily       Cholecalciferol (VITAMIN D3) 2000 UNITS CAPS Take by mouth daily       dutasteride (AVODART) 0.5 MG capsule Take 1 capsule (0.5 mg) by mouth daily 30 capsule 11     Fish Oil-Cholecalciferol (FISH OIL + D3) 3390-7596 MG-UNIT CAPS Take 2 tablets by mouth daily       lisinopril (PRINIVIL/ZESTRIL) 2.5 MG tablet One tablet twice per day 270 tablet 3     melatonin 5 MG tablet Take 5 mg by mouth nightly as needed for sleep       metoprolol (TOPROL-XL) 25 MG 24 hr tablet Take 0.5 tablets (12.5 mg) by mouth daily 45 tablet 3     nitroGLYcerin (NITROSTAT) 0.4 MG sublingual tablet Place 1 tablet (0.4 mg) under the tongue every 5 minutes as needed for chest pain Take as directed 25 tablet 3     warfarin (COUMADIN) 5 MG tablet Take 1/2 tab on Mondays and Thursdays and 1 tab all other days or as directed by INR clinic, Coumadin SHIKHA 15 tablet 0     [DISCONTINUED] amLODIPine (NORVASC) 2.5 MG tablet Take 1 tablet (2.5 mg) by mouth daily 90 tablet 1       ALLERGIES     Allergies   Allergen Reactions     Flomax [Tamsulosin]      Weakness and dizzyness     Aldactone [Spironolactone]      High potassium and worsening creat when on lisinopril and spironalactone     Carvedilol      dizziness     Colesevelam      Epigastric pain     Ezetimibe      Lisinopril Itching     Hyperkalemia and inc. Creat. At 20 mg daily, itching , skin red when dose goes above 2.5 mg a day--elevated creat      Pravastatin      Abdominal pain     Rosuvastatin      Simvastatin        PAST MEDICAL HISTORY:  Past Medical History:   Diagnosis Date     BPH (benign prostatic hyperplasia)       "Cardiomyopathy, ischemic     EF 49% by cardiac MRI 1/15/2014     Coronary artery disease 1/24/06    Cypher CANDIDA to LAD     Gastro-oesophageal reflux disease      Hyperlipidaemia      Hypertension      Left ventricular thrombus      Myocardial infarction 1/2006    Anterior     TIA (transient ischaemic attack)        PAST SURGICAL HISTORY:  Past Surgical History:   Procedure Laterality Date     CORONARY ANGIOGRAPHY ADULT ORDER  1/24/06    Cypher CANDIDA to LAD     HEART CATH, ANGIOPLASTY  1/2006    Cypher CANDIDA to LAD     TONSILLECTOMY & ADENOIDECTOMY         FAMILY HISTORY:  Family History   Problem Relation Age of Onset     HEART DISEASE Mother      heart failure     HEART DISEASE Brother        SOCIAL HISTORY:  Social History     Social History     Marital status:      Spouse name: N/A     Number of children: N/A     Years of education: N/A     Social History Main Topics     Smoking status: Never Smoker     Smokeless tobacco: Never Used     Alcohol use No     Drug use: None     Sexual activity: Not Asked     Other Topics Concern     Parent/Sibling W/ Cabg, Mi Or Angioplasty Before 65f 55m? No     Caffeine Concern No     Sleep Concern No     Stress Concern No     Weight Concern Yes     weight loss     Special Diet No     Exercise Yes     bicycle 10 min, walking, 3 days week     Seat Belt Yes     Social History Narrative       Review of Systems:  Skin:  Negative       Eyes:  Positive for glasses reading glasses  ENT:  Negative      Respiratory:  Negative       Cardiovascular:  Negative      Gastroenterology: Negative      Genitourinary:  not assessed      Musculoskeletal:  Negative      Neurologic:  Negative      Psychiatric:  Positive for sleep disturbances    Heme/Lymph/Imm:  Positive for allergies    Endocrine:  Negative        Physical Exam:  Vitals: /48  Pulse 64  Ht 1.556 m (5' 1.25\")  Wt 56.7 kg (125 lb)  BMI 23.43 kg/m2    Constitutional:  well developed        Skin:  warm and dry to the touch      "     Head:  normocephalic, no masses or lesions        Eyes:  pupils equal and round        Lymph:      ENT:  no pallor or cyanosis        Neck:  carotid pulses are full and equal bilaterally        Respiratory:  clear to auscultation         Cardiac: normal S1 and S2   S4 no presence of murmur systolic murmur;grade 2;apical        not assessed this visit                                        GI:  abdomen soft;BS normoactive        Extremities and Muscular Skeletal:  no edema         ecchymosis over left flank following fall at home    Neurological:  no gross motor deficits        Psych:  Alert and Oriented x 3      Recent Lab Results:  LIPID RESULTS:  Lab Results   Component Value Date    CHOL 136 04/04/2018    HDL 55 04/04/2018    LDL 66 04/04/2018    TRIG 73 04/04/2018    CHOLHDLRATIO 4.3 10/13/2015       LIVER ENZYME RESULTS:  Lab Results   Component Value Date    AST 21 07/25/2017    ALT 7 11/07/2017       CBC RESULTS:  Lab Results   Component Value Date    WBC 6.1 03/12/2018    RBC 3.90 03/12/2018    HGB 12.1 03/12/2018    HCT 34.4 03/12/2018    MCV 88.2 03/12/2018    MCH 31 03/12/2018    MCHC 35.2 03/12/2018    RDW 13 03/12/2018     03/12/2018       BMP RESULTS:  Lab Results   Component Value Date     04/04/2018    POTASSIUM 4.3 04/04/2018    CHLORIDE 106 04/04/2018    CO2 25 04/04/2018    ANIONGAP 13.3 04/04/2018     04/04/2018    BUN 21 04/04/2018    CR 1.63 (H) 04/04/2018    GFRESTIMATED 41 (L) 04/04/2018    GFRESTBLACK 49 (L) 04/04/2018    AURORA 8.1 (L) 04/04/2018        A1C RESULTS:  No results found for: A1C    INR RESULTS:  Lab Results   Component Value Date    INR 2.9 (A) 05/11/2018    INR 1.5 (A) 05/04/2018    INR 2.94 (H) 06/08/2017    INR 2.8 05/09/2014           CC  No referring provider defined for this encounter.

## 2018-05-11 NOTE — LETTER
5/11/2018    Francisco Doshi MD  [address]  [facility] [address]    RE: Sawyer Renteria       Dear Colleague,    I had the pleasure of seeing Sawyer Renteria in the [facility].    History of Present Illness:      Sawyer Renteria is a 84 year old male followed here by Dr. Newton. He has a history of an anterior wall MI in 2006 resulting in an LAD stent and angioplasty to the diagonal. 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 Ejection fraction has waxed and waned a bit, being  45-50% by echocardiogram in April 2017 with an ongoing apical wall motion abnormality.  He underwent a recent Lexiscan nuclear stress test reviewed at the last office visit with Dr. Newton.  This reveals an anterior, anteroapical scar without ischemia.  Ejection fraction was 43% at rest 39% post stress.    He has statin intolerant and has been on Praluent which he tolerates well. His lipids look much improved with total cholesterol 136, HDL 55, LDL 66, triglyceride 145.     He has had elevated blood pressures intermittently and when lisinopril was increased his creatinine elevates.  He has also had hyperkalemia on the combination of lisinopril with spironolactone.  He recently saw Dr. Oneill from Nephrology.  He felt his renal function is reasonably stable and he was not uremic.  He will see him back in a year.  If his creatinine worsens in the interim he suggests a renal ultrasound.     Basic metabolic panel in April was stable with a sodium of 140 potassium 4.3 BUN 21 creatinine 1.63.  Nephrology suggested continuing lisinopril twice per day at low dose.    At the last office visit amlodipine 2.5 mg per day was added for blood pressure elevation.  His blood pressure today is 122/48 and in fact at home is 100-118.  He is tolerating this well with no side effects.    Note his INR has been subtherapeutic over the past several weeks.  Is therapeutic today.  He  states he had been eating more green vegetables during this time.     On exam his JVP is not elevated, lungs are clear. He has no activity intolerance. He has an S4 gallop which is unchanged.      He denies orthopnea PND. Weight is stable. He denies chest discomfort.  He has no other complaints today.              Impression/Plan:       1. Coronary artery disease with anterior wall MI status post LAD stenting with the balloon angioplasty to the diagonal branch. Ejection fraction is stable at 45-50%.  With no evidence of ischemia on a nuclear stress test recently.  An echocardiogram between October and January and he opts for October if possible.  This has been ordered     2.  Ischemic cardiomyopathy with no heart failure symptoms currently.     3. 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 He uses brand name warfarin.  INR is recently was subtherapeutic but dose has been adjusted they are not at goal.     4. Dyslipidemia with statin intolerance. He is tolerating Praluent with excellent lipid profile.    5.  Hypertension  - improved -->continue amlodipine, Toprol and lisinopril  -Dr. Newton mentioned switching metoprolol to carvedilol if need be,but the patient had significant dizziness on carvedilol in the past and was intolerant    6.  Chronic kidney disease  -Overall stable creatinines between 1.3 and 1.6  -He has no uremic symptoms  -Have on annual follow-up with Dr. Oneill        As always, it has been a pleasure seeing AJ in follow-up.  We will see him back this fall.    Randell Izaguirre, MSN, APRN-BC, CNP  Cardiology    Orders Placed This Encounter   Procedures     Basic metabolic panel     Lipid Profile     ALT     Follow-Up with Cardiologist     Echocardiogram     Orders Placed This Encounter   Medications     amLODIPine (NORVASC) 2.5 MG tablet     Sig: Take 1 tablet (2.5 mg) by mouth daily     Dispense:  90 tablet     Refill:  3     Medications Discontinued During  This Encounter   Medication Reason     amLODIPine (NORVASC) 2.5 MG tablet Reorder         Encounter Diagnoses   Name Primary?     Coronary artery disease involving native coronary artery of native heart without angina pectoris      Essential hypertension        CURRENT MEDICATIONS:  Current Outpatient Prescriptions   Medication Sig Dispense Refill     alirocumab (PRALUENT) 75 MG/ML injectable pen Inject 1 mL (75 mg) Subcutaneous every 14 days 2 mL 11     amLODIPine (NORVASC) 2.5 MG tablet Take 1 tablet (2.5 mg) by mouth daily 90 tablet 3     aspirin 81 MG tablet Take 81 mg by mouth daily       Cholecalciferol (VITAMIN D3) 2000 UNITS CAPS Take by mouth daily       dutasteride (AVODART) 0.5 MG capsule Take 1 capsule (0.5 mg) by mouth daily 30 capsule 11     Fish Oil-Cholecalciferol (FISH OIL + D3) 6388-6413 MG-UNIT CAPS Take 2 tablets by mouth daily       lisinopril (PRINIVIL/ZESTRIL) 2.5 MG tablet One tablet twice per day 270 tablet 3     melatonin 5 MG tablet Take 5 mg by mouth nightly as needed for sleep       metoprolol (TOPROL-XL) 25 MG 24 hr tablet Take 0.5 tablets (12.5 mg) by mouth daily 45 tablet 3     nitroGLYcerin (NITROSTAT) 0.4 MG sublingual tablet Place 1 tablet (0.4 mg) under the tongue every 5 minutes as needed for chest pain Take as directed 25 tablet 3     warfarin (COUMADIN) 5 MG tablet Take 1/2 tab on Mondays and Thursdays and 1 tab all other days or as directed by INR clinic, Coumadin SHIKHA 15 tablet 0     [DISCONTINUED] amLODIPine (NORVASC) 2.5 MG tablet Take 1 tablet (2.5 mg) by mouth daily 90 tablet 1       ALLERGIES     Allergies   Allergen Reactions     Flomax [Tamsulosin]      Weakness and dizzyness     Aldactone [Spironolactone]      High potassium and worsening creat when on lisinopril and spironalactone     Carvedilol      dizziness     Colesevelam      Epigastric pain     Ezetimibe      Lisinopril Itching     Hyperkalemia and inc. Creat. At 20 mg daily, itching , skin red when dose goes  above 2.5 mg a day--elevated creat      Pravastatin      Abdominal pain     Rosuvastatin      Simvastatin        PAST MEDICAL HISTORY:  Past Medical History:   Diagnosis Date     BPH (benign prostatic hyperplasia)      Cardiomyopathy, ischemic     EF 49% by cardiac MRI 1/15/2014     Coronary artery disease 1/24/06    Cypher CANDIDA to LAD     Gastro-oesophageal reflux disease      Hyperlipidaemia      Hypertension      Left ventricular thrombus      Myocardial infarction 1/2006    Anterior     TIA (transient ischaemic attack)        PAST SURGICAL HISTORY:  Past Surgical History:   Procedure Laterality Date     CORONARY ANGIOGRAPHY ADULT ORDER  1/24/06    Cypher CANDIDA to LAD     HEART CATH, ANGIOPLASTY  1/2006    Cypher CANDIDA to LAD     TONSILLECTOMY & ADENOIDECTOMY         FAMILY HISTORY:  Family History   Problem Relation Age of Onset     HEART DISEASE Mother      heart failure     HEART DISEASE Brother        SOCIAL HISTORY:  Social History     Social History     Marital status:      Spouse name: N/A     Number of children: N/A     Years of education: N/A     Social History Main Topics     Smoking status: Never Smoker     Smokeless tobacco: Never Used     Alcohol use No     Drug use: None     Sexual activity: Not Asked     Other Topics Concern     Parent/Sibling W/ Cabg, Mi Or Angioplasty Before 65f 55m? No     Caffeine Concern No     Sleep Concern No     Stress Concern No     Weight Concern Yes     weight loss     Special Diet No     Exercise Yes     bicycle 10 min, walking, 3 days week     Seat Belt Yes     Social History Narrative       Review of Systems:  Skin:  Negative       Eyes:  Positive for glasses reading glasses  ENT:  Negative      Respiratory:  Negative       Cardiovascular:  Negative      Gastroenterology: Negative      Genitourinary:  not assessed      Musculoskeletal:  Negative      Neurologic:  Negative      Psychiatric:  Positive for sleep disturbances    Heme/Lymph/Imm:  Positive for allergies   "  Endocrine:  Negative        Physical Exam:  Vitals: /48  Pulse 64  Ht 1.556 m (5' 1.25\")  Wt 56.7 kg (125 lb)  BMI 23.43 kg/m2    Constitutional:  well developed        Skin:  warm and dry to the touch          Head:  normocephalic, no masses or lesions        Eyes:  pupils equal and round        Lymph:      ENT:  no pallor or cyanosis        Neck:  carotid pulses are full and equal bilaterally        Respiratory:  clear to auscultation         Cardiac: normal S1 and S2   S4 no presence of murmur systolic murmur;grade 2;apical        not assessed this visit                                        GI:  abdomen soft;BS normoactive        Extremities and Muscular Skeletal:  no edema         ecchymosis over left flank following fall at home    Neurological:  no gross motor deficits        Psych:  Alert and Oriented x 3      Recent Lab Results:  LIPID RESULTS:  Lab Results   Component Value Date    CHOL 136 04/04/2018    HDL 55 04/04/2018    LDL 66 04/04/2018    TRIG 73 04/04/2018    CHOLHDLRATIO 4.3 10/13/2015       LIVER ENZYME RESULTS:  Lab Results   Component Value Date    AST 21 07/25/2017    ALT 7 11/07/2017       CBC RESULTS:  Lab Results   Component Value Date    WBC 6.1 03/12/2018    RBC 3.90 03/12/2018    HGB 12.1 03/12/2018    HCT 34.4 03/12/2018    MCV 88.2 03/12/2018    MCH 31 03/12/2018    MCHC 35.2 03/12/2018    RDW 13 03/12/2018     03/12/2018       BMP RESULTS:  Lab Results   Component Value Date     04/04/2018    POTASSIUM 4.3 04/04/2018    CHLORIDE 106 04/04/2018    CO2 25 04/04/2018    ANIONGAP 13.3 04/04/2018     04/04/2018    BUN 21 04/04/2018    CR 1.63 (H) 04/04/2018    GFRESTIMATED 41 (L) 04/04/2018    GFRESTBLACK 49 (L) 04/04/2018    AURORA 8.1 (L) 04/04/2018        A1C RESULTS:  No results found for: A1C    INR RESULTS:  Lab Results   Component Value Date    INR 2.9 (A) 05/11/2018    INR 1.5 (A) 05/04/2018    INR 2.94 (H) 06/08/2017    INR 2.8 05/09/2014 "           CC  No referring provider defined for this encounter.                  Thank you for allowing me to participate in the care of your patient.      Sincerely,     JAE Jones CNP     [facility]

## 2018-05-23 ENCOUNTER — TELEPHONE (OUTPATIENT)
Dept: CARDIOLOGY | Facility: CLINIC | Age: 83
End: 2018-05-23

## 2018-05-24 ENCOUNTER — CARE COORDINATION (OUTPATIENT)
Dept: CARDIOLOGY | Facility: CLINIC | Age: 83
End: 2018-05-24

## 2018-05-24 NOTE — PROGRESS NOTES
"Received call from patient saying he had experienced numbing in one his right hand and last three digits about three days ago. Today he noticed the numbness in both hands affecting the last three digits as well. He has to rub them to get the circulation going. He seemed very anxious and wanting to speak with Randell. He denied any sx of sob, swelling, or chest pain. He believes the amlodipine is the culprit. He states he started this medication 25 days ago. \"Make sure Randell calls me, I need to speak with her please\". Will update Randell. Daija Santiago RN 1:42 PM 05/24/18      "

## 2018-05-24 NOTE — PROGRESS NOTES
Called patient back and he has awakened with numbness of his first three fingers in the am the past 2 days. First was one hand and next day was the other    No associated symptoms. This disappears when he moves the his hands around. No chest pain or sob    I will review with Dr. Newton and call patient back

## 2018-05-24 NOTE — PROGRESS NOTES
Reviewed symptoms with Dr. Newton.  He encouraged the patient to see his primary care physician.  I have called the patient back and he is not home.  I left a message with his wife.  If the primary care physician does not find the cause the patient can schedule a follow-up here for further investigation even with the DOD if necessary.  Wife stated she would pass the message along to her .

## 2018-05-25 ENCOUNTER — ANTICOAGULATION THERAPY VISIT (OUTPATIENT)
Dept: CARDIOLOGY | Facility: CLINIC | Age: 83
End: 2018-05-25
Payer: COMMERCIAL

## 2018-05-25 DIAGNOSIS — Z79.01 LONG-TERM (CURRENT) USE OF ANTICOAGULANTS: ICD-10-CM

## 2018-05-25 DIAGNOSIS — G45.9 TRANSIENT CEREBRAL ISCHEMIA: ICD-10-CM

## 2018-05-25 LAB — INR POINT OF CARE: 1.9 (ref 0.86–1.14)

## 2018-05-25 PROCEDURE — 85610 PROTHROMBIN TIME: CPT | Mod: QW | Performed by: INTERNAL MEDICINE

## 2018-05-25 PROCEDURE — 36416 COLLJ CAPILLARY BLOOD SPEC: CPT | Performed by: INTERNAL MEDICINE

## 2018-05-25 NOTE — MR AVS SNAPSHOT
Sawyer Renteria   5/25/2018 10:20 AM   Anticoagulation Therapy Visit    Description:  84 year old male   Provider:  [facility]   Department:  [facility]           INR as of 5/25/2018     Today's INR 1.9!      Anticoagulation Summary as of 5/25/2018     INR goal 2.0-3.0   Today's INR 1.9!   Full warfarin instructions 5/25: 5 mg; Otherwise 2.5 mg on Mon, Fri; 5 mg all other days   Next INR check 6/8/2018    Indications   Left ventricular thrombus [AYD2895]  Transient cerebral ischemia [G45.9]  Long-term (current) use of anticoagulants [Z79.01] [Z79.01]         Your next Anticoagulation Clinic appointment(s)     Jun 08, 2018 10:40 AM CDT   Anticoagulation Visit with  ANTICOAGULATION   [facility] ([facility])    [address] [place] [id number]   [phone] OPT 2              Contact Numbers     Anticoagulant (INR) Clinic Number: [phone]          May 2018 Details    Sun Mon Tue Wed Thu Fri Sat       1               2               3               4               5                 6               7               8               9               10               11               12                 13               14               15               16               17               18               19                 20               21               22               23               24               25      5 mg   See details      26      5 mg           27      5 mg         28      2.5 mg         29      5 mg         30      5 mg         31      5 mg            Date Details   05/25 This INR check               How to take your warfarin dose     To take:  2.5 mg Take 0.5 of a 5 mg tablet.    To take:  5 mg Take 1 of the 5 mg tablets.           June 2018 Details    Sun Mon Tue Wed Thu Fri Sat          1      2.5 mg         2      5 mg           3      5 mg         4      2.5 mg         5      5 mg         6      5 mg         7       5 mg         8            9                 10               11               12               13               14               15               16                 17               18               19               20               21               22               23                 24               25               26               27               28               29               30                Date Details   No additional details    Date of next INR:  6/8/2018         How to take your warfarin dose     To take:  2.5 mg Take 0.5 of a 5 mg tablet.    To take:  5 mg Take 1 of the 5 mg tablets.

## 2018-05-25 NOTE — PROGRESS NOTES
ANTICOAGULATION FOLLOW-UP CLINIC VISIT    Patient Name:  Sawyer Renteria  Date:  5/25/2018  Contact Type:  Face to Face    SUBJECTIVE:     Patient Findings     Positives No Problem Findings           OBJECTIVE    INR Protime   Date Value Ref Range Status   05/25/2018 1.9 (A) 0.86 - 1.14 Final       ASSESSMENT / PLAN  INR assessment THER    Recheck INR In: 2 WEEKS    INR Location Clinic      Anticoagulation Summary as of 5/25/2018     INR goal 2.0-3.0   Today's INR 1.9!   Warfarin maintenance plan 2.5 mg (5 mg x 0.5) on Mon, Fri; 5 mg (5 mg x 1) all other days   Full warfarin instructions 5/25: 5 mg; Otherwise 2.5 mg on Mon, Fri; 5 mg all other days   Weekly warfarin total 30 mg   Plan last modified Tatiana Avalos RN (5/25/2018)   Next INR check 6/8/2018   Target end date Indefinite    Indications   Left ventricular thrombus [YZX9393]  Transient cerebral ischemia [G45.9]  Long-term (current) use of anticoagulants [Z79.01] [Z79.01]         Anticoagulation Episode Summary     INR check location     Preferred lab     Send INR reminders to [facility] HEART INR NURSE    Comments       Anticoagulation Care Providers     Provider Role Specialty Phone number    Satya Newton MD Responsible Cardiology [phone]            See the Encounter Report to view Anticoagulation Flowsheet and Dosing Calendar (Go to Encounters tab in chart review, and find the Anticoagulation Therapy Visit)    INR 1.9 No complications noted. No signs/sx bleeding or clots. INR has been sub-therapeutic often over past couple of months but then comes into range nicely with 1 x boosts in dosing. He has avoided greens. Today he said he has not been taking 5mg on Weds as the maintenance dose indicates. He thought that was just a one time boost back when that change was made. Therefore, he has only been taking 27.5mg weekly for his maintenance dose not 30 mg. Will boost today only to 5mg (2.5mg boost x 1) then he will permanently take 5mg on  Wedsnesday as he should have been. Maintenance plan is 2.5mg MonFri and 5mg ROW. This was explained very carefully to him today. He verbalized understanding. Recheck in 2 weeks.    Dosage adjustment made based on physician directed care plan.    Tatiana Avalos RN

## 2018-05-25 NOTE — TELEPHONE ENCOUNTER
PA Initiation    Medication: PRALUENT 75MG/ML PEN  Insurance Company: [employer] - Phone [phone] Fax [phone]  Pharmacy Filling the Rx: [employer] [facility] - [place] [address] [address]  Filling Pharmacy Phone:  Please note this is a renewal request  Filling Pharmacy Fax:    Start Date: 5/25/2018    Central Prior Authorization Team   Phone: [phone]      Manually faxed prior auth renewal request to [employer] at fax# [phone]

## 2018-05-29 NOTE — TELEPHONE ENCOUNTER
Prior Authorization Approval    Authorization Effective Date: 5/15/2018  Authorization Expiration Date: 5/15/2020  Medication: PRALUENT 75MG/ML PEN Approved  Approved Dose/Quantity: 2ml/28 days  Reference #: [id number]/a   Insurance Company: [employer] - Phone [phone] Fax [phone]  Which Pharmacy is filling the prescription (Not needed for infusion/clinic administered): [employer] [facility] - [facility] [address] [place] [address]  Pharmacy Notified: No  Patient Notified: No    Did not call pharmacy as per request this is a renewal sent by [employer].

## 2018-06-08 ENCOUNTER — ANTICOAGULATION THERAPY VISIT (OUTPATIENT)
Dept: CARDIOLOGY | Facility: CLINIC | Age: 83
End: 2018-06-08
Payer: COMMERCIAL

## 2018-06-08 DIAGNOSIS — G45.9 TRANSIENT CEREBRAL ISCHEMIA: ICD-10-CM

## 2018-06-08 DIAGNOSIS — Z79.01 LONG-TERM (CURRENT) USE OF ANTICOAGULANTS: ICD-10-CM

## 2018-06-08 LAB — INR POINT OF CARE: 2.3 (ref 0.86–1.14)

## 2018-06-08 PROCEDURE — 99207 ZZC NO CHARGE NURSE ONLY: CPT | Performed by: INTERNAL MEDICINE

## 2018-06-08 PROCEDURE — 85610 PROTHROMBIN TIME: CPT | Mod: QW | Performed by: INTERNAL MEDICINE

## 2018-06-08 PROCEDURE — 36416 COLLJ CAPILLARY BLOOD SPEC: CPT | Performed by: INTERNAL MEDICINE

## 2018-06-08 NOTE — MR AVS SNAPSHOT
Sawyer Renteria   6/8/2018 10:40 AM   Anticoagulation Therapy Visit    Description:  84 year old male   Provider:  [facility]   Department:  [facility]           INR as of 6/8/2018     Today's INR 2.3      Anticoagulation Summary as of 6/8/2018     INR goal 2.0-3.0   Today's INR 2.3   Full warfarin instructions 2.5 mg on Mon, Wed, Fri; 5 mg all other days   Next INR check 6/22/2018    Indications   Left ventricular thrombus [[id number]]  Transient cerebral ischemia [G45.9]  Long-term (current) use of anticoagulants [Z79.01] [Z79.01]         Your next Anticoagulation Clinic appointment(s)     Jun 22, 2018 10:20 AM CDT   Anticoagulation Visit with  ANTICOAGULATION   [facility] ([facility])    [address] [place] [id number]   [phone] OPT 2              Contact Numbers     Anticoagulant (INR) Clinic Number: [phone]          June 2018 Details    Sun Mon Tue Wed Thu Fri Sat          1               2                 3               4               5               6               7               8      2.5 mg   See details      9      5 mg           10      5 mg         11      2.5 mg         12      5 mg         13      2.5 mg         14      5 mg         15      2.5 mg         16      5 mg           17      5 mg         18      2.5 mg         19      5 mg         20      2.5 mg         21      5 mg         22            23                 24               25               26               27               28               29               30                Date Details   06/08 This INR check       Date of next INR:  6/22/2018         How to take your warfarin dose     To take:  2.5 mg Take 0.5 of a 5 mg tablet.    To take:  5 mg Take 1 of the 5 mg tablets.

## 2018-06-08 NOTE — PROGRESS NOTES
ANTICOAGULATION FOLLOW-UP CLINIC VISIT    Patient Name:  Sawyer Renteria  Date:  6/8/2018  Contact Type:  Face to Face    SUBJECTIVE:     Patient Findings     Positives Missed doses (he did not increase wednesday's dose to 5 mg (took only 2.5 mg))           OBJECTIVE    INR Protime   Date Value Ref Range Status   06/08/2018 2.3 (A) 0.86 - 1.14 Final       ASSESSMENT / PLAN  No question data found.  Anticoagulation Summary as of 6/8/2018     INR goal 2.0-3.0   Today's INR 2.3   Warfarin maintenance plan 2.5 mg (5 mg x 0.5) on Mon, Wed, Fri; 5 mg (5 mg x 1) all other days   Full warfarin instructions 2.5 mg on Mon, Wed, Fri; 5 mg all other days   Weekly warfarin total 27.5 mg   Plan last modified Donna Colby RN (6/8/2018)   Next INR check 6/22/2018   Target end date Indefinite    Indications   Left ventricular thrombus [KUT3198]  Transient cerebral ischemia [G45.9]  Long-term (current) use of anticoagulants [Z79.01] [Z79.01]         Anticoagulation Episode Summary     INR check location     Preferred lab     Send INR reminders to [facility] HEART INR NURSE    Comments       Anticoagulation Care Providers     Provider Role Specialty Phone number    Satya Newton MD Responsible Cardiology [phone]            See the Encounter Report to view Anticoagulation Flowsheet and Dosing Calendar (Go to Encounters tab in chart review, and find the Anticoagulation Therapy Visit)    INR 2.3 He has been taking only 2.5 mg on MWF and 5 mg all other days for at least 2 weeks but he thinks it has been much longer. He was to have increased Wednesdays' dose to 5 mg but never did. He is not eating the dark green veggies now -- only a small salad on occasion. No change in meds (he gave himself the Praluent injection in the left thigh). No abnormal bleeding or bruising. Will continue the current dosing of 2.5 mg MWF and 5 mg all other days with recheck in 2 weeks to make sure he is staying in range. Dosage adjustment made based  on physician directed care plan.    Donna Colby RN

## 2018-06-22 ENCOUNTER — ANTICOAGULATION THERAPY VISIT (OUTPATIENT)
Dept: CARDIOLOGY | Facility: CLINIC | Age: 83
End: 2018-06-22
Payer: COMMERCIAL

## 2018-06-22 DIAGNOSIS — G45.9 TRANSIENT CEREBRAL ISCHEMIA: ICD-10-CM

## 2018-06-22 DIAGNOSIS — Z79.01 LONG-TERM (CURRENT) USE OF ANTICOAGULANTS: ICD-10-CM

## 2018-06-22 LAB — INR POINT OF CARE: 2 (ref 0.86–1.14)

## 2018-06-22 PROCEDURE — 85610 PROTHROMBIN TIME: CPT | Mod: QW | Performed by: INTERNAL MEDICINE

## 2018-06-22 PROCEDURE — 36416 COLLJ CAPILLARY BLOOD SPEC: CPT | Performed by: INTERNAL MEDICINE

## 2018-06-22 NOTE — MR AVS SNAPSHOT
Sawyer Renteria   6/22/2018 10:20 AM   Anticoagulation Therapy Visit    Description:  84 year old male   Provider:  [facility]   Department:  [facility]           INR as of 6/22/2018     Today's INR 2.0      Anticoagulation Summary as of 6/22/2018     INR goal 2.0-3.0   Today's INR 2.0   Full warfarin instructions 2.5 mg on Mon, Wed, Fri; 5 mg all other days   Next INR check 7/6/2018    Indications   Left ventricular thrombus [KIK5927]  Transient cerebral ischemia [G45.9]  Long-term (current) use of anticoagulants [Z79.01] [Z79.01]         Your next Anticoagulation Clinic appointment(s)     Jul 06, 2018 10:20 AM CDT   Anticoagulation Visit with  ANTICOAGULATION   [facility] ([facility])    [address] [place] [address]   [phone] OPT 2            Jul 20, 2018 10:20 AM CDT   Anticoagulation Visit with  ANTICOAGULATION   [facility] ([facility])    [address] [address]   [phone] OPT 2              Contact Numbers     Anticoagulant (INR) Clinic Number: [id number]          June 2018 Details    Sun Mon Tue Wed Thu Fri Sat          1               2                 3               4               5               6               7               8               9                 10               11               12               13               14               15               16                 17               18               19               20               21               22      2.5 mg   See details      23      5 mg           24      5 mg         25      2.5 mg         26      5 mg         27      2.5 mg         28      5 mg         29      2.5 mg         30      5 mg          Date Details   06/22 This INR check               How to take your warfarin dose     To take:  2.5 mg Take 0.5 of a 5 mg tablet.    To take:  5 mg Take 1  of the 5 mg tablets.           July 2018 Details    Sun Mon Tue Wed Thu Fri Sat     1      5 mg         2      2.5 mg         3      5 mg         4      2.5 mg         5      5 mg         6            7                 8               9               10               11               12               13               14                 15               16               17               18               19               20               21                 22               23               24               25               26               27               28                 29               30               31                    Date Details   No additional details    Date of next INR:  7/6/2018         How to take your warfarin dose     To take:  2.5 mg Take 0.5 of a 5 mg tablet.    To take:  5 mg Take 1 of the 5 mg tablets.

## 2018-07-06 ENCOUNTER — ANTICOAGULATION THERAPY VISIT (OUTPATIENT)
Dept: CARDIOLOGY | Facility: CLINIC | Age: 83
End: 2018-07-06
Payer: COMMERCIAL

## 2018-07-06 DIAGNOSIS — G45.9 TRANSIENT CEREBRAL ISCHEMIA: ICD-10-CM

## 2018-07-06 DIAGNOSIS — Z79.01 LONG-TERM (CURRENT) USE OF ANTICOAGULANTS: ICD-10-CM

## 2018-07-06 LAB — INR POINT OF CARE: 2.7 (ref 0.86–1.14)

## 2018-07-06 PROCEDURE — 85610 PROTHROMBIN TIME: CPT | Mod: QW | Performed by: INTERNAL MEDICINE

## 2018-07-06 PROCEDURE — 36416 COLLJ CAPILLARY BLOOD SPEC: CPT | Performed by: INTERNAL MEDICINE

## 2018-07-06 NOTE — PROGRESS NOTES
ANTICOAGULATION FOLLOW-UP CLINIC VISIT    Patient Name:  Sawyer Renteria  Date:  7/6/2018  Contact Type:  Face to Face    SUBJECTIVE:        OBJECTIVE    INR Protime   Date Value Ref Range Status   07/06/2018 2.7 (A) 0.86 - 1.14 Final       ASSESSMENT / PLAN  INR assessment THER    Recheck INR In: 2 WEEKS    INR Location Clinic      Anticoagulation Summary as of 7/6/2018     INR goal 2.0-3.0   Today's INR 2.7   Warfarin maintenance plan 2.5 mg (5 mg x 0.5) on Mon, Wed, Fri; 5 mg (5 mg x 1) all other days   Full warfarin instructions 2.5 mg on Mon, Wed, Fri; 5 mg all other days   Weekly warfarin total 27.5 mg   No change documented Penfield, Lynette E, RN   Plan last modified Donna Colby RN (6/8/2018)   Next INR check 7/20/2018   Target end date Indefinite    Indications   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]  Transient cerebral ischemia [G45.9]  Long-term (current) use of anticoagulants [Z79.01] [Z79.01]         Anticoagulation Episode Summary     INR check location     Preferred lab     Send INR reminders to [facility] HEART INR NURSE    Comments       Anticoagulation Care Providers     Provider Role Specialty Phone number    Satya Newton MD Responsible Cardiology [phone]            See the Encounter Report to view Anticoagulation Flowsheet and Dosing Calendar (Go to Encounters tab in chart review, and find the Anticoagulation Therapy Visit)    INR=2.7  No changes or complications. Will continue same confirmed dose and recheck in 2 weeks. He gave himself the Praluent injection in the left thigh without complications.   Lynette E. Penfield, RN

## 2018-07-06 NOTE — MR AVS SNAPSHOT
Sawyer Renteria   7/6/2018 10:20 AM   Anticoagulation Therapy Visit    Description:  84 year old male   Provider:  [facility]RET[facility] ANTICOAGULATION   Department:  [facility]           INR as of 7/6/2018     Today's INR 2.7      Anticoagulation Summary as of 7/6/2018     INR goal 2.0-3.0   Today's INR 2.7   Full warfarin instructions 2.5 mg on Mon, Wed, Fri; 5 mg all other days   Next INR check 7/20/2018    Indications   Left ventricular thrombus [JMG6599]  Transient cerebral ischemia [G45.9]  Long-term (current) use of anticoagulants [Z79.01] [Z79.01]         Your next Anticoagulation Clinic appointment(s)     Jul 20, 2018 10:20 AM CDT   Anticoagulation Visit with  ANTICOAGULATION   [facility] ([facility])    [address] [place] [id number]   [phone] OPT 2              Contact Numbers     Anticoagulant (INR) Clinic Number: [phone]          July 2018 Details    Sun Mon Tue Wed Thu Fri Sat     1               2               3               4               5               6      2.5 mg   See details      7      5 mg           8      5 mg         9      2.5 mg         10      5 mg         11      2.5 mg         12      5 mg         13      2.5 mg         14      5 mg           15      5 mg         16      2.5 mg         17      5 mg         18      2.5 mg         19      5 mg         20            21                 22               23               24               25               26               27               28                 29               30               31                    Date Details   07/06 This INR check       Date of next INR:  7/20/2018         How to take your warfarin dose     To take:  2.5 mg Take 0.5 of a 5 mg tablet.    To take:  5 mg Take 1 of the 5 mg tablets.

## 2018-07-11 ENCOUNTER — TELEPHONE (OUTPATIENT)
Dept: CARDIOLOGY | Facility: CLINIC | Age: 83
End: 2018-07-11

## 2018-07-11 NOTE — TELEPHONE ENCOUNTER
Patient called to report that he called [facility] and they are sending him another 3 month supply. Just wanted to update Kasey. He advised he did not need anything further it was just an update.

## 2018-07-20 ENCOUNTER — ANTICOAGULATION THERAPY VISIT (OUTPATIENT)
Dept: CARDIOLOGY | Facility: CLINIC | Age: 83
End: 2018-07-20
Payer: COMMERCIAL

## 2018-07-20 DIAGNOSIS — Z79.01 LONG-TERM (CURRENT) USE OF ANTICOAGULANTS: ICD-10-CM

## 2018-07-20 DIAGNOSIS — G45.9 TRANSIENT CEREBRAL ISCHEMIA: ICD-10-CM

## 2018-07-20 LAB — INR POINT OF CARE: 1.9 (ref 0.86–1.14)

## 2018-07-20 PROCEDURE — 36416 COLLJ CAPILLARY BLOOD SPEC: CPT | Performed by: INTERNAL MEDICINE

## 2018-07-20 PROCEDURE — 99207 ZZC NO CHARGE NURSE ONLY: CPT | Performed by: INTERNAL MEDICINE

## 2018-07-20 PROCEDURE — 85610 PROTHROMBIN TIME: CPT | Mod: QW | Performed by: INTERNAL MEDICINE

## 2018-07-20 NOTE — MR AVS SNAPSHOT
Sawyer Renteria   7/20/2018 10:20 AM   Anticoagulation Therapy Visit    Description:  84 year old male   Provider:  [facility]RETEL ANTICOAGULATION   Department:  [facility]           INR as of 7/20/2018     Today's INR 1.9!      Anticoagulation Summary as of 7/20/2018     INR goal 2.0-3.0   Today's INR 1.9!   Full warfarin instructions 2.5 mg on Mon, Wed, Fri; 5 mg all other days   Next INR check 8/3/2018    Indications   Left ventricular thrombus [ONL4209]  Transient cerebral ischemia [G45.9]  Long-term (current) use of anticoagulants [Z79.01] [Z79.01]         Your next Anticoagulation Clinic appointment(s)     Aug 03, 2018 10:20 AM CDT   Anticoagulation Visit with  ANTICOAGULATION   [facility] ([facility])    [address] [place] [address]   [phone] OPT 2              Contact Numbers     Anticoagulant (INR) Clinic Number: [phone]          July 2018 Details    Sun Mon Tue Wed Thu Fri Sat     1               2               3               4               5               6               7                 8               9               10               11               12               13               14                 15               16               17               18               19               20      2.5 mg   See details      21      5 mg           22      5 mg         23      2.5 mg         24      5 mg         25      2.5 mg         26      5 mg         27      2.5 mg         28      5 mg           29      5 mg         30      2.5 mg         31      5 mg              Date Details   07/20 This INR check               How to take your warfarin dose     To take:  2.5 mg Take 0.5 of a 5 mg tablet.    To take:  5 mg Take 1 of the 5 mg tablets.           August 2018 Details    Sun Mon Tue Wed Thu Fri Sat        1      2.5 mg         2      5 mg         3            4                 5               6               7                8               9               10               11                 12               13               14               15               16               17               18                 19               20               21               22               23               24               25                 26               27               28               29               30               31                 Date Details   No additional details    Date of next INR:  8/3/2018         How to take your warfarin dose     To take:  2.5 mg Take 0.5 of a 5 mg tablet.    To take:  5 mg Take 1 of the 5 mg tablets.

## 2018-07-20 NOTE — PROGRESS NOTES
ANTICOAGULATION FOLLOW-UP CLINIC VISIT    Patient Name:  Sawyer Renteria  Date:  7/20/2018  Contact Type:  Face to Face    SUBJECTIVE:     Patient Findings     Positives Change in diet/appetite (thinks he ate more salads last week)           OBJECTIVE    INR Protime   Date Value Ref Range Status   07/20/2018 1.9 (A) 0.86 - 1.14 Final       ASSESSMENT / PLAN  INR assessment THER    Recheck INR In: 2 WEEKS    INR Location Clinic      Anticoagulation Summary as of 7/20/2018     INR goal 2.0-3.0   Today's INR 1.9!   Warfarin maintenance plan 2.5 mg (5 mg x 0.5) on Mon, Wed, Fri; 5 mg (5 mg x 1) all other days   Full warfarin instructions 2.5 mg on Mon, Wed, Fri; 5 mg all other days   Weekly warfarin total 27.5 mg   No change documented Donna Colby RN   Plan last modified Donna Colby RN (6/8/2018)   Next INR check 8/3/2018   Target end date Indefinite    Indications   Left ventricular thrombus [EZF0909]  Transient cerebral ischemia [G45.9]  Long-term (current) use of anticoagulants [Z79.01] [Z79.01]         Anticoagulation Episode Summary     INR check location     Preferred lab     Send INR reminders to [facility] HEART INR NURSE    Comments       Anticoagulation Care Providers     Provider Role Specialty Phone number    Satya Newton MD Responsible Cardiology [phone]            See the Encounter Report to view Anticoagulation Flowsheet and Dosing Calendar (Go to Encounters tab in chart review, and find the Anticoagulation Therapy Visit)    INR 1.9 He thinks he ate more salads this week. No change in meds. No abnormal bleeding or bruising. He will avoid greens for 2 days then resume usual amount of greens/veggies every other day. Will continue current dosing of 2.5 mg MWF and 5 mg all other days with recheck in 2 weeks. He gave himself a Praluent injection in his right thigh.     Donna Colby RN

## 2018-08-03 ENCOUNTER — ANTICOAGULATION THERAPY VISIT (OUTPATIENT)
Dept: CARDIOLOGY | Facility: CLINIC | Age: 83
End: 2018-08-03
Payer: COMMERCIAL

## 2018-08-03 DIAGNOSIS — G45.9 TRANSIENT CEREBRAL ISCHEMIA: ICD-10-CM

## 2018-08-03 DIAGNOSIS — Z79.01 LONG-TERM (CURRENT) USE OF ANTICOAGULANTS: ICD-10-CM

## 2018-08-03 LAB — INR POINT OF CARE: 2.3 (ref 0.86–1.14)

## 2018-08-03 PROCEDURE — 36416 COLLJ CAPILLARY BLOOD SPEC: CPT | Performed by: INTERNAL MEDICINE

## 2018-08-03 PROCEDURE — 85610 PROTHROMBIN TIME: CPT | Mod: QW | Performed by: INTERNAL MEDICINE

## 2018-08-03 NOTE — PROGRESS NOTES
ANTICOAGULATION FOLLOW-UP CLINIC VISIT    Patient Name:  Sawyer Renteria  Date:  8/3/2018  Contact Type:  Face to Face    SUBJECTIVE:     Patient Findings     Positives No Problem Findings           OBJECTIVE    INR Protime   Date Value Ref Range Status   08/03/2018 2.3 (A) 0.86 - 1.14 Final       ASSESSMENT / PLAN  INR assessment THER    Recheck INR In: 2 WEEKS    INR Location Clinic      Anticoagulation Summary as of 8/3/2018     INR goal 2.0-3.0   Today's INR 2.3   Warfarin maintenance plan 2.5 mg (5 mg x 0.5) on Mon, Wed, Fri; 5 mg (5 mg x 1) all other days   Full warfarin instructions 2.5 mg on Mon, Wed, Fri; 5 mg all other days   Weekly warfarin total 27.5 mg   No change documented Kasey Reyes RN   Plan last modified Donna Colby RN (6/8/2018)   Next INR check 8/17/2018   Target end date Indefinite    Indications   Left ventricular thrombus [UEX8381]  Transient cerebral ischemia [G45.9]  Long-term (current) use of anticoagulants [Z79.01] [Z79.01]         Anticoagulation Episode Summary     INR check location     Preferred lab     Send INR reminders to [facility] HEART INR NURSE    Comments       Anticoagulation Care Providers     Provider Role Specialty Phone number    Satya Newton MD Responsible Cardiology [phone]            See the Encounter Report to view Anticoagulation Flowsheet and Dosing Calendar (Go to Encounters tab in chart review, and find the Anticoagulation Therapy Visit)    INR 2.3 No changes to dosing. Pt ate less greens these past 2 weeks. Denies bleeding, bruising, or new medications. Pt reports he had a physical per his PMD yesterday. Pt gave himself his Praluent injection in his left thigh. Next apt in 2 weeks per patient. Dosage adjustment made based on physician directed care plan.    Kasey Reyes, RN

## 2018-08-03 NOTE — MR AVS SNAPSHOT
Sawyer Renteria   8/3/2018 10:20 AM   Anticoagulation Therapy Visit    Description:  84 year old male   Provider:  [facility] ANTICOAGULATION   Department:  [facility]           INR as of 8/3/2018     Today's INR 2.3      Anticoagulation Summary as of 8/3/2018     INR goal 2.0-3.0   Today's INR 2.3   Full warfarin instructions 2.5 mg on Mon, Wed, Fri; 5 mg all other days   Next INR check 8/17/2018    Indications   Left ventricular thrombus [GMP9685]  Transient cerebral ischemia [G45.9]  Long-term (current) use of anticoagulants [Z79.01] [Z79.01]         Your next Anticoagulation Clinic appointment(s)     Aug 17, 2018 10:20 AM CDT   Anticoagulation Visit with  ANTICOAGULATION   [facility] ([facility])    [address] [place] [id number]   [phone] OPT 2              Contact Numbers     Anticoagulant (INR) Clinic Number: [phone]          August 2018 Details    Sun Mon Tue Wed Thu Fri Sat        1               2               3      2.5 mg   See details      4      5 mg           5      5 mg         6      2.5 mg         7      5 mg         8      2.5 mg         9      5 mg         10      2.5 mg         11      5 mg           12      5 mg         13      2.5 mg         14      5 mg         15      2.5 mg         16      5 mg         17            18                 19               20               21               22               23               24               25                 26               27               28               29               30               31                 Date Details   08/03 This INR check       Date of next INR:  8/17/2018         How to take your warfarin dose     To take:  2.5 mg Take 0.5 of a 5 mg tablet.    To take:  5 mg Take 1 of the 5 mg tablets.

## 2018-08-08 DIAGNOSIS — I25.5 CARDIOMYOPATHY, ISCHEMIC: ICD-10-CM

## 2018-08-08 DIAGNOSIS — I25.10 CORONARY ARTERY DISEASE INVOLVING NATIVE CORONARY ARTERY OF NATIVE HEART WITHOUT ANGINA PECTORIS: ICD-10-CM

## 2018-08-08 RX ORDER — METOPROLOL SUCCINATE 25 MG/1
12.5 TABLET, EXTENDED RELEASE ORAL DAILY
Qty: 45 TABLET | Refills: 0 | Status: SHIPPED | OUTPATIENT
Start: 2018-08-08 | End: 2018-10-26

## 2018-08-08 RX ORDER — LISINOPRIL 2.5 MG/1
TABLET ORAL
Qty: 270 TABLET | Refills: 0 | Status: SHIPPED | OUTPATIENT
Start: 2018-08-08 | End: 2018-12-12

## 2018-08-17 ENCOUNTER — ANTICOAGULATION THERAPY VISIT (OUTPATIENT)
Dept: CARDIOLOGY | Facility: CLINIC | Age: 83
End: 2018-08-17
Payer: COMMERCIAL

## 2018-08-17 DIAGNOSIS — Z79.01 LONG-TERM (CURRENT) USE OF ANTICOAGULANTS: ICD-10-CM

## 2018-08-17 DIAGNOSIS — G45.9 TRANSIENT CEREBRAL ISCHEMIA: ICD-10-CM

## 2018-08-17 LAB — INR POINT OF CARE: 1.7 (ref 0.86–1.14)

## 2018-08-17 PROCEDURE — 85610 PROTHROMBIN TIME: CPT | Mod: QW | Performed by: INTERNAL MEDICINE

## 2018-08-17 PROCEDURE — 36416 COLLJ CAPILLARY BLOOD SPEC: CPT | Performed by: INTERNAL MEDICINE

## 2018-08-17 NOTE — MR AVS SNAPSHOT
Sawyer Renteria   8/17/2018 10:20 AM   Anticoagulation Therapy Visit    Description:  84 year old male   Provider:  [facility]RETEL ANTICOAGULATION   Department:  [facility]           INR as of 8/17/2018     Today's INR 1.7!      Anticoagulation Summary as of 8/17/2018     INR goal 2.0-3.0   Today's INR 1.7!   Full warfarin instructions 8/17: 5 mg; Otherwise 2.5 mg on Mon, Wed, Fri; 5 mg all other days   Next INR check 8/31/2018    Indications   Left ventricular thrombus [SCZ0856]  Transient cerebral ischemia [G45.9]  Long-term (current) use of anticoagulants [Z79.01] [Z79.01]         Your next Anticoagulation Clinic appointment(s)     Aug 31, 2018 10:20 AM CDT   Anticoagulation Visit with  ANTICOAGULATION   [facility] ([facility])    [address] [address]   [phone] OPT 2              Contact Numbers     Anticoagulant (INR) Clinic Number: [phone]          August 2018 Details    Sun Mon Tue Wed Thu Fri Sat        1               2               3               4                 5               6               7               8               9               10               11                 12               13               14               15               16               17      5 mg   See details      18      5 mg           19      5 mg         20      2.5 mg         21      5 mg         22      2.5 mg         23      5 mg         24      2.5 mg         25      5 mg           26      5 mg         27      2.5 mg         28      5 mg         29      2.5 mg         30      5 mg         31              Date Details   08/17 This INR check       Date of next INR:  8/31/2018         How to take your warfarin dose     To take:  2.5 mg Take 0.5 of a 5 mg tablet.    To take:  5 mg Take 1 of the 5 mg tablets.

## 2018-08-17 NOTE — PROGRESS NOTES
ANTICOAGULATION FOLLOW-UP CLINIC VISIT    Patient Name:  Sawyer Renteria  Date:  8/17/2018  Contact Type:  Face to Face    SUBJECTIVE:     Patient Findings     Positives Unexplained INR or factor level change           OBJECTIVE    INR Protime   Date Value Ref Range Status   08/17/2018 1.7 (A) 0.86 - 1.14 Final       ASSESSMENT / PLAN  INR assessment SUB    Recheck INR In: 2 WEEKS    INR Location Clinic      Anticoagulation Summary as of 8/17/2018     INR goal 2.0-3.0   Today's INR 1.7!   Warfarin maintenance plan 2.5 mg (5 mg x 0.5) on Mon, Wed, Fri; 5 mg (5 mg x 1) all other days   Full warfarin instructions 8/17: 5 mg; Otherwise 2.5 mg on Mon, Wed, Fri; 5 mg all other days   Weekly warfarin total 27.5 mg   Plan last modified Donna Colby RN (6/8/2018)   Next INR check 8/31/2018   Target end date Indefinite    Indications   Left ventricular thrombus [KKT4778]  Transient cerebral ischemia [G45.9]  Long-term (current) use of anticoagulants [Z79.01] [Z79.01]         Anticoagulation Episode Summary     INR check location     Preferred lab     Send INR reminders to [facility] HEART INR NURSE    Comments       Anticoagulation Care Providers     Provider Role Specialty Phone number    Satya Newton MD Responsible Cardiology [phone]            See the Encounter Report to view Anticoagulation Flowsheet and Dosing Calendar (Go to Encounters tab in chart review, and find the Anticoagulation Therapy Visit)    INR 1.7 Denies missing doses. No change in diet that he is aware of. No change in meds. No abnormal bleeding or bruising. Will boost today's dose to 5 mg then resume 2.5 mg MWF and 5 mg all other days with recheck in 2 weeks. He gave himself the praluent injection in his right thigh. Dosage adjustment made based on physician directed care plan.    Donna Colby, RN

## 2018-08-31 ENCOUNTER — ANTICOAGULATION THERAPY VISIT (OUTPATIENT)
Dept: CARDIOLOGY | Facility: CLINIC | Age: 83
End: 2018-08-31
Payer: COMMERCIAL

## 2018-08-31 DIAGNOSIS — Z79.01 LONG-TERM (CURRENT) USE OF ANTICOAGULANTS: ICD-10-CM

## 2018-08-31 DIAGNOSIS — G45.9 TRANSIENT CEREBRAL ISCHEMIA: ICD-10-CM

## 2018-08-31 LAB — INR POINT OF CARE: 2.1 (ref 0.86–1.14)

## 2018-08-31 PROCEDURE — 85610 PROTHROMBIN TIME: CPT | Mod: QW | Performed by: INTERNAL MEDICINE

## 2018-08-31 PROCEDURE — 36416 COLLJ CAPILLARY BLOOD SPEC: CPT | Performed by: INTERNAL MEDICINE

## 2018-08-31 PROCEDURE — 99207 ZZC NO CHARGE NURSE ONLY: CPT | Performed by: INTERNAL MEDICINE

## 2018-08-31 NOTE — PROGRESS NOTES
ANTICOAGULATION FOLLOW-UP CLINIC VISIT    Patient Name:  Sawyer Renteria  Date:  8/31/2018  Contact Type:  Face to Face    SUBJECTIVE:     Patient Findings     Positives No Problem Findings           OBJECTIVE    INR Protime   Date Value Ref Range Status   08/31/2018 2.1 (A) 0.86 - 1.14 Final       ASSESSMENT / PLAN  INR assessment THER    Recheck INR In: 2 WEEKS    INR Location Clinic      Anticoagulation Summary as of 8/31/2018     INR goal 2.0-3.0   Today's INR 2.1   Warfarin maintenance plan 2.5 mg (5 mg x 0.5) on Mon, Wed, Fri; 5 mg (5 mg x 1) all other days   Full warfarin instructions 2.5 mg on Mon, Wed, Fri; 5 mg all other days   Weekly warfarin total 27.5 mg   Plan last modified Donna Colby RN (6/8/2018)   Next INR check 9/14/2018   Target end date Indefinite    Indications   Left ventricular thrombus [HAP5840]  Transient cerebral ischemia [G45.9]  Long-term (current) use of anticoagulants [Z79.01] [Z79.01]         Anticoagulation Episode Summary     INR check location     Preferred lab     Send INR reminders to [facility] HEART INR NURSE    Comments       Anticoagulation Care Providers     Provider Role Specialty Phone number    Satya Newton MD Responsible Cardiology [phone]            See the Encounter Report to view Anticoagulation Flowsheet and Dosing Calendar (Go to Encounters tab in chart review, and find the Anticoagulation Therapy Visit)    INR 2.1 No changes to dosing. Denies changes to medication, unusual bruising or bleeding. Pt drinks Ensure around a 6 pack/2 weeks.Advised not to eat greens or ensure today. Also started using protein powder-advised not to use protein powder. Pt to bring it in to review contents. Also has been eating mangos. Discussed the effects of all of these foods on INR. Pt provided INR food information sheets. Pt gave himself Praluent injection in left thigh. Next apt in 2 weeks. Dosage adjustment made based on physician directed care plan.    Kasey  Eric, RN

## 2018-08-31 NOTE — MR AVS SNAPSHOT
Sawyer Renteria   8/31/2018 10:20 AM   Anticoagulation Therapy Visit    Description:  84 year old male   Provider:  [facility]RET[facility] ANTICOAGULATION   Department:  [facility]           INR as of 8/31/2018     Today's INR 2.1      Anticoagulation Summary as of 8/31/2018     INR goal 2.0-3.0   Today's INR 2.1   Full warfarin instructions 2.5 mg on Mon, Wed, Fri; 5 mg all other days   Next INR check 9/14/2018    Indications   Left ventricular thrombus [MSD3510]  Transient cerebral ischemia [G45.9]  Long-term (current) use of anticoagulants [Z79.01] [Z79.01]         Your next Anticoagulation Clinic appointment(s)     Sep 13, 2018 10:40 AM CDT   Anticoagulation Visit with  ANTICOAGULATION   [facility] ([facility])    [address] [place] [address]   [phone] OPT 2              Contact Numbers     Anticoagulant (INR) Clinic Number: [phone]          August 2018 Details    Sun Mon Tue Wed Thu Fri Sat        1               2               3               4                 5               6               7               8               9               10               11                 12               13               14               15               16               17               18                 19               20               21               22               23               24               25                 26               27               28               29               30               31      2.5 mg   See details        Date Details   08/31 This INR check               How to take your warfarin dose     To take:  2.5 mg Take 0.5 of a 5 mg tablet.           September 2018 Details    Sun Mon Tue Wed Thu Fri Sat           1      5 mg           2      5 mg         3      2.5 mg         4      5 mg         5      2.5 mg         6      5 mg         7      2.5 mg         8      5 mg           9      5 mg         10      2.5 mg          11      5 mg         12      2.5 mg         13      5 mg         14            15                 16               17               18               19               20               21               22                 23               24               25               26               27               28               29                 30                      Date Details   No additional details    Date of next INR:  9/14/2018         How to take your warfarin dose     To take:  2.5 mg Take 0.5 of a 5 mg tablet.    To take:  5 mg Take 1 of the 5 mg tablets.

## 2018-09-13 ENCOUNTER — ANTICOAGULATION THERAPY VISIT (OUTPATIENT)
Dept: CARDIOLOGY | Facility: CLINIC | Age: 83
End: 2018-09-13
Payer: COMMERCIAL

## 2018-09-13 DIAGNOSIS — Z79.01 LONG-TERM (CURRENT) USE OF ANTICOAGULANTS: ICD-10-CM

## 2018-09-13 DIAGNOSIS — G45.9 TRANSIENT CEREBRAL ISCHEMIA: ICD-10-CM

## 2018-09-13 LAB — INR POINT OF CARE: 3.3 (ref 0.86–1.14)

## 2018-09-13 PROCEDURE — 36416 COLLJ CAPILLARY BLOOD SPEC: CPT | Performed by: INTERNAL MEDICINE

## 2018-09-13 PROCEDURE — 85610 PROTHROMBIN TIME: CPT | Mod: QW | Performed by: INTERNAL MEDICINE

## 2018-09-13 PROCEDURE — 99207 ZZC NO CHARGE NURSE ONLY: CPT | Performed by: INTERNAL MEDICINE

## 2018-09-13 NOTE — PROGRESS NOTES
ANTICOAGULATION FOLLOW-UP CLINIC VISIT    Patient Name:  Sawyer Renteria  Date:  9/13/2018  Contact Type:  Face to Face    SUBJECTIVE:     Patient Findings     Positives Change in diet/appetite (no Ensure or other protein drink for 2 weeks; has been eating more mangoes and drinking suresh juice for the last 2 weeks)           OBJECTIVE    INR Protime   Date Value Ref Range Status   09/13/2018 3.3 (A) 0.86 - 1.14 Final       ASSESSMENT / PLAN  INR assessment SUPRA    Recheck INR In: 2 WEEKS    INR Location Clinic      Anticoagulation Summary as of 9/13/2018     INR goal 2.0-3.0   Today's INR 3.3!   Warfarin maintenance plan 5 mg (5 mg x 1) on Tue, Thu, Sat; 2.5 mg (5 mg x 0.5) all other days   Full warfarin instructions 9/13: 2.5 mg; Otherwise 5 mg on Tue, Thu, Sat; 2.5 mg all other days   Weekly warfarin total 25 mg   Plan last modified Donna Colby RN (9/13/2018)   Next INR check 9/28/2018   Target end date Indefinite    Indications   Left ventricular thrombus [JBD2579]  Transient cerebral ischemia [G45.9]  Long-term (current) use of anticoagulants [Z79.01] [Z79.01]         Anticoagulation Episode Summary     INR check location     Preferred lab     Send INR reminders to [facility] HEART INR NURSE    Comments       Anticoagulation Care Providers     Provider Role Specialty Phone number    Satya Newton MD Responsible Cardiology [phone]            See the Encounter Report to view Anticoagulation Flowsheet and Dosing Calendar (Go to Encounters tab in chart review, and find the Anticoagulation Therapy Visit)    INR 3.3 He has not drank any Ensure for 2 weeks (he was sporadically drinking up to 3 Ensure a week) and has been eating a lot of suresh as well as drinking suresh juice over the last 2 weeks. He has not drank any of the protein powder but brought the can - it contains pea protein (no vit K content listed on the label) and cranberry extract. He has not eaten any spinach over the last month. Advised  that he needs to be consistent with his vit K intake. He has decided not to drink the Ensure and will decrease his intake of Mangoes. He will not use the protein powder with cranberry extract yet either. No change in meds (he gave himself a Praluent injection in his right thigh today). No abnormal bleeding or bruising. Will decrease dosing by 2.5 mg per week (take 2.5 mg today then change maintenance dose to 5 mg TuThSa and 2.5 mg all other days). Recheck in 2 weeks. Will discuss use of protein powder again with pt at that time - if he wants to drink it then he will need to be consistent with the frequency. Dosage adjustment made based on physician directed care plan.    Donna Colby RN

## 2018-09-13 NOTE — MR AVS SNAPSHOT
Sawyer Renteria   9/13/2018 10:40 AM   Anticoagulation Therapy Visit    Description:  84 year old male   Provider:  MAJANO ANTICOAGULATION   Department:  [facility]           INR as of 9/13/2018     Today's INR 3.3!      Anticoagulation Summary as of 9/13/2018     INR goal 2.0-3.0   Today's INR 3.3!   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 ([facility])    [id number]  [facility] [address]   [phone] OPT 2            Sep 28, 2018 10:20 AM CDT   Anticoagulation Visit with  ANTICOAGULATION   [facility] ([facility])    [id number]  [facility] [address]   [phone] OPT 2              Contact Numbers     Anticoagulant (INR) Clinic Number: [phone]          September 2018 Details    Sun Mon Tue Wed Thu Fri Sat           1                 2               3               4               5               6               7               8                 9               10               11               12               13      2.5 mg   See details      14      2.5 mg         15      5 mg           16      2.5 mg         17      2.5 mg         18      5 mg         19      2.5 mg         20      5 mg         21      2.5 mg         22      5 mg           23      2.5 mg         24      2.5 mg         25      5 mg         26      2.5 mg         27      5 mg         28            29                 30                      Date Details   09/13 This INR check       Date of next INR:  9/28/2018         How  to take your warfarin dose     To take:  2.5 mg Take 0.5 of a 5 mg tablet.    To take:  5 mg Take 1 of the 5 mg tablets.

## 2018-09-28 ENCOUNTER — ANTICOAGULATION THERAPY VISIT (OUTPATIENT)
Dept: CARDIOLOGY | Facility: CLINIC | Age: 83
End: 2018-09-28
Payer: COMMERCIAL

## 2018-09-28 DIAGNOSIS — Z79.01 LONG-TERM (CURRENT) USE OF ANTICOAGULANTS: ICD-10-CM

## 2018-09-28 DIAGNOSIS — G45.9 TRANSIENT CEREBRAL ISCHEMIA: ICD-10-CM

## 2018-09-28 LAB — INR POINT OF CARE: 2.7 (ref 0.86–1.14)

## 2018-09-28 PROCEDURE — 85610 PROTHROMBIN TIME: CPT | Mod: QW | Performed by: INTERNAL MEDICINE

## 2018-09-28 PROCEDURE — 99207 ZZC NO CHARGE NURSE ONLY: CPT | Performed by: INTERNAL MEDICINE

## 2018-09-28 PROCEDURE — 36416 COLLJ CAPILLARY BLOOD SPEC: CPT | Performed by: INTERNAL MEDICINE

## 2018-09-28 NOTE — MR AVS SNAPSHOT
Sawyer Renteria   9/28/2018 10:20 AM   Anticoagulation Therapy Visit    Description:  84 year old male   Provider:  GRETEL ANTICOAGULATION   Department:  [facility]           INR as of 9/28/2018     Today's INR 2.7      Anticoagulation Summary as of 9/28/2018     INR goal 2.0-3.0   Today's INR 2.7   Full warfarin instructions 2.5 mg on Mon, Wed, Fri; 5 mg all other days   Next INR check 10/12/2018    Indications   Left ventricular thrombus [ALR7463]  Transient cerebral ischemia [G45.9]  Long-term (current) use of anticoagulants [Z79.01] [Z79.01]         Your next Anticoagulation Clinic appointment(s)     Oct 12, 2018 10:20 AM CDT   Anticoagulation Visit with  ANTICOAGULATION   [facility] ([facility])    [address] [place] [address]   [phone] OPT 2              Contact Numbers     Anticoagulant (INR) Clinic Number: [id number]          September 2018 Details    Sun Mon Tue Wed Thu Fri Sat           1                 2               3               4               5               6               7               8                 9               10               11               12               13               14               15                 16               17               18               19               20               21               22                 23               24               25               26               27               28      2.5 mg   See details      29      5 mg           30      5 mg                Date Details   09/28 This INR check               How to take your warfarin dose     To take:  2.5 mg Take 0.5 of a 5 mg tablet.    To take:  5 mg Take 1 of the 5 mg tablets.           October 2018 Details    Sun Mon Tue Wed Thu Fri Sat      1      2.5 mg         2      5 mg         3      2.5 mg         4      5 mg         5      2.5 mg         6      5 mg           7      5 mg         8      2.5 mg          9      5 mg         10      2.5 mg         11      5 mg         12            13                 14               15               16               17               18               19               20                 21               22               23               24               25               26               27                 28               29               30               31                   Date Details   No additional details    Date of next INR:  10/12/2018         How to take your warfarin dose     To take:  2.5 mg Take 0.5 of a 5 mg tablet.    To take:  5 mg Take 1 of the 5 mg tablets.

## 2018-09-28 NOTE — PROGRESS NOTES
ANTICOAGULATION FOLLOW-UP CLINIC VISIT    Patient Name:  Sawyer Renteria  Date:  9/28/2018  Contact Type:  Face to Face    SUBJECTIVE:     Patient Findings     Positives Extra doses (took 5 mg on Sundays)           OBJECTIVE    INR Protime   Date Value Ref Range Status   09/28/2018 2.7 (A) 0.86 - 1.14 Final       ASSESSMENT / PLAN  INR assessment THER    Recheck INR In: 2 WEEKS    INR Location Clinic      Anticoagulation Summary as of 9/28/2018     INR goal 2.0-3.0   Today's INR 2.7   Warfarin maintenance plan 2.5 mg (5 mg x 0.5) on Mon, Wed, Fri; 5 mg (5 mg x 1) all other days   Full warfarin instructions 2.5 mg on Mon, Wed, Fri; 5 mg all other days   Weekly warfarin total 27.5 mg   Plan last modified Donna Colby RN (9/28/2018)   Next INR check 10/12/2018   Target end date Indefinite    Indications   Left ventricular thrombus [ZMV7689]  Transient cerebral ischemia [G45.9]  Long-term (current) use of anticoagulants [Z79.01] [Z79.01]         Anticoagulation Episode Summary     INR check location     Preferred lab     Send INR reminders to [facility] HEART INR NURSE    Comments       Anticoagulation Care Providers     Provider Role Specialty Phone number    Satya Newton MD Responsible Cardiology [phone]            See the Encounter Report to view Anticoagulation Flowsheet and Dosing Calendar (Go to Encounters tab in chart review, and find the Anticoagulation Therapy Visit)    INR 2.7 Not eating many greens recently (not drinking protein shakes). Drank suresh juice. He mistakenly took 5 mg instead of 2.5 mg on Sundays for 2 weeks. No change in other meds.  Bruise on forearm after lab draw. Will continue the dosing he has been taking - 2.5 mg MWF and 5 mg all other days with recheck in 2 weeks.  Pt gave praluent injection in left thigh. Dosage adjustment made based on physician directed care plan.    Donna Colby RN

## 2018-10-12 ENCOUNTER — ANTICOAGULATION THERAPY VISIT (OUTPATIENT)
Dept: CARDIOLOGY | Facility: CLINIC | Age: 83
End: 2018-10-12
Payer: COMMERCIAL

## 2018-10-12 DIAGNOSIS — I51.3 LEFT VENTRICULAR THROMBUS: ICD-10-CM

## 2018-10-12 DIAGNOSIS — Z79.01 LONG TERM CURRENT USE OF ANTICOAGULANTS WITH INR GOAL OF 2.0-3.0: ICD-10-CM

## 2018-10-12 DIAGNOSIS — G45.9 TRANSIENT CEREBRAL ISCHEMIA: ICD-10-CM

## 2018-10-12 LAB — INR POINT OF CARE: 2.1 (ref 0.86–1.14)

## 2018-10-12 PROCEDURE — 85610 PROTHROMBIN TIME: CPT | Mod: QW | Performed by: INTERNAL MEDICINE

## 2018-10-12 PROCEDURE — 99207 ZZC NO CHARGE NURSE ONLY: CPT | Performed by: INTERNAL MEDICINE

## 2018-10-12 PROCEDURE — 36416 COLLJ CAPILLARY BLOOD SPEC: CPT | Performed by: INTERNAL MEDICINE

## 2018-10-12 NOTE — MR AVS SNAPSHOT
Sawyer Renteria   10/12/2018 10:20 AM   Anticoagulation Therapy Visit    Description:  84 year old male   Provider:  MAJANO ANTICOAGULATION   Department:  [facility]           INR as of 10/12/2018     Today's INR 2.1      Anticoagulation Summary as of 10/12/2018     INR goal 2.0-3.0   Today's INR 2.1   Full warfarin instructions 2.5 mg on Mon, Wed, Fri; 5 mg all other days   Next INR check 10/26/2018    Indications   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]  Transient cerebral ischemia [G45.9]  Long term current use of anticoagulants with INR goal of 2.0-3.0 [Z79.01]         Your next Anticoagulation Clinic appointment(s)     Oct 26, 2018 10:20 AM CDT   Anticoagulation Visit with  ANTICOAGULATION   [facility] ([facility])    [id number]  [facility] [address]   [phone] OPT 2            Nov 16, 2018 10:20 AM CST   Anticoagulation Visit with  ANTICOAGULATION   [facility] ([facility])    [address] [address]   [phone] OPT 2              Contact Numbers     Anticoagulant (INR) Clinic Number: [phone]          October 2018 Details    Sun Mon Tue Wed Thu Fri Sat      1               2               3               4               5               6                 7               8               9               10               11               12      2.5 mg   See details      13      5 mg           14      5 mg         15      2.5 mg         16      5 mg         17      2.5 mg         18      5 mg         19      2.5 mg         20      5 mg           21      5 mg         22      2.5 mg         23      5 mg         24      2.5 mg         25      5 mg         26            27                 28               29               30               31                   Date Details   10/12 This INR check       Date of next INR:  10/26/2018         How to take your  warfarin dose     To take:  2.5 mg Take 0.5 of a 5 mg tablet.    To take:  5 mg Take 1 of the 5 mg tablets.

## 2018-10-12 NOTE — PROGRESS NOTES
ANTICOAGULATION FOLLOW-UP CLINIC VISIT    Patient Name:  Sawyer Renteria  Date:  10/12/2018  Contact Type:  Face to Face    SUBJECTIVE:     Patient Findings     Positives No Problem Findings           OBJECTIVE    INR Protime   Date Value Ref Range Status   10/12/2018 2.1 (A) 0.86 - 1.14 Final       ASSESSMENT / PLAN  INR assessment THER    Recheck INR In: 2 WEEKS    INR Location Clinic      Anticoagulation Summary as of 10/12/2018     INR goal 2.0-3.0   Today's INR 2.1   Warfarin maintenance plan 2.5 mg (5 mg x 0.5) on Mon, Wed, Fri; 5 mg (5 mg x 1) all other days   Full warfarin instructions 2.5 mg on Mon, Wed, Fri; 5 mg all other days   Weekly warfarin total 27.5 mg   No change documented Kasey Reyes, OKSANA   Plan last modified Donna Colby RN (9/28/2018)   Next INR check 10/26/2018   Target end date Indefinite    Indications   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]  Transient cerebral ischemia [G45.9]  Long term current use of anticoagulants with INR goal of 2.0-3.0 [Z79.01]         Anticoagulation Episode Summary     INR check location     Preferred lab     Send INR reminders to [facility] HEART INR NURSE    Comments       Anticoagulation Care Providers     Provider Role Specialty Phone number    Satya Newton MD Responsible Cardiology [phone]            See the Encounter Report to view Anticoagulation Flowsheet and Dosing Calendar (Go to Encounters tab in chart review, and find the Anticoagulation Therapy Visit)    INR 2.1. No changes to dosing. Denies unusual bleeding or bruising. Continues to drink Ensure-had some 2 days ago. Will be going on vacation on 11/1/18 through 11/11/18. Next appt in 2 weeks and then on 11/16/18 after his vacation.  Dosage adjustment made based on physician directed care plan.    Praluent injection done by pt in right thigh.    Kasey Reyes, RN

## 2018-10-17 DIAGNOSIS — G45.9 TRANSIENT CEREBRAL ISCHEMIA: ICD-10-CM

## 2018-10-17 DIAGNOSIS — I23.6 LV (LEFT VENTRICULAR) MURAL THROMBUS FOLLOWING MI (H): ICD-10-CM

## 2018-10-17 RX ORDER — WARFARIN SODIUM 5 MG/1
TABLET ORAL
Qty: 90 TABLET | Refills: 1 | Status: SHIPPED | OUTPATIENT
Start: 2018-10-17 | End: 2019-01-31

## 2018-10-18 DIAGNOSIS — I10 ESSENTIAL HYPERTENSION: ICD-10-CM

## 2018-10-18 DIAGNOSIS — I25.10 CORONARY ARTERY DISEASE INVOLVING NATIVE CORONARY ARTERY OF NATIVE HEART WITHOUT ANGINA PECTORIS: ICD-10-CM

## 2018-10-18 RX ORDER — AMLODIPINE BESYLATE 2.5 MG/1
2.5 TABLET ORAL DAILY
Qty: 90 TABLET | Refills: 0 | Status: SHIPPED | OUTPATIENT
Start: 2018-10-18 | End: 2019-03-14

## 2018-10-26 ENCOUNTER — ANTICOAGULATION THERAPY VISIT (OUTPATIENT)
Dept: CARDIOLOGY | Facility: CLINIC | Age: 83
End: 2018-10-26
Payer: COMMERCIAL

## 2018-10-26 DIAGNOSIS — I51.3 LEFT VENTRICULAR THROMBUS: ICD-10-CM

## 2018-10-26 DIAGNOSIS — Z79.01 LONG TERM CURRENT USE OF ANTICOAGULANTS WITH INR GOAL OF 2.0-3.0: ICD-10-CM

## 2018-10-26 DIAGNOSIS — I25.5 CARDIOMYOPATHY, ISCHEMIC: ICD-10-CM

## 2018-10-26 DIAGNOSIS — G45.9 TRANSIENT CEREBRAL ISCHEMIA: ICD-10-CM

## 2018-10-26 LAB — INR POINT OF CARE: 2.5 (ref 0.86–1.14)

## 2018-10-26 PROCEDURE — 85610 PROTHROMBIN TIME: CPT | Mod: QW | Performed by: INTERNAL MEDICINE

## 2018-10-26 PROCEDURE — 99207 ZZC NO CHARGE NURSE ONLY: CPT | Performed by: INTERNAL MEDICINE

## 2018-10-26 PROCEDURE — 36416 COLLJ CAPILLARY BLOOD SPEC: CPT | Performed by: INTERNAL MEDICINE

## 2018-10-26 RX ORDER — METOPROLOL SUCCINATE 25 MG/1
12.5 TABLET, EXTENDED RELEASE ORAL DAILY
Qty: 45 TABLET | Refills: 1 | Status: SHIPPED | OUTPATIENT
Start: 2018-10-26 | End: 2019-05-20

## 2018-10-26 NOTE — PROGRESS NOTES
ANTICOAGULATION FOLLOW-UP CLINIC VISIT    Patient Name:  Sawyer Renteria  Date:  10/26/2018  Contact Type:  Face to Face    SUBJECTIVE:     Patient Findings     Positives No Problem Findings           OBJECTIVE    INR Protime   Date Value Ref Range Status   10/26/2018 2.5 (A) 0.86 - 1.14 Final       ASSESSMENT / PLAN  INR assessment THER    Recheck INR In: 4 WEEKS    INR Location Clinic      Anticoagulation Summary as of 10/26/2018     INR goal 2.0-3.0   Today's INR 2.5   Warfarin maintenance plan 2.5 mg (5 mg x 0.5) on Mon, Wed, Fri; 5 mg (5 mg x 1) all other days   Full warfarin instructions 2.5 mg on Mon, Wed, Fri; 5 mg all other days   Weekly warfarin total 27.5 mg   No change documented Kasey Reyes RN   Plan last modified Donna Colby RN (9/28/2018)   Next INR check 11/16/2018   Target end date Indefinite    Indications   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]  Transient cerebral ischemia [G45.9]  Long term current use of anticoagulants with INR goal of 2.0-3.0 [Z79.01]         Anticoagulation Episode Summary     INR check location     Preferred lab     Send INR reminders to [facility] HEART INR NURSE    Comments       Anticoagulation Care Providers     Provider Role Specialty Phone number    Satya Newton MD Responsible Cardiology [phone]            See the Encounter Report to view Anticoagulation Flowsheet and Dosing Calendar (Go to Encounters tab in chart review, and find the Anticoagulation Therapy Visit)    INR 2.5 No changes to dosing. Denies unusual bleeding or bruising. Not drinking as much ensure and did not eat as many greens this past week. Next appt in 3 weeks after he returns from vacation. Dosage adjustment made based on physician directed care plan.    Praluent injection done by patient in left thigh.    Kasey Reyes, RN

## 2018-10-26 NOTE — MR AVS SNAPSHOT
Sawyer Renteria   10/26/2018 10:20 AM   Anticoagulation Therapy Visit    Description:  84 year old male   Provider:  GRETEL ANTICOAGULATION   Department:  [facility]           INR as of 10/26/2018     Today's INR 2.5      Anticoagulation Summary as of 10/26/2018     INR goal 2.0-3.0   Today's INR 2.5   Full warfarin instructions 2.5 mg on Mon, Wed, Fri; 5 mg all other days   Next INR check 11/16/2018    Indications   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]  Transient cerebral ischemia [G45.9]  Long term current use of anticoagulants with INR goal of 2.0-3.0 [Z79.01]         Your next Anticoagulation Clinic appointment(s)     Nov 16, 2018 10:20 AM CST   Anticoagulation Visit with  ANTICOAGULATION   [facility] ([facility])    [address] [place] [address]   [phone] OPT 2              Contact Numbers     Anticoagulant (INR) Clinic Number: [phone]          October 2018 Details    Sun Mon Tue Wed Thu Fri Sat      1               2               3               4               5               6                 7               8               9               10               11               12               13                 14               15               16               17               18               19               20                 21               22               23               24               25               26      2.5 mg   See details      27      5 mg           28      5 mg         29      2.5 mg         30      5 mg         31      2.5 mg             Date Details   10/26 This INR check               How to take your warfarin dose     To take:  2.5 mg Take 0.5 of a 5 mg tablet.    To take:  5 mg Take 1 of the 5 mg tablets.           November 2018 Details    Sun Mon Tue Wed Thu Fri Sat         1      5 mg         2      2.5 mg         3      5 mg           4      5 mg         5      2.5 mg         6      5 mg          7      2.5 mg         8      5 mg         9      2.5 mg         10      5 mg           11      5 mg         12      2.5 mg         13      5 mg         14      2.5 mg         15      5 mg         16            17                 18               19               20               21               22               23               24                 25               26               27               28               29               30                 Date Details   No additional details    Date of next INR:  11/16/2018         How to take your warfarin dose     To take:  2.5 mg Take 0.5 of a 5 mg tablet.    To take:  5 mg Take 1 of the 5 mg tablets.

## 2018-11-16 ENCOUNTER — ANTICOAGULATION THERAPY VISIT (OUTPATIENT)
Dept: CARDIOLOGY | Facility: CLINIC | Age: 83
End: 2018-11-16
Payer: COMMERCIAL

## 2018-11-16 DIAGNOSIS — Z79.01 LONG TERM CURRENT USE OF ANTICOAGULANTS WITH INR GOAL OF 2.0-3.0: ICD-10-CM

## 2018-11-16 DIAGNOSIS — I51.3 LEFT VENTRICULAR THROMBUS: ICD-10-CM

## 2018-11-16 DIAGNOSIS — G45.9 TRANSIENT CEREBRAL ISCHEMIA, UNSPECIFIED TYPE: ICD-10-CM

## 2018-11-16 LAB — INR POINT OF CARE: 2.6 (ref 0.86–1.14)

## 2018-11-16 PROCEDURE — 85610 PROTHROMBIN TIME: CPT | Mod: QW | Performed by: INTERNAL MEDICINE

## 2018-11-16 PROCEDURE — 99207 ZZC NO CHARGE NURSE ONLY: CPT | Performed by: INTERNAL MEDICINE

## 2018-11-16 PROCEDURE — 36416 COLLJ CAPILLARY BLOOD SPEC: CPT | Performed by: INTERNAL MEDICINE

## 2018-11-16 NOTE — MR AVS SNAPSHOT
Sawyer Renteria   11/16/2018 10:20 AM   Anticoagulation Therapy Visit    Description:  84 year old male   Provider:  GRETEL ANTICOAGULATION   Department:  [facility]           INR as of 11/16/2018     Today's INR 2.6      Anticoagulation Summary as of 11/16/2018     INR goal 2.0-3.0   Today's INR 2.6   Full warfarin instructions 2.5 mg on Mon, Wed, Fri; 5 mg all other days   Next INR check 11/30/2018    Indications   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]  Transient cerebral ischemia [G45.9]  Long term current use of anticoagulants with INR goal of 2.0-3.0 [Z79.01]         Your next Anticoagulation Clinic appointment(s)     Nov 30, 2018 10:20 AM CST   Anticoagulation Visit with  ANTICOAGULATION   [facility] ([facility])    [address] [place] [address]   [phone] OPT 2              Contact Numbers     Anticoagulant (INR) Clinic Number: [phone]          November 2018 Details    Sun Mon Tue Wed Thu Fri Sat         1               2               3                 4               5               6               7               8               9               10                 11               12               13               14               15               16      2.5 mg   See details      17      5 mg           18      5 mg         19      2.5 mg         20      5 mg         21      2.5 mg         22      5 mg         23      2.5 mg         24      5 mg           25      5 mg         26      2.5 mg         27      5 mg         28      2.5 mg         29      5 mg         30              Date Details   11/16 This INR check       Date of next INR:  11/30/2018         How to take your warfarin dose     To take:  2.5 mg Take 0.5 of a 5 mg tablet.    To take:  5 mg Take 1 of the 5 mg tablets.

## 2018-11-16 NOTE — PROGRESS NOTES
ANTICOAGULATION FOLLOW-UP CLINIC VISIT    Patient Name:  Sawyer Renteria  Date:  11/16/2018  Contact Type:  Face to Face    SUBJECTIVE:     Patient Findings     Positives Change in diet/appetite (diet variable while on vacation in [place]; drank 2-3 protein shakes since back from [place])           OBJECTIVE    INR Protime   Date Value Ref Range Status   11/16/2018 2.6 (A) 0.86 - 1.14 Final       ASSESSMENT / PLAN  INR assessment THER    Recheck INR In: 2 WEEKS    INR Location Clinic      Anticoagulation Summary as of 11/16/2018     INR goal 2.0-3.0   Today's INR 2.6   Warfarin maintenance plan 2.5 mg (5 mg x 0.5) on Mon, Wed, Fri; 5 mg (5 mg x 1) all other days   Full warfarin instructions 2.5 mg on Mon, Wed, Fri; 5 mg all other days   Weekly warfarin total 27.5 mg   No change documented Donna Colby RN   Plan last modified Donna Colby RN (9/28/2018)   Next INR check 11/30/2018   Target end date Indefinite    Indications   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]  Transient cerebral ischemia [G45.9]  Long term current use of anticoagulants with INR goal of 2.0-3.0 [Z79.01]         Anticoagulation Episode Summary     INR check location     Preferred lab     Send INR reminders to [facility] HEART INR NURSE    Comments       Anticoagulation Care Providers     Provider Role Specialty Phone number    Satya Newton MD Responsible Cardiology [phone]            See the Encounter Report to view Anticoagulation Flowsheet and Dosing Calendar (Go to Encounters tab in chart review, and find the Anticoagulation Therapy Visit)    INR 2.6 Diet was variable while he was travelling in [place]. Has drank 2-3 protein shakes since he returned from [place]. No abnormal bleeding or bruising. Will continue current dosing of 2.5 mg MWF and 5 mg all other days with recheck in 2 weeks. He administered his Praluent injection in his right thigh.    Donna Colby, OKSANA

## 2018-11-21 ENCOUNTER — TELEPHONE (OUTPATIENT)
Dept: CARDIOLOGY | Facility: CLINIC | Age: 83
End: 2018-11-21

## 2018-11-21 NOTE — TELEPHONE ENCOUNTER
RN called patient and reviewed with him Dr. Newton's recommendation to take benadryl. RN also informed patient to hold off on any further pralulent injections until our office calls him after we have spoken with the praluent rep. Patient verbalized understanding and agreed with plan. RN will send to [facility] GANDHI to discuss with Dionte bertrand.

## 2018-11-21 NOTE — TELEPHONE ENCOUNTER
He should take benadryl if itching significant. Do not drive after benadrly. Call praluent representative and see if they have any recommendations on this issue. Till we talk to representative, no further injections.

## 2018-11-21 NOTE — TELEPHONE ENCOUNTER
Patient called and advised that after his Praluent injection last Friday (11/16/18) he experienced some swelling and itching at the injection site (R thigh). Patient reports this has improved, but he still has some itching and swelling. Patient denies any SOB now or after the injection. Patient denied any other symptoms outside of the localized swelling and itching. RN advised patient that RN would send to Dr. Newton for review and will call patient with/if Dr. Newton has any recommendations. Patient verbalized understanding and is in agreement with plan.

## 2018-11-26 NOTE — TELEPHONE ENCOUNTER
RN spoke with Malgorzata Salazar PharmD from [employer] regarding the localized injection site reaction. Per Malgorzata, this is the most common side effect of Praluent. It is usually due to the polysorbate in the injection (excipients) and it should be at room temperature. Generally, it is not stopped for this reaction however if it becomes progressively worse with the next injections it should be stopped. Benedryl can also be used before taking the injection but she felt this should be avoided to determine if the patient develops a reaction. RN discussed with Dr. Newton-per Dr. Newton it is ok to try the injection again. RN called pt to inform him of the recommendations. Pt states he also had itching but no rash down his leg. Pt is coming in for INR check on Friday 11/30/18 and we will discuss it further.

## 2018-11-26 NOTE — TELEPHONE ENCOUNTER
RN called the PharmD for [employer] and left a message to call RN back to discuss options for patient. RN called pt back to discuss reaction to Praluent. Pt states he had a hard area and rash about 2-4 inches at the injection site. Pt took the Benedryl as per Dr. Newton's recommendation. Pt states the rash disappeared in approx 2 days. The injection site is slightly hard and tender to touch. The injection was not at room temperature when used.Pt will hold the praluent until RN receives a response from the PharmD and discussion with Dr. Newton.

## 2018-11-30 ENCOUNTER — ANTICOAGULATION THERAPY VISIT (OUTPATIENT)
Dept: CARDIOLOGY | Facility: CLINIC | Age: 83
End: 2018-11-30
Payer: COMMERCIAL

## 2018-11-30 DIAGNOSIS — I51.3 LEFT VENTRICULAR THROMBUS: ICD-10-CM

## 2018-11-30 DIAGNOSIS — G45.9 TRANSIENT CEREBRAL ISCHEMIA, UNSPECIFIED TYPE: ICD-10-CM

## 2018-11-30 DIAGNOSIS — Z79.01 LONG TERM CURRENT USE OF ANTICOAGULANTS WITH INR GOAL OF 2.0-3.0: ICD-10-CM

## 2018-11-30 DIAGNOSIS — E78.5 HYPERLIPIDEMIA, UNSPECIFIED HYPERLIPIDEMIA TYPE: ICD-10-CM

## 2018-11-30 LAB — INR POINT OF CARE: 3.2 (ref 0.86–1.14)

## 2018-11-30 PROCEDURE — 36416 COLLJ CAPILLARY BLOOD SPEC: CPT | Performed by: INTERNAL MEDICINE

## 2018-11-30 PROCEDURE — 85610 PROTHROMBIN TIME: CPT | Mod: QW | Performed by: INTERNAL MEDICINE

## 2018-11-30 NOTE — PROGRESS NOTES
ANTICOAGULATION FOLLOW-UP CLINIC VISIT    Patient Name:  Sawyer Renteria  Date:  11/30/2018  Contact Type:  Face to Face    SUBJECTIVE:     Patient Findings     Positives Change in diet/appetite (not eating as much greens)           OBJECTIVE    INR Protime   Date Value Ref Range Status   11/30/2018 3.2 (A) 0.86 - 1.14 Final       ASSESSMENT / PLAN  INR assessment SUPRA    Recheck INR In: 2 WEEKS    INR Location Clinic      Anticoagulation Summary as of 11/30/2018     INR goal 2.0-3.0   Today's INR 3.2!   Warfarin maintenance plan 2.5 mg (5 mg x 0.5) on Mon, Wed, Fri; 5 mg (5 mg x 1) all other days   Full warfarin instructions 11/30: Hold; Otherwise 2.5 mg on Mon, Wed, Fri; 5 mg all other days   Weekly warfarin total 27.5 mg   Plan last modified Donna Colby RN (9/28/2018)   Next INR check 12/14/2018   Target end date Indefinite    Indications   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]  Transient cerebral ischemia [G45.9]  Long term current use of anticoagulants with INR goal of 2.0-3.0 [Z79.01]         Anticoagulation Episode Summary     INR check location     Preferred lab     Send INR reminders to [facility] HEART INR NURSE    Comments       Anticoagulation Care Providers     Provider Role Specialty Phone number    aStya Newton MD Responsible Cardiology [phone]            See the Encounter Report to view Anticoagulation Flowsheet and Dosing Calendar (Go to Encounters tab in chart review, and find the Anticoagulation Therapy Visit)    INR 3.2 . Pt to hold today's dose and resume usual dosing tomorrow. Pt has not been eating as many greens and thought he may have taken an extra 2.5 mg last week. Denies unusual bleeding or bruising. Praluent injection was at room temperature.and given in left thigh per patient. RN discussed the local injection reaction with last injection in right thigh. Pt states it was about 2-3 inches in diameter, red and itched. Denied rash.States he had some itching though down his leg  without a rash. Pt will call if he experiences similar side effect. Next appt in 2 weeks. Dosage adjustment made based on physician directed care plan.    Kasey Reyes RN

## 2018-11-30 NOTE — TELEPHONE ENCOUNTER
RN called pt to find out if he had any reaction from the injection today-given to himself in INR. Pt stated the site looks ok. RN also called to confirm insurance information for the PASS application. Pt confirmed the information. Pt to call with any concerns or questions.

## 2018-11-30 NOTE — MR AVS SNAPSHOT
Sawyer Renteria   11/30/2018 10:20 AM   Anticoagulation Therapy Visit    Description:  84 year old male   Provider:  [facility]   Department:  [facility]           INR as of 11/30/2018     Today's INR 3.2!      Anticoagulation Summary as of 11/30/2018     INR goal 2.0-3.0   Today's INR 3.2!   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]  Transient cerebral ischemia [G45.9]  Long term current use of anticoagulants with INR goal of 2.0-3.0 [Z79.01]         Your next Anticoagulation Clinic appointment(s)     Dec 14, 2018 10:20 AM CST   Anticoagulation Visit with  ANTICOAGULATION   [facility] ([facility])    [address] [place] [id number]   [phone] OPT 2              Contact Numbers     Anticoagulant (INR) Clinic Number: [phone]          November 2018 Details    Sun Mon Tue Wed Thu Fri Sat         1               2               3                 4               5               6               7               8               9               10                 11               12               13               14               15               16               17                 18               19               20               21               22               23               24                 25               26               27               28               29               30      Hold   See details        Date Details   11/30 This INR check               How to take your warfarin dose     Hold Do not take your warfarin dose. See the Details table to the right for additional instructions.                December 2018 Details    Sun Mon Tue Wed Thu Fri Sat           1      5 mg           2      5 mg         3      2.5 mg         4      5 mg         5      2.5 mg         6      5 mg          7      2.5 mg         8      5 mg           9      5 mg         10      2.5 mg         11      5 mg         12      2.5 mg         13      5 mg         14            15                 16               17               18               19               20               21               22                 23               24               25               26               27               28               29                 30               31                     Date Details   No additional details    Date of next INR:  12/14/2018         How to take your warfarin dose     To take:  2.5 mg Take 0.5 of a 5 mg tablet.    To take:  5 mg Take 1 of the 5 mg tablets.

## 2018-12-12 DIAGNOSIS — I25.10 CORONARY ARTERY DISEASE INVOLVING NATIVE CORONARY ARTERY OF NATIVE HEART WITHOUT ANGINA PECTORIS: ICD-10-CM

## 2018-12-12 RX ORDER — LISINOPRIL 2.5 MG/1
TABLET ORAL
Qty: 180 TABLET | Refills: 0 | Status: SHIPPED | OUTPATIENT
Start: 2018-12-12 | End: 2019-03-14

## 2018-12-14 ENCOUNTER — ANTICOAGULATION THERAPY VISIT (OUTPATIENT)
Dept: CARDIOLOGY | Facility: CLINIC | Age: 83
End: 2018-12-14
Payer: COMMERCIAL

## 2018-12-14 DIAGNOSIS — I51.3 LEFT VENTRICULAR THROMBUS: ICD-10-CM

## 2018-12-14 DIAGNOSIS — Z79.01 LONG TERM CURRENT USE OF ANTICOAGULANTS WITH INR GOAL OF 2.0-3.0: ICD-10-CM

## 2018-12-14 DIAGNOSIS — G45.9 TRANSIENT CEREBRAL ISCHEMIA: ICD-10-CM

## 2018-12-14 LAB — INR POINT OF CARE: 2.5 (ref 0.86–1.14)

## 2018-12-14 PROCEDURE — 36416 COLLJ CAPILLARY BLOOD SPEC: CPT | Performed by: INTERNAL MEDICINE

## 2018-12-14 PROCEDURE — 85610 PROTHROMBIN TIME: CPT | Mod: QW | Performed by: INTERNAL MEDICINE

## 2018-12-14 NOTE — ADDENDUM NOTE
Addended by: PENFIELD, LYNETTE EILEEN BARTELT on: 12/14/2018 02:25 PM     Modules accepted: Level of Service, SmartSet

## 2018-12-14 NOTE — PROGRESS NOTES
ANTICOAGULATION FOLLOW-UP CLINIC VISIT    Patient Name:  Sawyer Renteria  Date:  2018  Contact Type:  Face to Face    SUBJECTIVE:        OBJECTIVE    INR Protime   Date Value Ref Range Status   2018 2.5 (A) 0.86 - 1.14 Final       ASSESSMENT / PLAN  INR assessment THER    Recheck INR In: 2 WEEKS    INR Location Clinic      Anticoagulation Summary  As of 2018    INR goal:   2.0-3.0   TTR:   66.9 % (2.5 y)   INR used for dosin.5 (2018)   Warfarin maintenance plan:   2.5 mg (5 mg x 0.5) every Mon, Wed, Fri; 5 mg (5 mg x 1) all other days   Full warfarin instructions:   2.5 mg every Mon, Wed, Fri; 5 mg all other days   Weekly warfarin total:   27.5 mg   No change documented:   Penfield, Lynette E, RN   Plan last modified:   Donna Colby RN (2018)   Next INR check:   2018   Target end date:   Indefinite    Indications    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]  Transient cerebral ischemia [G45.9]  Long term current use of anticoagulants with INR goal of 2.0-3.0 [Z79.01]             Anticoagulation Episode Summary     INR check location:       Preferred lab:       Send INR reminders to:   [facility] HEART INR NURSE    Comments:         Anticoagulation Care Providers     Provider Role Specialty Phone number    Satya Newton MD Responsible Cardiology [phone]            See the Encounter Report to view Anticoagulation Flowsheet and Dosing Calendar (Go to Encounters tab in chart review, and find the Anticoagulation Therapy Visit)    INR=2.5  No changes or complications. Will continue same confirmed dose and recheck in 2 weeks. Praluent injection was at room temperature.and given in right thigh per patient. No complications noted.   Lynette E. Penfield, OKSANA

## 2018-12-28 ENCOUNTER — ANTICOAGULATION THERAPY VISIT (OUTPATIENT)
Dept: CARDIOLOGY | Facility: CLINIC | Age: 83
End: 2018-12-28
Payer: COMMERCIAL

## 2018-12-28 DIAGNOSIS — I51.3 LEFT VENTRICULAR THROMBUS: ICD-10-CM

## 2018-12-28 DIAGNOSIS — Z79.01 LONG TERM CURRENT USE OF ANTICOAGULANTS WITH INR GOAL OF 2.0-3.0: ICD-10-CM

## 2018-12-28 DIAGNOSIS — G45.9 TRANSIENT CEREBRAL ISCHEMIA: ICD-10-CM

## 2018-12-28 LAB — INR POINT OF CARE: 2.2 (ref 0.86–1.14)

## 2018-12-28 PROCEDURE — 85610 PROTHROMBIN TIME: CPT | Mod: QW | Performed by: INTERNAL MEDICINE

## 2018-12-28 PROCEDURE — 36416 COLLJ CAPILLARY BLOOD SPEC: CPT | Performed by: INTERNAL MEDICINE

## 2018-12-28 NOTE — PROGRESS NOTES
ANTICOAGULATION FOLLOW-UP CLINIC VISIT    Patient Name:  Sawyer Renteria  Date:  2018  Contact Type:  Face to Face    SUBJECTIVE:     Patient Findings     Positives:   No Problem Findings           OBJECTIVE    INR Protime   Date Value Ref Range Status   2018 2.2 (A) 0.86 - 1.14 Final       ASSESSMENT / PLAN  INR assessment THER    Recheck INR In: 2 WEEKS    INR Location Clinic      Anticoagulation Summary  As of 2018    INR goal:   2.0-3.0   TTR:   67.4 % (2.5 y)   INR used for dosin.2 (2018)   Warfarin maintenance plan:   2.5 mg (5 mg x 0.5) every Mon, Wed, Fri; 5 mg (5 mg x 1) all other days   Full warfarin instructions:   2.5 mg every Mon, Wed, Fri; 5 mg all other days   Weekly warfarin total:   27.5 mg   No change documented:   Donna Colby RN   Plan last modified:   Donna Colby RN (2018)   Next INR check:   2019   Target end date:   Indefinite    Indications    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]  Transient cerebral ischemia [G45.9]  Long term current use of anticoagulants with INR goal of 2.0-3.0 [Z79.01]             Anticoagulation Episode Summary     INR check location:       Preferred lab:       Send INR reminders to:   [facility] HEART INR NURSE    Comments:         Anticoagulation Care Providers     Provider Role Specialty Phone number    Satya Newton MD Responsible Cardiology [phone]            See the Encounter Report to view Anticoagulation Flowsheet and Dosing Calendar (Go to Encounters tab in chart review, and find the Anticoagulation Therapy Visit)    INR 2.2 No change in meds or diet. No abnormal bleeding or bruising. Will continue with current dosing of 2.5 mg MWF and 5 mg all other days with recheck in 2 weeks. He administered Praluent into his left thigh.    Donna Colby RN

## 2019-01-11 ENCOUNTER — ANTICOAGULATION THERAPY VISIT (OUTPATIENT)
Dept: CARDIOLOGY | Facility: CLINIC | Age: 84
End: 2019-01-11
Payer: MEDICARE

## 2019-01-11 DIAGNOSIS — I51.3 LEFT VENTRICULAR THROMBUS: ICD-10-CM

## 2019-01-11 DIAGNOSIS — G45.9 TRANSIENT CEREBRAL ISCHEMIA: ICD-10-CM

## 2019-01-11 DIAGNOSIS — Z79.01 LONG TERM CURRENT USE OF ANTICOAGULANTS WITH INR GOAL OF 2.0-3.0: ICD-10-CM

## 2019-01-11 LAB — INR POINT OF CARE: 2.3 (ref 0.86–1.14)

## 2019-01-11 PROCEDURE — 36416 COLLJ CAPILLARY BLOOD SPEC: CPT | Performed by: INTERNAL MEDICINE

## 2019-01-11 PROCEDURE — 85610 PROTHROMBIN TIME: CPT | Mod: QW | Performed by: INTERNAL MEDICINE

## 2019-01-11 NOTE — PROGRESS NOTES
ANTICOAGULATION FOLLOW-UP CLINIC VISIT    Patient Name:  Sawyer Renteria  Date:  2019  Contact Type:  Face to Face    SUBJECTIVE:     Patient Findings     Positives:   No Problem Findings           OBJECTIVE    INR Protime   Date Value Ref Range Status   2019 2.3 (A) 0.86 - 1.14 Final       ASSESSMENT / PLAN  INR assessment THER    Recheck INR In: 2 WEEKS    INR Location Clinic      Anticoagulation Summary  As of 2019    INR goal:   2.0-3.0   TTR:   67.9 % (2.6 y)   INR used for dosin.3 (2019)   Warfarin maintenance plan:   2.5 mg (5 mg x 0.5) every Mon, Wed, Fri; 5 mg (5 mg x 1) all other days   Full warfarin instructions:   2.5 mg every Mon, Wed, Fri; 5 mg all other days   Weekly warfarin total:   27.5 mg   Plan last modified:   Donna Colby RN (2018)   Next INR check:   2019   Target end date:   Indefinite    Indications    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]  Transient cerebral ischemia [G45.9]  Long term current use of anticoagulants with INR goal of 2.0-3.0 [Z79.01]             Anticoagulation Episode Summary     INR check location:       Preferred lab:       Send INR reminders to:   [facility] HEART INR NURSE    Comments:         Anticoagulation Care Providers     Provider Role Specialty Phone number    Satya Newton MD Responsible Cardiology [phone]            See the Encounter Report to view Anticoagulation Flowsheet and Dosing Calendar (Go to Encounters tab in chart review, and find the Anticoagulation Therapy Visit)    INR 2.3 No changes to dosing. Denies unusual bleeding or bruising. No diet or medication changes. Pt gave Praluent dose in right thigh. Next appt in 2 weeks. Dosage adjustment made based on physician directed care plan.    Kasey Reyes, RN

## 2019-01-28 ENCOUNTER — ANTICOAGULATION THERAPY VISIT (OUTPATIENT)
Dept: CARDIOLOGY | Facility: CLINIC | Age: 84
End: 2019-01-28
Payer: MEDICARE

## 2019-01-28 ENCOUNTER — HOSPITAL ENCOUNTER (OUTPATIENT)
Dept: CARDIOLOGY | Facility: CLINIC | Age: 84
Discharge: HOME | End: 2019-01-28
Attending: NURSE PRACTITIONER | Admitting: NURSE PRACTITIONER
Payer: MEDICARE

## 2019-01-28 DIAGNOSIS — I25.10 CORONARY ARTERY DISEASE INVOLVING NATIVE CORONARY ARTERY OF NATIVE HEART WITHOUT ANGINA PECTORIS: ICD-10-CM

## 2019-01-28 DIAGNOSIS — I25.5 CARDIOMYOPATHY, ISCHEMIC: ICD-10-CM

## 2019-01-28 DIAGNOSIS — I51.3 LEFT VENTRICULAR THROMBUS: ICD-10-CM

## 2019-01-28 DIAGNOSIS — G45.9 TRANSIENT CEREBRAL ISCHEMIA: ICD-10-CM

## 2019-01-28 DIAGNOSIS — Z79.01 LONG TERM CURRENT USE OF ANTICOAGULANTS WITH INR GOAL OF 2.0-3.0: ICD-10-CM

## 2019-01-28 DIAGNOSIS — I10 ESSENTIAL HYPERTENSION: ICD-10-CM

## 2019-01-28 LAB
ALT SERPL W P-5'-P-CCNC: <5 U/L (ref 5–30)
ANION GAP SERPL CALCULATED.3IONS-SCNC: 11 MMOL/L (ref 6–17)
BUN SERPL-MCNC: 30 MG/DL (ref 7–30)
CALCIUM SERPL-MCNC: 9.3 MG/DL (ref 8.5–10.5)
CHLORIDE SERPL-SCNC: 109 MMOL/L (ref 98–107)
CHOLEST SERPL-MCNC: 162 MG/DL
CO2 SERPL-SCNC: 25 MMOL/L (ref 23–29)
CREAT SERPL-MCNC: 1.5 MG/DL (ref 0.7–1.3)
GFR SERPL CREATININE-BSD FRML MDRD: 45 ML/MIN/{1.73_M2}
GLUCOSE SERPL-MCNC: 108 MG/DL (ref 70–105)
HDLC SERPL-MCNC: 60 MG/DL
INR POINT OF CARE: 2.9 (ref 0.86–1.14)
LDLC SERPL CALC-MCNC: 77 MG/DL
NONHDLC SERPL-MCNC: 102 MG/DL
POTASSIUM SERPL-SCNC: 4 MMOL/L (ref 3.5–5.1)
SODIUM SERPL-SCNC: 141 MMOL/L (ref 136–145)
TRIGL SERPL-MCNC: 124 MG/DL

## 2019-01-28 PROCEDURE — 93306 TTE W/DOPPLER COMPLETE: CPT | Mod: 26 | Performed by: INTERNAL MEDICINE

## 2019-01-28 PROCEDURE — 25500064 ZZH RX 255 OP 636: Performed by: NURSE PRACTITIONER

## 2019-01-28 PROCEDURE — 36416 COLLJ CAPILLARY BLOOD SPEC: CPT | Performed by: INTERNAL MEDICINE

## 2019-01-28 PROCEDURE — 80061 LIPID PANEL: CPT | Performed by: INTERNAL MEDICINE

## 2019-01-28 PROCEDURE — 80048 BASIC METABOLIC PNL TOTAL CA: CPT | Performed by: INTERNAL MEDICINE

## 2019-01-28 PROCEDURE — 84460 ALANINE AMINO (ALT) (SGPT): CPT | Performed by: INTERNAL MEDICINE

## 2019-01-28 PROCEDURE — 85610 PROTHROMBIN TIME: CPT | Mod: QW | Performed by: INTERNAL MEDICINE

## 2019-01-28 PROCEDURE — 40000264 ECHOCARDIOGRAM COMPLETE

## 2019-01-28 RX ADMIN — HUMAN ALBUMIN MICROSPHERES AND PERFLUTREN 3 ML: 10; .22 INJECTION, SOLUTION INTRAVENOUS at 10:54

## 2019-01-28 NOTE — PROGRESS NOTES
ANTICOAGULATION FOLLOW-UP CLINIC VISIT    Patient Name:  Sawyer Renteria  Date:  2019  Contact Type:  Face to Face    SUBJECTIVE:     Patient Findings     Positives:   No Problem Findings           OBJECTIVE    INR Protime   Date Value Ref Range Status   2019 2.9 (A) 0.86 - 1.14 Final       ASSESSMENT / PLAN  INR assessment THER    Recheck INR In: 2 WEEKS    INR Location Clinic      Anticoagulation Summary  As of 2019    INR goal:   2.0-3.0   TTR:   68.5 % (2.6 y)   INR used for dosin.9 (2019)   Warfarin maintenance plan:   2.5 mg (5 mg x 0.5) every Mon, Wed, Fri; 5 mg (5 mg x 1) all other days   Full warfarin instructions:   2.5 mg every Mon, Wed, Fri; 5 mg all other days   Weekly warfarin total:   27.5 mg   No change documented:   Tayla Antonio RN   Plan last modified:   Donna Colby RN (2018)   Next INR check:   2019   Target end date:   Indefinite    Indications    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9.  He gave himself the praulent injection in his left thigh.  No med changes.  Seeing Dr. Newton on Thursday.  Continue same schedule and recheck INR in 2 weeks per patient request.  Turner Antonio, RN

## 2019-01-31 ENCOUNTER — OFFICE VISIT (OUTPATIENT)
Dept: CARDIOLOGY | Facility: CLINIC | Age: 84
End: 2019-01-31
Attending: NURSE PRACTITIONER
Payer: MEDICARE

## 2019-01-31 VITALS
HEART RATE: 92 BPM | WEIGHT: 128.3 LBS | SYSTOLIC BLOOD PRESSURE: 128 MMHG | HEIGHT: 61 IN | DIASTOLIC BLOOD PRESSURE: 66 MMHG | BODY MASS INDEX: 24.22 KG/M2

## 2019-01-31 DIAGNOSIS — E78.2 MIXED HYPERLIPIDEMIA: ICD-10-CM

## 2019-01-31 DIAGNOSIS — I25.10 CORONARY ARTERY DISEASE INVOLVING NATIVE CORONARY ARTERY OF NATIVE HEART WITHOUT ANGINA PECTORIS: ICD-10-CM

## 2019-01-31 DIAGNOSIS — N18.30 CHRONIC RENAL IMPAIRMENT, STAGE 3 (MODERATE) (H): ICD-10-CM

## 2019-01-31 DIAGNOSIS — I10 ESSENTIAL HYPERTENSION: ICD-10-CM

## 2019-01-31 DIAGNOSIS — G45.9 TRANSIENT CEREBRAL ISCHEMIA: ICD-10-CM

## 2019-01-31 DIAGNOSIS — I25.5 CARDIOMYOPATHY, ISCHEMIC: Primary | ICD-10-CM

## 2019-01-31 DIAGNOSIS — I23.6 LV (LEFT VENTRICULAR) MURAL THROMBUS FOLLOWING MI (H): ICD-10-CM

## 2019-01-31 PROCEDURE — 99214 OFFICE O/P EST MOD 30 MIN: CPT | Performed by: INTERNAL MEDICINE

## 2019-01-31 RX ORDER — WARFARIN SODIUM 5 MG/1
TABLET ORAL
Qty: 90 TABLET | Refills: 1 | Status: SHIPPED | OUTPATIENT
Start: 2019-01-31 | End: 2019-02-04

## 2019-01-31 ASSESSMENT — MIFFLIN-ST. JEOR: SCORE: 1139.3

## 2019-01-31 NOTE — LETTER
1/31/2019    Francisco Doshi MD  [address]  [facility] [address]    RE: Sawyer Renteria       Dear Colleague,    I had the pleasure of seeing Sawyer Renteria in the [facility].    HPI and Plan:   Sawyer Renteria is a 84 year old male followed here by Dr. Newton. He has a history of an anterior wall MI in 2006 resulting in an LAD stent and angioplasty to the diagonal. 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 Ejection fraction has waxed and waned a bit, being  45-50% by echocardiogram  on recent echocardiogram with an ongoing apical wall motion abnormality.    This is unchanged from prior study.  His last stress nuclear study revealed an anterior, anteroapical scar without ischemia.  Ejection fraction was 43% at rest 39% post stress.     He has statin intolerant and has been on Praluent which he tolerates well. His lipids look much improved with total cholesterol 164, HDL60, LDL 77.  He has intolerance to statins.     He denies any chest pain or shortness of breath.  He exercises fairly regularly.  His main complaint is frequent urination.  I have asked him to discuss with his primary care physician and get a referral to the urologist.    He has chronic renal insufficiency with creatinine stable at 1.54.     Because of previous apical thrombus, he remains on long-term Coumadin.    Exam  Below              Impression/Plan:       1. Coronary artery disease with anterior wall MI status post LAD stenting with the balloon angioplasty to the diagonal branch. Ejection fraction is stable at 45-50%.  With no evidence of ischemia on a nuclear stress test recently.  An echocardiogram couple days ago reveals a stable ejection fraction of 45-50% I reviewed the results with him.    2.  Ischemic cardiomyopathy with no heart failure symptoms currently.     3. 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  He uses brand name warfarin.       4. Dyslipidemia with statin intolerance. He is tolerating Praluent with HDL of 60 and LDL of 77.     5.  Hypertension  - improved -->continue amlodipine, Toprol and lisinopril     6.  Chronic kidney disease  -Overall stable   - follow-up with Dr. Oneill    7.  Likely benign prostate hypertrophy  Have asked him to discuss with his primary care physician and get a referral to urologist.        He will return to see my nurse practitioner in 6 months and with me in a year.  Thank you for allowing us to participate in the care of this nice patient.    Sincerely,    Satya Newton MD    Orders Placed This Encounter   Procedures     Basic metabolic panel     Follow-Up with Cardiac Advanced Practice Provider       No orders of the defined types were placed in this encounter.      There are no discontinued medications.      Encounter Diagnoses   Name Primary?     Coronary artery disease involving native coronary artery of native heart without angina pectoris      Cardiomyopathy, ischemic Yes     Essential hypertension      Mixed hyperlipidemia      Chronic renal impairment, stage 3 (moderate) (H)        CURRENT MEDICATIONS:  Current Outpatient Medications   Medication Sig Dispense Refill     alirocumab (PRALUENT) 75 MG/ML injectable pen Inject 1 mL (75 mg) Subcutaneous every 14 days 2 mL 11     amLODIPine (NORVASC) 2.5 MG tablet Take 1 tablet (2.5 mg) by mouth daily 90 tablet 0     aspirin 81 MG tablet Take 81 mg by mouth daily       Cholecalciferol (VITAMIN D3) 2000 UNITS CAPS Take by mouth daily       dutasteride (AVODART) 0.5 MG capsule Take 1 capsule (0.5 mg) by mouth daily 30 capsule 11     Fish Oil-Cholecalciferol (FISH OIL + D3) 7043-7020 MG-UNIT CAPS Take 2 tablets by mouth daily       lisinopril (PRINIVIL/ZESTRIL) 2.5 MG tablet One tablet twice per day 180 tablet 0     melatonin 5 MG tablet Take 5 mg by mouth nightly as needed for sleep       metoprolol succinate (TOPROL-XL) 25 MG 24  hr tablet Take 0.5 tablets (12.5 mg) by mouth daily 45 tablet 1     nitroGLYcerin (NITROSTAT) 0.4 MG sublingual tablet Place 1 tablet (0.4 mg) under the tongue every 5 minutes as needed for chest pain Take as directed 25 tablet 3     warfarin (COUMADIN) 5 MG tablet Take 1/2 tab on Mondays, Wednesdays and Fridays and 1 tab all other days or as directed by INR clinic, Coumadin SHIKHA 90 tablet 1       ALLERGIES     Allergies   Allergen Reactions     Flomax [Tamsulosin]      Weakness and dizzyness     Aldactone [Spironolactone]      High potassium and worsening creat when on lisinopril and spironalactone     Carvedilol      dizziness     Colesevelam      Epigastric pain     Ezetimibe      Lisinopril Itching     Hyperkalemia and inc. Creat. At 20 mg daily, itching , skin red when dose goes above 2.5 mg a day--elevated creat      Pravastatin      Abdominal pain     Rosuvastatin      Simvastatin        PAST MEDICAL HISTORY:  Past Medical History:   Diagnosis Date     BPH (benign prostatic hyperplasia)      Cardiomyopathy, ischemic     EF 49% by cardiac MRI 1/15/2014     Coronary artery disease 1/24/06    Cypher CANDIDA to LAD     Gastro-oesophageal reflux disease      Hyperlipidaemia      Hypertension      Left ventricular thrombus      Myocardial infarction (H) 1/2006    Anterior     TIA (transient ischaemic attack)        PAST SURGICAL HISTORY:  Past Surgical History:   Procedure Laterality Date     CORONARY ANGIOGRAPHY ADULT ORDER  1/24/06    Cypher CANDIDA to LAD     HEART CATH, ANGIOPLASTY  1/2006    Cypher CANDIDA to LAD     TONSILLECTOMY & ADENOIDECTOMY         FAMILY HISTORY:  Family History   Problem Relation Age of Onset     Heart Disease Mother         heart failure     Heart Disease Brother        SOCIAL HISTORY:  Social History     Socioeconomic History     Marital status:      Spouse name: None     Number of children: None     Years of education: None     Highest education level: None   Social Needs     Financial  "resource strain: None     Food insecurity - worry: None     Food insecurity - inability: None     Transportation needs - medical: None     Transportation needs - non-medical: None   Occupational History     None   Tobacco Use     Smoking status: Never Smoker     Smokeless tobacco: Never Used   Substance and Sexual Activity     Alcohol use: No     Drug use: None     Sexual activity: None   Other Topics Concern     Parent/sibling w/ CABG, MI or angioplasty before 65F 55M? No      Service Not Asked     Blood Transfusions Not Asked     Caffeine Concern No     Occupational Exposure Not Asked     Hobby Hazards Not Asked     Sleep Concern No     Stress Concern No     Weight Concern Yes     Comment: weight loss     Special Diet No     Back Care Not Asked     Exercise Yes     Comment: bicycle 10 min, walking, 3 days week     Bike Helmet Not Asked     Seat Belt Yes     Self-Exams Not Asked   Social History Narrative     None       Review of Systems:  Skin:  Negative       Eyes:  Positive for glasses    ENT:  Negative      Respiratory:  Negative       Cardiovascular:  Negative;palpitations;chest pain;syncope or near-syncope;cyanosis;lightheadedness;dizziness;fatigue;edema      Gastroenterology: Negative      Genitourinary:  Positive for nocturia;urinary frequency    Musculoskeletal:  Negative      Neurologic:  Negative      Psychiatric:  Positive for sleep disturbances    Heme/Lymph/Imm:  Positive for allergies    Endocrine:  Negative        Physical Exam:  Vitals: /66 (BP Location: Left arm, Cuff Size: Adult Regular)   Pulse 92   Ht 1.556 m (5' 1.25\")   Wt 58.2 kg (128 lb 4.8 oz)   BMI 24.04 kg/m       Constitutional:  well developed        Skin:  warm and dry to the touch          Head:  normocephalic, no masses or lesions        Eyes:  pupils equal and round        Lymph:      ENT:  no pallor or cyanosis        Neck:  carotid pulses are full and equal bilaterally        Respiratory:  clear to auscultation  "        Cardiac: normal S1 and S2   S4 no presence of murmur systolic murmur;grade 2;apical        not assessed this visit                                        GI:  not assessed this visit        Extremities and Muscular Skeletal:  no edema         ecchymosis over left flank following fall at home    Neurological:  no gross motor deficits        Psych:  Alert and Oriented x 3        CC  Randell Izaguirre, APRN CNP  [address]  [place] [address]                Thank you for allowing me to participate in the care of your patient.      Sincerely,     Satya Newton MD     [facility]    cc:   Randell Izaguirre, JAE CNP  [address]  [place] [address]

## 2019-01-31 NOTE — PROGRESS NOTES
HPI and Plan:   Sawyer Renteria is a 84 year old male followed here by Dr. Newton. He has a history of an anterior wall MI in 2006 resulting in an LAD stent and angioplasty to the diagonal. 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 Ejection fraction has waxed and waned a bit, being  45-50% by echocardiogram on recent echocardiogram with an ongoing apical wall motion abnormality.   This is unchanged from prior study.  His last stress nuclear study revealed an anterior, anteroapical scar without ischemia.  Ejection fraction was 43% at rest 39% post stress.     He has statin intolerant and has been on Praluent which he tolerates well. His lipids look much improved with total cholesterol 164, HDL60, LDL 77.  He has intolerance to statins.     He denies any chest pain or shortness of breath.  He exercises fairly regularly.  His main complaint is frequent urination.  I have asked him to discuss with his primary care physician and get a referral to the urologist.    He has chronic renal insufficiency with creatinine stable at 1.54.     Because of previous apical thrombus, he remains on long-term Coumadin.    Exam  Below              Impression/Plan:       1. Coronary artery disease with anterior wall MI status post LAD stenting with the balloon angioplasty to the diagonal branch. Ejection fraction is stable at 45-50%.  With no evidence of ischemia on a nuclear stress test recently.  An echocardiogram couple days ago reveals a stable ejection fraction of 45-50% I reviewed the results with him.    2.  Ischemic cardiomyopathy with no heart failure symptoms currently.     3. 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 He uses brand name warfarin.       4. Dyslipidemia with statin intolerance. He is tolerating Praluent with HDL of 60 and LDL of 77.     5.  Hypertension  - improved -->continue amlodipine, Toprol and lisinopril     6.   Chronic kidney disease  -Overall stable   - follow-up with Dr. Oneill    7.  Likely benign prostate hypertrophy  Have asked him to discuss with his primary care physician and get a referral to urologist.        He will return to see my nurse practitioner in 6 months and with me in a year.  Thank you for allowing us to participate in the care of this nice patient.    Sincerely,    Satya Newton MD    Orders Placed This Encounter   Procedures     Basic metabolic panel     Follow-Up with Cardiac Advanced Practice Provider       No orders of the defined types were placed in this encounter.      There are no discontinued medications.      Encounter Diagnoses   Name Primary?     Coronary artery disease involving native coronary artery of native heart without angina pectoris      Cardiomyopathy, ischemic Yes     Essential hypertension      Mixed hyperlipidemia      Chronic renal impairment, stage 3 (moderate) (H)        CURRENT MEDICATIONS:  Current Outpatient Medications   Medication Sig Dispense Refill     alirocumab (PRALUENT) 75 MG/ML injectable pen Inject 1 mL (75 mg) Subcutaneous every 14 days 2 mL 11     amLODIPine (NORVASC) 2.5 MG tablet Take 1 tablet (2.5 mg) by mouth daily 90 tablet 0     aspirin 81 MG tablet Take 81 mg by mouth daily       Cholecalciferol (VITAMIN D3) 2000 UNITS CAPS Take by mouth daily       dutasteride (AVODART) 0.5 MG capsule Take 1 capsule (0.5 mg) by mouth daily 30 capsule 11     Fish Oil-Cholecalciferol (FISH OIL + D3) 2419-8159 MG-UNIT CAPS Take 2 tablets by mouth daily       lisinopril (PRINIVIL/ZESTRIL) 2.5 MG tablet One tablet twice per day 180 tablet 0     melatonin 5 MG tablet Take 5 mg by mouth nightly as needed for sleep       metoprolol succinate (TOPROL-XL) 25 MG 24 hr tablet Take 0.5 tablets (12.5 mg) by mouth daily 45 tablet 1     nitroGLYcerin (NITROSTAT) 0.4 MG sublingual tablet Place 1 tablet (0.4 mg) under the tongue every 5 minutes as needed for chest pain Take as directed 25  tablet 3     warfarin (COUMADIN) 5 MG tablet Take 1/2 tab on Mondays, Wednesdays and Fridays and 1 tab all other days or as directed by INR clinic, Coumadin SHIKHA 90 tablet 1       ALLERGIES     Allergies   Allergen Reactions     Flomax [Tamsulosin]      Weakness and dizzyness     Aldactone [Spironolactone]      High potassium and worsening creat when on lisinopril and spironalactone     Carvedilol      dizziness     Colesevelam      Epigastric pain     Ezetimibe      Lisinopril Itching     Hyperkalemia and inc. Creat. At 20 mg daily, itching , skin red when dose goes above 2.5 mg a day--elevated creat      Pravastatin      Abdominal pain     Rosuvastatin      Simvastatin        PAST MEDICAL HISTORY:  Past Medical History:   Diagnosis Date     BPH (benign prostatic hyperplasia)      Cardiomyopathy, ischemic     EF 49% by cardiac MRI 1/15/2014     Coronary artery disease 1/24/06    Cypher CANDIDA to LAD     Gastro-oesophageal reflux disease      Hyperlipidaemia      Hypertension      Left ventricular thrombus      Myocardial infarction (H) 1/2006    Anterior     TIA (transient ischaemic attack)        PAST SURGICAL HISTORY:  Past Surgical History:   Procedure Laterality Date     CORONARY ANGIOGRAPHY ADULT ORDER  1/24/06    Cypher CANDIDA to LAD     HEART CATH, ANGIOPLASTY  1/2006    Cypher CANDIDA to LAD     TONSILLECTOMY & ADENOIDECTOMY         FAMILY HISTORY:  Family History   Problem Relation Age of Onset     Heart Disease Mother         heart failure     Heart Disease Brother        SOCIAL HISTORY:  Social History     Socioeconomic History     Marital status:      Spouse name: None     Number of children: None     Years of education: None     Highest education level: None   Social Needs     Financial resource strain: None     Food insecurity - worry: None     Food insecurity - inability: None     Transportation needs - medical: None     Transportation needs - non-medical: None   Occupational History     None   Tobacco  "Use     Smoking status: Never Smoker     Smokeless tobacco: Never Used   Substance and Sexual Activity     Alcohol use: No     Drug use: None     Sexual activity: None   Other Topics Concern     Parent/sibling w/ CABG, MI or angioplasty before 65F 55M? No      Service Not Asked     Blood Transfusions Not Asked     Caffeine Concern No     Occupational Exposure Not Asked     Hobby Hazards Not Asked     Sleep Concern No     Stress Concern No     Weight Concern Yes     Comment: weight loss     Special Diet No     Back Care Not Asked     Exercise Yes     Comment: bicycle 10 min, walking, 3 days week     Bike Helmet Not Asked     Seat Belt Yes     Self-Exams Not Asked   Social History Narrative     None       Review of Systems:  Skin:  Negative       Eyes:  Positive for glasses    ENT:  Negative      Respiratory:  Negative       Cardiovascular:  Negative;palpitations;chest pain;syncope or near-syncope;cyanosis;lightheadedness;dizziness;fatigue;edema      Gastroenterology: Negative      Genitourinary:  Positive for nocturia;urinary frequency    Musculoskeletal:  Negative      Neurologic:  Negative      Psychiatric:  Positive for sleep disturbances    Heme/Lymph/Imm:  Positive for allergies    Endocrine:  Negative        Physical Exam:  Vitals: /66 (BP Location: Left arm, Cuff Size: Adult Regular)   Pulse 92   Ht 1.556 m (5' 1.25\")   Wt 58.2 kg (128 lb 4.8 oz)   BMI 24.04 kg/m      Constitutional:  well developed        Skin:  warm and dry to the touch          Head:  normocephalic, no masses or lesions        Eyes:  pupils equal and round        Lymph:      ENT:  no pallor or cyanosis        Neck:  carotid pulses are full and equal bilaterally        Respiratory:  clear to auscultation         Cardiac: normal S1 and S2   S4 no presence of murmur systolic murmur;grade 2;apical        not assessed this visit                                        GI:  not assessed this visit        Extremities and Muscular " Skeletal:  no edema         ecchymosis over left flank following fall at home    Neurological:  no gross motor deficits        Psych:  Alert and Oriented x 3        CC  JAE Jones CNP  [address]  [place] [id number]

## 2019-01-31 NOTE — LETTER
1/31/2019    Francisco Doshi MD  [address]  [facility] [address]    RE: Sawyer Renteria       Dear Colleague,    I had the pleasure of seeing Sawyer Renteria in the [facility].    HPI and Plan:   Sawyer Renteria is a 84 year old male followed here by Dr. Newton. He has a history of an anterior wall MI in 2006 resulting in an LAD stent and angioplasty to the diagonal. 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 Ejection fraction has waxed and waned a bit, being  45-50% by echocardiogram  on recent echocardiogram with an ongoing apical wall motion abnormality.    This is unchanged from prior study.  His last stress nuclear study revealed an anterior, anteroapical scar without ischemia.  Ejection fraction was 43% at rest 39% post stress.     He has statin intolerant and has been on Praluent which he tolerates well. His lipids look much improved with total cholesterol 164, HDL60, LDL 77.  He has intolerance to statins.     He denies any chest pain or shortness of breath.  He exercises fairly regularly.  His main complaint is frequent urination.  I have asked him to discuss with his primary care physician and get a referral to the urologist.    He has chronic renal insufficiency with creatinine stable at 1.54.     Because of previous apical thrombus, he remains on long-term Coumadin.    Exam  Below              Impression/Plan:       1. Coronary artery disease with anterior wall MI status post LAD stenting with the balloon angioplasty to the diagonal branch. Ejection fraction is stable at 45-50%.  With no evidence of ischemia on a nuclear stress test recently.  An echocardiogram couple days ago reveals a stable ejection fraction of 45-50% I reviewed the results with him.    2.  Ischemic cardiomyopathy with no heart failure symptoms currently.     3. 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  He uses brand name warfarin.       4. Dyslipidemia with statin intolerance. He is tolerating Praluent with HDL of 60 and LDL of 77.     5.  Hypertension  - improved -->continue amlodipine, Toprol and lisinopril     6.  Chronic kidney disease  -Overall stable   - follow-up with Dr. Oneill    7.  Likely benign prostate hypertrophy  Have asked him to discuss with his primary care physician and get a referral to urologist.        He will return to see my nurse practitioner in 6 months and with me in a year.  Thank you for allowing us to participate in the care of this nice patient.    Sincerely,    Satya Newton MD    Orders Placed This Encounter   Procedures     Basic metabolic panel     Follow-Up with Cardiac Advanced Practice Provider       No orders of the defined types were placed in this encounter.      There are no discontinued medications.      Encounter Diagnoses   Name Primary?     Coronary artery disease involving native coronary artery of native heart without angina pectoris      Cardiomyopathy, ischemic Yes     Essential hypertension      Mixed hyperlipidemia      Chronic renal impairment, stage 3 (moderate) (H)        CURRENT MEDICATIONS:  Current Outpatient Medications   Medication Sig Dispense Refill     alirocumab (PRALUENT) 75 MG/ML injectable pen Inject 1 mL (75 mg) Subcutaneous every 14 days 2 mL 11     amLODIPine (NORVASC) 2.5 MG tablet Take 1 tablet (2.5 mg) by mouth daily 90 tablet 0     aspirin 81 MG tablet Take 81 mg by mouth daily       Cholecalciferol (VITAMIN D3) 2000 UNITS CAPS Take by mouth daily       dutasteride (AVODART) 0.5 MG capsule Take 1 capsule (0.5 mg) by mouth daily 30 capsule 11     Fish Oil-Cholecalciferol (FISH OIL + D3) 7360-4489 MG-UNIT CAPS Take 2 tablets by mouth daily       lisinopril (PRINIVIL/ZESTRIL) 2.5 MG tablet One tablet twice per day 180 tablet 0     melatonin 5 MG tablet Take 5 mg by mouth nightly as needed for sleep       metoprolol succinate (TOPROL-XL) 25 MG 24  hr tablet Take 0.5 tablets (12.5 mg) by mouth daily 45 tablet 1     nitroGLYcerin (NITROSTAT) 0.4 MG sublingual tablet Place 1 tablet (0.4 mg) under the tongue every 5 minutes as needed for chest pain Take as directed 25 tablet 3     warfarin (COUMADIN) 5 MG tablet Take 1/2 tab on Mondays, Wednesdays and Fridays and 1 tab all other days or as directed by INR clinic, Coumadin SHIKHA 90 tablet 1       ALLERGIES     Allergies   Allergen Reactions     Flomax [Tamsulosin]      Weakness and dizzyness     Aldactone [Spironolactone]      High potassium and worsening creat when on lisinopril and spironalactone     Carvedilol      dizziness     Colesevelam      Epigastric pain     Ezetimibe      Lisinopril Itching     Hyperkalemia and inc. Creat. At 20 mg daily, itching , skin red when dose goes above 2.5 mg a day--elevated creat      Pravastatin      Abdominal pain     Rosuvastatin      Simvastatin        PAST MEDICAL HISTORY:  Past Medical History:   Diagnosis Date     BPH (benign prostatic hyperplasia)      Cardiomyopathy, ischemic     EF 49% by cardiac MRI 1/15/2014     Coronary artery disease 1/24/06    Cypher CANDIDA to LAD     Gastro-oesophageal reflux disease      Hyperlipidaemia      Hypertension      Left ventricular thrombus      Myocardial infarction (H) 1/2006    Anterior     TIA (transient ischaemic attack)        PAST SURGICAL HISTORY:  Past Surgical History:   Procedure Laterality Date     CORONARY ANGIOGRAPHY ADULT ORDER  1/24/06    Cypher CANDIDA to LAD     HEART CATH, ANGIOPLASTY  1/2006    Cypher CANDIDA to LAD     TONSILLECTOMY & ADENOIDECTOMY         FAMILY HISTORY:  Family History   Problem Relation Age of Onset     Heart Disease Mother         heart failure     Heart Disease Brother        SOCIAL HISTORY:  Social History     Socioeconomic History     Marital status:      Spouse name: None     Number of children: None     Years of education: None     Highest education level: None   Social Needs     Financial  "resource strain: None     Food insecurity - worry: None     Food insecurity - inability: None     Transportation needs - medical: None     Transportation needs - non-medical: None   Occupational History     None   Tobacco Use     Smoking status: Never Smoker     Smokeless tobacco: Never Used   Substance and Sexual Activity     Alcohol use: No     Drug use: None     Sexual activity: None   Other Topics Concern     Parent/sibling w/ CABG, MI or angioplasty before 65F 55M? No      Service Not Asked     Blood Transfusions Not Asked     Caffeine Concern No     Occupational Exposure Not Asked     Hobby Hazards Not Asked     Sleep Concern No     Stress Concern No     Weight Concern Yes     Comment: weight loss     Special Diet No     Back Care Not Asked     Exercise Yes     Comment: bicycle 10 min, walking, 3 days week     Bike Helmet Not Asked     Seat Belt Yes     Self-Exams Not Asked   Social History Narrative     None       Review of Systems:  Skin:  Negative       Eyes:  Positive for glasses    ENT:  Negative      Respiratory:  Negative       Cardiovascular:  Negative;palpitations;chest pain;syncope or near-syncope;cyanosis;lightheadedness;dizziness;fatigue;edema      Gastroenterology: Negative      Genitourinary:  Positive for nocturia;urinary frequency    Musculoskeletal:  Negative      Neurologic:  Negative      Psychiatric:  Positive for sleep disturbances    Heme/Lymph/Imm:  Positive for allergies    Endocrine:  Negative        Physical Exam:  Vitals: /66 (BP Location: Left arm, Cuff Size: Adult Regular)   Pulse 92   Ht 1.556 m (5' 1.25\")   Wt 58.2 kg (128 lb 4.8 oz)   BMI 24.04 kg/m       Constitutional:  well developed        Skin:  warm and dry to the touch          Head:  normocephalic, no masses or lesions        Eyes:  pupils equal and round        Lymph:      ENT:  no pallor or cyanosis        Neck:  carotid pulses are full and equal bilaterally        Respiratory:  clear to auscultation  "        Cardiac: normal S1 and S2   S4 no presence of murmur systolic murmur;grade 2;apical        not assessed this visit                                        GI:  not assessed this visit        Extremities and Muscular Skeletal:  no edema         ecchymosis over left flank following fall at home    Neurological:  no gross motor deficits        Psych:  Alert and Oriented x 3        Thank you for allowing me to participate in the care of your patient.    Sincerely,     Satya Newton MD     [facility]

## 2019-02-04 DIAGNOSIS — G45.9 TRANSIENT CEREBRAL ISCHEMIA: ICD-10-CM

## 2019-02-04 DIAGNOSIS — I23.6 LV (LEFT VENTRICULAR) MURAL THROMBUS FOLLOWING MI (H): ICD-10-CM

## 2019-02-04 RX ORDER — WARFARIN SODIUM 5 MG/1
TABLET ORAL
Qty: 90 TABLET | Refills: 1 | Status: SHIPPED | OUTPATIENT
Start: 2019-02-04 | End: 2019-05-17

## 2019-02-11 ENCOUNTER — ANTICOAGULATION THERAPY VISIT (OUTPATIENT)
Dept: CARDIOLOGY | Facility: CLINIC | Age: 84
End: 2019-02-11
Payer: MEDICARE

## 2019-02-11 DIAGNOSIS — I51.3 LEFT VENTRICULAR THROMBUS: ICD-10-CM

## 2019-02-11 DIAGNOSIS — Z79.01 LONG TERM CURRENT USE OF ANTICOAGULANTS WITH INR GOAL OF 2.0-3.0: ICD-10-CM

## 2019-02-11 DIAGNOSIS — G45.9 TRANSIENT CEREBRAL ISCHEMIA: ICD-10-CM

## 2019-02-11 LAB — INR POINT OF CARE: 2.3 (ref 0.86–1.14)

## 2019-02-11 PROCEDURE — 36416 COLLJ CAPILLARY BLOOD SPEC: CPT | Performed by: INTERNAL MEDICINE

## 2019-02-11 PROCEDURE — 85610 PROTHROMBIN TIME: CPT | Mod: QW | Performed by: INTERNAL MEDICINE

## 2019-02-11 NOTE — PROGRESS NOTES
ANTICOAGULATION FOLLOW-UP CLINIC VISIT    Patient Name:  Sawyer Renteria  Date:  2019  Contact Type:  Face to Face    SUBJECTIVE:     Patient Findings     Positives:   Bleeding gums (Instructed to use a soft bristle toothbrush)           OBJECTIVE    INR Protime   Date Value Ref Range Status   2019 2.3 (A) 0.86 - 1.14 Final       ASSESSMENT / PLAN  INR assessment THER    Recheck INR In: 2 WEEKS    INR Location Clinic      Anticoagulation Summary  As of 2019    INR goal:   2.0-3.0   TTR:   68.9 % (2.7 y)   INR used for dosin.3 (2019)   Warfarin maintenance plan:   2.5 mg (5 mg x 0.5) every Mon, Wed, Fri; 5 mg (5 mg x 1) all other days   Full warfarin instructions:   2.5 mg every Mon, Wed, Fri; 5 mg all other days   Weekly warfarin total:   27.5 mg   No change documented:   Alexandra Rust RN   Plan last modified:   Donna Colby RN (2018)   Next INR check:   3/1/2019   Target end date:   Indefinite    Indications    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]  Transient cerebral ischemia [G45.9]  Long term current use of anticoagulants with INR goal of 2.0-3.0 [Z79.01]             Anticoagulation Episode Summary     INR check location:       Preferred lab:       Send INR reminders to:   [facility] HEART INR NURSE    Comments:         Anticoagulation Care Providers     Provider Role Specialty Phone number    Satya Newton MD Responsible Cardiology [phone]            See the Encounter Report to view Anticoagulation Flowsheet and Dosing Calendar (Go to Encounters tab in chart review, and find the Anticoagulation Therapy Visit)    INR 2.3 today. Denies any recent illness, medication changes or dietary changes. States eats greens once weekly. Does c/o some bleeding gums. Denies any abnormal bleeding or bruising. Instructed pt to use a soft bristle tooth brush. Verbalized understanding. Continue current dose of 2.5 mg M,W,F and 5 mg all other days of the week. Next INR recheck in 2.5 weeks  per his request.    Alexandra Rust RN

## 2019-03-01 ENCOUNTER — ANTICOAGULATION THERAPY VISIT (OUTPATIENT)
Dept: CARDIOLOGY | Facility: CLINIC | Age: 84
End: 2019-03-01
Payer: MEDICARE

## 2019-03-01 DIAGNOSIS — G45.9 TRANSIENT CEREBRAL ISCHEMIA: ICD-10-CM

## 2019-03-01 DIAGNOSIS — Z79.01 LONG TERM CURRENT USE OF ANTICOAGULANTS WITH INR GOAL OF 2.0-3.0: ICD-10-CM

## 2019-03-01 DIAGNOSIS — I51.3 LEFT VENTRICULAR THROMBUS: ICD-10-CM

## 2019-03-01 LAB — INR POINT OF CARE: 1.7 (ref 0.86–1.14)

## 2019-03-01 PROCEDURE — 99207 ZZC NO CHARGE NURSE ONLY: CPT | Performed by: INTERNAL MEDICINE

## 2019-03-01 PROCEDURE — 85610 PROTHROMBIN TIME: CPT | Mod: QW | Performed by: INTERNAL MEDICINE

## 2019-03-01 PROCEDURE — 36416 COLLJ CAPILLARY BLOOD SPEC: CPT | Performed by: INTERNAL MEDICINE

## 2019-03-14 DIAGNOSIS — I10 ESSENTIAL HYPERTENSION: ICD-10-CM

## 2019-03-14 DIAGNOSIS — I25.10 CORONARY ARTERY DISEASE INVOLVING NATIVE CORONARY ARTERY OF NATIVE HEART WITHOUT ANGINA PECTORIS: ICD-10-CM

## 2019-03-14 RX ORDER — AMLODIPINE BESYLATE 2.5 MG/1
2.5 TABLET ORAL DAILY
Qty: 90 TABLET | Refills: 0 | Status: SHIPPED | OUTPATIENT
Start: 2019-03-14 | End: 2019-05-16

## 2019-03-14 RX ORDER — LISINOPRIL 2.5 MG/1
TABLET ORAL
Qty: 180 TABLET | Refills: 3 | Status: SHIPPED | OUTPATIENT
Start: 2019-03-14 | End: 2020-01-13

## 2019-03-15 ENCOUNTER — ANTICOAGULATION THERAPY VISIT (OUTPATIENT)
Dept: CARDIOLOGY | Facility: CLINIC | Age: 84
End: 2019-03-15
Payer: MEDICARE

## 2019-03-15 DIAGNOSIS — I51.3 LEFT VENTRICULAR THROMBUS: ICD-10-CM

## 2019-03-15 DIAGNOSIS — G45.9 TRANSIENT CEREBRAL ISCHEMIA: ICD-10-CM

## 2019-03-15 DIAGNOSIS — Z79.01 LONG TERM CURRENT USE OF ANTICOAGULANTS WITH INR GOAL OF 2.0-3.0: ICD-10-CM

## 2019-03-15 LAB — INR POINT OF CARE: 2 (ref 0.86–1.14)

## 2019-03-15 PROCEDURE — 85610 PROTHROMBIN TIME: CPT | Mod: QW | Performed by: INTERNAL MEDICINE

## 2019-03-15 PROCEDURE — 36416 COLLJ CAPILLARY BLOOD SPEC: CPT | Performed by: INTERNAL MEDICINE

## 2019-03-15 NOTE — PROGRESS NOTES
ANTICOAGULATION FOLLOW-UP CLINIC VISIT    Patient Name:  Sawyer Renteria  Date:  3/15/2019  Contact Type:  Face to Face    SUBJECTIVE:     Patient Findings            OBJECTIVE    INR Protime   Date Value Ref Range Status   03/15/2019 2.0 (A) 0.86 - 1.14 Final       ASSESSMENT / PLAN  INR assessment THER    Recheck INR In: 2 WEEKS    INR Location Clinic      Anticoagulation Summary  As of 3/15/2019    INR goal:   2.0-3.0   TTR:   67.6 % (2.8 y)   INR used for dosin.0 (3/15/2019)   Warfarin maintenance plan:   2.5 mg (5 mg x 0.5) every Mon, Wed, Fri; 5 mg (5 mg x 1) all other days   Full warfarin instructions:   2.5 mg every Mon, Wed, Fri; 5 mg all other days   Weekly warfarin total:   27.5 mg   No change documented:   Donna Colby RN   Plan last modified:   Donna Colby RN (2018)   Next INR check:   3/29/2019   Target end date:   Indefinite    Indications    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]  Transient cerebral ischemia [G45.9]  Long term current use of anticoagulants with INR goal of 2.0-3.0 [Z79.01]             Anticoagulation Episode Summary     INR check location:       Preferred lab:       Send INR reminders to:   [facility] HEART INR NURSE    Comments:         Anticoagulation Care Providers     Provider Role Specialty Phone number    Satya Newton MD Responsible Cardiology [phone]            See the Encounter Report to view Anticoagulation Flowsheet and Dosing Calendar (Go to Encounters tab in chart review, and find the Anticoagulation Therapy Visit)    INR 2.0 INR back in range. No change in meds or diet. No abnormal bleeding or bruising. Will continue current dosing of 2.5 mg MWF and 5 mg all other days with recheck in 2 weeks per pt request. He administered his Praluent injection in his right thigh.     Donna Colby RN

## 2019-03-29 ENCOUNTER — ANTICOAGULATION THERAPY VISIT (OUTPATIENT)
Dept: CARDIOLOGY | Facility: CLINIC | Age: 84
End: 2019-03-29
Payer: MEDICARE

## 2019-03-29 DIAGNOSIS — G45.9 TRANSIENT CEREBRAL ISCHEMIA: ICD-10-CM

## 2019-03-29 DIAGNOSIS — I51.3 LEFT VENTRICULAR THROMBUS: ICD-10-CM

## 2019-03-29 DIAGNOSIS — Z79.01 LONG TERM CURRENT USE OF ANTICOAGULANTS WITH INR GOAL OF 2.0-3.0: ICD-10-CM

## 2019-03-29 LAB — INR POINT OF CARE: 1.5 (ref 0.86–1.14)

## 2019-03-29 PROCEDURE — 85610 PROTHROMBIN TIME: CPT | Mod: QW | Performed by: INTERNAL MEDICINE

## 2019-03-29 PROCEDURE — 36416 COLLJ CAPILLARY BLOOD SPEC: CPT | Performed by: INTERNAL MEDICINE

## 2019-03-29 NOTE — PROGRESS NOTES
ANTICOAGULATION FOLLOW-UP CLINIC VISIT    Patient Name:  Sawyer Renteria  Date:  3/29/2019  Contact Type:  Face to Face    SUBJECTIVE:     Patient Findings     Positives:   Missed doses (missed a 5 mg dose this week), Change in diet/appetite (ate more broccoli this week)           OBJECTIVE    INR Protime   Date Value Ref Range Status   2019 1.5 (A) 0.86 - 1.14 Final       ASSESSMENT / PLAN  INR assessment SUB    Recheck INR In: 2 WEEKS    INR Location Clinic      Anticoagulation Summary  As of 3/29/2019    INR goal:   2.0-3.0   TTR:   66.7 % (2.8 y)   INR used for dosin.5! (3/29/2019)   Warfarin maintenance plan:   2.5 mg (5 mg x 0.5) every Mon, Wed, Fri; 5 mg (5 mg x 1) all other days   Full warfarin instructions:   3/29: 5 mg; Otherwise 2.5 mg every Mon, Wed, Fri; 5 mg all other days   Weekly warfarin total:   27.5 mg   Plan last modified:   Donna Colby RN (2018)   Next INR check:   4/15/2019   Target end date:   Indefinite    Indications    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]  Transient cerebral ischemia [G45.9]  Long term current use of anticoagulants with INR goal of 2.0-3.0 [Z79.01]             Anticoagulation Episode Summary     INR check location:       Preferred lab:       Send INR reminders to:   [facility] HEART INR NURSE    Comments:         Anticoagulation Care Providers     Provider Role Specialty Phone number    Satya Newton MD Responsible Cardiology [phone]            See the Encounter Report to view Anticoagulation Flowsheet and Dosing Calendar (Go to Encounters tab in chart review, and find the Anticoagulation Therapy Visit)    INR 1.5 He ate more broccoli than usual this week. He missed a 5 mg dose when he noted bleeding gums with brushing one day. No further bleeding. Advised that he should not hold a dose on his own and that he should use a soft bristle brush and brush gently. Will boost today's dose to 5 mg then resume usual dosing of 2.5 mg MWF and 5 mg all other  days. He will avoid green veggies for 2 days then resume his usual diet. Recheck in 2 weeks. INR clinic referral renewal order submitted for signature.  Has the patient previously taken warfarin? yes  If yes, for what indication? LV Thrombus, transient cerbral ischemia    Does the patient have any of the following indications for a higher range of 2.5-3.5:    Mitral position mechanical valve? no    Carlos-Shiley, Ball and Cage or Monoleaflet valve (regardless of position) no    Other (if yes, please explain) no  Dosage adjustment made based on physician directed care plan.    Donna Colby RN

## 2019-04-15 ENCOUNTER — ANTICOAGULATION THERAPY VISIT (OUTPATIENT)
Dept: CARDIOLOGY | Facility: CLINIC | Age: 84
End: 2019-04-15
Payer: MEDICARE

## 2019-04-15 DIAGNOSIS — Z79.01 LONG TERM CURRENT USE OF ANTICOAGULANTS WITH INR GOAL OF 2.0-3.0: ICD-10-CM

## 2019-04-15 DIAGNOSIS — I51.3 LEFT VENTRICULAR THROMBUS: ICD-10-CM

## 2019-04-15 LAB — INR POINT OF CARE: 2.3 (ref 0.86–1.14)

## 2019-04-15 PROCEDURE — 85610 PROTHROMBIN TIME: CPT | Mod: QW | Performed by: INTERNAL MEDICINE

## 2019-04-15 PROCEDURE — 36416 COLLJ CAPILLARY BLOOD SPEC: CPT | Performed by: INTERNAL MEDICINE

## 2019-05-03 ENCOUNTER — ANTICOAGULATION THERAPY VISIT (OUTPATIENT)
Dept: CARDIOLOGY | Facility: CLINIC | Age: 84
End: 2019-05-03
Payer: MEDICARE

## 2019-05-03 DIAGNOSIS — G45.9 TRANSIENT CEREBRAL ISCHEMIA: ICD-10-CM

## 2019-05-03 DIAGNOSIS — Z79.01 LONG TERM CURRENT USE OF ANTICOAGULANTS WITH INR GOAL OF 2.0-3.0: ICD-10-CM

## 2019-05-03 DIAGNOSIS — I51.3 LEFT VENTRICULAR THROMBUS: ICD-10-CM

## 2019-05-03 LAB — INR POINT OF CARE: 2.3 (ref 0.86–1.14)

## 2019-05-03 PROCEDURE — 36416 COLLJ CAPILLARY BLOOD SPEC: CPT | Performed by: INTERNAL MEDICINE

## 2019-05-03 PROCEDURE — 85610 PROTHROMBIN TIME: CPT | Mod: QW | Performed by: INTERNAL MEDICINE

## 2019-05-03 NOTE — PROGRESS NOTES
ANTICOAGULATION FOLLOW-UP CLINIC VISIT    Patient Name:  Sawyer Renteria  Date:  5/3/2019  Contact Type:  Face to Face    SUBJECTIVE:     Patient Findings            OBJECTIVE    INR Protime   Date Value Ref Range Status   2019 2.3 (A) 0.86 - 1.14 Final       ASSESSMENT / PLAN  INR assessment THER    Recheck INR In: 2 WEEKS    INR Location Clinic      Anticoagulation Summary  As of 5/3/2019    INR goal:   2.0-3.0   TTR:   66.8 % (2.9 y)   INR used for dosin.3 (5/3/2019)   Warfarin maintenance plan:   2.5 mg (5 mg x 0.5) every Mon, Wed, Fri; 5 mg (5 mg x 1) all other days   Full warfarin instructions:   2.5 mg every Mon, Wed, Fri; 5 mg all other days   Weekly warfarin total:   27.5 mg   No change documented:   Donna Colby RN   Plan last modified:   Donna Colby RN (2018)   Next INR check:   2019   Target end date:   Indefinite    Indications    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]  Transient cerebral ischemia [G45.9]  Long term current use of anticoagulants with INR goal of 2.0-3.0 [Z79.01]             Anticoagulation Episode Summary     INR check location:       Preferred lab:       Send INR reminders to:   [facility] HEART INR NURSE    Comments:         Anticoagulation Care Providers     Provider Role Specialty Phone number    Satya Newton MD Responsible Cardiology [phone]            See the Encounter Report to view Anticoagulation Flowsheet and Dosing Calendar (Go to Encounters tab in chart review, and find the Anticoagulation Therapy Visit)    INR 2.3 No change in meds or diet. No abnormal bleeding or bruising. Will continue current dosing of 2.5 mg MWF and 5 mg all other days with recheck in 2 weeks (pt request). He administered his own Praluent in the left thigh today. Joana Colby RN

## 2019-05-16 DIAGNOSIS — I10 ESSENTIAL HYPERTENSION: ICD-10-CM

## 2019-05-16 DIAGNOSIS — I25.10 CORONARY ARTERY DISEASE INVOLVING NATIVE CORONARY ARTERY OF NATIVE HEART WITHOUT ANGINA PECTORIS: ICD-10-CM

## 2019-05-16 RX ORDER — AMLODIPINE BESYLATE 2.5 MG/1
2.5 TABLET ORAL DAILY
Qty: 90 TABLET | Refills: 2 | Status: SHIPPED | OUTPATIENT
Start: 2019-05-16 | End: 2020-02-25

## 2019-05-17 DIAGNOSIS — I23.6 LV (LEFT VENTRICULAR) MURAL THROMBUS FOLLOWING MI (H): ICD-10-CM

## 2019-05-17 DIAGNOSIS — G45.9 TRANSIENT CEREBRAL ISCHEMIA: ICD-10-CM

## 2019-05-17 RX ORDER — WARFARIN SODIUM 5 MG/1
TABLET ORAL
Qty: 90 TABLET | Refills: 1 | Status: SHIPPED | OUTPATIENT
Start: 2019-05-17 | End: 2019-10-30

## 2019-05-20 DIAGNOSIS — I25.5 CARDIOMYOPATHY, ISCHEMIC: ICD-10-CM

## 2019-05-20 RX ORDER — METOPROLOL SUCCINATE 25 MG/1
12.5 TABLET, EXTENDED RELEASE ORAL DAILY
Qty: 45 TABLET | Refills: 2 | Status: SHIPPED | OUTPATIENT
Start: 2019-05-20 | End: 2020-01-15

## 2019-05-23 ENCOUNTER — ANTICOAGULATION THERAPY VISIT (OUTPATIENT)
Dept: CARDIOLOGY | Facility: CLINIC | Age: 84
End: 2019-05-23
Payer: MEDICARE

## 2019-05-23 DIAGNOSIS — Z79.01 LONG TERM CURRENT USE OF ANTICOAGULANTS WITH INR GOAL OF 2.0-3.0: ICD-10-CM

## 2019-05-23 DIAGNOSIS — G45.9 TRANSIENT CEREBRAL ISCHEMIA: ICD-10-CM

## 2019-05-23 DIAGNOSIS — I51.3 LEFT VENTRICULAR THROMBUS: ICD-10-CM

## 2019-05-23 LAB — INR POINT OF CARE: 1.9 (ref 0.86–1.14)

## 2019-05-23 PROCEDURE — 85610 PROTHROMBIN TIME: CPT | Mod: QW | Performed by: INTERNAL MEDICINE

## 2019-05-23 PROCEDURE — 36416 COLLJ CAPILLARY BLOOD SPEC: CPT | Performed by: INTERNAL MEDICINE

## 2019-05-23 NOTE — PROGRESS NOTES
ANTICOAGULATION FOLLOW-UP CLINIC VISIT    Patient Name:  Sawyer Renteria  Date:  2019  Contact Type:  Face to Face    SUBJECTIVE:  Patient Findings         Clinical Outcomes     Negatives:   Major bleeding event, Thromboembolic event, Anticoagulation-related hospital admission, Anticoagulation-related ED visit, Anticoagulation-related fatality           OBJECTIVE    INR Protime   Date Value Ref Range Status   2019 1.9 (A) 0.86 - 1.14 Final       ASSESSMENT / PLAN  INR assessment SUB    Recheck INR In: 2 WEEKS    INR Location Clinic      Anticoagulation Summary  As of 2019    INR goal:   2.0-3.0   TTR:   67.0 % (2.9 y)   INR used for dosin.9! 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]  Transient cerebral ischemia [G45.9]  Long term current use of anticoagulants with INR goal of 2.0-3.0 [Z79.01]             Anticoagulation Episode Summary     INR check location:       Preferred lab:       Send INR reminders to:   [facility] HEART INR NURSE    Comments:         Anticoagulation Care Providers     Provider Role Specialty Phone number    Satya Newton MD Responsible Cardiology [phone]            See the Encounter Report to view Anticoagulation Flowsheet and Dosing Calendar (Go to Encounters tab in chart review, and find the Anticoagulation Therapy Visit)    INR 1.9 No change in meds. No known change in diet. He noted some bloody mucous with brushing his teeth this week. Will continue current dosing of 2.5 mg MWF and 5 mg all other days (he will take the larger dose today) and avoid greens for 2 days  then resume usual diet. Recheck INR in 2 weeks and if still low then will adjust dosing. He had already given himself a Praluent injection last weekend at home so did not administer one today.    Donna Colby RN

## 2019-06-06 ENCOUNTER — TRANSFERRED RECORDS (OUTPATIENT)
Dept: HEALTH INFORMATION MANAGEMENT | Facility: CLINIC | Age: 84
End: 2019-06-06

## 2019-06-06 LAB
ALBUMIN SERPL-MCNC: 3.8 MG/DL (ref 3.6–5.1)
BUN SERPL-MCNC: 23 MG/DL (ref 7–25)
BUN/CREATININE RATIO: 16 (ref 6–22)
CALCIUM SERPL-MCNC: 8.7 MG/DL (ref 8.6–10.3)
CHLORIDE SERPLBLD-SCNC: 107 MMOL/L (ref 98–110)
CO2 SERPL-SCNC: 24 MMOL/L (ref 20–32)
CREAT SERPL-MCNC: 1.4 MG/DL (ref 0.7–1.11)
GFR SERPL CREATININE-BSD FRML MDRD: 45 ML/MIN/1.73M2
GLUCOSE SERPL-MCNC: 104 MG/DL (ref 65–99)
HEMOGLOBIN: 11.6 G/DL (ref 13.2–17.1)
PARATHYROID HORMONE INTACT: 61 (ref 14–64)
PHOSPHATE - QUEST: 2.4 (ref 2.1–4.3)
POTASSIUM SERPL-SCNC: 4.5 MMOL/L (ref 3.5–5.3)
SODIUM SERPL-SCNC: 138 MMOL/L (ref 135–146)
VITAMIN D 25: 47 (ref 30–100)

## 2019-06-07 ENCOUNTER — ANTICOAGULATION THERAPY VISIT (OUTPATIENT)
Dept: CARDIOLOGY | Facility: CLINIC | Age: 84
End: 2019-06-07
Payer: MEDICARE

## 2019-06-07 DIAGNOSIS — G45.9 TRANSIENT CEREBRAL ISCHEMIA: ICD-10-CM

## 2019-06-07 DIAGNOSIS — Z79.01 LONG TERM CURRENT USE OF ANTICOAGULANTS WITH INR GOAL OF 2.0-3.0: ICD-10-CM

## 2019-06-07 DIAGNOSIS — I51.3 LEFT VENTRICULAR THROMBUS: ICD-10-CM

## 2019-06-07 LAB — INR POINT OF CARE: 1.7 (ref 0.86–1.14)

## 2019-06-07 PROCEDURE — 85610 PROTHROMBIN TIME: CPT | Mod: QW | Performed by: INTERNAL MEDICINE

## 2019-06-07 PROCEDURE — 36416 COLLJ CAPILLARY BLOOD SPEC: CPT | Performed by: INTERNAL MEDICINE

## 2019-06-07 NOTE — PROGRESS NOTES
ANTICOAGULATION FOLLOW-UP CLINIC VISIT    Patient Name:  Sawyer Renteria  Date:  2019  Contact Type:  Face to Face    SUBJECTIVE:  Patient Findings     Comments:   The patient was assessed for diet, medication, and activity level changes, missed or extra doses, bruising or bleeding, with no problem findings.        Clinical Outcomes     Negatives:   Major bleeding event, Thromboembolic event, Anticoagulation-related hospital admission, Anticoagulation-related ED visit, Anticoagulation-related fatality    Comments:   The patient was assessed for diet, medication, and activity level changes, missed or extra doses, bruising or bleeding, with no problem findings.           OBJECTIVE    INR Protime   Date Value Ref Range Status   2019 1.7 (A) 0.86 - 1.14 Final       ASSESSMENT / PLAN  INR assessment SUB    Recheck INR In: 2 WEEKS    INR Location Clinic      Anticoagulation Summary  As of 2019    INR goal:   2.0-3.0   TTR:   66.0 % (3 y)   INR used for dosin.7! 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]  Transient cerebral ischemia [G45.9]  Long term current use of anticoagulants with INR goal of 2.0-3.0 [Z79.01]             Anticoagulation Episode Summary     INR check location:       Preferred lab:       Send INR reminders to:   [facility] HEART INR NURSE    Comments:         Anticoagulation Care Providers     Provider Role Specialty Phone number    Satya Newton MD Responsible Cardiology [phone]            See the Encounter Report to view Anticoagulation Flowsheet and Dosing Calendar (Go to Encounters tab in chart review, and find the  Anticoagulation Therapy Visit)    INR 1.7 INR lower today with no known cause. Denies missing doses but he isn't using a pillbox. Recommended that he start using a pillbox so we know for sure that he isn't missing doses when his INR is low. No change in meds or diet. No abnormal bleeding, bruising or clotting symptoms. Will increase dosing by 2.5 mg/wk - take 2.5 mg MTh and 5 mg all other days with recheck in 2 weeks. Pt administered his own Praluent injection in his left thigh today. Dosage adjustment made based on physician directed care plan.    Donna Colby RN

## 2019-06-21 ENCOUNTER — ANTICOAGULATION THERAPY VISIT (OUTPATIENT)
Dept: CARDIOLOGY | Facility: CLINIC | Age: 84
End: 2019-06-21
Payer: MEDICARE

## 2019-06-21 DIAGNOSIS — I51.3 LEFT VENTRICULAR THROMBUS: ICD-10-CM

## 2019-06-21 DIAGNOSIS — Z79.01 LONG TERM CURRENT USE OF ANTICOAGULANTS WITH INR GOAL OF 2.0-3.0: ICD-10-CM

## 2019-06-21 DIAGNOSIS — G45.9 TRANSIENT CEREBRAL ISCHEMIA: ICD-10-CM

## 2019-06-21 LAB — INR POINT OF CARE: 2.3 (ref 0.86–1.14)

## 2019-06-21 PROCEDURE — 36416 COLLJ CAPILLARY BLOOD SPEC: CPT | Performed by: INTERNAL MEDICINE

## 2019-06-21 PROCEDURE — 85610 PROTHROMBIN TIME: CPT | Mod: QW | Performed by: INTERNAL MEDICINE

## 2019-06-21 NOTE — PROGRESS NOTES
ANTICOAGULATION FOLLOW-UP CLINIC VISIT    Patient Name:  Sawyer Renteria  Date:  2019  Contact Type:  Face to Face    SUBJECTIVE:  Patient Findings     Positives:   Missed doses (he mistakenly returned to his previous dosing schedule of 2.5 mg MWF and 5 mg all other days)    Comments:   The patient was assessed for diet, medication, and activity level changes, missed or extra doses, bruising or bleeding, with no problem findings.        Clinical Outcomes     Negatives:   Major bleeding event, Thromboembolic event, Anticoagulation-related hospital admission, Anticoagulation-related ED visit, Anticoagulation-related fatality    Comments:   The patient was assessed for diet, medication, and activity level changes, missed or extra doses, bruising or bleeding, with no problem findings.           OBJECTIVE    INR Protime   Date Value Ref Range Status   2019 2.3 (A) 0.86 - 1.14 Final       ASSESSMENT / PLAN  INR assessment THER    Recheck INR In: 3 WEEKS    INR Location Clinic      Anticoagulation Summary  As of 2019    INR goal:   2.0-3.0   TTR:   65.8 % (3 y)   INR used for dosin.3 (2019)   Warfarin maintenance plan:   2.5 mg (5 mg x 0.5) every Mon, Wed, Fri; 5 mg (5 mg x 1) all other days   Full warfarin instructions:   2.5 mg every Mon, Wed, Fri; 5 mg all other days   Weekly warfarin total:   27.5 mg   Plan last modified:   Donna Colby RN (2019)   Next INR check:   2019   Target end date:   Indefinite    Indications    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]  Transient cerebral ischemia [G45.9]  Long term current use of anticoagulants with INR goal of 2.0-3.0 [Z79.01]             Anticoagulation Episode Summary     INR check location:       Preferred lab:       Send INR reminders to:   [facility] HEART INR NURSE    Comments:         Anticoagulation Care Providers     Provider Role Specialty Phone number    Satya Newton MD [facility] Cardiology [phone]            See the  Encounter Report to view Anticoagulation Flowsheet and Dosing Calendar (Go to Encounters tab in chart review, and find the Anticoagulation Therapy Visit)    INR 2.3 INR back in range after a one time increase in dosing because he did not increase his maintenance schedule as was intended. He didn't look at the dosing calendar and he resumed his previous dosing. He also did not start to use a pill box as recommended and is still resistant to this idea. Spoke with pt about needing to follow the instructions. His INR's have been fluctuating recently but he states that it is because of changing diet and he will try to be more consistent. Will continue the dosing that he has been taking - 2.5 mg MWF and 5 mg all other days with recheck in 3 weeks. Dosage adjustment made based on physician directed care plan.    Donna Colby RN

## 2019-06-26 ENCOUNTER — DOCUMENTATION ONLY (OUTPATIENT)
Dept: CARDIOLOGY | Facility: CLINIC | Age: 84
End: 2019-06-26

## 2019-07-12 ENCOUNTER — OFFICE VISIT (OUTPATIENT)
Dept: CARDIOLOGY | Facility: CLINIC | Age: 84
End: 2019-07-12
Attending: INTERNAL MEDICINE
Payer: MEDICARE

## 2019-07-12 ENCOUNTER — DOCUMENTATION ONLY (OUTPATIENT)
Dept: CARDIOLOGY | Facility: CLINIC | Age: 84
End: 2019-07-12

## 2019-07-12 ENCOUNTER — ANTICOAGULATION THERAPY VISIT (OUTPATIENT)
Dept: CARDIOLOGY | Facility: CLINIC | Age: 84
End: 2019-07-12
Payer: MEDICARE

## 2019-07-12 VITALS
SYSTOLIC BLOOD PRESSURE: 129 MMHG | WEIGHT: 125.5 LBS | HEART RATE: 61 BPM | BODY MASS INDEX: 23.7 KG/M2 | HEIGHT: 61 IN | DIASTOLIC BLOOD PRESSURE: 55 MMHG

## 2019-07-12 DIAGNOSIS — I25.5 CARDIOMYOPATHY, ISCHEMIC: ICD-10-CM

## 2019-07-12 DIAGNOSIS — G45.9 TRANSIENT CEREBRAL ISCHEMIA: ICD-10-CM

## 2019-07-12 DIAGNOSIS — I25.5 ISCHEMIC CARDIOMYOPATHY: Primary | ICD-10-CM

## 2019-07-12 DIAGNOSIS — I51.3 LEFT VENTRICULAR THROMBUS: ICD-10-CM

## 2019-07-12 DIAGNOSIS — Z79.01 LONG TERM CURRENT USE OF ANTICOAGULANTS WITH INR GOAL OF 2.0-3.0: ICD-10-CM

## 2019-07-12 LAB
ANION GAP SERPL CALCULATED.3IONS-SCNC: 10.5 MMOL/L (ref 6–17)
BUN SERPL-MCNC: 20 MG/DL (ref 7–30)
CALCIUM SERPL-MCNC: 9.5 MG/DL (ref 8.5–10.5)
CHLORIDE SERPL-SCNC: 106 MMOL/L (ref 98–107)
CO2 SERPL-SCNC: 27 MMOL/L (ref 23–29)
CREAT SERPL-MCNC: 1.37 MG/DL (ref 0.7–1.3)
GFR SERPL CREATININE-BSD FRML MDRD: 49 ML/MIN/{1.73_M2}
GLUCOSE SERPL-MCNC: 111 MG/DL (ref 70–105)
INR POINT OF CARE: 2.4 (ref 0.86–1.14)
POTASSIUM SERPL-SCNC: 4.5 MMOL/L (ref 3.5–5.1)
SODIUM SERPL-SCNC: 139 MMOL/L (ref 136–145)

## 2019-07-12 PROCEDURE — 36416 COLLJ CAPILLARY BLOOD SPEC: CPT | Performed by: INTERNAL MEDICINE

## 2019-07-12 PROCEDURE — 80048 BASIC METABOLIC PNL TOTAL CA: CPT | Performed by: INTERNAL MEDICINE

## 2019-07-12 PROCEDURE — 85610 PROTHROMBIN TIME: CPT | Mod: QW | Performed by: INTERNAL MEDICINE

## 2019-07-12 PROCEDURE — 99214 OFFICE O/P EST MOD 30 MIN: CPT | Performed by: NURSE PRACTITIONER

## 2019-07-12 ASSESSMENT — MIFFLIN-ST. JEOR: SCORE: 1121.6

## 2019-07-12 NOTE — PROGRESS NOTES
ANTICOAGULATION FOLLOW-UP CLINIC VISIT    Patient Name:  Sawyer Renteria  Date:  2019  Contact Type:  Face to Face    SUBJECTIVE:  Patient Findings     Positives:   Signs/symptoms of bleeding (Had minor bleeding gums after brushing)             OBJECTIVE    INR Protime   Date Value Ref Range Status   2019 2.4 (A) 0.86 - 1.14 Final       ASSESSMENT / PLAN  INR assessment THER    Recheck INR In: 2 WEEKS    INR Location Clinic      Anticoagulation Summary  As of 2019    INR goal:   2.0-3.0   TTR:   66.5 % (3.1 y)   INR used for dosin.4 (2019)   Warfarin maintenance plan:   2.5 mg (5 mg x 0.5) every Mon, Wed, Fri; 5 mg (5 mg x 1) all other days   Full warfarin instructions:   2.5 mg every Mon, Wed, Fri; 5 mg all other days   Weekly warfarin total:   27.5 mg   No change documented:   Kasey Reyes RN   Plan last modified:   Donna Colby RN (2019)   Next INR check:   2019   Target end date:   Indefinite    Indications    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]  Transient cerebral ischemia [G45.9]  Long term current use of anticoagulants with INR goal of 2.0-3.0 [Z79.01]             Anticoagulation Episode Summary     INR check location:       Preferred lab:       Send INR reminders to:   [facility] HEART INR NURSE    Comments:         Anticoagulation Care Providers     Provider Role Specialty Phone number    Satya Newton MD Responsible Cardiology [phone]            See the Encounter Report to view Anticoagulation Flowsheet and Dosing Calendar (Go to Encounters tab in chart review, and find the Anticoagulation Therapy Visit)    INR 2.4 No changes to dosing. Denies unusual bleeding or bruising. Had minor bleeding of gums while brushing his teeth today. Pt states he uses a soft toothbrush. Resistant to using a pill box for organizing his weekly medications. Pt injected Praluent in right thigh. Stated it hurt this time. Next appt in 2 weeks. Dosage adjustment made based on  physician directed care plan.    Kasey Reyes RN

## 2019-07-12 NOTE — LETTER
7/12/2019    Francisco Doshi MD  [address]  [facility] [address]    RE: Sawyer Renteria       Dear Colleague,    I had the pleasure of seeing Sawyer Renteria in the [facility].    History of Present Illness:    Sawyer Renteria is a 85 year old male followed here by Dr. Newton who returns for 6 month follow up today.    He has a history of an anterior wall MI in 2006 resulting in an LAD stent and angioplasty to the diagonal. He has an ongoing OM-1 with 60% stenosis. 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 INR's have been a bit fluctuant lately but appear to be stabilizing on the past two checks.    Ejection fraction has waxed and waned a bit, being  45-50% by echocardiogram on recent echocardiogram with an ongoing apical wall motion abnormality.  His last stress nuclear study in April of 2018 revealed an anterior, anteroapical scar without ischemia.  Ejection fraction was 43% at rest 39% post stress.     He has statin intolerant and has been on Praluent which he tolerates well. His lipids look much improved as of January, 2019 with total cholesterol 164, HDL60, LDL 77.  He has intolerance to statins.     He denies any chest pain or shortness of breath.  He exercises regularly.    His only complaint today is of fleeting twinges of prickly discomfort in his lower extremities which last seconds    Carotid ultrasound has shown less than 50% stenosis bilaterally     He has chronic renal insufficiency with creatinine stable at 1.37. He saw Dr Oneill who would consider renal ultrasound if creatinine rises, otherwise he will see him annually.     Because of previous apical thrombus, he remains on long-term Coumadin.     Exam-no edema; no JVP, lungs clear  Remainder outlined below              Impression/Plan:       1. Coronary artery disease with anterior wall MI status post LAD stenting with the balloon angioplasty to the diagonal branch.  Ejection fraction is stable at 45-50%.  No evidence of ischemia on a nuclear stress test 4-2018.  An echocardiogram reveals a stable ejection fraction of 45-50% in January of 2-19 with minimal valvular disease and ongoing apical, distal anterior/septal AK.  -continue medical therapy     2.  Ischemic cardiomyopathy with no heart failure symptoms currently.     3. 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 He uses brand name warfarin.    -No data at this time to support changing to DOAC     4. Dyslipidemia with statin intolerance. He is tolerating Praluent with HDL of 60 and LDL of 77.  -recheck lipids in January     5.  Hypertension  - controlled     6.  Chronic kidney disease  -Overall stable baseline 1.3 to 1.5.  - follow-up with Dr. Oneill annually     7.  Likely benign prostate hypertrophy       It has been a pleasure seeing Sawyer Renteria in follow up. We will see him in January; I will message Dr. Newton to see if ischemic work up needs to be done at that time.    Reply from [facility]: no echo in January; CMR with stress--order placed and will have nursing call the patient. perfecto Izaguirre, MSN, APRN-BC, CNP  Cardiology    Orders Placed This Encounter   Procedures     Basic metabolic panel     Lipid Profile     ALT     Follow-Up with Cardiologist     Echocardiogram Complete     No orders of the defined types were placed in this encounter.    There are no discontinued medications.      Encounter Diagnoses   Name Primary?     Cardiomyopathy, ischemic      Ischemic cardiomyopathy Yes       CURRENT MEDICATIONS:  Current Outpatient Medications   Medication Sig Dispense Refill     alirocumab (PRALUENT) 75 MG/ML injectable pen Inject 1 mL (75 mg) Subcutaneous every 14 days 2 mL 11     amLODIPine (NORVASC) 2.5 MG tablet Take 1 tablet (2.5 mg) by mouth daily 90 tablet 2     aspirin 81 MG tablet Take 81 mg by mouth daily       Cholecalciferol (VITAMIN D3) 2000 UNITS CAPS  Take by mouth daily       dutasteride (AVODART) 0.5 MG capsule Take 1 capsule (0.5 mg) by mouth daily 30 capsule 11     Fish Oil-Cholecalciferol (FISH OIL + D3) 8286-8692 MG-UNIT CAPS Take 2 tablets by mouth daily       lisinopril (PRINIVIL/ZESTRIL) 2.5 MG tablet One tablet twice per day 180 tablet 3     melatonin 5 MG tablet Take 5 mg by mouth nightly as needed for sleep       metoprolol succinate ER (TOPROL-XL) 25 MG 24 hr tablet Take 0.5 tablets (12.5 mg) by mouth daily 45 tablet 2     nitroGLYcerin (NITROSTAT) 0.4 MG sublingual tablet Place 1 tablet (0.4 mg) under the tongue every 5 minutes as needed for chest pain Take as directed 25 tablet 3     warfarin (COUMADIN) 5 MG tablet Take 1/2 tab on Mondays, Wednesdays and Fridays and 1 tab all other days or as directed by INR clinic, Coumadin Dispense as written 90 tablet 1       ALLERGIES     Allergies   Allergen Reactions     Flomax [Tamsulosin]      Weakness and dizzyness     Aldactone [Spironolactone]      High potassium and worsening creat when on lisinopril and spironalactone     Carvedilol      dizziness     Colesevelam      Epigastric pain     Ezetimibe      Lisinopril Itching     Hyperkalemia and inc. Creat. At 20 mg daily, itching , skin red when dose goes above 2.5 mg a day--elevated creat      Pravastatin      Abdominal pain     Rosuvastatin      Simvastatin        PAST MEDICAL HISTORY:  Past Medical History:   Diagnosis Date     BPH (benign prostatic hyperplasia)      Cardiomyopathy, ischemic     EF 49% by cardiac MRI 1/15/2014     Coronary artery disease 1/24/06    Cypher CANDIDA to LAD     Gastro-oesophageal reflux disease      Hyperlipidaemia      Hypertension      Left ventricular thrombus      Myocardial infarction (H) 1/2006    Anterior     TIA (transient ischaemic attack)        PAST SURGICAL HISTORY:  Past Surgical History:   Procedure Laterality Date     CORONARY ANGIOGRAPHY ADULT ORDER  1/24/06    Cypher CANDIDA to LAD     HEART CATH, ANGIOPLASTY   1/2006    Connerher TIRADO to LAD     TONSILLECTOMY & ADENOIDECTOMY         FAMILY HISTORY:  Family History   Problem Relation Age of Onset     Heart Disease Mother         heart failure     Heart Disease Brother        SOCIAL HISTORY:  Social History     Socioeconomic History     Marital status:      Spouse name: None     Number of children: None     Years of education: None     Highest education level: None   Occupational History     None   Social Needs     Financial resource strain: None     Food insecurity:     Worry: None     Inability: None     Transportation needs:     Medical: None     Non-medical: None   Tobacco Use     Smoking status: Never Smoker     Smokeless tobacco: Never Used   Substance and Sexual Activity     Alcohol use: No     Drug use: None     Sexual activity: None   Lifestyle     Physical activity:     Days per week: None     Minutes per session: None     Stress: None   Relationships     Social connections:     Talks on phone: None     Gets together: None     Attends Sabianist service: None     Active member of club or organization: None     Attends meetings of clubs or organizations: None     Relationship status: None     Intimate partner violence:     Fear of current or ex partner: None     Emotionally abused: None     Physically abused: None     Forced sexual activity: None   Other Topics Concern     Parent/sibling w/ CABG, MI or angioplasty before 65F 55M? No      Service Not Asked     Blood Transfusions Not Asked     Caffeine Concern No     Occupational Exposure Not Asked     Hobby Hazards Not Asked     Sleep Concern No     Stress Concern No     Weight Concern Yes     Comment: weight loss     Special Diet No     Back Care Not Asked     Exercise Yes     Comment: bicycle 10 min, walking, 3 days week     Bike Helmet Not Asked     Seat Belt Yes     Self-Exams Not Asked   Social History Narrative     None       Review of Systems:  Skin:  Negative       Eyes:  Positive for glasses  "reading glasses  ENT:  Negative postnasal drainage    Respiratory:  Negative       Cardiovascular:  Negative Positive for;fatigue    Gastroenterology: Negative      Genitourinary:  Positive for nocturia;urinary frequency    Musculoskeletal:  Negative joint pain stiffness in legs.more stiffness in legs/body  Neurologic:  Positive for numbness or tingling of hands;numbness or tingling of feet sometimes get pinpoint needle tingle on legs and arms--last short periods of time  Psychiatric:  Positive for sleep disturbances    Heme/Lymph/Imm:  Positive for allergies    Endocrine:  Negative        Physical Exam:  Vitals: /55   Pulse 61   Ht 1.556 m (5' 1.25\")   Wt 56.9 kg (125 lb 8 oz)   BMI 23.52 kg/m       Constitutional:  well developed        Skin:  warm and dry to the touch          Head:  normocephalic, no masses or lesions        Eyes:  pupils equal and round        Lymph:      ENT:  no pallor or cyanosis        Neck:  carotid pulses are full and equal bilaterally        Respiratory:  clear to auscultation         Cardiac: normal S1 and S2   S4 no presence of murmur systolic murmur;grade 2;apical        not assessed this visit                                        GI:  not assessed this visit        Extremities and Muscular Skeletal:  no edema         ecchymosis over left flank following fall at home    Neurological:  no gross motor deficits        Psych:  Alert and Oriented x 3      Recent Lab Results:  LIPID RESULTS:  Lab Results   Component Value Date    CHOL 162 01/28/2019    HDL 60 01/28/2019    LDL 77 01/28/2019    TRIG 124 01/28/2019    CHOLHDLRATIO 4.3 10/13/2015       LIVER ENZYME RESULTS:  Lab Results   Component Value Date    AST 21 07/25/2017    ALT <5 (L) 01/28/2019       CBC RESULTS:  Lab Results   Component Value Date    WBC 6.1 03/12/2018    RBC 3.90 03/12/2018    HGB 11.6 (L) 06/06/2019    HCT 34.4 03/12/2018    MCV 88.2 03/12/2018    MCH 31 03/12/2018    MCHC 35.2 03/12/2018    RDW 13 " 03/12/2018     03/12/2018       BMP RESULTS:  Lab Results   Component Value Date     07/12/2019    POTASSIUM 4.5 07/12/2019    CHLORIDE 106 07/12/2019    CO2 27 07/12/2019    ANIONGAP 10.5 07/12/2019     (H) 07/12/2019    BUN 20 07/12/2019    CR 1.37 (H) 07/12/2019    GFRESTIMATED 49 (L) 07/12/2019    GFRESTBLACK 60 (L) 07/12/2019    AURORA 9.5 07/12/2019        A1C RESULTS:  No results found for: A1C    INR RESULTS:  Lab Results   Component Value Date    INR 2.4 (A) 07/12/2019    INR 2.3 (A) 06/21/2019    INR 2.94 (H) 06/08/2017    INR 2.8 05/09/2014           CC  Francisco Doshi MD  [address] [address]              Thank you for allowing me to participate in the care of your patient.  Sincerely,     JAE Jones CNP     [facility]

## 2019-07-12 NOTE — PROGRESS NOTES
History of Present Illness:    Sawyer Renteria is a 85 year old male followed here by Dr. Newton who returns for 6 month follow up today.    He has a history of an anterior wall MI in 2006 resulting in an LAD stent and angioplasty to the diagonal. He has an ongoing OM-1 with 60% stenosis. 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 INR's have been a bit fluctuant lately but appear to be stabilizing on the past two checks.    Ejection fraction has waxed and waned a bit, being  45-50% by echocardiogram on recent echocardiogram with an ongoing apical wall motion abnormality.  His last stress nuclear study in April of 2018 revealed an anterior, anteroapical scar without ischemia.  Ejection fraction was 43% at rest 39% post stress.     He has statin intolerant and has been on Praluent which he tolerates well. His lipids look much improved as of January, 2019 with total cholesterol 164, HDL60, LDL 77.  He has intolerance to statins.     He denies any chest pain or shortness of breath.  He exercises regularly.    His only complaint today is of fleeting twinges of prickly discomfort in his lower extremities which last seconds    Carotid ultrasound has shown less than 50% stenosis bilaterally     He has chronic renal insufficiency with creatinine stable at 1.37. He saw Dr Oneill who would consider renal ultrasound if creatinine rises, otherwise he will see him annually.     Because of previous apical thrombus, he remains on long-term Coumadin.     Exam-no edema; no JVP, lungs clear  Remainder outlined below              Impression/Plan:       1. Coronary artery disease with anterior wall MI status post LAD stenting with the balloon angioplasty to the diagonal branch. Ejection fraction is stable at 45-50%.  No evidence of ischemia on a nuclear stress test 4-2018.  An echocardiogram reveals a stable ejection fraction of 45-50% in January of 2-19 with minimal valvular disease and  ongoing apical, distal anterior/septal AK.  -continue medical therapy     2.  Ischemic cardiomyopathy with no heart failure symptoms currently.     3. 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 He uses brand name warfarin.    -No data at this time to support changing to DOAC     4. Dyslipidemia with statin intolerance. He is tolerating Praluent with HDL of 60 and LDL of 77.  -recheck lipids in January     5.  Hypertension  - controlled     6.  Chronic kidney disease  -Overall stable baseline 1.3 to 1.5.  - follow-up with Dr. Oneill annually     7.  Likely benign prostate hypertrophy       It has been a pleasure seeing Sawyer Garsiar in follow up. We will see him in January; I will message Dr. Newton to see if ischemic work up needs to be done at that time.    Reply from Aman: no echo in January; CMR with stress--order placed and will have nursing call the patient. perfecto Izaguirre, MSN, APRN-BC, CNP  Cardiology    Orders Placed This Encounter   Procedures     Basic metabolic panel     Lipid Profile     ALT     Follow-Up with Cardiologist     Echocardiogram Complete     No orders of the defined types were placed in this encounter.    There are no discontinued medications.      Encounter Diagnoses   Name Primary?     Cardiomyopathy, ischemic      Ischemic cardiomyopathy Yes       CURRENT MEDICATIONS:  Current Outpatient Medications   Medication Sig Dispense Refill     alirocumab (PRALUENT) 75 MG/ML injectable pen Inject 1 mL (75 mg) Subcutaneous every 14 days 2 mL 11     amLODIPine (NORVASC) 2.5 MG tablet Take 1 tablet (2.5 mg) by mouth daily 90 tablet 2     aspirin 81 MG tablet Take 81 mg by mouth daily       Cholecalciferol (VITAMIN D3) 2000 UNITS CAPS Take by mouth daily       dutasteride (AVODART) 0.5 MG capsule Take 1 capsule (0.5 mg) by mouth daily 30 capsule 11     Fish Oil-Cholecalciferol (FISH OIL + D3) 9472-9382 MG-UNIT CAPS Take 2 tablets by mouth daily        lisinopril (PRINIVIL/ZESTRIL) 2.5 MG tablet One tablet twice per day 180 tablet 3     melatonin 5 MG tablet Take 5 mg by mouth nightly as needed for sleep       metoprolol succinate ER (TOPROL-XL) 25 MG 24 hr tablet Take 0.5 tablets (12.5 mg) by mouth daily 45 tablet 2     nitroGLYcerin (NITROSTAT) 0.4 MG sublingual tablet Place 1 tablet (0.4 mg) under the tongue every 5 minutes as needed for chest pain Take as directed 25 tablet 3     warfarin (COUMADIN) 5 MG tablet Take 1/2 tab on Mondays, Wednesdays and Fridays and 1 tab all other days or as directed by INR clinic, Coumadin Dispense as written 90 tablet 1       ALLERGIES     Allergies   Allergen Reactions     Flomax [Tamsulosin]      Weakness and dizzyness     Aldactone [Spironolactone]      High potassium and worsening creat when on lisinopril and spironalactone     Carvedilol      dizziness     Colesevelam      Epigastric pain     Ezetimibe      Lisinopril Itching     Hyperkalemia and inc. Creat. At 20 mg daily, itching , skin red when dose goes above 2.5 mg a day--elevated creat      Pravastatin      Abdominal pain     Rosuvastatin      Simvastatin        PAST MEDICAL HISTORY:  Past Medical History:   Diagnosis Date     BPH (benign prostatic hyperplasia)      Cardiomyopathy, ischemic     EF 49% by cardiac MRI 1/15/2014     Coronary artery disease 1/24/06    Cypher CANDIDA to LAD     Gastro-oesophageal reflux disease      Hyperlipidaemia      Hypertension      Left ventricular thrombus      Myocardial infarction (H) 1/2006    Anterior     TIA (transient ischaemic attack)        PAST SURGICAL HISTORY:  Past Surgical History:   Procedure Laterality Date     CORONARY ANGIOGRAPHY ADULT ORDER  1/24/06    Cypher CANDIDA to LAD     HEART CATH, ANGIOPLASTY  1/2006    Cypher CANDIDA to LAD     TONSILLECTOMY & ADENOIDECTOMY         FAMILY HISTORY:  Family History   Problem Relation Age of Onset     Heart Disease Mother         heart failure     Heart Disease Brother         SOCIAL HISTORY:  Social History     Socioeconomic History     Marital status:      Spouse name: None     Number of children: None     Years of education: None     Highest education level: None   Occupational History     None   Social Needs     Financial resource strain: None     Food insecurity:     Worry: None     Inability: None     Transportation needs:     Medical: None     Non-medical: None   Tobacco Use     Smoking status: Never Smoker     Smokeless tobacco: Never Used   Substance and Sexual Activity     Alcohol use: No     Drug use: None     Sexual activity: None   Lifestyle     Physical activity:     Days per week: None     Minutes per session: None     Stress: None   Relationships     Social connections:     Talks on phone: None     Gets together: None     Attends Evangelical service: None     Active member of club or organization: None     Attends meetings of clubs or organizations: None     Relationship status: None     Intimate partner violence:     Fear of current or ex partner: None     Emotionally abused: None     Physically abused: None     Forced sexual activity: None   Other Topics Concern     Parent/sibling w/ CABG, MI or angioplasty before 65F 55M? No      Service Not Asked     Blood Transfusions Not Asked     Caffeine Concern No     Occupational Exposure Not Asked     Hobby Hazards Not Asked     Sleep Concern No     Stress Concern No     Weight Concern Yes     Comment: weight loss     Special Diet No     Back Care Not Asked     Exercise Yes     Comment: bicycle 10 min, walking, 3 days week     Bike Helmet Not Asked     Seat Belt Yes     Self-Exams Not Asked   Social History Narrative     None       Review of Systems:  Skin:  Negative       Eyes:  Positive for glasses reading glasses  ENT:  Negative postnasal drainage    Respiratory:  Negative       Cardiovascular:  Negative Positive for;fatigue    Gastroenterology: Negative      Genitourinary:  Positive for nocturia;urinary  "frequency    Musculoskeletal:  Negative joint pain stiffness in legs.more stiffness in legs/body  Neurologic:  Positive for numbness or tingling of hands;numbness or tingling of feet sometimes get pinpoint needle tingle on legs and arms--last short periods of time  Psychiatric:  Positive for sleep disturbances    Heme/Lymph/Imm:  Positive for allergies    Endocrine:  Negative        Physical Exam:  Vitals: /55   Pulse 61   Ht 1.556 m (5' 1.25\")   Wt 56.9 kg (125 lb 8 oz)   BMI 23.52 kg/m      Constitutional:  well developed        Skin:  warm and dry to the touch          Head:  normocephalic, no masses or lesions        Eyes:  pupils equal and round        Lymph:      ENT:  no pallor or cyanosis        Neck:  carotid pulses are full and equal bilaterally        Respiratory:  clear to auscultation         Cardiac: normal S1 and S2   S4 no presence of murmur systolic murmur;grade 2;apical        not assessed this visit                                        GI:  not assessed this visit        Extremities and Muscular Skeletal:  no edema         ecchymosis over left flank following fall at home    Neurological:  no gross motor deficits        Psych:  Alert and Oriented x 3      Recent Lab Results:  LIPID RESULTS:  Lab Results   Component Value Date    CHOL 162 01/28/2019    HDL 60 01/28/2019    LDL 77 01/28/2019    TRIG 124 01/28/2019    CHOLHDLRATIO 4.3 10/13/2015       LIVER ENZYME RESULTS:  Lab Results   Component Value Date    AST 21 07/25/2017    ALT <5 (L) 01/28/2019       CBC RESULTS:  Lab Results   Component Value Date    WBC 6.1 03/12/2018    RBC 3.90 03/12/2018    HGB 11.6 (L) 06/06/2019    HCT 34.4 03/12/2018    MCV 88.2 03/12/2018    MCH 31 03/12/2018    MCHC 35.2 03/12/2018    RDW 13 03/12/2018     03/12/2018       BMP RESULTS:  Lab Results   Component Value Date     07/12/2019    POTASSIUM 4.5 07/12/2019    CHLORIDE 106 07/12/2019    CO2 27 07/12/2019    ANIONGAP 10.5 07/12/2019    "  (H) 07/12/2019    BUN 20 07/12/2019    CR 1.37 (H) 07/12/2019    GFRESTIMATED 49 (L) 07/12/2019    GFRESTBLACK 60 (L) 07/12/2019    AURORA 9.5 07/12/2019        A1C RESULTS:  No results found for: A1C    INR RESULTS:  Lab Results   Component Value Date    INR 2.4 (A) 07/12/2019    INR 2.3 (A) 06/21/2019    INR 2.94 (H) 06/08/2017    INR 2.8 05/09/2014           CC  Francisco Doshi MD  [address] [facility] [place] [address]

## 2019-07-12 NOTE — PROGRESS NOTES
Can you call him please    Let him know Dr Newton does not want echo in January, instead he want a cardiac MRI to check heart function and do a stress test as well to check blood flow flow so chemical MRI stress  I have put in orders

## 2019-07-12 NOTE — PROGRESS NOTES
Can you send or have medical records send a copy of the BMP to Dr Oneill at [facility] just as FYI please

## 2019-07-12 NOTE — LETTER
7/12/2019    Francisco Doshi MD  [address]  [facility] [address]    RE: Sawyer Renteria       Dear Colleague,    I had the pleasure of seeing Sawyer Renteria in the [facility].    History of Present Illness:    Sawyer Renteria is a 85 year old male followed here by Dr. Newton who returns for 6 month follow up today.    He has a history of an anterior wall MI in 2006 resulting in an LAD stent and angioplasty to the diagonal. He has an ongoing OM-1 with 60% stenosis. 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 INR's have been a bit fluctuant lately but appear to be stabilizing on the past two checks.    Ejection fraction has waxed and waned a bit, being  45-50% by echocardiogram on recent echocardiogram with an ongoing apical wall motion abnormality.  His last stress nuclear study in April of 2018 revealed an anterior, anteroapical scar without ischemia.  Ejection fraction was 43% at rest 39% post stress.     He has statin intolerant and has been on Praluent which he tolerates well. His lipids look much improved as of January, 2019 with total cholesterol 164, HDL60, LDL 77.  He has intolerance to statins.     He denies any chest pain or shortness of breath.  He exercises regularly.    His only complaint today is of fleeting twinges of prickly discomfort in his lower extremities which last seconds    Carotid ultrasound has shown less than 50% stenosis bilaterally     He has chronic renal insufficiency with creatinine stable at 1.37. He saw Dr Oneill who would consider renal ultrasound if creatinine rises, otherwise he will see him annually.     Because of previous apical thrombus, he remains on long-term Coumadin.     Exam-no edema; no JVP, lungs clear  Remainder outlined below              Impression/Plan:       1. Coronary artery disease with anterior wall MI status post LAD stenting with the balloon angioplasty to the diagonal branch.  Ejection fraction is stable at 45-50%.  No evidence of ischemia on a nuclear stress test 4-2018.  An echocardiogram reveals a stable ejection fraction of 45-50% in January of 2-19 with minimal valvular disease and ongoing apical, distal anterior/septal AK.  -continue medical therapy     2.  Ischemic cardiomyopathy with no heart failure symptoms currently.     3. 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 He uses brand name warfarin.    -No data at this time to support changing to DOAC     4. Dyslipidemia with statin intolerance. He is tolerating Praluent with HDL of 60 and LDL of 77.  -recheck lipids in January     5.  Hypertension  - controlled     6.  Chronic kidney disease  -Overall stable baseline 1.3 to 1.5.  - follow-up with Dr. Oneill annually     7.  Likely benign prostate hypertrophy       It has been a pleasure seeing Sawyer Renteria in follow up. We will see him in January; I will message Dr. Newton to see if ischemic work up needs to be done at that time.    Reply from Aman: no echo in January; CMR with stress--order placed and will have nursing call the patient. perfecto Izaguirre, MSN, APRN-BC, CNP  Cardiology    Orders Placed This Encounter   Procedures     Basic metabolic panel     Lipid Profile     ALT     Follow-Up with Cardiologist     Echocardiogram Complete     No orders of the defined types were placed in this encounter.    There are no discontinued medications.      Encounter Diagnoses   Name Primary?     Cardiomyopathy, ischemic      Ischemic cardiomyopathy Yes       CURRENT MEDICATIONS:  Current Outpatient Medications   Medication Sig Dispense Refill     alirocumab (PRALUENT) 75 MG/ML injectable pen Inject 1 mL (75 mg) Subcutaneous every 14 days 2 mL 11     amLODIPine (NORVASC) 2.5 MG tablet Take 1 tablet (2.5 mg) by mouth daily 90 tablet 2     aspirin 81 MG tablet Take 81 mg by mouth daily       Cholecalciferol (VITAMIN D3) 2000 UNITS CAPS  Take by mouth daily       dutasteride (AVODART) 0.5 MG capsule Take 1 capsule (0.5 mg) by mouth daily 30 capsule 11     Fish Oil-Cholecalciferol (FISH OIL + D3) 7323-5456 MG-UNIT CAPS Take 2 tablets by mouth daily       lisinopril (PRINIVIL/ZESTRIL) 2.5 MG tablet One tablet twice per day 180 tablet 3     melatonin 5 MG tablet Take 5 mg by mouth nightly as needed for sleep       metoprolol succinate ER (TOPROL-XL) 25 MG 24 hr tablet Take 0.5 tablets (12.5 mg) by mouth daily 45 tablet 2     nitroGLYcerin (NITROSTAT) 0.4 MG sublingual tablet Place 1 tablet (0.4 mg) under the tongue every 5 minutes as needed for chest pain Take as directed 25 tablet 3     warfarin (COUMADIN) 5 MG tablet Take 1/2 tab on Mondays, Wednesdays and Fridays and 1 tab all other days or as directed by INR clinic, Coumadin Dispense as written 90 tablet 1       ALLERGIES     Allergies   Allergen Reactions     Flomax [Tamsulosin]      Weakness and dizzyness     Aldactone [Spironolactone]      High potassium and worsening creat when on lisinopril and spironalactone     Carvedilol      dizziness     Colesevelam      Epigastric pain     Ezetimibe      Lisinopril Itching     Hyperkalemia and inc. Creat. At 20 mg daily, itching , skin red when dose goes above 2.5 mg a day--elevated creat      Pravastatin      Abdominal pain     Rosuvastatin      Simvastatin        PAST MEDICAL HISTORY:  Past Medical History:   Diagnosis Date     BPH (benign prostatic hyperplasia)      Cardiomyopathy, ischemic     EF 49% by cardiac MRI 1/15/2014     Coronary artery disease 1/24/06    Cypher CANDIDA to LAD     Gastro-oesophageal reflux disease      Hyperlipidaemia      Hypertension      Left ventricular thrombus      Myocardial infarction (H) 1/2006    Anterior     TIA (transient ischaemic attack)        PAST SURGICAL HISTORY:  Past Surgical History:   Procedure Laterality Date     CORONARY ANGIOGRAPHY ADULT ORDER  1/24/06    Cypher CANDIDA to LAD     HEART CATH, ANGIOPLASTY   1/2006    Connerher TIRADO to LAD     TONSILLECTOMY & ADENOIDECTOMY         FAMILY HISTORY:  Family History   Problem Relation Age of Onset     Heart Disease Mother         heart failure     Heart Disease Brother        SOCIAL HISTORY:  Social History     Socioeconomic History     Marital status:      Spouse name: None     Number of children: None     Years of education: None     Highest education level: None   Occupational History     None   Social Needs     Financial resource strain: None     Food insecurity:     Worry: None     Inability: None     Transportation needs:     Medical: None     Non-medical: None   Tobacco Use     Smoking status: Never Smoker     Smokeless tobacco: Never Used   Substance and Sexual Activity     Alcohol use: No     Drug use: None     Sexual activity: None   Lifestyle     Physical activity:     Days per week: None     Minutes per session: None     Stress: None   Relationships     Social connections:     Talks on phone: None     Gets together: None     Attends Church service: None     Active member of club or organization: None     Attends meetings of clubs or organizations: None     Relationship status: None     Intimate partner violence:     Fear of current or ex partner: None     Emotionally abused: None     Physically abused: None     Forced sexual activity: None   Other Topics Concern     Parent/sibling w/ CABG, MI or angioplasty before 65F 55M? No      Service Not Asked     Blood Transfusions Not Asked     Caffeine Concern No     Occupational Exposure Not Asked     Hobby Hazards Not Asked     Sleep Concern No     Stress Concern No     Weight Concern Yes     Comment: weight loss     Special Diet No     Back Care Not Asked     Exercise Yes     Comment: bicycle 10 min, walking, 3 days week     Bike Helmet Not Asked     Seat Belt Yes     Self-Exams Not Asked   Social History Narrative     None       Review of Systems:  Skin:  Negative       Eyes:  Positive for glasses  "reading glasses  ENT:  Negative postnasal drainage    Respiratory:  Negative       Cardiovascular:  Negative Positive for;fatigue    Gastroenterology: Negative      Genitourinary:  Positive for nocturia;urinary frequency    Musculoskeletal:  Negative joint pain stiffness in legs.more stiffness in legs/body  Neurologic:  Positive for numbness or tingling of hands;numbness or tingling of feet sometimes get pinpoint needle tingle on legs and arms--last short periods of time  Psychiatric:  Positive for sleep disturbances    Heme/Lymph/Imm:  Positive for allergies    Endocrine:  Negative        Physical Exam:  Vitals: /55   Pulse 61   Ht 1.556 m (5' 1.25\")   Wt 56.9 kg (125 lb 8 oz)   BMI 23.52 kg/m       Constitutional:  well developed        Skin:  warm and dry to the touch          Head:  normocephalic, no masses or lesions        Eyes:  pupils equal and round        Lymph:      ENT:  no pallor or cyanosis        Neck:  carotid pulses are full and equal bilaterally        Respiratory:  clear to auscultation         Cardiac: normal S1 and S2   S4 no presence of murmur systolic murmur;grade 2;apical        not assessed this visit                                        GI:  not assessed this visit        Extremities and Muscular Skeletal:  no edema         ecchymosis over left flank following fall at home    Neurological:  no gross motor deficits        Psych:  Alert and Oriented x 3      Recent Lab Results:  LIPID RESULTS:  Lab Results   Component Value Date    CHOL 162 01/28/2019    HDL 60 01/28/2019    LDL 77 01/28/2019    TRIG 124 01/28/2019    CHOLHDLRATIO 4.3 10/13/2015       LIVER ENZYME RESULTS:  Lab Results   Component Value Date    AST 21 07/25/2017    ALT <5 (L) 01/28/2019       CBC RESULTS:  Lab Results   Component Value Date    WBC 6.1 03/12/2018    RBC 3.90 03/12/2018    HGB 11.6 (L) 06/06/2019    HCT 34.4 03/12/2018    MCV 88.2 03/12/2018    MCH 31 03/12/2018    MCHC 35.2 03/12/2018    RDW 13 " 03/12/2018     03/12/2018       BMP RESULTS:  Lab Results   Component Value Date     07/12/2019    POTASSIUM 4.5 07/12/2019    CHLORIDE 106 07/12/2019    CO2 27 07/12/2019    ANIONGAP 10.5 07/12/2019     (H) 07/12/2019    BUN 20 07/12/2019    CR 1.37 (H) 07/12/2019    GFRESTIMATED 49 (L) 07/12/2019    GFRESTBLACK 60 (L) 07/12/2019    AURORA 9.5 07/12/2019        A1C RESULTS:  No results found for: A1C    INR RESULTS:  Lab Results   Component Value Date    INR 2.4 (A) 07/12/2019    INR 2.3 (A) 06/21/2019    INR 2.94 (H) 06/08/2017    INR 2.8 05/09/2014           CC  Francisco Doshi MD  [address]  [place] [address]                  Thank you for allowing me to participate in the care of your patient.      Sincerely,     JAE Jones [facility]    cc:   Francisco Doshi MD  [address]  [place] [address]

## 2019-07-12 NOTE — PROGRESS NOTES
Saw your note about DOAC    There is no data to support using those for LV thrombus even though some providers are    Plus I feel like they would cost him too much    Aman prefers staying with Warfarin as does the patient    thanks

## 2019-07-12 NOTE — PROGRESS NOTES
Called and spoke with pt.  Discussed that MANJULA Guy reviewed plan with Dr. Newton after OV today and Dr. Newton recommends a stress cardiac MRI instead of an Echo in January of 2020 and discussed that scheduling will reach out closer to that time to arrange- he verbalized understanding and agrees with this plan.    OKSANA Dunne 2:26 PM 7/12/2019

## 2019-07-12 NOTE — PROGRESS NOTES
AJ is doing well  Last nuke was 4-2018  Had left over 60% OM-1  No angina  Ordered an echo for his annual but do you want an ischemic work up as well    We did CMR without stress 2013    Let me know and I will put in orders  thanks

## 2019-07-26 ENCOUNTER — ANTICOAGULATION THERAPY VISIT (OUTPATIENT)
Dept: CARDIOLOGY | Facility: CLINIC | Age: 84
End: 2019-07-26
Payer: MEDICARE

## 2019-07-26 DIAGNOSIS — G45.9 TRANSIENT CEREBRAL ISCHEMIA, UNSPECIFIED TYPE: ICD-10-CM

## 2019-07-26 DIAGNOSIS — I51.3 LEFT VENTRICULAR THROMBUS: ICD-10-CM

## 2019-07-26 DIAGNOSIS — Z79.01 LONG TERM CURRENT USE OF ANTICOAGULANTS WITH INR GOAL OF 2.0-3.0: ICD-10-CM

## 2019-07-26 LAB — INR POINT OF CARE: 2.6 (ref 0.86–1.14)

## 2019-07-26 PROCEDURE — 85610 PROTHROMBIN TIME: CPT | Mod: QW | Performed by: INTERNAL MEDICINE

## 2019-07-26 PROCEDURE — 36416 COLLJ CAPILLARY BLOOD SPEC: CPT | Performed by: INTERNAL MEDICINE

## 2019-07-26 NOTE — PROGRESS NOTES
ANTICOAGULATION FOLLOW-UP CLINIC VISIT    Patient Name:  Sawyer Renteria  Date:  2019  Contact Type:  Face to Face    SUBJECTIVE:  Patient Findings     Comments:   The patient was assessed for diet, medication, and activity level changes, missed or extra doses, bruising or bleeding, with no problem findings.        Clinical Outcomes     Negatives:   Major bleeding event, Thromboembolic event, Anticoagulation-related hospital admission, Anticoagulation-related ED visit, Anticoagulation-related fatality    Comments:   The patient was assessed for diet, medication, and activity level changes, missed or extra doses, bruising or bleeding, with no problem findings.           OBJECTIVE    INR Protime   Date Value Ref Range Status   2019 2.6 (A) 0.86 - 1.14 Final       ASSESSMENT / PLAN  INR assessment THER    Recheck INR In: 2 WEEKS    INR Location Clinic      Anticoagulation Summary  As of 2019    INR goal:   2.0-3.0   TTR:   66.9 % (3.1 y)   INR used for dosin.6 (2019)   Warfarin maintenance plan:   2.5 mg (5 mg x 0.5) every Mon, Wed, Fri; 5 mg (5 mg x 1) all other days   Full warfarin instructions:   2.5 mg every Mon, Wed, Fri; 5 mg all other days   Weekly warfarin total:   27.5 mg   No change documented:   Donna Colby RN   Plan last modified:   Donna Colby RN (2019)   Next INR check:   2019   Target end date:   Indefinite    Indications    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]  Transient cerebral ischemia [G45.9]  Long term current use of anticoagulants with INR goal of 2.0-3.0 [Z79.01]             Anticoagulation Episode Summary     INR check location:       Preferred lab:       Send INR reminders to:   [facility] HEART INR NURSE    Comments:         Anticoagulation Care Providers     Provider Role Specialty Phone number    Satya Newton MD Responsible Cardiology [phone]            See the Encounter Report to view Anticoagulation Flowsheet and Dosing Calendar (Go to  Encounters tab in chart review, and find the Anticoagulation Therapy Visit)    INR 2.6 No change in meds or diet. No abnormal bleeding or bruising. Will continue current dosing of 2.5 mg MWF and 5 mg all other days with recheck in 2 weeks. He administered his Praluent injection in his left thigh.    Donna Colby RN

## 2019-08-09 ENCOUNTER — ANTICOAGULATION THERAPY VISIT (OUTPATIENT)
Dept: CARDIOLOGY | Facility: CLINIC | Age: 84
End: 2019-08-09
Payer: MEDICARE

## 2019-08-09 DIAGNOSIS — Z79.01 LONG TERM CURRENT USE OF ANTICOAGULANTS WITH INR GOAL OF 2.0-3.0: ICD-10-CM

## 2019-08-09 DIAGNOSIS — I51.3 LEFT VENTRICULAR THROMBUS: ICD-10-CM

## 2019-08-09 DIAGNOSIS — G45.9 TRANSIENT CEREBRAL ISCHEMIA, UNSPECIFIED TYPE: ICD-10-CM

## 2019-08-09 LAB — INR POINT OF CARE: 2.6 (ref 0.86–1.14)

## 2019-08-09 PROCEDURE — 85610 PROTHROMBIN TIME: CPT | Mod: QW | Performed by: INTERNAL MEDICINE

## 2019-08-09 PROCEDURE — 99207 ZZC NO CHARGE NURSE ONLY: CPT | Performed by: INTERNAL MEDICINE

## 2019-08-09 PROCEDURE — 36416 COLLJ CAPILLARY BLOOD SPEC: CPT | Performed by: INTERNAL MEDICINE

## 2019-08-09 NOTE — PROGRESS NOTES
ANTICOAGULATION FOLLOW-UP CLINIC VISIT    Patient Name:  Sawyer Renteria  Date:  2019  Contact Type:  Face to Face    SUBJECTIVE:  Patient Findings     Positives:   Signs/symptoms of bleeding (Patient states he has had some bleeding from his tongue when he brushes his teeth. Will be seeing his dentist this month. )        Clinical Outcomes     Negatives:   Major bleeding event, Thromboembolic event, Anticoagulation-related hospital admission, Anticoagulation-related ED visit, Anticoagulation-related fatality           OBJECTIVE    INR Protime   Date Value Ref Range Status   2019 2.6 (A) 0.86 - 1.14 Final       ASSESSMENT / PLAN  INR assessment THER    Recheck INR In: 2 WEEKS    INR Location Clinic      Anticoagulation Summary  As of 2019    INR goal:   2.0-3.0   TTR:   67.3 % (3.2 y)   INR used for dosin.6 (2019)   Warfarin maintenance plan:   2.5 mg (5 mg x 0.5) every Mon, Wed, Fri; 5 mg (5 mg x 1) all other days   Full warfarin instructions:   2.5 mg every Mon, Wed, Fri; 5 mg all other days   Weekly warfarin total:   27.5 mg   No change documented:   Kimberly Noyola RN   Plan last modified:   Donna Colby RN (2019)   Next INR check:   2019   Target end date:   Indefinite    Indications    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]  Transient cerebral ischemia [G45.9]  Long term current use of anticoagulants with INR goal of 2.0-3.0 [Z79.01]             Anticoagulation Episode Summary     INR check location:       Preferred lab:       Send INR reminders to:   [facility] HEART INR NURSE    Comments:         Anticoagulation Care Providers     Provider Role Specialty Phone number    Satya Newton MD Responsible Cardiology [phone]            See the Encounter Report to view Anticoagulation Flowsheet and Dosing Calendar (Go to Encounters tab in chart review, and find the Anticoagulation Therapy Visit)    INR 2.6. No changes to medications. No changes to diet. No abnormal bleeding or  bruising. Patient will continue with current maintenance dosing of 2.5mg Mon/Weds/Fri and 5mg all other days. Next INR check in 2wks. Patient administered his own praluent injection into his right thigh without event.     Kimberly Noyola RN

## 2019-08-11 ENCOUNTER — HOSPITAL ENCOUNTER (EMERGENCY)
Facility: CLINIC | Age: 84
Discharge: HOME | End: 2019-08-11
Attending: EMERGENCY MEDICINE | Admitting: EMERGENCY MEDICINE
Payer: MEDICARE

## 2019-08-11 VITALS
DIASTOLIC BLOOD PRESSURE: 50 MMHG | RESPIRATION RATE: 16 BRPM | BODY MASS INDEX: 23.79 KG/M2 | OXYGEN SATURATION: 97 % | HEIGHT: 61 IN | WEIGHT: 126 LBS | SYSTOLIC BLOOD PRESSURE: 127 MMHG | TEMPERATURE: 98.6 F

## 2019-08-11 DIAGNOSIS — R58 MUCOSAL BLEEDING: ICD-10-CM

## 2019-08-11 LAB
APTT PPP: 44 SEC (ref 22–37)
BASOPHILS # BLD AUTO: 0 10E9/L (ref 0–0.2)
BASOPHILS NFR BLD AUTO: 0.2 %
DIFFERENTIAL METHOD BLD: ABNORMAL
EOSINOPHIL # BLD AUTO: 0.4 10E9/L (ref 0–0.7)
EOSINOPHIL NFR BLD AUTO: 6.4 %
ERYTHROCYTE [DISTWIDTH] IN BLOOD BY AUTOMATED COUNT: 13.6 % (ref 10–15)
HCT VFR BLD AUTO: 35.9 % (ref 40–53)
HGB BLD-MCNC: 12.4 G/DL (ref 13.3–17.7)
IMM GRANULOCYTES # BLD: 0 10E9/L (ref 0–0.4)
IMM GRANULOCYTES NFR BLD: 0.2 %
INR PPP: 2.43 (ref 0.86–1.14)
LYMPHOCYTES # BLD AUTO: 1.9 10E9/L (ref 0.8–5.3)
LYMPHOCYTES NFR BLD AUTO: 30.6 %
MCH RBC QN AUTO: 30.2 PG (ref 26.5–33)
MCHC RBC AUTO-ENTMCNC: 34.5 G/DL (ref 31.5–36.5)
MCV RBC AUTO: 87 FL (ref 78–100)
MONOCYTES # BLD AUTO: 0.6 10E9/L (ref 0–1.3)
MONOCYTES NFR BLD AUTO: 9.7 %
NEUTROPHILS # BLD AUTO: 3.2 10E9/L (ref 1.6–8.3)
NEUTROPHILS NFR BLD AUTO: 52.9 %
NRBC # BLD AUTO: 0 10*3/UL
NRBC BLD AUTO-RTO: 0 /100
PLATELET # BLD AUTO: 227 10E9/L (ref 150–450)
RBC # BLD AUTO: 4.11 10E12/L (ref 4.4–5.9)
WBC # BLD AUTO: 6.1 10E9/L (ref 4–11)

## 2019-08-11 PROCEDURE — 85025 COMPLETE CBC W/AUTO DIFF WBC: CPT | Performed by: EMERGENCY MEDICINE

## 2019-08-11 PROCEDURE — 85730 THROMBOPLASTIN TIME PARTIAL: CPT | Performed by: EMERGENCY MEDICINE

## 2019-08-11 PROCEDURE — 85610 PROTHROMBIN TIME: CPT | Performed by: EMERGENCY MEDICINE

## 2019-08-11 PROCEDURE — 99283 EMERGENCY DEPT VISIT LOW MDM: CPT

## 2019-08-11 ASSESSMENT — ENCOUNTER SYMPTOMS
BRUISES/BLEEDS EASILY: 1
BLOOD IN STOOL: 0
ANAL BLEEDING: 0

## 2019-08-11 ASSESSMENT — MIFFLIN-ST. JEOR: SCORE: 1119.91

## 2019-08-11 NOTE — ED PROVIDER NOTES
"  History     Chief Complaint:  Mouth Problem      HPI   Sawyer Renteria is a 85 year old male with a history of left ventricular thrombus, on Coumadin, who presents with a mouth problem. Patient states that he has noticed that his gums are bleeding more than usual when brushing his teeth. Additionally, he woke up this morning and noted blood on his the tissue while blowing his nose. There is no active bleeding at this time. Patient notes he did have his INR checked 8/09 and it was 2.6. Patient denies increased bruising and hematochezia.     Allergies:  Flomax [Tamsulosin]  Aldactone [Spironolactone]  Carvedilol  Colesevelam  Ezetimibe  Lisinopril  Pravastatin  Rosuvastatin  Simvastatin      Medications:    Amlodipine   Aspirin 81mg   Avodart   Lisinopril   Metoprolol  Nitroglycerin   Coumadin      Past Medical History:    BPH   Cardiomyopathy, ischemic   Coronary artery disease    Gastroesophageal reflux disease   Hyperlipidemia    Hypertension   Left ventricular thrombus   Myocardial infarction    TIA    Past Surgical History:    Coronary angiography adult order  Heart cath, angioplasty  Tonsillectomy & Adenoidectomy      Family History:    Mother - heart disease   Brother - heart disease     Social History:  The patient was alone.  Smoking Status: Never  Smokeless Tobacco: Never  Alcohol Use: No   Marital Status:        Review of Systems   Gastrointestinal: Negative for anal bleeding and blood in stool.   Hematological: Bruises/bleeds easily (bleeding from gums more; NO increased bruising).   All other systems reviewed and are negative.    Physical Exam     Patient Vitals for the past 24 hrs:   BP Temp Temp src Heart Rate Resp SpO2 Height Weight   08/11/19 1120 127/50 98.6  F (37  C) Oral 89 16 97 % 1.549 m (5' 1\") 57.2 kg (126 lb)      Physical Exam  General/Appearance: appears stated age, well-groomed, appears comfortable  Eyes: EOMI, no scleral injection, no icterus  ENT: MMM, no mucosal petechia/active " bleeding/lesions, nares clear and without blood/clots  Neck: supple, nl ROM, no stiffness  Cardiovascular: RRR, nl S1S2, no m/r/g, 2+ pulses in all 4 extremities, cap refill <2sec  Respiratory: CTAB, good air movement throughout, no wheezes/rhonchi/rales, no increased WOB, no retractions  Back: no lesions  GI: abd soft, non-distended, nttp,  no HSM, no rebound, no guarding, nl BS  MSK: HUTSON, good tone, no bony abnormality  Skin: warm and well-perfused, no rash, no edema, no ecchymosis, nl turgor  Neuro: GCS 15, alert and oriented, no gross focal neuro deficits  Psych: interacts appropriately  Heme: no petechia, no purpura, no active bleeding      Emergency Department Course     Laboratory:  Laboratory findings were communicated with the patient who voiced understanding of the findings.    CBC: HGB 12.4 (L) o/w WNL. (WBC 6.1, )   INR: 2.43 (H)      PTT: 44 (H)      Emergency Department Course:  Nursing notes and vitals reviewed.  IV was inserted and blood was drawn for laboratory testing, results above.    1147: I performed an exam of the patient as documented above.     1213: Patient rechecked and updated.      Findings and plan explained to the Patient. Patient discharged home with instructions regarding supportive care, medications, and reasons to return. The importance of close follow-up was reviewed.     I personally reviewed the laboratory results with the Patient and answered all related questions prior to discharge.    Impression & Plan      Medical Decision Making:  This patient is an 85-year-old male on Coumadin who presents with mucosal bleeding.  He had increased blood when he brushes teeth today.  He also noticed a small amount of blood on the tissue when he blew his nose.  He is not having increased petechiae, ecchymosis, GI bleed.  Hemoglobin and platelets are normal.  INR is therapeutic.  He is on Coumadin for a reason that I think supersedes mucosal bleeding therefore at this time I think it is  more beneficial to continue the Coumadin and not reverse him.  Especially as he has no evidence of ongoing bleeding nor mucosal bleeding at this time.  He denies any headache, neuro changes, chest pain, other symptoms concerning for different areas of active bleeding.  I think is appropriate for discharge and follow-up with his PCP.    Diagnosis:    ICD-10-CM   1. Mucosal bleeding R58       Disposition:  Discharged to home.     Scribe Disclosure:  I, Kat Ibarra, am serving as a scribe at 11:47 AM on 8/11/2019 to document services personally performed by Aishwarya Nuñez MD based on my observations and the provider's statements to me.    8/11/2019    EMERGENCY DEPARTMENT       Aishwarya Nuñez MD  08/11/19 5061

## 2019-08-11 NOTE — ED AVS SNAPSHOT
Emergency Department  [address] [place] [address]  Phone:  [phone]  Fax:  [phone]                                    Sawyer Renteria   MRN: [id number]    Department:   Emergency Department   Date of Visit:  8/11/2019           After Visit Summary Signature Page    I have received my discharge instructions, and my questions have been answered. I have discussed any challenges I see with this plan with the nurse or doctor.    ..........................................................................................................................................  Patient/Patient Representative Signature      ..........................................................................................................................................  Patient Representative Print Name and Relationship to Patient    ..................................................               ................................................  Date                                   Time    ..........................................................................................................................................  Reviewed by Signature/Title    ...................................................              ..............................................  Date                                               Time          22EPIC Rev 08/18

## 2019-08-11 NOTE — ED TRIAGE NOTES
Pt states that he takes coumadin and has noticed that his gums are bleeding more than usual when brushing teeth. Pt states that additionally, he woke up this AM and noted that there was blood when blowing his nose. No active bleeding at this time. Had INR checked on Friday and it was 2.6.

## 2019-08-12 ENCOUNTER — TELEPHONE (OUTPATIENT)
Dept: CARDIOLOGY | Facility: CLINIC | Age: 84
End: 2019-08-12

## 2019-08-12 NOTE — TELEPHONE ENCOUNTER
Called Pt he did call in on Sunday with some gum bleeding and a little bit of bloody nose. Pt did go to ER, and did labs and checked him out and sent him home. Pt said he is doing well today. Pt will make a appointment with dentist today. TIANA Painter RN

## 2019-08-23 ENCOUNTER — ANTICOAGULATION THERAPY VISIT (OUTPATIENT)
Dept: CARDIOLOGY | Facility: CLINIC | Age: 84
End: 2019-08-23
Payer: MEDICARE

## 2019-08-23 DIAGNOSIS — Z79.01 LONG TERM CURRENT USE OF ANTICOAGULANTS WITH INR GOAL OF 2.0-3.0: ICD-10-CM

## 2019-08-23 DIAGNOSIS — G45.9 TRANSIENT CEREBRAL ISCHEMIA, UNSPECIFIED TYPE: ICD-10-CM

## 2019-08-23 DIAGNOSIS — I51.3 LEFT VENTRICULAR THROMBUS: ICD-10-CM

## 2019-08-23 LAB — INR POINT OF CARE: 1.8 (ref 0.86–1.14)

## 2019-08-23 PROCEDURE — 36416 COLLJ CAPILLARY BLOOD SPEC: CPT | Performed by: INTERNAL MEDICINE

## 2019-08-23 PROCEDURE — 85610 PROTHROMBIN TIME: CPT | Mod: QW | Performed by: INTERNAL MEDICINE

## 2019-08-23 NOTE — PROGRESS NOTES
ANTICOAGULATION FOLLOW-UP CLINIC VISIT    Patient Name:  Sawyer Renteria  Date:  2019  Contact Type:  Face to Face    SUBJECTIVE:  Patient Findings     Positives:   Emergency department visit (ER visit on  for nosebleed and bleeding gums)    Comments:   The patient was assessed for diet, medication, and activity level changes, missed or extra doses, bruising or bleeding, with no problem findings.        Clinical Outcomes     Negatives:   Major bleeding event, Thromboembolic event, Anticoagulation-related hospital admission, Anticoagulation-related ED visit, Anticoagulation-related fatality    Comments:   The patient was assessed for diet, medication, and activity level changes, missed or extra doses, bruising or bleeding, with no problem findings.           OBJECTIVE    INR Protime   Date Value Ref Range Status   2019 1.8 (A) 0.86 - 1.14 Final       ASSESSMENT / PLAN  INR assessment SUB    Recheck INR In: 2 WEEKS    INR Location Clinic      Anticoagulation Summary  As of 2019    INR goal:   2.0-3.0   TTR:   67.4 % (3.2 y)   INR used for dosin.8! (2019)   Warfarin maintenance plan:   2.5 mg (5 mg x 0.5) every Mon, Wed, Fri; 5 mg (5 mg x 1) all other days   Full warfarin instructions:   : 5 mg; Otherwise 2.5 mg every Mon, Wed, Fri; 5 mg all other days   Weekly warfarin total:   27.5 mg   Plan last modified:   Donna Colby, RN (2019)   Next INR check:   2019   Target end date:   Indefinite    Indications    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]  Transient cerebral ischemia [G45.9]  Long term current use of anticoagulants with INR goal of 2.0-3.0 [Z79.01]             Anticoagulation Episode Summary     INR check location:       Preferred lab:       Send INR reminders to:   [facility] HEART INR NURSE    Comments:         Anticoagulation Care Providers     Provider Role Specialty Phone number    Satya Newton MD [facility] [place] Cardiology [phone]            See the Encounter  Report to view Anticoagulation Flowsheet and Dosing Calendar (Go to Encounters tab in chart review, and find the Anticoagulation Therapy Visit)    INR 1.8 He missed a 5 mg dose earlier this week and didn't realize it right away so he did not make up the missed dose. On 8/11, he had blood in his nose and gum bleeding so he went to ER. INR was 2.46 and no active bleeding found so he was sent home. He had OV with dentist who recommended salt water gargles for his gums up to twice a day. He has had no further bleeding. Will boost today's dose to 5 mg then resume usual dosing of 2.5 mg MWF and 5 mg all other days with recheck in 2 weeks. He administered his Praluent injection in his left thigh. Dosage adjustment made based on physician directed care plan.    Donna Colby RN

## 2019-09-06 ENCOUNTER — ANTICOAGULATION THERAPY VISIT (OUTPATIENT)
Dept: CARDIOLOGY | Facility: CLINIC | Age: 84
End: 2019-09-06
Payer: MEDICARE

## 2019-09-06 DIAGNOSIS — G45.9 TRANSIENT CEREBRAL ISCHEMIA, UNSPECIFIED TYPE: ICD-10-CM

## 2019-09-06 DIAGNOSIS — I51.3 LEFT VENTRICULAR THROMBUS: ICD-10-CM

## 2019-09-06 DIAGNOSIS — Z79.01 LONG TERM CURRENT USE OF ANTICOAGULANTS WITH INR GOAL OF 2.0-3.0: ICD-10-CM

## 2019-09-06 LAB — INR POINT OF CARE: 1.9 (ref 0.86–1.14)

## 2019-09-06 PROCEDURE — 36416 COLLJ CAPILLARY BLOOD SPEC: CPT | Performed by: INTERNAL MEDICINE

## 2019-09-06 PROCEDURE — 85610 PROTHROMBIN TIME: CPT | Mod: QW | Performed by: INTERNAL MEDICINE

## 2019-09-06 NOTE — PROGRESS NOTES
ANTICOAGULATION FOLLOW-UP CLINIC VISIT    Patient Name:  Sawyer Renteria  Date:  2019  Contact Type:  Face to Face    SUBJECTIVE:  Patient Findings     Comments:   The patient was assessed for diet, medication, and activity level changes, missed or extra doses, bruising or bleeding, with no problem findings.        Clinical Outcomes     Negatives:   Major bleeding event, Thromboembolic event, Anticoagulation-related hospital admission, Anticoagulation-related ED visit, Anticoagulation-related fatality    Comments:   The patient was assessed for diet, medication, and activity level changes, missed or extra doses, bruising or bleeding, with no problem findings.           OBJECTIVE    INR Protime   Date Value Ref Range Status   2019 1.9 (A) 0.86 - 1.14 Final       ASSESSMENT / PLAN  INR assessment SUB    Recheck INR In: 3 WEEKS    INR Location Clinic      Anticoagulation Summary  As of 2019    INR goal:   2.0-3.0   TTR:   66.6 % (3.2 y)   INR used for dosin.9! (2019)   Warfarin maintenance plan:   2.5 mg (5 mg x 0.5) every Mon, Wed, Fri; 5 mg (5 mg x 1) all other days   Full warfarin instructions:   : 5 mg; Otherwise 2.5 mg every Mon, Wed, Fri; 5 mg all other days   Weekly warfarin total:   27.5 mg   Plan last modified:   Donna Colby RN (2019)   Next INR check:   2019   Target end date:   Indefinite    Indications    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]  Transient cerebral ischemia [G45.9]  Long term current use of anticoagulants with INR goal of 2.0-3.0 [Z79.01]             Anticoagulation Episode Summary     INR check location:       Preferred lab:       Send INR reminders to:   [facility] HEART INR NURSE    Comments:         Anticoagulation Care Providers     Provider Role Specialty Phone number    Satya Newton MD Responsible Cardiology [phone]            See the Encounter Report to view Anticoagulation Flowsheet and Dosing Calendar (Go to Encounters tab in chart  review, and find the Anticoagulation Therapy Visit)    INR 1.9 INR mimi slightly with boost in dosing. He can't remember if he missed a dose this time. No change in diet. Denies abnormal bleeding or bruising. No further gum bleeding now that he is doing a saline mouth rinse. Will boost today's dose to 5 mg then resume usual dosing of 2.5 mg MWF and 5 mg all other days with recheck in 3 weeks. Will consider increasing maintenance dose if INR still low next time. He administered his own Praluent injection in his right thigh. Dosage adjustment made based on physician directed care plan.    Donna Cobly RN

## 2019-09-30 ENCOUNTER — ANTICOAGULATION THERAPY VISIT (OUTPATIENT)
Dept: CARDIOLOGY | Facility: CLINIC | Age: 84
End: 2019-09-30
Payer: MEDICARE

## 2019-09-30 DIAGNOSIS — Z79.01 LONG TERM CURRENT USE OF ANTICOAGULANTS WITH INR GOAL OF 2.0-3.0: ICD-10-CM

## 2019-09-30 DIAGNOSIS — I51.3 LEFT VENTRICULAR THROMBUS: ICD-10-CM

## 2019-09-30 DIAGNOSIS — G45.9 TRANSIENT CEREBRAL ISCHEMIA, UNSPECIFIED TYPE: ICD-10-CM

## 2019-09-30 LAB — INR POINT OF CARE: 2.1 (ref 0.86–1.14)

## 2019-09-30 PROCEDURE — 85610 PROTHROMBIN TIME: CPT | Mod: QW | Performed by: INTERNAL MEDICINE

## 2019-09-30 PROCEDURE — 36416 COLLJ CAPILLARY BLOOD SPEC: CPT | Performed by: INTERNAL MEDICINE

## 2019-09-30 NOTE — PROGRESS NOTES
ANTICOAGULATION FOLLOW-UP CLINIC VISIT    Patient Name:  Sawyer Renteria  Date:  2019  Contact Type:  Face to Face    SUBJECTIVE:  Patient Findings         Clinical Outcomes     Negatives:   Major bleeding event, Thromboembolic event, Anticoagulation-related hospital admission, Anticoagulation-related ED visit, Anticoagulation-related fatality           OBJECTIVE    INR Protime   Date Value Ref Range Status   2019 2.1 (A) 0.86 - 1.14 Final       ASSESSMENT / PLAN  INR assessment THER    Recheck INR In: 2 WEEKS    INR Location Clinic      Anticoagulation Summary  As of 2019    INR goal:   2.0-3.0   TTR:   66.2 % (3.3 y)   INR used for dosin.1 (2019)   Warfarin maintenance plan:   2.5 mg (5 mg x 0.5) every Mon, Wed, Fri; 5 mg (5 mg x 1) all other days   Full warfarin instructions:   2.5 mg every Mon, Wed, Fri; 5 mg all other days   Weekly warfarin total:   27.5 mg   No change documented:   Kasey Reyes RN   Plan last modified:   Donna Colby RN (2019)   Next INR check:   10/14/2019   Target end date:   Indefinite    Indications    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]  Transient cerebral ischemia [G45.9]  Long term current use of anticoagulants with INR goal of 2.0-3.0 [Z79.01]             Anticoagulation Episode Summary     INR check location:       Preferred lab:       Send INR reminders to:   [facility] HEART INR NURSE    Comments:         Anticoagulation Care Providers     Provider Role Specialty Phone number    Satya Newton MD Responsible Cardiology [phone]            See the Encounter Report to view Anticoagulation Flowsheet and Dosing Calendar (Go to Encounters tab in chart review, and find the Anticoagulation Therapy Visit)    INR 2.1 Back in range. No changes to dosing. Pt is taking Prevagen for memory. No data on effect of Prevagen on warfarin, Denies unusual bleeding, bruising, or clotting symptoms. Next appt in 2 weeks. Pt gave self Praluent injection in left  thigh. Dosage adjustment made based on physician directed care plan.    Kasey Reyes RN

## 2019-10-17 ENCOUNTER — ANTICOAGULATION THERAPY VISIT (OUTPATIENT)
Dept: CARDIOLOGY | Facility: CLINIC | Age: 84
End: 2019-10-17
Payer: MEDICARE

## 2019-10-17 DIAGNOSIS — I51.3 LEFT VENTRICULAR THROMBUS: ICD-10-CM

## 2019-10-17 DIAGNOSIS — Z79.01 LONG TERM CURRENT USE OF ANTICOAGULANTS WITH INR GOAL OF 2.0-3.0: ICD-10-CM

## 2019-10-17 LAB — INR POINT OF CARE: 1.4 (ref 0.86–1.14)

## 2019-10-17 PROCEDURE — 99207 ZZC NO CHARGE NURSE ONLY: CPT | Performed by: INTERNAL MEDICINE

## 2019-10-17 PROCEDURE — 36416 COLLJ CAPILLARY BLOOD SPEC: CPT | Performed by: INTERNAL MEDICINE

## 2019-10-17 PROCEDURE — 85610 PROTHROMBIN TIME: CPT | Mod: QW | Performed by: INTERNAL MEDICINE

## 2019-10-17 NOTE — PROGRESS NOTES
ANTICOAGULATION FOLLOW-UP CLINIC VISIT    Patient Name:  Sawyer Renteria  Date:  10/17/2019  Contact Type:  Face to Face    SUBJECTIVE:  Patient Findings     Positives:   Change in diet/appetite    Comments:   Has been drinking Ensure        Clinical Outcomes     Negatives:   Major bleeding event, Thromboembolic event, Anticoagulation-related hospital admission, Anticoagulation-related ED visit, Anticoagulation-related fatality    Comments:   Has been drinking Ensure           OBJECTIVE    INR Protime   Date Value Ref Range Status   10/17/2019 1.4 (A) 0.86 - 1.14 Final       ASSESSMENT / PLAN  INR assessment SUB    Recheck INR In: 1 WEEK    INR Location Clinic      Anticoagulation Summary  As of 10/17/2019    INR goal:   2.0-3.0   TTR:   65.5 % (3.3 y)   INR used for dosin.4! (10/17/2019)   Warfarin maintenance plan:   2.5 mg (5 mg x 0.5) every Mon, Wed, Fri; 5 mg (5 mg x 1) all other days   Full warfarin instructions:   10/17: 10 mg; Otherwise 2.5 mg every Mon, Wed, Fri; 5 mg all other days   Weekly warfarin total:   27.5 mg   Plan last modified:   Donna Colby RN (2019)   Next INR check:   10/25/2019   Target end date:   Indefinite    Indications    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]  Transient cerebral ischemia [G45.9]  Long term current use of anticoagulants with INR goal of 2.0-3.0 [Z79.01]             Anticoagulation Episode Summary     INR check location:       Preferred lab:       Send INR reminders to:   [facility] HEART INR NURSE    Comments:         Anticoagulation Care Providers     Provider Role Specialty Phone number    Satya Newton MD Responsible Cardiology [phone]            See the Encounter Report to view Anticoagulation Flowsheet and Dosing Calendar (Go to Encounters tab in chart review, and find the Anticoagulation Therapy Visit)    INR 1.4.  He doesn't use a pill box.  He has been drinking Ensure and it was about 3x this past week.  Boost today from 5mg to 10mg x1 then  normal schedule and recheck in 1 week.  He will not drink Ensure this next week but he does have some extra bottles left so he will need to decide if he wants to drink them or not.  Praulent injection done by patient in the RIGHT thigh.  Turner Antonio, RN

## 2019-10-25 ENCOUNTER — ANTICOAGULATION THERAPY VISIT (OUTPATIENT)
Dept: CARDIOLOGY | Facility: CLINIC | Age: 84
End: 2019-10-25
Payer: MEDICARE

## 2019-10-25 DIAGNOSIS — Z79.01 LONG TERM CURRENT USE OF ANTICOAGULANTS WITH INR GOAL OF 2.0-3.0: ICD-10-CM

## 2019-10-25 DIAGNOSIS — G45.9 TRANSIENT CEREBRAL ISCHEMIA: ICD-10-CM

## 2019-10-25 DIAGNOSIS — I51.3 LEFT VENTRICULAR THROMBUS: ICD-10-CM

## 2019-10-25 LAB — INR POINT OF CARE: 1.8 (ref 0.86–1.14)

## 2019-10-25 PROCEDURE — 36416 COLLJ CAPILLARY BLOOD SPEC: CPT | Performed by: INTERNAL MEDICINE

## 2019-10-25 PROCEDURE — 85610 PROTHROMBIN TIME: CPT | Mod: QW | Performed by: INTERNAL MEDICINE

## 2019-10-25 NOTE — PROGRESS NOTES
ANTICOAGULATION FOLLOW-UP CLINIC VISIT    Patient Name:  Sawyer Renteria  Date:  10/25/2019  Contact Type:  Face to Face    SUBJECTIVE:  Patient Findings     Positives:   Change in diet/appetite (no longer drinking protein shakes)        Clinical Outcomes     Negatives:   Major bleeding event, Thromboembolic event, Anticoagulation-related hospital admission, Anticoagulation-related ED visit, Anticoagulation-related fatality           OBJECTIVE    INR Protime   Date Value Ref Range Status   10/25/2019 1.8 (A) 0.86 - 1.14 Final       ASSESSMENT / PLAN  INR assessment SUB    Recheck INR In: 2 WEEKS    INR Location Clinic      Anticoagulation Summary  As of 10/25/2019    INR goal:   2.0-3.0   TTR:   65.1 % (3.4 y)   INR used for dosin.8! (10/25/2019)   Warfarin maintenance plan:   2.5 mg (5 mg x 0.5) every Mon, Wed, Fri; 5 mg (5 mg x 1) all other days   Full warfarin instructions:   10/25: 5 mg; Otherwise 2.5 mg every Mon, Wed, Fri; 5 mg all other days   Weekly warfarin total:   27.5 mg   Plan last modified:   Donna Colby RN (2019)   Next INR check:   2019   Target end date:   Indefinite    Indications    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]  Transient cerebral ischemia [G45.9]  Long term current use of anticoagulants with INR goal of 2.0-3.0 [Z79.01]             Anticoagulation Episode Summary     INR check location:       Preferred lab:       Send INR reminders to:   [facility] HEART INR NURSE    Comments:         Anticoagulation Care Providers     Provider Role Specialty Phone number    Satya Newton MD [facility] Cardiology [phone]            See the Encounter Report to view Anticoagulation Flowsheet and Dosing Calendar (Go to Encounters tab in chart review, and find the Anticoagulation Therapy Visit)      INR 1.8 INR is rising with boost in dosing last week but still low (INR dropped when he started drinking Ensure). He has stopped drinking Ensure. He is asking if he Glucerna has vit K in  it - internet search states that there is vig K in Glucerna so he won't drink it but he has drank it sporadically in the past which may have been the cause for occasional low INR's in the past. He will try not to drink the protein shakes. No change in meds. Denies abnormal bleeding or bruising. Will boost today's dose to 5 mg then resume usual dosing of 2.5 mg MWF and 5 mg all other days. Recheck in 2 weeks. He did not administer Praluent today (too soon for the dose because he took it last week). Dosage adjustment made based on physician directed care plan.    Donna Colby RN

## 2019-10-30 DIAGNOSIS — G45.9 TRANSIENT CEREBRAL ISCHEMIA: ICD-10-CM

## 2019-10-30 DIAGNOSIS — I23.6 LV (LEFT VENTRICULAR) MURAL THROMBUS FOLLOWING MI (H): ICD-10-CM

## 2019-10-30 RX ORDER — WARFARIN SODIUM 5 MG/1
TABLET ORAL
Qty: 90 TABLET | Refills: 1 | Status: SHIPPED | OUTPATIENT
Start: 2019-10-30 | End: 2020-05-05

## 2019-11-01 ENCOUNTER — TELEPHONE (OUTPATIENT)
Dept: CARDIOLOGY | Facility: CLINIC | Age: 84
End: 2019-11-01

## 2019-11-07 NOTE — TELEPHONE ENCOUNTER
Rec'd faxed request from [employer] program to renew pt's Praluent free drug for 2020. Will mail pt their portion and work with provider to obtain signature and fax back.    [email or web address]

## 2019-11-08 ENCOUNTER — ANTICOAGULATION THERAPY VISIT (OUTPATIENT)
Dept: CARDIOLOGY | Facility: CLINIC | Age: 84
End: 2019-11-08
Payer: MEDICARE

## 2019-11-08 DIAGNOSIS — I51.3 LEFT VENTRICULAR THROMBUS: ICD-10-CM

## 2019-11-08 DIAGNOSIS — G45.9 TRANSIENT CEREBRAL ISCHEMIA: ICD-10-CM

## 2019-11-08 DIAGNOSIS — Z79.01 LONG TERM CURRENT USE OF ANTICOAGULANTS WITH INR GOAL OF 2.0-3.0: ICD-10-CM

## 2019-11-08 LAB — INR POINT OF CARE: 3.3 (ref 0.86–1.14)

## 2019-11-08 PROCEDURE — 85610 PROTHROMBIN TIME: CPT | Mod: QW | Performed by: INTERNAL MEDICINE

## 2019-11-08 PROCEDURE — 99207 ZZC NO CHARGE NURSE ONLY: CPT | Performed by: INTERNAL MEDICINE

## 2019-11-08 PROCEDURE — 36416 COLLJ CAPILLARY BLOOD SPEC: CPT | Performed by: INTERNAL MEDICINE

## 2019-11-08 NOTE — PROGRESS NOTES
ANTICOAGULATION FOLLOW-UP CLINIC VISIT    Patient Name:  Sawyer Renteria  Date:  11/8/2019  Contact Type:  Face to Face    SUBJECTIVE:  Patient Findings     Positives:   Change in diet/appetite (no protein shakes)        Clinical Outcomes     Negatives:   Major bleeding event, Thromboembolic event, Anticoagulation-related hospital admission, Anticoagulation-related ED visit, Anticoagulation-related fatality           OBJECTIVE    INR Protime   Date Value Ref Range Status   11/08/2019 3.3 (A) 0.86 - 1.14 Final       ASSESSMENT / PLAN  INR assessment SUPRA    Recheck INR In: 2 WEEKS    INR Location Clinic      Anticoagulation Summary  As of 11/8/2019    INR goal:   2.0-3.0   TTR:   65.1 % (3.4 y)   INR used for dosing:   3.3! (11/8/2019)   Warfarin maintenance plan:   2.5 mg (5 mg x 0.5) every Mon, Wed, Fri; 5 mg (5 mg x 1) all other days   Full warfarin instructions:   2.5 mg every Mon, Wed, Fri; 5 mg all other days   Weekly warfarin total:   27.5 mg   No change documented:   Donna Colby RN   Plan last modified:   Donna Colby RN (6/21/2019)   Next INR check:   11/22/2019   Target end date:   Indefinite    Indications    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]  Transient cerebral ischemia [G45.9]  Long term current use of anticoagulants with INR goal of 2.0-3.0 [Z79.01]             Anticoagulation Episode Summary     INR check location:       Preferred lab:       Send INR reminders to:   [facility] HEART INR NURSE    Comments:         Anticoagulation Care Providers     Provider Role Specialty Phone number    Satya Newton MD [facility] Cardiology [phone]            See the Encounter Report to view Anticoagulation Flowsheet and Dosing Calendar (Go to Encounters tab in chart review, and find the Anticoagulation Therapy Visit)    INR 3.3 He did not drink any protein shakes since the last INR check but he would like to drink them. No change in meds. Denies abnormal bleeding or bruising. Will continue current  dosing of 2.5 mg MWF and 5 mg all other days and drink 1 bottle of protein shake on Mondays and Fridays. Recheck in 2 weeks. He administered his Praluent injection in his left thigh. Dosage adjustment made based on physician directed care plan.    Donna Colby RN

## 2019-11-22 ENCOUNTER — ANTICOAGULATION THERAPY VISIT (OUTPATIENT)
Dept: CARDIOLOGY | Facility: CLINIC | Age: 84
End: 2019-11-22
Payer: MEDICARE

## 2019-11-22 DIAGNOSIS — I51.3 LEFT VENTRICULAR THROMBUS: ICD-10-CM

## 2019-11-22 DIAGNOSIS — Z79.01 LONG TERM CURRENT USE OF ANTICOAGULANTS WITH INR GOAL OF 2.0-3.0: ICD-10-CM

## 2019-11-22 DIAGNOSIS — G45.9 TRANSIENT CEREBRAL ISCHEMIA, UNSPECIFIED TYPE: ICD-10-CM

## 2019-11-22 LAB — INR POINT OF CARE: 1.8 (ref 0.86–1.14)

## 2019-11-22 PROCEDURE — 85610 PROTHROMBIN TIME: CPT | Mod: QW | Performed by: INTERNAL MEDICINE

## 2019-11-22 PROCEDURE — 36416 COLLJ CAPILLARY BLOOD SPEC: CPT | Performed by: INTERNAL MEDICINE

## 2019-11-22 NOTE — PROGRESS NOTES
ANTICOAGULATION FOLLOW-UP CLINIC VISIT    Patient Name:  Sawyer Renteria  Date:  2019  Contact Type:  Face to Face    SUBJECTIVE:  Patient Findings     Positives:   Change in diet/appetite (drank 2 bottles of protein shakes this week)             OBJECTIVE    INR Protime   Date Value Ref Range Status   2019 1.8 (A) 0.86 - 1.14 Final       ASSESSMENT / PLAN  INR assessment SUB    Recheck INR In: 2 WEEKS    INR Location Clinic      Anticoagulation Summary  As of 2019    INR goal:   2.0-3.0   TTR:   60.3 % (1 y)   INR used for dosin.8! (2019)   Warfarin maintenance plan:   2.5 mg (5 mg x 0.5) every Mon, Wed, Fri; 5 mg (5 mg x 1) all other days   Full warfarin instructions:   2.5 mg every Mon, Wed, Fri; 5 mg all other days   Weekly warfarin total:   27.5 mg   No change documented:   Donna Colby RN   Plan last modified:   Donna Colby RN (2019)   Next INR check:   2019   Priority:   Maintenance   Target end date:   Indefinite    Indications    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]  Transient cerebral ischemia [G45.9]  Long term current use of anticoagulants with INR goal of 2.0-3.0 [Z79.01]             Anticoagulation Episode Summary     INR check location:       Preferred lab:       Send INR reminders to:   [facility] HEART INR NURSE    Comments:         Anticoagulation Care Providers     Provider Role Specialty Phone number    Satya Newton MD Responsible Cardiology [phone]            See the Encounter Report to view Anticoagulation Flowsheet and Dosing Calendar (Go to Encounters tab in chart review, and find the Anticoagulation Therapy Visit)    INR 1.8 No change in meds. He is drinking 2 bottles of protein shakes a week now. Denies abnormal bleeding or bruising.He prefers to decrease his intake of protein shakes instead of adjusting doses so he will drink a protein shake on  only and will continue current dosing of 2.5 mg MWF and 5 mg all other days with  recheck in 2 week. Pt administered his Praluent injection in his right thigh. Dosage adjustment made based on physician directed care plan.    Donna Colby RN

## 2019-12-06 ENCOUNTER — ANTICOAGULATION THERAPY VISIT (OUTPATIENT)
Dept: CARDIOLOGY | Facility: CLINIC | Age: 84
End: 2019-12-06
Payer: MEDICARE

## 2019-12-06 DIAGNOSIS — I51.3 LEFT VENTRICULAR THROMBUS: ICD-10-CM

## 2019-12-06 DIAGNOSIS — G45.9 TRANSIENT CEREBRAL ISCHEMIA, UNSPECIFIED TYPE: ICD-10-CM

## 2019-12-06 DIAGNOSIS — Z79.01 LONG TERM CURRENT USE OF ANTICOAGULANTS WITH INR GOAL OF 2.0-3.0: ICD-10-CM

## 2019-12-06 LAB — INR POINT OF CARE: 2.8 (ref 0.86–1.14)

## 2019-12-06 PROCEDURE — 85610 PROTHROMBIN TIME: CPT | Mod: QW | Performed by: INTERNAL MEDICINE

## 2019-12-06 PROCEDURE — 36416 COLLJ CAPILLARY BLOOD SPEC: CPT | Performed by: INTERNAL MEDICINE

## 2019-12-06 NOTE — PROGRESS NOTES
ANTICOAGULATION FOLLOW-UP CLINIC VISIT    Patient Name:  Sawyer Renteria  Date:  2019  Contact Type:  Face to Face    SUBJECTIVE:  Patient Findings         Clinical Outcomes     Negatives:   Major bleeding event, Thromboembolic event, Anticoagulation-related hospital admission, Anticoagulation-related ED visit, Anticoagulation-related fatality           OBJECTIVE    INR Protime   Date Value Ref Range Status   2019 2.8 (A) 0.86 - 1.14 Final       ASSESSMENT / PLAN  INR assessment THER    Recheck INR In: 2 WEEKS    INR Location Clinic      Anticoagulation Summary  As of 2019    INR goal:   2.0-3.0   TTR:   61.9 % (1 y)   INR used for dosin.8 (2019)   Warfarin maintenance plan:   2.5 mg (5 mg x 0.5) every Mon, Wed, Fri; 5 mg (5 mg x 1) all other days   Full warfarin instructions:   2.5 mg every Mon, Wed, Fri; 5 mg all other days   Weekly warfarin total:   27.5 mg   Plan last modified:   Donna Colby RN (2019)   Next INR check:   2019   Priority:   Maintenance   Target end date:   Indefinite    Indications    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]  Transient cerebral ischemia [G45.9]  Long term current use of anticoagulants with INR goal of 2.0-3.0 [Z79.01]             Anticoagulation Episode Summary     INR check location:       Preferred lab:       Send INR reminders to:   [facility] HEART INR NURSE    Comments:         Anticoagulation Care Providers     Provider Role Specialty Phone number    Satya Newton MD Responsible Cardiology [phone]            See the Encounter Report to view Anticoagulation Flowsheet and Dosing Calendar (Go to Encounters tab in chart review, and find the Anticoagulation Therapy Visit)    INR 2.8 INR back in range. He thinks he drank one protein shake this week. No change in meds. Denies abnormal bleeding or bruising. Will continue current dosing of 2.5 mg MWF and 5 mg all other days with recheck in 2 weeks. Pt administered his Praluent injection in  his left thigh.    Donna Colby RN

## 2019-12-11 ENCOUNTER — HOSPITAL PATHOLOGY (OUTPATIENT)
Dept: OTHER | Facility: CLINIC | Age: 84
End: 2019-12-11

## 2019-12-12 LAB — COPATH REPORT: NORMAL

## 2019-12-20 ENCOUNTER — ANTICOAGULATION THERAPY VISIT (OUTPATIENT)
Dept: CARDIOLOGY | Facility: CLINIC | Age: 84
End: 2019-12-20
Payer: MEDICARE

## 2019-12-20 DIAGNOSIS — G45.9 TRANSIENT CEREBRAL ISCHEMIA, UNSPECIFIED TYPE: ICD-10-CM

## 2019-12-20 DIAGNOSIS — Z79.01 LONG TERM CURRENT USE OF ANTICOAGULANTS WITH INR GOAL OF 2.0-3.0: ICD-10-CM

## 2019-12-20 DIAGNOSIS — I51.3 LEFT VENTRICULAR THROMBUS: ICD-10-CM

## 2019-12-20 LAB — INR POINT OF CARE: 1.8 (ref 0.86–1.14)

## 2019-12-20 PROCEDURE — 36416 COLLJ CAPILLARY BLOOD SPEC: CPT | Performed by: INTERNAL MEDICINE

## 2019-12-20 PROCEDURE — 85610 PROTHROMBIN TIME: CPT | Mod: QW | Performed by: INTERNAL MEDICINE

## 2019-12-20 NOTE — PATIENT INSTRUCTIONS
Drink no more than one protein shake weekly, in addition to your normal average serving of weekly greens. See dosage change of warfarin for today only.

## 2019-12-20 NOTE — PROGRESS NOTES
ANTICOAGULATION FOLLOW-UP CLINIC VISIT    Patient Name:  Sawyer Renteria  Date:  2019  Contact Type:  Face to Face    SUBJECTIVE:  Patient Findings     Positives:   Change in diet/appetite (Pt thinks he drank at least 2 protein shakes this past week)        Clinical Outcomes     Negatives:   Major bleeding event, Thromboembolic event, Anticoagulation-related hospital admission, Anticoagulation-related ED visit, Anticoagulation-related fatality           OBJECTIVE    INR Protime   Date Value Ref Range Status   2019 1.8 (A) 0.86 - 1.14 Final       ASSESSMENT / PLAN  INR assessment SUB    Recheck INR In: 2 WEEKS    INR Location Clinic      Anticoagulation Summary  As of 2019    INR goal:   2.0-3.0   TTR:   61.2 % (1 y)   INR used for dosin.8! (2019)   Warfarin maintenance plan:   2.5 mg (5 mg x 0.5) every Mon, Wed, Fri; 5 mg (5 mg x 1) all other days   Full warfarin instructions:   : 5 mg; Otherwise 2.5 mg every Mon, Wed, Fri; 5 mg all other days   Weekly warfarin total:   27.5 mg   Plan last modified:   Donna Colby RN (2019)   Next INR check:   1/3/2020   Priority:   Maintenance   Target end date:   Indefinite    Indications    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]  Transient cerebral ischemia [G45.9]  Long term current use of anticoagulants with INR goal of 2.0-3.0 [Z79.01]             Anticoagulation Episode Summary     INR check location:       Preferred lab:       Send INR reminders to:   [facility] HEART INR NURSE    Comments:         Anticoagulation Care Providers     Provider Role Specialty Phone number    Satya eNwton MD [facility] Cardiology [phone]            See the Encounter Report to view Anticoagulation Flowsheet and Dosing Calendar (Go to Encounters tab in chart review, and find the Anticoagulation Therapy Visit)    INR was 1.8 today. Pt denies any abnormal bleeding or bruising. Denies any recent medication or dietary changes or recent illness. Eats  approximately 2 servings of greens weekly and instructed to drink no more than 1 protein shake weekly. Pt believes he may have had at least 2 shakes this week. Will boost today's dose from 2.5 mg to 5 mg, and then continue current maintenance dosing of 2.5 mg every MWF and 5 mg all other days of the week. Next INR check is scheduled in 2 weeks. Pt verbalized understanding. Administered his Pralulent injection to left thigh. Dosage adjustment made based on physician directed care plan.    Alexandra Rust RN

## 2020-01-03 ENCOUNTER — CARE COORDINATION (OUTPATIENT)
Dept: CARDIOLOGY | Facility: CLINIC | Age: 85
End: 2020-01-03

## 2020-01-03 ENCOUNTER — ANTICOAGULATION THERAPY VISIT (OUTPATIENT)
Dept: CARDIOLOGY | Facility: CLINIC | Age: 85
End: 2020-01-03
Payer: MEDICARE

## 2020-01-03 DIAGNOSIS — Z79.01 LONG TERM CURRENT USE OF ANTICOAGULANTS WITH INR GOAL OF 2.0-3.0: ICD-10-CM

## 2020-01-03 DIAGNOSIS — I51.3 LEFT VENTRICULAR THROMBUS: ICD-10-CM

## 2020-01-03 DIAGNOSIS — G45.9 TRANSIENT CEREBRAL ISCHEMIA, UNSPECIFIED TYPE: ICD-10-CM

## 2020-01-03 LAB — INR POINT OF CARE: 2.3 (ref 0.86–1.14)

## 2020-01-03 PROCEDURE — 36416 COLLJ CAPILLARY BLOOD SPEC: CPT | Performed by: INTERNAL MEDICINE

## 2020-01-03 PROCEDURE — 85610 PROTHROMBIN TIME: CPT | Mod: QW | Performed by: INTERNAL MEDICINE

## 2020-01-03 PROCEDURE — 99207 ZZC NO CHARGE NURSE ONLY: CPT | Performed by: INTERNAL MEDICINE

## 2020-01-03 NOTE — PROGRESS NOTES
Pt requested update on status of his PASS application for Praluent after his INR visit today. He confirmed he submitted his portion of PASS application directly to [employer] and, per chart review, note that physician portion of PASS application was submitted on 11/7/19. Discussed with pt that PASS did not start to review applications until after 1/1/20, so will take a little while to hear response.  Will plan to give update to pt when he is here for next INR visit on 1/24/20- reminder sent.    OKSANA Dunne 11:29 AM 1/3/2020

## 2020-01-07 ENCOUNTER — HOSPITAL ENCOUNTER (OUTPATIENT)
Dept: CARDIOLOGY | Facility: CLINIC | Age: 85
Discharge: HOME | End: 2020-01-07
Attending: NURSE PRACTITIONER | Admitting: NURSE PRACTITIONER
Payer: MEDICARE

## 2020-01-07 DIAGNOSIS — I25.5 ISCHEMIC CARDIOMYOPATHY: ICD-10-CM

## 2020-01-07 LAB
CREAT BLD-MCNC: 1.3 MG/DL (ref 0.66–1.25)
GFR SERPL CREATININE-BSD FRML MDRD: 52 ML/MIN/{1.73_M2}

## 2020-01-07 PROCEDURE — 25000132 ZZH RX MED GY IP 250 OP 250 PS 637: Mod: GY | Performed by: NURSE PRACTITIONER

## 2020-01-07 PROCEDURE — 75557 CARDIAC MRI FOR MORPH: CPT

## 2020-01-07 PROCEDURE — 75557 CARDIAC MRI FOR MORPH: CPT | Mod: 26 | Performed by: INTERNAL MEDICINE

## 2020-01-07 PROCEDURE — 82565 ASSAY OF CREATININE: CPT

## 2020-01-07 RX ORDER — METHYLPREDNISOLONE SODIUM SUCCINATE 125 MG/2ML
125 INJECTION, POWDER, LYOPHILIZED, FOR SOLUTION INTRAMUSCULAR; INTRAVENOUS
Status: DISCONTINUED | OUTPATIENT
Start: 2020-01-07 | End: 2020-01-08 | Stop reason: HOSPADM

## 2020-01-07 RX ORDER — ALBUTEROL SULFATE 90 UG/1
2 AEROSOL, METERED RESPIRATORY (INHALATION) EVERY 5 MIN PRN
Status: DISCONTINUED | OUTPATIENT
Start: 2020-01-07 | End: 2020-01-08 | Stop reason: HOSPADM

## 2020-01-07 RX ORDER — AMINOPHYLLINE 25 MG/ML
50-100 INJECTION, SOLUTION INTRAVENOUS
Status: DISCONTINUED | OUTPATIENT
Start: 2020-01-07 | End: 2020-01-08 | Stop reason: HOSPADM

## 2020-01-07 RX ORDER — ACYCLOVIR 200 MG/1
0-1 CAPSULE ORAL
Status: DISCONTINUED | OUTPATIENT
Start: 2020-01-07 | End: 2020-01-08 | Stop reason: HOSPADM

## 2020-01-07 RX ORDER — DIAZEPAM 5 MG
5 TABLET ORAL EVERY 30 MIN PRN
Status: DISCONTINUED | OUTPATIENT
Start: 2020-01-07 | End: 2020-01-08 | Stop reason: HOSPADM

## 2020-01-07 RX ORDER — DIPHENHYDRAMINE HYDROCHLORIDE 50 MG/ML
25-50 INJECTION INTRAMUSCULAR; INTRAVENOUS
Status: DISCONTINUED | OUTPATIENT
Start: 2020-01-07 | End: 2020-01-08 | Stop reason: HOSPADM

## 2020-01-07 RX ORDER — REGADENOSON 0.08 MG/ML
0.4 INJECTION, SOLUTION INTRAVENOUS ONCE
Status: DISCONTINUED | OUTPATIENT
Start: 2020-01-07 | End: 2020-01-08 | Stop reason: HOSPADM

## 2020-01-07 RX ORDER — ONDANSETRON 2 MG/ML
4 INJECTION INTRAMUSCULAR; INTRAVENOUS
Status: DISCONTINUED | OUTPATIENT
Start: 2020-01-07 | End: 2020-01-08 | Stop reason: HOSPADM

## 2020-01-07 RX ORDER — DIPHENHYDRAMINE HCL 25 MG
25 CAPSULE ORAL
Status: DISCONTINUED | OUTPATIENT
Start: 2020-01-07 | End: 2020-01-08 | Stop reason: HOSPADM

## 2020-01-07 RX ORDER — AMINOPHYLLINE 25 MG/ML
100 INJECTION, SOLUTION INTRAVENOUS ONCE
Status: DISCONTINUED | OUTPATIENT
Start: 2020-01-07 | End: 2020-01-08 | Stop reason: HOSPADM

## 2020-01-07 RX ADMIN — DIAZEPAM 5 MG: 5 TABLET ORAL at 14:10

## 2020-01-07 NOTE — PROGRESS NOTES
Premeds were given prior to Cardiac MRI study. Patient was not able to complete test due to claustrophobia.

## 2020-01-13 DIAGNOSIS — I25.10 CORONARY ARTERY DISEASE INVOLVING NATIVE CORONARY ARTERY OF NATIVE HEART WITHOUT ANGINA PECTORIS: ICD-10-CM

## 2020-01-13 RX ORDER — LISINOPRIL 2.5 MG/1
TABLET ORAL
Qty: 180 TABLET | Refills: 0 | Status: SHIPPED | OUTPATIENT
Start: 2020-01-13 | End: 2020-04-22

## 2020-01-13 NOTE — LETTER
January 13, 2020       TO: Sawyer Renteria  [address] [place] [address]       Dear Sawyer Renteria,    Your pharmacy has requested a medication refill and our records indicate that you are due to be seen by your cardiologist. We will refill your medication for 3 months, which will allow you enough time to be seen by one of our providers.    Please call our clinic at [phone] to schedule your appointment as soon as possible.    Thank you,    [facility] Emergency Department

## 2020-01-14 ENCOUNTER — TELEPHONE (OUTPATIENT)
Dept: CARDIOLOGY | Facility: CLINIC | Age: 85
End: 2020-01-14

## 2020-01-14 NOTE — TELEPHONE ENCOUNTER
"RN called patient with MRI results. Patient verbalized understanding and confirmed for RN that he is feeling \"great.\" Patient also confirmed for RN he is on the wait list to see Dr. Newton. Patient will call prior to OV with Dr. Newton with any further questions/concerns.     ----- Message from JAE Jones CNP sent at 1/13/2020  6:59 PM CST -----  Let him know MRI showed know old MI with good EF at 52%-I assume he was supposed to see [facility] and is on wait list--no appointments that I see   Can you check with him    "

## 2020-01-15 DIAGNOSIS — I25.5 CARDIOMYOPATHY, ISCHEMIC: ICD-10-CM

## 2020-01-15 RX ORDER — METOPROLOL SUCCINATE 25 MG/1
12.5 TABLET, EXTENDED RELEASE ORAL DAILY
Qty: 45 TABLET | Refills: 0 | Status: SHIPPED | OUTPATIENT
Start: 2020-01-15 | End: 2020-03-20

## 2020-01-24 ENCOUNTER — ANTICOAGULATION THERAPY VISIT (OUTPATIENT)
Dept: CARDIOLOGY | Facility: CLINIC | Age: 85
End: 2020-01-24
Payer: MEDICARE

## 2020-01-24 DIAGNOSIS — Z79.01 LONG TERM CURRENT USE OF ANTICOAGULANTS WITH INR GOAL OF 2.0-3.0: ICD-10-CM

## 2020-01-24 DIAGNOSIS — I51.3 LEFT VENTRICULAR THROMBUS: ICD-10-CM

## 2020-01-24 DIAGNOSIS — G45.9 TRANSIENT CEREBRAL ISCHEMIA, UNSPECIFIED TYPE: ICD-10-CM

## 2020-01-24 LAB — INR POINT OF CARE: 2.4 (ref 0.86–1.14)

## 2020-01-24 PROCEDURE — 85610 PROTHROMBIN TIME: CPT | Mod: QW | Performed by: INTERNAL MEDICINE

## 2020-01-24 PROCEDURE — 36416 COLLJ CAPILLARY BLOOD SPEC: CPT | Performed by: INTERNAL MEDICINE

## 2020-01-24 NOTE — PROGRESS NOTES
ANTICOAGULATION FOLLOW-UP CLINIC VISIT    Patient Name:  Sawyer Renteria  Date:  2020  Contact Type:  Face to Face    SUBJECTIVE:  Patient Findings         Clinical Outcomes     Negatives:   Major bleeding event, Thromboembolic event, Anticoagulation-related hospital admission, Anticoagulation-related ED visit, Anticoagulation-related fatality           OBJECTIVE    INR Protime   Date Value Ref Range Status   2020 2.4 (A) 0.86 - 1.14 Final       ASSESSMENT / PLAN  INR assessment THER    Recheck INR In: 2 WEEKS    INR Location Clinic      Anticoagulation Summary  As of 2020    INR goal:   2.0-3.0   TTR:   59.6 % (1 y)   INR used for dosin.4 (2020)   Warfarin maintenance plan:   2.5 mg (5 mg x 0.5) every Mon, Wed, Fri; 5 mg (5 mg x 1) all other days   Full warfarin instructions:   2.5 mg every Mon, Wed, Fri; 5 mg all other days   Weekly warfarin total:   27.5 mg   No change documented:   Donna Colby RN   Plan last modified:   Donna Colby RN (2019)   Next INR check:   2020   Priority:   Maintenance   Target end date:   Indefinite    Indications    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]  Transient cerebral ischemia [G45.9]  Long term current use of anticoagulants with INR goal of 2.0-3.0 [Z79.01]             Anticoagulation Episode Summary     INR check location:       Preferred lab:       Send INR reminders to:   [facility] HEART INR NURSE    Comments:         Anticoagulation Care Providers     Provider Role Specialty Phone number    Satya Newton MD Responsible Cardiology [phone]            See the Encounter Report to view Anticoagulation Flowsheet and Dosing Calendar (Go to Encounters tab in chart review, and find the Anticoagulation Therapy Visit)    INR 2.4 No change in meds or diet. Had a brief nosebleed x1 with no recurrence. Will continue current dosing of 2.5 mg MWF and 5 mg all other days with recheck in 2 weeks. He administered his Praluent syringe in his left  thigh. He states that he is waiting to find out if he will get assistance with the cost of Praluent this year.     Donna Colby RN

## 2020-01-24 NOTE — PROGRESS NOTES
Called PASS and was informed that pt's application is still in process and they are not able to give expected time it will take.      Called and updated pt with above information.  He reports he has 2 Praluent pens left, will take one today and another one in 2 weeks.  Discussed with pt that will plan to call PASS for another update in 2 weeks and call him with update.     OKSANA Dunne 9:12 AM 1/24/2020

## 2020-01-29 NOTE — TELEPHONE ENCOUNTER
Per call to PASS they stated a new PA is required. [employer] is stating Repatha is preferred this year. We will need to redo the PA and all the paperwork to apply for free Repatha to [employer] instead.

## 2020-01-30 ENCOUNTER — TELEPHONE (OUTPATIENT)
Dept: CARDIOLOGY | Facility: CLINIC | Age: 85
End: 2020-01-30

## 2020-01-30 DIAGNOSIS — E78.2 MIXED HYPERLIPIDEMIA: Primary | ICD-10-CM

## 2020-01-30 NOTE — TELEPHONE ENCOUNTER
Prior Authorization Approval    Authorization Effective Date: 1/30/2020  Authorization Expiration Date: 12/31/2020  Medication: Repatha- Approved ([employer])   Approved Dose/Quantity:140mg  Reference #: [id number]/A   Insurance Company: [employer] - Phone [phone] Fax [phone]  Expected CoPay: $226.38     CoPay Card Available: No    Foundation Assistance Needed: [facility]   Which Pharmacy is filling the prescription (Not needed for infusion/clinic administered): [facility] - [place], [place] - [address]  Pharmacy Notified: No  Patient Notified: No

## 2020-01-30 NOTE — TELEPHONE ENCOUNTER
I attempted to call and speak to AJ about the [facility] ruben that he would qualify for.  If approved we wouldn't need to apply for free drug from [employer] and he can use [facility] to fill the Repatha.

## 2020-01-31 ENCOUNTER — HOSPITAL ENCOUNTER (EMERGENCY)
Facility: CLINIC | Age: 85
Discharge: HOME | End: 2020-01-31
Attending: PHYSICIAN ASSISTANT | Admitting: PHYSICIAN ASSISTANT
Payer: MEDICARE

## 2020-01-31 ENCOUNTER — APPOINTMENT (OUTPATIENT)
Dept: GENERAL RADIOLOGY | Facility: CLINIC | Age: 85
End: 2020-01-31
Attending: PHYSICIAN ASSISTANT
Payer: MEDICARE

## 2020-01-31 ENCOUNTER — APPOINTMENT (OUTPATIENT)
Dept: ULTRASOUND IMAGING | Facility: CLINIC | Age: 85
End: 2020-01-31
Attending: PHYSICIAN ASSISTANT
Payer: MEDICARE

## 2020-01-31 VITALS
WEIGHT: 124 LBS | DIASTOLIC BLOOD PRESSURE: 78 MMHG | HEART RATE: 82 BPM | BODY MASS INDEX: 22.82 KG/M2 | RESPIRATION RATE: 18 BRPM | OXYGEN SATURATION: 98 % | SYSTOLIC BLOOD PRESSURE: 130 MMHG | TEMPERATURE: 97.9 F | HEIGHT: 62 IN

## 2020-01-31 DIAGNOSIS — M71.21 POPLITEAL CYST, RIGHT: ICD-10-CM

## 2020-01-31 LAB — INR PPP: 1.88 (ref 0.86–1.14)

## 2020-01-31 PROCEDURE — 85610 PROTHROMBIN TIME: CPT | Performed by: PHYSICIAN ASSISTANT

## 2020-01-31 PROCEDURE — 99285 EMERGENCY DEPT VISIT HI MDM: CPT | Mod: 25

## 2020-01-31 PROCEDURE — 73562 X-RAY EXAM OF KNEE 3: CPT | Mod: RT

## 2020-01-31 PROCEDURE — 93971 EXTREMITY STUDY: CPT | Mod: RT

## 2020-01-31 ASSESSMENT — ENCOUNTER SYMPTOMS
MYALGIAS: 1
NUMBNESS: 0

## 2020-01-31 ASSESSMENT — MIFFLIN-ST. JEOR: SCORE: 1126.71

## 2020-01-31 NOTE — ED AVS SNAPSHOT
Emergency Department  [address] [place] [address]  Phone:  [phone]  Fax:  [phone]                                    Sawyer Renteria   MRN: [id number]    Department:   Emergency Department   Date of Visit:  1/31/2020           After Visit Summary Signature Page    I have received my discharge instructions, and my questions have been answered. I have discussed any challenges I see with this plan with the nurse or doctor.    ..........................................................................................................................................  Patient/Patient Representative Signature      ..........................................................................................................................................  Patient Representative Print Name and Relationship to Patient    ..................................................               ................................................  Date                                   Time    ..........................................................................................................................................  Reviewed by Signature/Title    ...................................................              ..............................................  Date                                               Time          22EPIC Rev 08/18

## 2020-01-31 NOTE — ED TRIAGE NOTES
R calf pain x 1 week. Went to primary and was going to get ultrasound on Monday but then primary said go to ER.

## 2020-01-31 NOTE — PROGRESS NOTES
Patient transferred to RN from pharmacy. Pt states his right leg is painful making it difficult to walk and his right hand does not look right. Denies shortness of breath or chest pain. He states he just does not feel well. The symptoms started around a month ago. Advised pt to call his PCP to be evaluated and if he needs to seen by Cards to call back.     RN also discussed switching to Repatha due to insurance coverage. Informed pt it is the same drug category- PCSK9. He should qualify for the [employer] for drug at no cost which is similar to the PASS program for Praluent. [facility] will manage the application. He states he has 2 more pens of Praluent.

## 2020-02-01 NOTE — ED PROVIDER NOTES
"  History     Chief Complaint:  Leg Pain      HPI   Sawyer Renteria is a 85 year old male who presents with leg pain. Patient complains of a few weeks of right leg pain. He notes the pain suddenly came on and there was no trauma to the leg. He states walking makes it worse. He notes his doctor advised him to come in and do an ultrasound right away. He has not had imaging done on the leg. He states he has hyperlipidemia and some heart problems. He has been taking coumadin and has not missed any doses. Additionally, his INR has been good. He denies experiencing numbness, chest pain, shortness of breath, or tingling. Currently, he has no pain while sitting.     Allergies:  Flomax  Aldactone  Carvedilol  Colesevelam  Ezetimibe  Lisinopril  Pravastatin  Rosuvastatin  Simvastatin    Medications:    Amlodipine   Aspirin 81mg   Avodart   Lisinopril   Metoprolol  Nitroglycerin   Coumadin     Past Medical History:    BPH   Cardiomyopathy, ischemic   Coronary artery disease    Gastroesophageal reflux disease   Hyperlipidemia    Hypertension   Left ventricular thrombus   Myocardial infarction    TIA    Past Surgical History:    Coronary angiography adult order  Heart cath, angioplasty  Tonsillectomy & Adenoidectomy    Family History:    Mother - heart disease   Brother - heart disease     Social History:  Denies tobacco use  Denies alcohol use  Denies drug use  Marital Status:   [2]    Review of Systems   Musculoskeletal: Positive for myalgias.   Neurological: Negative for numbness.   All other systems reviewed and are negative.      Physical Exam   First Vitals:  Patient Vitals for the past 24 hrs:   BP Temp Temp src Pulse Resp SpO2 Height Weight   01/31/20 1649 (!) 149/56 97.9  F (36.6  C) Oral 82 16 96 % 1.575 m (5' 2\") 56.2 kg (124 lb)     Physical Exam  Constitutional: Pleasant. Cooperative.   Eyes: Pupils equally round and reactive  HENT: Head is normal in appearance. Oropharynx is normal with moist mucus " membranes.  Cardiovascular: Regular rate and rhythm and without murmurs.  Respiratory: Normal respiratory effort, lungs are clear bilaterally.  Musculoskeletal: No asymmetry of the lower extremities. Tenderness to palpation just distal to posterior aspect of R knee. Full ROM of lower extremities. 2+ PT pulses bilaterally.   Skin: Normal, without rash.  Neurologic: Cranial nerves grossly intact, normal cognition, no focal deficits. Alert and oriented x 3. Sensation intact distally.  Psychiatric: Normal affect.  Nursing notes and vital signs reviewed.    Emergency Department Course     Imaging:  US Lower Extremity Venous Duplex, right:   IMPRESSION:   1. No evidence for DVT within the right leg.  2. Baker's cyst extends to the proximal right calf at the region of  pain. as per radiology.    XR Knee, Right, 3 views:   IMPRESSION: No acute fracture or malalignment. Mild to moderate tricompartmental degenerative changes. No significant joint effusion. Osteopenia. Atherosclerosis. as per radiology.     Laboratory:  INR: 1.88(H)     Emergency Department Course:  Nursing notes and vitals reviewed. (1851) I performed an exam of the patient as documented above.     IV inserted. Blood drawn. This was sent to the lab for further testing, results above.     The patient was sent for a US lower extremity venous duplex, right, and XR Knee Right 3 Views while in the emergency department, findings above.     2031 I rechecked the patient and discussed the results of his workup thus far.     Findings and plan explained to the Patient. Patient discharged home with instructions regarding supportive care, medications, and reasons to return. The importance of close follow-up was reviewed.     I personally reviewed the laboratory results with the Patient and answered all related questions prior to discharge.     Impression & Plan      Medical Decision Making:  Sawyer Renteria is a 85 year old male who presents for evaluation of calf swelling  and pain. A broad differential was considered including Baker's cyst rupture, Baker's cyst, hematoma, rupture, compartment syndrome, muscle rupture, DVT, superficial thrombophlebitis, compression of the venous structures higher up in the abdomen and or leg. The symptoms here are consistent with a Baker's cyst. US negative for DVT. There are no signs of compartment syndrome or other worrisome etiologies at this point so outpatient management is indicated. They will elevate leg, ACE wrap for compression, ice, take Tylenol for pain. Advised follow up with [facility] for further evaluation and management. Also advised patient to call PCP to discuss INR. Discussed reasons to return. All questions answered. Patient discharged to home in stable condition.    Diagnosis:    ICD-10-CM    1. 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     Jaycob Snell  1/31/2020    EMERGENCY DEPARTMENT       Ramu Price PA-C  02/01/20 0021

## 2020-02-01 NOTE — DISCHARGE INSTRUCTIONS
Use ACE wrap as compression around knee.  Use ice to the back of the knee, for 15 minutes every hour.  Elevate knee.  Call [facility] to schedule an appointment for further evaluation and management of Baker's cyst.  Call you primary doctor to discuss INR of 1.8.  You can call to cancel imaging appointment as all of the ordered scans were completed today.

## 2020-02-07 ENCOUNTER — ANTICOAGULATION THERAPY VISIT (OUTPATIENT)
Dept: CARDIOLOGY | Facility: CLINIC | Age: 85
End: 2020-02-07
Payer: MEDICARE

## 2020-02-07 DIAGNOSIS — I51.3 LEFT VENTRICULAR THROMBUS: ICD-10-CM

## 2020-02-07 DIAGNOSIS — G45.9 TRANSIENT CEREBRAL ISCHEMIA, UNSPECIFIED TYPE: ICD-10-CM

## 2020-02-07 DIAGNOSIS — E78.2 MIXED HYPERLIPIDEMIA: ICD-10-CM

## 2020-02-07 DIAGNOSIS — Z79.01 LONG TERM CURRENT USE OF ANTICOAGULANTS WITH INR GOAL OF 2.0-3.0: ICD-10-CM

## 2020-02-07 LAB — INR POINT OF CARE: 4.1 (ref 0.86–1.14)

## 2020-02-07 PROCEDURE — 85610 PROTHROMBIN TIME: CPT | Mod: QW | Performed by: INTERNAL MEDICINE

## 2020-02-07 PROCEDURE — 36416 COLLJ CAPILLARY BLOOD SPEC: CPT | Performed by: INTERNAL MEDICINE

## 2020-02-07 NOTE — PROGRESS NOTES
ANTICOAGULATION FOLLOW-UP CLINIC VISIT    Patient Name:  Sawyer Renteria  Date:  2020  Contact Type:  Face to Face    SUBJECTIVE:  Patient Findings     Positives:   Emergency department visit (ER visit for right leg pain and calf swelling (Baker's cyst found on US)), Extra doses (pt took an extra 2.5 mg earlier this week ), Change in diet/appetite (he didn't eat greens this week)        Clinical Outcomes     Negatives:   Major bleeding event, Thromboembolic event, Anticoagulation-related hospital admission, Anticoagulation-related ED visit, Anticoagulation-related fatality           OBJECTIVE    INR Protime   Date Value Ref Range Status   2020 4.1 (A) 0.86 - 1.14 Final       ASSESSMENT / PLAN  INR assessment SUPRA    Recheck INR In: 2 WEEKS    INR Location Clinic      Anticoagulation Summary  As of 2020    INR goal:   2.0-3.0   TTR:   58.2 % (1 y)   INR used for dosin.1! (2020)   Warfarin maintenance plan:   2.5 mg (5 mg x 0.5) every Mon, Wed, Fri; 5 mg (5 mg x 1) all other days   Full warfarin instructions:   : Hold; Otherwise 2.5 mg every Mon, Wed, Fri; 5 mg all other days   Weekly warfarin total:   27.5 mg   Plan last modified:   Donna Colby RN (2019)   Next INR check:   2020   Priority:   Maintenance   Target end date:   Indefinite    Indications    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]  Transient cerebral ischemia [G45.9]  Long term current use of anticoagulants with INR goal of 2.0-3.0 [Z79.01]             Anticoagulation Episode Summary     INR check location:       Preferred lab:       Send INR reminders to:   [facility] HEART INR NURSE    Comments:         Anticoagulation Care Providers     Provider Role Specialty Phone number    Satya Newton MD Responsible Cardiology [phone]            See the Encounter Report to view Anticoagulation Flowsheet and Dosing Calendar (Go to Encounters tab in chart review, and find the Anticoagulation Therapy Visit)    INR 4.1 He  was seen in ER on 1/31 because of right leg pain and calf swelling. Baker's cyst found on US (ace wrap, ice, elevation and Tylenol for pain). INR was 1.88. He saw ortho and got an injection in the right knee with improvement in pain. Took Tylenol 1-2x/wk. He was concerned about the low INR in ER so on his own, he took an extra 2.5 mg earlier this week. Advised that he should not take extra doses on his own. He administered his last Praluent injection today in the right thigh. Due to insurance coverage, Praluent now changing to Repatha (Rx was already sent to the [place] mail order pharmacy). He also avoided greens this week. Denies abnormal bleeding or bruising. Will hold today's dose and eat spinach today then resume usual diet and usual dosing of 2.5 mg MWF and 5 mg all other days with recheck in 2 weeks. Dosage adjustment made based on physician directed care plan.    Donna Colby RN

## 2020-02-21 ENCOUNTER — ANTICOAGULATION THERAPY VISIT (OUTPATIENT)
Dept: CARDIOLOGY | Facility: CLINIC | Age: 85
End: 2020-02-21
Payer: MEDICARE

## 2020-02-21 DIAGNOSIS — Z79.01 LONG TERM CURRENT USE OF ANTICOAGULANTS WITH INR GOAL OF 2.0-3.0: ICD-10-CM

## 2020-02-21 DIAGNOSIS — I51.3 LEFT VENTRICULAR THROMBUS: ICD-10-CM

## 2020-02-21 DIAGNOSIS — G45.9 TRANSIENT CEREBRAL ISCHEMIA, UNSPECIFIED TYPE: ICD-10-CM

## 2020-02-21 LAB — INR POINT OF CARE: 1.5 (ref 0.86–1.14)

## 2020-02-21 PROCEDURE — 85610 PROTHROMBIN TIME: CPT | Mod: QW | Performed by: INTERNAL MEDICINE

## 2020-02-21 PROCEDURE — 99207 ZZC NO CHARGE NURSE ONLY: CPT | Performed by: INTERNAL MEDICINE

## 2020-02-21 PROCEDURE — 36416 COLLJ CAPILLARY BLOOD SPEC: CPT | Performed by: INTERNAL MEDICINE

## 2020-02-21 NOTE — PROGRESS NOTES
ANTICOAGULATION FOLLOW-UP CLINIC VISIT    Patient Name:  Sawyer Renteria  Date:  2020  Contact Type:  Face to Face    SUBJECTIVE:  Patient Findings     Positives:   Missed doses (he's not sure if he missed doses), Change in diet/appetite (ate spinach once)        Clinical Outcomes     Negatives:   Major bleeding event, Thromboembolic event, Anticoagulation-related hospital admission, Anticoagulation-related ED visit, Anticoagulation-related fatality           OBJECTIVE    INR Protime   Date Value Ref Range Status   2020 1.5 (A) 0.86 - 1.14 Final       ASSESSMENT / PLAN  INR assessment SUB    Recheck INR In: 2 WEEKS    INR Location Clinic      Anticoagulation Summary  As of 2020    INR goal:   2.0-3.0   TTR:   56.1 % (1 y)   INR used for dosin.5! (2020)   Warfarin maintenance plan:   2.5 mg (5 mg x 0.5) every Mon, Wed, Fri; 5 mg (5 mg x 1) all other days   Full warfarin instructions:   : 5 mg; Otherwise 2.5 mg every Mon, Wed, Fri; 5 mg all other days   Weekly warfarin total:   27.5 mg   Plan last modified:   Donna Colby RN (2019)   Next INR check:   3/6/2020   Priority:   Maintenance   Target end date:   Indefinite    Indications    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]  Transient cerebral ischemia [G45.9]  Long term current use of anticoagulants with INR goal of 2.0-3.0 [Z79.01]             Anticoagulation Episode Summary     INR check location:       Preferred lab:       Send INR reminders to:   [facility] HEART INR NURSE    Comments:         Anticoagulation Care Providers     Provider Role Specialty Phone number    Satya Newton MD Responsible Cardiology [phone]            See the Encounter Report to view Anticoagulation Flowsheet and Dosing Calendar (Go to Encounters tab in chart review, and find the Anticoagulation Therapy Visit)    INR 1.5 INR now low after he held one dose when INR was 4.1 (on his own, he had taken an extra dose prior to the elevated INR two weeks  ago). He states that he might have missed a dose recently but he wasn't sure. He ate spinach once but states he isn;t drinking the protein shakes. Denies abnormal bleeding or bruising. His insurance won't allow him to have Praluent anymore so today he starts Repatha. Pt administered his Repatha injection in his left thigh. Will increase Coumadin dose to 5 mg today then resume usual dosing of 2.5 mg MWF and 5 mg all other days. Advised that he should avoid greens for 3 days then resume his usual diet. Recheck in 2 weeks. Dosage adjustment made based on physician directed care plan.    Donna Colby RN

## 2020-02-25 DIAGNOSIS — I25.10 CORONARY ARTERY DISEASE INVOLVING NATIVE CORONARY ARTERY OF NATIVE HEART WITHOUT ANGINA PECTORIS: ICD-10-CM

## 2020-02-25 DIAGNOSIS — I10 ESSENTIAL HYPERTENSION: ICD-10-CM

## 2020-02-25 RX ORDER — AMLODIPINE BESYLATE 2.5 MG/1
2.5 TABLET ORAL DAILY
Qty: 90 TABLET | Refills: 3 | Status: SHIPPED | OUTPATIENT
Start: 2020-02-25 | End: 2020-02-25

## 2020-02-25 RX ORDER — AMLODIPINE BESYLATE 2.5 MG/1
2.5 TABLET ORAL DAILY
Qty: 90 TABLET | Refills: 3 | Status: SHIPPED | OUTPATIENT
Start: 2020-02-25 | End: 2020-08-28

## 2020-03-06 ENCOUNTER — ANTICOAGULATION THERAPY VISIT (OUTPATIENT)
Dept: CARDIOLOGY | Facility: CLINIC | Age: 85
End: 2020-03-06
Payer: MEDICARE

## 2020-03-06 DIAGNOSIS — G45.9 TRANSIENT CEREBRAL ISCHEMIA, UNSPECIFIED TYPE: ICD-10-CM

## 2020-03-06 DIAGNOSIS — Z79.01 LONG TERM CURRENT USE OF ANTICOAGULANTS WITH INR GOAL OF 2.0-3.0: ICD-10-CM

## 2020-03-06 DIAGNOSIS — I51.3 LEFT VENTRICULAR THROMBUS: ICD-10-CM

## 2020-03-06 LAB — INR POINT OF CARE: 4.1 (ref 0.86–1.14)

## 2020-03-06 PROCEDURE — 36416 COLLJ CAPILLARY BLOOD SPEC: CPT | Performed by: INTERNAL MEDICINE

## 2020-03-06 PROCEDURE — 85610 PROTHROMBIN TIME: CPT | Mod: QW | Performed by: INTERNAL MEDICINE

## 2020-03-06 NOTE — PROGRESS NOTES
ANTICOAGULATION FOLLOW-UP CLINIC VISIT    Patient Name:  Sawyer Renteria  Date:  3/6/2020  Contact Type:  Face to Face    SUBJECTIVE:  Patient Findings     Positives:   Signs/symptoms of bleeding (Pt states his gums bled a bit this morning brushing his teeth), Change in diet/appetite (Pt has been drinking suresh juice the last few weeks)        Clinical Outcomes     Negatives:   Major bleeding event, Thromboembolic event, Anticoagulation-related hospital admission, Anticoagulation-related ED visit, Anticoagulation-related fatality           OBJECTIVE    INR Protime   Date Value Ref Range Status   2020 4.1 (A) 0.86 - 1.14 Final       ASSESSMENT / PLAN  INR assessment SUPRA    Recheck INR In: 1 WEEK    INR Location Clinic      Anticoagulation Summary  As of 3/6/2020    INR goal:   2.0-3.0   TTR:   57.6 % (1 y)   INR used for dosin.1! (3/6/2020)   Warfarin maintenance plan:   2.5 mg (5 mg x 0.5) every Mon, Wed, Fri; 5 mg (5 mg x 1) all other days   Full warfarin instructions:   3/6: Hold; Otherwise 2.5 mg every Mon, Wed, Fri; 5 mg all other days   Weekly warfarin total:   27.5 mg   Plan last modified:   Donna Colby RN (2019)   Next INR check:   3/13/2020   Priority:   Maintenance   Target end date:   Indefinite    Indications    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]  Transient cerebral ischemia [G45.9]  Long term current use of anticoagulants with INR goal of 2.0-3.0 [Z79.01]             Anticoagulation Episode Summary     INR check location:       Preferred lab:       Send INR reminders to:   [facility] HEART INR NURSE    Comments:         Anticoagulation Care Providers     Provider Role Specialty Phone number    Satya Newton MD Responsible Cardiology [phone]            See the Encounter Report to view Anticoagulation Flowsheet and Dosing Calendar (Go to Encounters tab in chart review, and find the Anticoagulation Therapy Visit)    INR 4.1. Pt had some bleeding this morning brushing his teeth.  No changes to medications. Pt does not eat greens. Pt reports he has been drinking suresh juice the last few weeks, did not know mangoes can raise the INR. Pt plans to stop suresh juice so will hold todays dose and then resume normal maintenance plan of 2.5mg Mon/Weds/Fri and 5mg all other days. Recheck INR in 1wk. Dosage adjustment made based on physician directed care plan. Reviewed signs and symptoms of bleeding and when to go to the ED for these. Patient self-administered his Praluent into his right thigh.     Kimberly Noyola RN

## 2020-03-11 ENCOUNTER — HOSPITAL ENCOUNTER (EMERGENCY)
Facility: CLINIC | Age: 85
Discharge: HOME | End: 2020-03-11
Attending: PHYSICIAN ASSISTANT | Admitting: PHYSICIAN ASSISTANT
Payer: MEDICARE

## 2020-03-11 VITALS
RESPIRATION RATE: 18 BRPM | HEART RATE: 75 BPM | TEMPERATURE: 98.7 F | DIASTOLIC BLOOD PRESSURE: 56 MMHG | WEIGHT: 124 LBS | HEIGHT: 61 IN | SYSTOLIC BLOOD PRESSURE: 124 MMHG | BODY MASS INDEX: 23.41 KG/M2 | OXYGEN SATURATION: 99 %

## 2020-03-11 DIAGNOSIS — R79.89 ELEVATED SERUM CREATININE: ICD-10-CM

## 2020-03-11 DIAGNOSIS — K06.8 BLEEDING GUMS: ICD-10-CM

## 2020-03-11 DIAGNOSIS — K08.9 POOR DENTITION: ICD-10-CM

## 2020-03-11 DIAGNOSIS — Z79.01 ON WARFARIN THERAPY: ICD-10-CM

## 2020-03-11 DIAGNOSIS — D64.9 ANEMIA, UNSPECIFIED: ICD-10-CM

## 2020-03-11 LAB
ANION GAP SERPL CALCULATED.3IONS-SCNC: <1 MMOL/L (ref 3–14)
BASOPHILS # BLD AUTO: 0 10E9/L (ref 0–0.2)
BASOPHILS NFR BLD AUTO: 0.2 %
BUN SERPL-MCNC: 27 MG/DL (ref 7–30)
CALCIUM SERPL-MCNC: 8.5 MG/DL (ref 8.5–10.1)
CHLORIDE SERPL-SCNC: 116 MMOL/L (ref 94–109)
CO2 SERPL-SCNC: 28 MMOL/L (ref 20–32)
CREAT SERPL-MCNC: 1.31 MG/DL (ref 0.66–1.25)
DIFFERENTIAL METHOD BLD: ABNORMAL
EOSINOPHIL # BLD AUTO: 0.3 10E9/L (ref 0–0.7)
EOSINOPHIL NFR BLD AUTO: 5.6 %
ERYTHROCYTE [DISTWIDTH] IN BLOOD BY AUTOMATED COUNT: 13.7 % (ref 10–15)
GFR SERPL CREATININE-BSD FRML MDRD: 49 ML/MIN/{1.73_M2}
GLUCOSE SERPL-MCNC: 98 MG/DL (ref 70–99)
HCT VFR BLD AUTO: 35.4 % (ref 40–53)
HGB BLD-MCNC: 12 G/DL (ref 13.3–17.7)
IMM GRANULOCYTES # BLD: 0 10E9/L (ref 0–0.4)
IMM GRANULOCYTES NFR BLD: 0.4 %
INR PPP: 2.13 (ref 0.86–1.14)
LYMPHOCYTES # BLD AUTO: 1.4 10E9/L (ref 0.8–5.3)
LYMPHOCYTES NFR BLD AUTO: 25.8 %
MCH RBC QN AUTO: 30.3 PG (ref 26.5–33)
MCHC RBC AUTO-ENTMCNC: 33.9 G/DL (ref 31.5–36.5)
MCV RBC AUTO: 89 FL (ref 78–100)
MONOCYTES # BLD AUTO: 0.4 10E9/L (ref 0–1.3)
MONOCYTES NFR BLD AUTO: 7.2 %
NEUTROPHILS # BLD AUTO: 3.3 10E9/L (ref 1.6–8.3)
NEUTROPHILS NFR BLD AUTO: 60.8 %
NRBC # BLD AUTO: 0 10*3/UL
NRBC BLD AUTO-RTO: 0 /100
PLATELET # BLD AUTO: 239 10E9/L (ref 150–450)
POTASSIUM SERPL-SCNC: 4.6 MMOL/L (ref 3.4–5.3)
RBC # BLD AUTO: 3.96 10E12/L (ref 4.4–5.9)
SODIUM SERPL-SCNC: 141 MMOL/L (ref 133–144)
WBC # BLD AUTO: 5.4 10E9/L (ref 4–11)

## 2020-03-11 PROCEDURE — 85025 COMPLETE CBC W/AUTO DIFF WBC: CPT | Performed by: EMERGENCY MEDICINE

## 2020-03-11 PROCEDURE — 99283 EMERGENCY DEPT VISIT LOW MDM: CPT

## 2020-03-11 PROCEDURE — 80048 BASIC METABOLIC PNL TOTAL CA: CPT | Performed by: EMERGENCY MEDICINE

## 2020-03-11 PROCEDURE — 85610 PROTHROMBIN TIME: CPT | Performed by: EMERGENCY MEDICINE

## 2020-03-11 ASSESSMENT — MIFFLIN-ST. JEOR: SCORE: 1110.84

## 2020-03-11 ASSESSMENT — ENCOUNTER SYMPTOMS
VOMITING: 0
COUGH: 0
DIARRHEA: 0
FEVER: 0
NAUSEA: 0

## 2020-03-11 NOTE — DISCHARGE INSTRUCTIONS
Your diagnosis is: bleeding gums. Gum disease. Therapeutic INR  You plan going forward is: continue on current warfarin/coumadin regimin. Follow up with your PCP in 2 days as scheduled for INR recheck. Please reach out to your dentist soon for an appointment because I think gum disease could be playing a role in bleeding gums as well.    Things to come back to the emergency department are: uncontrolled bleeding, lightheadedness, passing out or any other concerns.  It was a pleasure caring for you, Sawyer. I hope you feel better soon! Please return if you have any ongoing concerns.

## 2020-03-11 NOTE — ED PROVIDER NOTES
History     Chief Complaint:  Bleeding gums    HPI   Sawyer Renteria is a 85 year old male who presents for evaluation of bleeding gums.  He states that he was brushing his teeth this morning, when he noted his gums are bleeding.  He states that he splashed water, and there was large amount of blood.  He states that this is now under better control.  He denies any trauma other than the tooth brushing.  He was recently seen for an INR check on 3/6 where his INR was elevated at 4.1, and the note mention the patient was having gum bleeding at that time, per the patient denies this.  He states that this is only happened this morning.  He is concerned given the bleeding gums and presents here for evaluation.  He does not denies any fever, or recent illness.  He states he has been taking the regimen as recommended by the INR clinic.  He has not changed his diet although has stopped drinking a suresh juice that he was drinking for 2 weeks prior to the INR check under instruction of the INR clinic.  He has no other complaint further complaints at this time.  He states that he seen a dentist 3 to 4 months ago.    Allergies:  Flomax [Tamsulosin]   Aldactone [Spironolactone]   Carvedilol   Colesevelam   Ezetimibe   Lisinopril   Pravastatin   Rosuvastatin   Simvastatin      Medications:    Amlodipine   aspirin 81 MG tablet  dutasteride  lisinopril  metoprolol succinate ER  warfarin     Past Medical History:    BPH (benign prostatic hyperplasia)   Cardiomyopathy, ischemic   Coronary artery disease   Gastro-oesophageal reflux disease   Hyperlipidaemia   Hypertension   Left ventricular thrombus   Myocardial infarction (H)   TIA (transient ischaemic attack)     Past Surgical History:    Coronary angiography   Heart cath   T&A    Family History:    Heart disease     Social History:  Marital Status:   [2]  Social History     Tobacco Use     Smoking status: Never Smoker     Smokeless tobacco: Never Used   Substance Use Topics  "    Alcohol use: No     Drug use: Never        Review of Systems   Constitutional: Negative for fever.   HENT:        (+) bleeding gums   Respiratory: Negative for cough.    Gastrointestinal: Negative for diarrhea, nausea and vomiting.   All other systems reviewed and are negative.    Physical Exam   First Vitals:  BP: 124/56  Pulse: 75  Temp: 98.7  F (37.1  C)  Resp: 18  Height: 154.9 cm (5' 1\")  Weight: 56.2 kg (124 lb)  SpO2: 99 %      Physical Exam  General: Resting comfortably.  Alert and oriented.   Head:  The scalp, face, and head appear normal   Eyes:  Conjunctivae and sclerae are normal    ENT:    Poor dentition throughout    Plaque build up and gum inflammation noted throughout the entire mouth. No ongoing bleeding.     Uvula is in the midline     Moist mucous membranes   Neck:  No lymphadenopathy  CV:  Regular rate and rhythm     Normal S1/S2  Resp:  Lungs are clear to auscultation    Non-labored    No rales or wheezing   MS:  Normal muscular tone   Skin:  No rash or acute skin lesions noted   Neuro: Speech is normal and fluent.       Emergency Department Course     Laboratory:  INR: 2.13 (H)    CBC:  WBC 5.4, HGB 12.0 (L), , o/w WNL     BMP: chloride 116 (H), anion gap <1 (L), GFR 49 (L), o/w WNL (Creatinine: 1.31 (H))     Emergency Department Course:  Past medical records, nursing notes, and vitals reviewed.     1345    I performed an exam of the patient as documented above.    IV was inserted and blood was drawn for laboratory testing, results above.       Findings and plan explained to the Patient. Patient discharged home with instructions regarding supportive care, medications, and reasons to return. The importance of close follow-up was reviewed.      Impression & Plan      Medical Decision Making:  Sawyer Renteria is a 85 year old male who presents for evaluation of bleeding gums.  Please refer to the HPI for full details.  This morning, he brushing his teeth and noticed that his gums are " bleeding.  He notes that he had a recent INR that was checked which was elevated and he has been following the dosing recommendation made by the nurse.  The patient denies bleeding before today, but on the nursing note 3/6, the patient apparently did state that he was having some gum bleeding at that time.  On exam, he does have poor dentition throughout.  There is plaque buildup, and irritation to the gums consistent with gum disease.  There is no ongoing bleeding.  No indication for further workup at this time.  INR is therapeutic at 2.13.  Overall, I believe the patient safe to discharge home.  He does have an INR clinic appointment follow-up in 2 days.  I did encourage him to make this appointment and continue on the current therapy recommended by his INR clinic.  I did also encourage he speak with his dentist regarding his gum disease as I feel like this also could be contributing factor.  The patient will be discharged home and he will follow-up as stated above.  He will return immediately for any ongoing bleeding, or any other concerns.  All questions were answered prior to discharge.  The patient understands and agrees to this plan.    Diagnosis:    ICD-10-CM    1. Bleeding gums  K06.8    2. On warfarin therapy  Z79.01    3. Poor dentition  K08.9    4. Anemia, unspecified  D64.9     Chronic   5. Elevated serum creatinine  R79.89     Chronic       Disposition:  discharged to home    Scribe disclosure:  I, Jeff De Dios, am serving as a scribe at 4:33 PM on 3/11/2020 to document services personally performed by Mesha Stallworth PA-C based on my observations and the provider's statements to me.      Mesha Stallworth PA-C  3/11/2020    EMERGENCY DEPARTMENT       Mesha Stallworth PA-C  03/11/20 1810

## 2020-03-11 NOTE — ED AVS SNAPSHOT
Emergency Department  [address] [facility] [address]  Phone:  [phone]  Fax:  [phone]                                    Sawyer Renteria   MRN: [id number]    Department:   Emergency Department   Date of Visit:  3/11/2020           After Visit Summary Signature Page    I have received my discharge instructions, and my questions have been answered. I have discussed any challenges I see with this plan with the nurse or doctor.    ..........................................................................................................................................  Patient/Patient Representative Signature      ..........................................................................................................................................  Patient Representative Print Name and Relationship to Patient    ..................................................               ................................................  Date                                   Time    ..........................................................................................................................................  Reviewed by Signature/Title    ...................................................              ..............................................  Date                                               Time          22EPIC Rev 08/18

## 2020-03-13 ENCOUNTER — ANTICOAGULATION THERAPY VISIT (OUTPATIENT)
Dept: CARDIOLOGY | Facility: CLINIC | Age: 85
End: 2020-03-13
Payer: MEDICARE

## 2020-03-13 DIAGNOSIS — I51.3 LEFT VENTRICULAR THROMBUS: ICD-10-CM

## 2020-03-13 DIAGNOSIS — Z79.01 LONG TERM CURRENT USE OF ANTICOAGULANTS WITH INR GOAL OF 2.0-3.0: ICD-10-CM

## 2020-03-13 DIAGNOSIS — G45.9 TRANSIENT CEREBRAL ISCHEMIA, UNSPECIFIED TYPE: ICD-10-CM

## 2020-03-13 LAB — INR POINT OF CARE: 1.8 (ref 0.86–1.14)

## 2020-03-13 PROCEDURE — 36416 COLLJ CAPILLARY BLOOD SPEC: CPT | Performed by: INTERNAL MEDICINE

## 2020-03-13 PROCEDURE — 85610 PROTHROMBIN TIME: CPT | Mod: QW | Performed by: INTERNAL MEDICINE

## 2020-03-13 NOTE — PROGRESS NOTES
ANTICOAGULATION FOLLOW-UP CLINIC VISIT    Patient Name:  Sawyer Renteria  Date:  3/13/2020  Contact Type:  Face to Face    SUBJECTIVE:  Patient Findings     Positives:   Emergency department visit (ER visit 3/11 for bleeding gums), Change in diet/appetite (stopped drinking suresh juice)        Clinical Outcomes     Negatives:   Major bleeding event, Thromboembolic event, Anticoagulation-related hospital admission, Anticoagulation-related ED visit, Anticoagulation-related fatality           OBJECTIVE    INR Protime   Date Value Ref Range Status   2020 1.8 (A) 0.86 - 1.14 Final       ASSESSMENT / PLAN  INR assessment SUB    Recheck INR In: 2 WEEKS    INR Location Clinic      Anticoagulation Summary  As of 3/13/2020    INR goal:   2.0-3.0   TTR:   58.4 % (1 y)   INR used for dosin.8! (3/13/2020)   Warfarin maintenance plan:   2.5 mg (5 mg x 0.5) every Mon, Wed, Fri; 5 mg (5 mg x 1) all other days   Full warfarin instructions:   3/13: 5 mg; Otherwise 2.5 mg every Mon, Wed, Fri; 5 mg all other days   Weekly warfarin total:   27.5 mg   Plan last modified:   Donna Colby RN (2019)   Next INR check:   3/30/2020   Priority:   Maintenance   Target end date:   Indefinite    Indications    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]  Transient cerebral ischemia [G45.9]  Long term current use of anticoagulants with INR goal of 2.0-3.0 [Z79.01]             Anticoagulation Episode Summary     INR check location:       Preferred lab:       Send INR reminders to:   [facility] HEART INR NURSE    Comments:         Anticoagulation Care Providers     Provider Role Specialty Phone number    Satya Newton MD Responsible Cardiology [phone]            See the Encounter Report to view Anticoagulation Flowsheet and Dosing Calendar (Go to Encounters tab in chart review, and find the Anticoagulation Therapy Visit)    INR 1.8 He stopped drinking suresh juice and held one dose when INR was elevated last week. He had gum bleeding  again on Wednesday so he went to the ER (3/11 INR 2.13). ER doctor noted poor dentition wih gum inflammation and encouraged him to speak with his dentist regarding gum disease. He states he was rinsing his mouth with salt water but will call his dental office for an appt. Denies further gum bleeding. No other med or dietary changes. Will boost today's dose to 5 mg then resume 2.5 mg MWF and 5 mg all other days and recheck in 2 weeks after his lab appt. He is not due for Praluent until next week so he plans to administer his Praluent injection at home next week. Dosage adjustment made based on physician directed care plan.  Travel screened - no international travel recently.  Donna Colby RN

## 2020-03-15 ENCOUNTER — HEALTH MAINTENANCE LETTER (OUTPATIENT)
Age: 85
End: 2020-03-15

## 2020-03-20 DIAGNOSIS — I25.5 CARDIOMYOPATHY, ISCHEMIC: ICD-10-CM

## 2020-03-20 RX ORDER — METOPROLOL SUCCINATE 25 MG/1
12.5 TABLET, EXTENDED RELEASE ORAL DAILY
Qty: 45 TABLET | Refills: 1 | Status: SHIPPED | OUTPATIENT
Start: 2020-03-20 | End: 2020-12-29

## 2020-03-24 ENCOUNTER — TELEPHONE (OUTPATIENT)
Dept: CARDIOLOGY | Facility: CLINIC | Age: 85
End: 2020-03-24

## 2020-03-24 DIAGNOSIS — Z79.01 LONG TERM CURRENT USE OF ANTICOAGULANTS WITH INR GOAL OF 2.0-3.0: Primary | ICD-10-CM

## 2020-03-24 NOTE — TELEPHONE ENCOUNTER
Free Drug Application Approved  Effective Dates: 3/23/2020-12/31/2020  Patient notified?: Yes  Additional Information:

## 2020-03-30 ENCOUNTER — ANTICOAGULATION THERAPY VISIT (OUTPATIENT)
Dept: CARDIOLOGY | Facility: CLINIC | Age: 85
End: 2020-03-30
Payer: MEDICARE

## 2020-03-30 DIAGNOSIS — Z79.01 LONG TERM CURRENT USE OF ANTICOAGULANTS WITH INR GOAL OF 2.0-3.0: ICD-10-CM

## 2020-03-30 DIAGNOSIS — I51.3 LEFT VENTRICULAR THROMBUS: ICD-10-CM

## 2020-03-30 DIAGNOSIS — G45.9 TRANSIENT CEREBRAL ISCHEMIA, UNSPECIFIED TYPE: ICD-10-CM

## 2020-03-30 LAB
CAPILLARY BLOOD COLLECTION: NORMAL
INR PPP: 2.6 (ref 0.86–1.14)

## 2020-03-30 PROCEDURE — 36416 COLLJ CAPILLARY BLOOD SPEC: CPT | Performed by: INTERNAL MEDICINE

## 2020-03-30 PROCEDURE — 99207 ZZC NO CHARGE NURSE ONLY: CPT | Performed by: INTERNAL MEDICINE

## 2020-03-30 PROCEDURE — 85610 PROTHROMBIN TIME: CPT | Performed by: INTERNAL MEDICINE

## 2020-03-30 NOTE — PROGRESS NOTES
ANTICOAGULATION FOLLOW-UP CLINIC VISIT    Patient Name:  Sawyer Renteria  Date:  3/30/2020  Contact Type:  Telephone    SUBJECTIVE:  Patient Findings         Clinical Outcomes     Negatives:   Major bleeding event, Thromboembolic event, Anticoagulation-related hospital admission, Anticoagulation-related ED visit, Anticoagulation-related fatality           OBJECTIVE    INR   Date Value Ref Range Status   2020 2.60 (H) 0.86 - 1.14 Final       ASSESSMENT / PLAN  INR assessment THER    Recheck INR In: 4 WEEKS    INR Location Outside lab      Anticoagulation Summary  As of 3/30/2020    INR goal:   2.0-3.0   TTR:   61.9 % (1 y)   INR used for dosin.60 (3/30/2020)   Warfarin maintenance plan:   2.5 mg (5 mg x 0.5) every Mon, Wed, Fri; 5 mg (5 mg x 1) all other days   Full warfarin instructions:   2.5 mg every Mon, Wed, Fri; 5 mg all other days   Weekly warfarin total:   27.5 mg   No change documented:   Donna Colby RN   Plan last modified:   Donna Colby RN (2019)   Next INR check:   2020   Priority:   Maintenance   Target end date:   Indefinite    Indications    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]  Transient cerebral ischemia [G45.9]  Long term current use of anticoagulants with INR goal of 2.0-3.0 [Z79.01]             Anticoagulation Episode Summary     INR check location:       Preferred lab:       Send INR reminders to:   [facility] HEART INR NURSE    Comments:         Anticoagulation Care Providers     Provider Role Specialty Phone number    Satya Newton MD Responsible Cardiology [phone]            See the Encounter Report to view Anticoagulation Flowsheet and Dosing Calendar (Go to Encounters tab in chart review, and find the Anticoagulation Therapy Visit)    INR 2.60 (done at [facility] lab). No change in meds or diet. Denies abnormal bleeding or bruising. Will continue current dosing of 2.5 mg MWF and 5 mg all other days with recheck in 4 weeks (he will call to schedule a  lab appt at a [place] lab then we will call him to review results). INR clinic referral renewal submitted for signature  Has the patient previously taken warfarin? yes  If yes, for what indication? LV thrombus, transient cerebral ischemia    Does the patient have any of the following indications for a higher range of 2.5-3.5:    Mitral position mechanical valve? no    Carlos-Shiley, Ball and Cage or Monoleaflet valve (regardless of position) no    Other (if yes, please explain) no      Donna Colby RN

## 2020-04-22 DIAGNOSIS — I25.10 CORONARY ARTERY DISEASE INVOLVING NATIVE CORONARY ARTERY OF NATIVE HEART WITHOUT ANGINA PECTORIS: ICD-10-CM

## 2020-04-22 RX ORDER — LISINOPRIL 2.5 MG/1
TABLET ORAL
Qty: 180 TABLET | Refills: 0 | Status: SHIPPED | OUTPATIENT
Start: 2020-04-22 | End: 2020-07-21

## 2020-04-27 ENCOUNTER — ANTICOAGULATION THERAPY VISIT (OUTPATIENT)
Dept: CARDIOLOGY | Facility: CLINIC | Age: 85
End: 2020-04-27

## 2020-04-27 DIAGNOSIS — Z79.01 LONG TERM CURRENT USE OF ANTICOAGULANTS WITH INR GOAL OF 2.0-3.0: ICD-10-CM

## 2020-04-27 DIAGNOSIS — G45.9 TRANSIENT CEREBRAL ISCHEMIA, UNSPECIFIED TYPE: ICD-10-CM

## 2020-04-27 DIAGNOSIS — I51.3 LEFT VENTRICULAR THROMBUS: ICD-10-CM

## 2020-04-27 LAB
CAPILLARY BLOOD COLLECTION: NORMAL
INR PPP: 3.6 (ref 0.86–1.14)

## 2020-04-27 PROCEDURE — 36416 COLLJ CAPILLARY BLOOD SPEC: CPT | Performed by: INTERNAL MEDICINE

## 2020-04-27 PROCEDURE — 99207 ZZC NO CHARGE NURSE ONLY: CPT | Performed by: INTERNAL MEDICINE

## 2020-04-27 PROCEDURE — 85610 PROTHROMBIN TIME: CPT | Performed by: INTERNAL MEDICINE

## 2020-04-27 NOTE — PROGRESS NOTES
ANTICOAGULATION FOLLOW-UP CLINIC VISIT    Patient Name:  Sawyer Renteria  Date:  4/27/2020  Contact Type:  Telephone    SUBJECTIVE:  Patient Findings     Positives:   Change in diet/appetite (Pt stopped eating spinach about a month ago as he thought that's what he was supposed to do; has also been drinking suresh juice for about a month)        Clinical Outcomes     Negatives:   Major bleeding event, Thromboembolic event, Anticoagulation-related hospital admission, Anticoagulation-related ED visit, Anticoagulation-related fatality           OBJECTIVE    INR   Date Value Ref Range Status   04/27/2020 3.60 (H) 0.86 - 1.14 Final     Comment:     Performed at Point of Care  This test is intended for monitoring Coumadin therapy.  Results are not   accurate in patients with prolonged INR due to factor deficiency.         ASSESSMENT / PLAN  INR assessment SUPRA    Recheck INR In: 2 WEEKS    INR Location Outside lab      Anticoagulation Summary  As of 4/27/2020    INR goal:   2.0-3.0   TTR:   59.7 % (1 y)   INR used for dosing:   3.60! (4/27/2020)   Warfarin maintenance plan:   2.5 mg (5 mg x 0.5) every Mon, Wed, Fri; 5 mg (5 mg x 1) all other days   Full warfarin instructions:   4/27: Hold; Otherwise 2.5 mg every Mon, Wed, Fri; 5 mg all other days   Weekly warfarin total:   27.5 mg   Plan last modified:   Donna Colby RN (6/21/2019)   Next INR check:   5/11/2020   Priority:   Maintenance   Target end date:   Indefinite    Indications    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]  Transient cerebral ischemia [G45.9]  Long term current use of anticoagulants with INR goal of 2.0-3.0 [Z79.01]  Transient cerebral ischemia  unspecified type [G45.9]             Anticoagulation Episode Summary     INR check location:       Preferred lab:       Send INR reminders to:   [facility] HEART INR NURSE    Comments:         Anticoagulation Care Providers     Provider Role Specialty Phone number    Satya Newton MD Referring Cardiology  [phone]            See the Encounter Report to view Anticoagulation Flowsheet and Dosing Calendar (Go to Encounters tab in chart review, and find the Anticoagulation Therapy Visit)    INR 3.6 today at outside lab. Spoke to patient who was very worried about his elevated reading. No signs of bleeding. No changes to medications. Pt states he stopped eating spinach about a month or two ago as he thought that's what he was supposed to do (was 1.8 back in mid-March, usually eats spinach 2-3x/wk). Pt also reports he started drinking suresh juice a few times a week around the same time. Will have patient hold warfarin today and then resume normal maintenance plan of 2.5mg Mon/Weds/Fri and 5mg all other days. Pt will stop drinking suresh juice, and resume eating spinach like normal starting today. Recheck INR in 2wks. Reviewed signs/symptoms of bleeding and to go to ED for these. Pt verbalized understanding. Pt to call [facility] clinic to arrange lab appointment for 5/11.     Kimberly Noyola RN

## 2020-05-05 DIAGNOSIS — I23.6 LV (LEFT VENTRICULAR) MURAL THROMBUS FOLLOWING MI (H): ICD-10-CM

## 2020-05-05 DIAGNOSIS — G45.9 TRANSIENT CEREBRAL ISCHEMIA: ICD-10-CM

## 2020-05-05 RX ORDER — WARFARIN SODIUM 5 MG/1
TABLET ORAL
Qty: 90 TABLET | Refills: 1 | Status: SHIPPED | OUTPATIENT
Start: 2020-05-05 | End: 2020-11-10

## 2020-05-08 ENCOUNTER — ANTICOAGULATION THERAPY VISIT (OUTPATIENT)
Dept: CARDIOLOGY | Facility: CLINIC | Age: 85
End: 2020-05-08
Payer: MEDICARE

## 2020-05-08 DIAGNOSIS — G45.9 TRANSIENT CEREBRAL ISCHEMIA, UNSPECIFIED TYPE: ICD-10-CM

## 2020-05-08 DIAGNOSIS — I51.3 LEFT VENTRICULAR THROMBUS: ICD-10-CM

## 2020-05-08 DIAGNOSIS — Z79.01 LONG TERM CURRENT USE OF ANTICOAGULANTS WITH INR GOAL OF 2.0-3.0: ICD-10-CM

## 2020-05-08 LAB — INR PPP: 2.1 (ref 0.86–1.14)

## 2020-05-08 PROCEDURE — 85610 PROTHROMBIN TIME: CPT | Performed by: INTERNAL MEDICINE

## 2020-05-08 PROCEDURE — 99207 ZZC NO CHARGE LOS: CPT

## 2020-05-08 PROCEDURE — 36416 COLLJ CAPILLARY BLOOD SPEC: CPT | Performed by: INTERNAL MEDICINE

## 2020-05-08 NOTE — PROGRESS NOTES
ANTICOAGULATION FOLLOW-UP CLINIC VISIT    Patient Name:  Sawyer Renteria  Date:  2020  Contact Type:  Telephone    SUBJECTIVE:  Patient Findings     Positives:   Change in diet/appetite (he stopped drinking suresh juice, ate spinach twice and had other green veggies twice)        Clinical Outcomes     Negatives:   Major bleeding event, Thromboembolic event, Anticoagulation-related hospital admission, Anticoagulation-related ED visit, Anticoagulation-related fatality           OBJECTIVE    INR   Date Value Ref Range Status   2020 2.10 (H) 0.86 - 1.14 Final       ASSESSMENT / PLAN  INR assessment THER    Recheck INR In: 3 WEEKS    INR Location Outside lab      Anticoagulation Summary  As of 2020    INR goal:   2.0-3.0   TTR:   58.5 % (1 y)   INR used for dosin.10 (2020)   Warfarin maintenance plan:   2.5 mg (5 mg x 0.5) every Mon, Wed, Fri; 5 mg (5 mg x 1) all other days   Full warfarin instructions:   2.5 mg every Mon, Wed, Fri; 5 mg all other days   Weekly warfarin total:   27.5 mg   No change documented:   Donna Colby RN   Plan last modified:   Donna Colby RN (2019)   Next INR check:   2020   Priority:   Maintenance   Target end date:   Indefinite    Indications    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]  Transient cerebral ischemia [G45.9]  Long term current use of anticoagulants with INR goal of 2.0-3.0 [Z79.01]  Transient cerebral ischemia  unspecified type [G45.9]             Anticoagulation Episode Summary     INR check location:       Preferred lab:       Send INR reminders to:   [facility] HEART INR NURSE    Comments:         Anticoagulation Care Providers     Provider Role Specialty Phone number    Satya Newton MD Referring Cardiology [phone]            See the Encounter Report to view Anticoagulation Flowsheet and Dosing Calendar (Go to Encounters tab in chart review, and find the Anticoagulation Therapy Visit)    INR 2.10 INR back in range after a one time hold.  He stopped drinking suresh juice and ate spinach 2x along with other green veggies 2x. No change in other meds. Denies abnormal bleeding issues. Will continue current dosing of 2.5 mg MWF and 5 mg all other days with recheck in 3 weeks.    Donna Colby RN

## 2020-05-29 ENCOUNTER — ANTICOAGULATION THERAPY VISIT (OUTPATIENT)
Dept: CARDIOLOGY | Facility: CLINIC | Age: 85
End: 2020-05-29
Payer: MEDICARE

## 2020-05-29 DIAGNOSIS — Z79.01 LONG TERM CURRENT USE OF ANTICOAGULANTS WITH INR GOAL OF 2.0-3.0: ICD-10-CM

## 2020-05-29 DIAGNOSIS — G45.9 TRANSIENT CEREBRAL ISCHEMIA, UNSPECIFIED TYPE: ICD-10-CM

## 2020-05-29 DIAGNOSIS — I51.3 LEFT VENTRICULAR THROMBUS: ICD-10-CM

## 2020-05-29 LAB
CAPILLARY BLOOD COLLECTION: NORMAL
INR PPP: 2.4 (ref 0.86–1.14)

## 2020-05-29 PROCEDURE — 85610 PROTHROMBIN TIME: CPT | Performed by: INTERNAL MEDICINE

## 2020-05-29 PROCEDURE — 99207 ZZC NO CHARGE LOS: CPT

## 2020-05-29 PROCEDURE — 36416 COLLJ CAPILLARY BLOOD SPEC: CPT | Performed by: INTERNAL MEDICINE

## 2020-05-29 NOTE — PROGRESS NOTES
ANTICOAGULATION FOLLOW-UP CLINIC VISIT    Patient Name:  Sawyer Renteria  Date:  2020  Contact Type:  Telephone    SUBJECTIVE:  Patient Findings         Clinical Outcomes     Negatives:   Major bleeding event, Thromboembolic event, Anticoagulation-related hospital admission, Anticoagulation-related ED visit, Anticoagulation-related fatality           OBJECTIVE    Recent labs: (last 7 days)     20  1254   INR 2.40*       ASSESSMENT / PLAN  INR assessment THER    Recheck INR In: 4 WEEKS    INR Location Outside lab      Anticoagulation Summary  As of 2020    INR goal:   2.0-3.0   TTR:   61.8 % (1 y)   INR used for dosin.40 (2020)   Warfarin maintenance plan:   2.5 mg (5 mg x 0.5) every Mon, Wed, Fri; 5 mg (5 mg x 1) all other days   Full warfarin instructions:   2.5 mg every Mon, Wed, Fri; 5 mg all other days   Weekly warfarin total:   27.5 mg   No change documented:   Kimberly Noyola RN   Plan last modified:   Donna Colby RN (2019)   Next INR check:   2020   Priority:   Maintenance   Target end date:   Indefinite    Indications    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]  Transient cerebral ischemia [G45.9]  Long term current use of anticoagulants with INR goal of 2.0-3.0 [Z79.01]  Transient cerebral ischemia  unspecified type [G45.9]             Anticoagulation Episode Summary     INR check location:       Preferred lab:       Send INR reminders to:   [facility] HEART INR NURSE    Comments:         Anticoagulation Care Providers     Provider Role Specialty Phone number    Satya Newton MD Referring Cardiology [phone]            See the Encounter Report to view Anticoagulation Flowsheet and Dosing Calendar (Go to Encounters tab in chart review, and find the Anticoagulation Therapy Visit)    INR 2.40 today at outside clinic. Spoke to patient who denies abnormal bleeding/bruising. No changes to medications. No changes to diet, still eating spinach/other greens 2-3x/wk. Will  continue on current maintenance dosing of 2.5mg MWF and 5mg all other days with a recheck in 4wks.     Kimberly Noyola RN

## 2020-06-24 DIAGNOSIS — Z79.01 LONG TERM CURRENT USE OF ANTICOAGULANTS WITH INR GOAL OF 2.0-3.0: ICD-10-CM

## 2020-06-24 LAB
CAPILLARY BLOOD COLLECTION: NORMAL
INR PPP: 2.7 (ref 0.86–1.14)

## 2020-06-24 PROCEDURE — 85610 PROTHROMBIN TIME: CPT | Performed by: INTERNAL MEDICINE

## 2020-06-24 PROCEDURE — 36416 COLLJ CAPILLARY BLOOD SPEC: CPT | Performed by: INTERNAL MEDICINE

## 2020-06-25 ENCOUNTER — ANTICOAGULATION THERAPY VISIT (OUTPATIENT)
Dept: CARDIOLOGY | Facility: CLINIC | Age: 85
End: 2020-06-25
Payer: MEDICARE

## 2020-06-25 DIAGNOSIS — Z79.01 LONG TERM CURRENT USE OF ANTICOAGULANTS WITH INR GOAL OF 2.0-3.0: ICD-10-CM

## 2020-06-25 DIAGNOSIS — G45.9 TRANSIENT CEREBRAL ISCHEMIA, UNSPECIFIED TYPE: ICD-10-CM

## 2020-06-25 DIAGNOSIS — I51.3 LEFT VENTRICULAR THROMBUS: ICD-10-CM

## 2020-06-25 PROCEDURE — 99207 ZZC NO CHARGE NURSE ONLY: CPT

## 2020-06-25 NOTE — PROGRESS NOTES
ANTICOAGULATION FOLLOW-UP CLINIC VISIT    Patient Name:  Sawyer Renteria  Date:  2020  Contact Type:  Telephone    SUBJECTIVE:         OBJECTIVE    Recent labs: (last 7 days)     20  1634   INR 2.70*       ASSESSMENT / PLAN  INR assessment THER    Recheck INR In: 4 WEEKS    INR Location Outside lab      Anticoagulation Summary  As of 2020    INR goal:   2.0-3.0   TTR:   65.8 % (1 y)   INR used for dosin.70 (2020)   Warfarin maintenance plan:   2.5 mg (5 mg x 0.5) every Mon, Wed, Fri; 5 mg (5 mg x 1) all other days   Full warfarin instructions:   2.5 mg every Mon, Wed, Fri; 5 mg all other days   Weekly warfarin total:   27.5 mg   No change documented:   Kimberly Noyola RN   Plan last modified:   Donna Colby RN (2019)   Next INR check:   2020   Priority:   Maintenance   Target end date:   Indefinite    Indications    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]  Transient cerebral ischemia [G45.9]  Long term current use of anticoagulants with INR goal of 2.0-3.0 [Z79.01]  Transient cerebral ischemia  unspecified type [G45.9]             Anticoagulation Episode Summary     INR check location:       Preferred lab:       Send INR reminders to:   [facility] HEART INR NURSE    Comments:         Anticoagulation Care Providers     Provider Role Specialty Phone number    Satya Newton MD Referring Cardiology [phone]            See the Encounter Report to view Anticoagulation Flowsheet and Dosing Calendar (Go to Encounters tab in chart review, and find the Anticoagulation Therapy Visit)    INR 2.70 yesterday at outside clinic, result did not come through prior to end of workday yesterday. Spoke to patient, no abnormal bleeding/bruising. No changes to meds. No major changes to diet, tries to be consistent with spinach. He will occasionally have suresh juice which he did have more of this week. Reviewed that mangoes can raise the INR and that if he chooses to have more of that juice he  could eat an extra serving of greens to offset it. Pt verbalized understanding. Pt states he did not take his warfarin last night since he was waiting for a call so will have him make up his dose today. Pt to take 7.5mg today and then resume normal maintenance dosing of 2.5mg MWF and 5mg all other days. Recheck in 4wks.     Kimberly Noyola RN

## 2020-07-16 ENCOUNTER — ANTICOAGULATION THERAPY VISIT (OUTPATIENT)
Dept: CARDIOLOGY | Facility: CLINIC | Age: 85
End: 2020-07-16
Payer: MEDICARE

## 2020-07-16 DIAGNOSIS — Z79.01 LONG TERM CURRENT USE OF ANTICOAGULANTS WITH INR GOAL OF 2.0-3.0: ICD-10-CM

## 2020-07-16 DIAGNOSIS — G45.9 TRANSIENT CEREBRAL ISCHEMIA, UNSPECIFIED TYPE: ICD-10-CM

## 2020-07-16 DIAGNOSIS — I51.3 LEFT VENTRICULAR THROMBUS: ICD-10-CM

## 2020-07-16 LAB
CAPILLARY BLOOD COLLECTION: NORMAL
INR PPP: 6.6 (ref 0.86–1.14)

## 2020-07-16 PROCEDURE — 99207 ZZC NO CHARGE NURSE ONLY: CPT | Performed by: INTERNAL MEDICINE

## 2020-07-16 PROCEDURE — 36416 COLLJ CAPILLARY BLOOD SPEC: CPT | Performed by: INTERNAL MEDICINE

## 2020-07-16 PROCEDURE — 85610 PROTHROMBIN TIME: CPT | Performed by: INTERNAL MEDICINE

## 2020-07-16 NOTE — PROGRESS NOTES
ANTICOAGULATION FOLLOW-UP CLINIC VISIT    Patient Name:  Sawyer Renteria  Date:  2020  Contact Type:  Telephone    SUBJECTIVE:  Patient Findings     Positives:   Change in medications (was taking Tylenol #3 for back pain then took several doses of Advil )        Clinical Outcomes     Negatives:   Major bleeding event, Thromboembolic event, Anticoagulation-related hospital admission, Anticoagulation-related ED visit, Anticoagulation-related fatality           OBJECTIVE    Recent labs: (last 7 days)     20  1412   INR 6.60*       ASSESSMENT / PLAN  INR assessment SUPRA    Recheck INR In: 4 DAYS    INR Location Outside lab      Anticoagulation Summary  As of 2020    INR goal:   2.0-3.0   TTR:   60.4 % (1 y)   INR used for dosin.60! (2020)   Warfarin maintenance plan:   2.5 mg (5 mg x 0.5) every Mon, Wed, Fri; 5 mg (5 mg x 1) all other days   Full warfarin instructions:   : Hold; : Hold; : Hold; Otherwise 2.5 mg every Mon, Wed, Fri; 5 mg all other days   Weekly warfarin total:   27.5 mg   Plan last modified:   Donna Colby RN (2019)   Next INR check:      Priority:   Maintenance   Target end date:   Indefinite    Indications    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]  Transient cerebral ischemia [G45.9]  Long term current use of anticoagulants with INR goal of 2.0-3.0 [Z79.01]  Transient cerebral ischemia  unspecified type [G45.9]             Anticoagulation Episode Summary     INR check location:       Preferred lab:       Send INR reminders to:   [facility] HEART INR NURSE    Comments:         Anticoagulation Care Providers     Provider Role Specialty Phone number    Satya Newton MD Referring Cardiology [phone]            See the Encounter Report to view Anticoagulation Flowsheet and Dosing Calendar (Go to Encounters tab in chart review, and find the Anticoagulation Therapy Visit)    INR 6.60 Pt has had back pain so was taking Tylenol #3 for several days (tapered down  on the dose because it upsets his stomach) then he took several doses of Advil. Back pain is improving. No change in other meds. He ate less greens while taking the pain meds last week. Denies bleeding issues or neuro symptoms. Will hold warfarin ThFSa then take 5 mg Sunday with recheck on Monday. He will eat spinach today and tomorrow. Advised that if he has bleeding or neuro changes then he would need to seek urgent medical attention in the ER. Pt verbalized understanding. Reviewed INR results with Dr Newton who requested an INR check tomorrow to ensure that the INR is not rising. Spoke with pt to review that he will need an INR checked tomorrow. Pt will schedule the lab appt. Dosage adjustment made based on physician directed care plan.    Donna Colby RN

## 2020-07-17 ENCOUNTER — TELEPHONE (OUTPATIENT)
Dept: CARDIOLOGY | Facility: CLINIC | Age: 85
End: 2020-07-17

## 2020-07-17 ENCOUNTER — ANTICOAGULATION THERAPY VISIT (OUTPATIENT)
Dept: CARDIOLOGY | Facility: CLINIC | Age: 85
End: 2020-07-17
Payer: MEDICARE

## 2020-07-17 DIAGNOSIS — I51.3 LEFT VENTRICULAR THROMBUS: ICD-10-CM

## 2020-07-17 DIAGNOSIS — G45.9 TRANSIENT CEREBRAL ISCHEMIA, UNSPECIFIED TYPE: ICD-10-CM

## 2020-07-17 DIAGNOSIS — Z79.01 LONG TERM CURRENT USE OF ANTICOAGULANTS WITH INR GOAL OF 2.0-3.0: ICD-10-CM

## 2020-07-17 LAB
CAPILLARY BLOOD COLLECTION: NORMAL
INR PPP: 3.1 (ref 0.86–1.14)

## 2020-07-17 PROCEDURE — 36416 COLLJ CAPILLARY BLOOD SPEC: CPT | Performed by: INTERNAL MEDICINE

## 2020-07-17 PROCEDURE — 85610 PROTHROMBIN TIME: CPT | Performed by: INTERNAL MEDICINE

## 2020-07-17 PROCEDURE — 99207 ZZC NO CHARGE NURSE ONLY: CPT | Performed by: INTERNAL MEDICINE

## 2020-07-17 NOTE — PROGRESS NOTES
ANTICOAGULATION FOLLOW-UP CLINIC VISIT    Patient Name:  Sawyer Renteria  Date:  7/17/2020  Contact Type:  Telephone    SUBJECTIVE:  Patient Findings     Positives:   Change in medications (no longer taking Tylenol #3 or Advil), Change in diet/appetite (he ate frozen spinach last night and this morning)        Clinical Outcomes     Negatives:   Major bleeding event, Thromboembolic event, Anticoagulation-related hospital admission, Anticoagulation-related ED visit, Anticoagulation-related fatality           OBJECTIVE    Recent labs: (last 7 days)     07/17/20  1335   INR 3.10*       ASSESSMENT / PLAN  INR assessment SUPRA    Recheck INR In: 4 DAYS    INR Location Outside lab      Anticoagulation Summary  As of 7/17/2020    INR goal:   2.0-3.0   TTR:   60.1 % (1 y)   INR used for dosing:   3.10! 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]  Transient cerebral ischemia [G45.9]  Long term current use of anticoagulants with INR goal of 2.0-3.0 [Z79.01]  Transient cerebral ischemia  unspecified type [G45.9]             Anticoagulation Episode Summary     INR check location:       Preferred lab:       Send INR reminders to:   [facility] HEART INR NURSE    Comments:         Anticoagulation Care Providers     Provider Role Specialty Phone number    Satya Newton MD Referring Cardiology [phone]            See the Encounter Report to view Anticoagulation Flowsheet and Dosing Calendar (Go to Encounters tab in chart review, and find the Anticoagulation Therapy Visit)    INR 3.10 INR came down from 6.60 after he held a dose of  Warfarin and ate frozen spinach last evening and this morning. He had fallen recently then had back pain so was taking Tylenol #3 for several days and also had taken a few doses of Advil. He also wasn't eating his usual greens. No longer taking pain meds and back pain is improved. Pt denies abnormal bleeding or bruising. Dr Newton wanted an INR checked today to be sure it wasn't continuing to rise. Pt will hold warfarin today then resume his usual dosing of 2.5 mg MWF and 5 mg all other days with recheck in 4 days. He will resume usual diet. Dosage adjustment made based on physician directed care plan.    Donna Colby RN

## 2020-07-17 NOTE — TELEPHONE ENCOUNTER
Spoke with pt to remind him that he needs to schedule an INR check today (as soon as possible) to ensure that the INR is not continuing to rise. He states that he will schedule the lab. Joana

## 2020-07-21 ENCOUNTER — TELEPHONE (OUTPATIENT)
Dept: CARDIOLOGY | Facility: CLINIC | Age: 85
End: 2020-07-21

## 2020-07-21 DIAGNOSIS — I25.10 CORONARY ARTERY DISEASE INVOLVING NATIVE CORONARY ARTERY OF NATIVE HEART WITHOUT ANGINA PECTORIS: ICD-10-CM

## 2020-07-21 RX ORDER — LISINOPRIL 2.5 MG/1
TABLET ORAL
Qty: 180 TABLET | Refills: 0 | Status: SHIPPED | OUTPATIENT
Start: 2020-07-21 | End: 2022-03-07

## 2020-07-21 NOTE — TELEPHONE ENCOUNTER
Pt was due for an INR check today after several supratherapeutic results recently. Spoke to patient to remind him to make an appointment, states he will call and make an appointment for tomorrow.

## 2020-07-28 ENCOUNTER — TELEPHONE (OUTPATIENT)
Dept: CARDIOLOGY | Facility: CLINIC | Age: 85
End: 2020-07-28

## 2020-07-28 NOTE — TELEPHONE ENCOUNTER
Spoke with pt to remind him that he is overdue for an INR lab. He agrees to schedule the lab today. Joana

## 2020-07-30 ENCOUNTER — ANTICOAGULATION THERAPY VISIT (OUTPATIENT)
Dept: CARDIOLOGY | Facility: CLINIC | Age: 85
End: 2020-07-30
Payer: MEDICARE

## 2020-07-30 DIAGNOSIS — G45.9 TRANSIENT CEREBRAL ISCHEMIA, UNSPECIFIED TYPE: ICD-10-CM

## 2020-07-30 DIAGNOSIS — Z79.01 LONG TERM CURRENT USE OF ANTICOAGULANTS WITH INR GOAL OF 2.0-3.0: ICD-10-CM

## 2020-07-30 DIAGNOSIS — I51.3 LEFT VENTRICULAR THROMBUS: ICD-10-CM

## 2020-07-30 LAB
CAPILLARY BLOOD COLLECTION: NORMAL
INR PPP: 3.1 (ref 0.86–1.14)

## 2020-07-30 PROCEDURE — 36416 COLLJ CAPILLARY BLOOD SPEC: CPT | Performed by: INTERNAL MEDICINE

## 2020-07-30 PROCEDURE — 99207 ZZC NO CHARGE NURSE ONLY: CPT

## 2020-07-30 PROCEDURE — 85610 PROTHROMBIN TIME: CPT | Performed by: INTERNAL MEDICINE

## 2020-07-30 NOTE — PROGRESS NOTES
ANTICOAGULATION FOLLOW-UP CLINIC VISIT    Patient Name:  Sawyer Renteria  Date:  7/30/2020  Contact Type:  Telephone    SUBJECTIVE:         OBJECTIVE    Recent labs: (last 7 days)     07/30/20  1410   INR 3.10*       ASSESSMENT / PLAN  INR assessment SUPRA    Recheck INR In: 2 WEEKS    INR Location Outside lab      Anticoagulation Summary  As of 7/30/2020    INR goal:   2.0-3.0   TTR:   56.5 % (1 y)   INR used for dosing:   3.10! (7/30/2020)   Warfarin maintenance plan:   2.5 mg (5 mg x 0.5) every Mon, Wed, Fri; 5 mg (5 mg x 1) all other days   Full warfarin instructions:   2.5 mg every Mon, Wed, Fri; 5 mg all other days   Weekly warfarin total:   27.5 mg   No change documented:   Donna Colby, RN   Plan last modified:   Donna Colby RN (6/21/2019)   Next INR check:      Priority:   Maintenance   Target end date:   Indefinite    Indications    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]  Transient cerebral ischemia [G45.9]  Long term current use of anticoagulants with INR goal of 2.0-3.0 [Z79.01]  Transient cerebral ischemia  unspecified type [G45.9]             Anticoagulation Episode Summary     INR check location:       Preferred lab:       Send INR reminders to:   [facility] HEART INR NURSE    Comments:         Anticoagulation Care Providers     Provider Role Specialty Phone number    Satya Newton MD Referring Cardiology [phone]            See the Encounter Report to view Anticoagulation Flowsheet and Dosing Calendar (Go to Encounters tab in chart review, and find the Anticoagulation Therapy Visit)    INR 3.1 INR slightly elevated today after being up to 6.6 on 7/16 (unknown cause). Nephrology note from 7/28 states that pt was having loose stools on 7/28. Amlodipine was stopped by Nephrology at that OV. Pt not available by phone. Left message instructing pt to continue his current dosing until we talk tomorrow. Will be asking if he has had any change in diet or any more loose stools. Planning to continue  current dosing of 2.5 mg MWF and 5 mg all other days with recheck in 2 weeks. 4:15pm He states that the loose stools have stopped and his appetite is improving. He ate a little spinach today. Will continue current dosing of 2.5 mg MWF and 5 mg all other days with recheck in 2 weeks.   Donna Colby RN

## 2020-08-13 ENCOUNTER — TELEPHONE (OUTPATIENT)
Dept: CARDIOLOGY | Facility: CLINIC | Age: 85
End: 2020-08-13

## 2020-08-13 NOTE — TELEPHONE ENCOUNTER
Spoke with pt to remind him to schedule the INR appt that was due today. He will try to schedule it for tomorrow. Joana

## 2020-08-14 ENCOUNTER — ANTICOAGULATION THERAPY VISIT (OUTPATIENT)
Dept: CARDIOLOGY | Facility: CLINIC | Age: 85
End: 2020-08-14
Payer: MEDICARE

## 2020-08-14 DIAGNOSIS — I51.3 LEFT VENTRICULAR THROMBUS: ICD-10-CM

## 2020-08-14 DIAGNOSIS — Z79.01 LONG TERM CURRENT USE OF ANTICOAGULANTS WITH INR GOAL OF 2.0-3.0: ICD-10-CM

## 2020-08-14 DIAGNOSIS — G45.9 TRANSIENT CEREBRAL ISCHEMIA, UNSPECIFIED TYPE: ICD-10-CM

## 2020-08-14 LAB
CAPILLARY BLOOD COLLECTION: NORMAL
INR PPP: 2.1 (ref 0.86–1.14)

## 2020-08-14 PROCEDURE — 99207 ZZC NO CHARGE NURSE ONLY: CPT

## 2020-08-14 PROCEDURE — 36416 COLLJ CAPILLARY BLOOD SPEC: CPT | Performed by: INTERNAL MEDICINE

## 2020-08-14 PROCEDURE — 85610 PROTHROMBIN TIME: CPT | Performed by: INTERNAL MEDICINE

## 2020-08-14 NOTE — PROGRESS NOTES
ANTICOAGULATION FOLLOW-UP CLINIC VISIT    Patient Name:  Sawyer Renteria  Date:  2020  Contact Type:  Telephone    SUBJECTIVE:  Patient Findings         Clinical Outcomes     Negatives:   Major bleeding event, Thromboembolic event, Anticoagulation-related hospital admission, Anticoagulation-related ED visit, Anticoagulation-related fatality           OBJECTIVE    Recent labs: (last 7 days)     20  1349   INR 2.10*       ASSESSMENT / PLAN  INR assessment THER    Recheck INR In: 2 WEEKS    INR Location Outside lab      Anticoagulation Summary  As of 2020    INR goal:   2.0-3.0   TTR:   56.1 % (1 y)   INR used for dosin.10 (2020)   Warfarin maintenance plan:   2.5 mg (5 mg x 0.5) every Mon, Wed, Fri; 5 mg (5 mg x 1) all other days   Full warfarin instructions:   2.5 mg every Mon, Wed, Fri; 5 mg all other days   Weekly warfarin total:   27.5 mg   No change documented:   Tayla Antonio RN   Plan last modified:   Donna Colby RN (2019)   Next INR check:   2020   Target end date:   Indefinite    Indications    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]  Transient cerebral ischemia [G45.9]  Long term current use of anticoagulants with INR goal of 2.0-3.0 [Z79.01]  Transient cerebral ischemia  unspecified type [G45.9]             Anticoagulation Episode Summary     INR check location:       Preferred lab:       Send INR reminders to:   [facility] HEART INR NURSE    Comments:         Anticoagulation Care Providers     Provider Role Specialty Phone number    Satya Newton MD Referring Cardiology [phone]            See the Encounter Report to view Anticoagulation Flowsheet and Dosing Calendar (Go to Encounters tab in chart review, and find the Anticoagulation Therapy Visit)    INR 2.10.  Called and left message on cell phone and home phone.  Continue same schedule and recheck in 2 weeks.  Asked patient to call the INR clinic to discuss plan of care.  Turner ALCANTARA  OKSANA Antonio    8/17/20 10:10am Spoke with pt. No change in meds or diet. Denies abnormal bleeding or bruising. Will continue current dosing of 2.5 mg MWF and 5 mg all other days with recheck in 2 weeks. Joana

## 2020-08-28 ENCOUNTER — TELEPHONE (OUTPATIENT)
Dept: CARDIOLOGY | Facility: CLINIC | Age: 85
End: 2020-08-28

## 2020-08-28 NOTE — TELEPHONE ENCOUNTER
Spoke with pt to schedule an INR appt. He can't schedule it for today. Scheduled INR appt on 9/1. Joana

## 2020-09-01 ENCOUNTER — ANTICOAGULATION THERAPY VISIT (OUTPATIENT)
Dept: CARDIOLOGY | Facility: CLINIC | Age: 85
End: 2020-09-01

## 2020-09-01 DIAGNOSIS — Z79.01 LONG TERM CURRENT USE OF ANTICOAGULANTS WITH INR GOAL OF 2.0-3.0: ICD-10-CM

## 2020-09-01 DIAGNOSIS — G45.9 TRANSIENT CEREBRAL ISCHEMIA, UNSPECIFIED TYPE: ICD-10-CM

## 2020-09-01 DIAGNOSIS — I51.3 LEFT VENTRICULAR THROMBUS: ICD-10-CM

## 2020-09-01 LAB
CAPILLARY BLOOD COLLECTION: NORMAL
INR PPP: 2.6 (ref 0.86–1.14)

## 2020-09-01 PROCEDURE — 36416 COLLJ CAPILLARY BLOOD SPEC: CPT | Performed by: INTERNAL MEDICINE

## 2020-09-01 PROCEDURE — 85610 PROTHROMBIN TIME: CPT | Performed by: INTERNAL MEDICINE

## 2020-09-01 NOTE — PROGRESS NOTES
ANTICOAGULATION FOLLOW-UP CLINIC VISIT    Patient Name:  Sawyer Renteria  Date:  2020  Contact Type:  Telephone    SUBJECTIVE:  Patient Findings         Clinical Outcomes     Negatives:   Major bleeding event, Thromboembolic event, Anticoagulation-related hospital admission, Anticoagulation-related ED visit, Anticoagulation-related fatality           OBJECTIVE    Recent labs: (last 7 days)     20  1407   INR 2.60*       ASSESSMENT / PLAN  INR assessment THER    Recheck INR In: 3 WEEKS    INR Location Outside lab      Anticoagulation Summary  As of 2020    INR goal:   2.0-3.0   TTR:   59.8 % (1 y)   INR used for dosin.60 (2020)   Warfarin maintenance plan:   2.5 mg (5 mg x 0.5) every Mon, Wed, Fri; 5 mg (5 mg x 1) all other days   Full warfarin instructions:   2.5 mg every Mon, Wed, Fri; 5 mg all other days   Weekly warfarin total:   27.5 mg   No change documented:   Tayla Antonio RN   Plan last modified:   Donna Colby RN (2019)   Next INR check:   2020   Target end date:   Indefinite    Indications    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]  Transient cerebral ischemia [G45.9]  Long term current use of anticoagulants with INR goal of 2.0-3.0 [Z79.01]  Transient cerebral ischemia  unspecified type [G45.9]             Anticoagulation Episode Summary     INR check location:       Preferred lab:       Send INR reminders to:   [facility] HEART INR NURSE    Comments:         Anticoagulation Care Providers     Provider Role Specialty Phone number    Satya Newton MD Referring Cardiology [phone]            See the Encounter Report to view Anticoagulation Flowsheet and Dosing Calendar (Go to Encounters tab in chart review, and find the Anticoagulation Therapy Visit)    INR 2.60.  Called and spoke to the patient.  NO changes.  Continue the same schedule and recheck in 3 weeks.  Turner Antonio, OKSANA

## 2020-09-18 ENCOUNTER — ANTICOAGULATION THERAPY VISIT (OUTPATIENT)
Dept: CARDIOLOGY | Facility: CLINIC | Age: 85
End: 2020-09-18
Payer: MEDICARE

## 2020-09-18 DIAGNOSIS — Z79.01 LONG TERM CURRENT USE OF ANTICOAGULANTS WITH INR GOAL OF 2.0-3.0: ICD-10-CM

## 2020-09-18 DIAGNOSIS — G45.9 TRANSIENT CEREBRAL ISCHEMIA, UNSPECIFIED TYPE: ICD-10-CM

## 2020-09-18 DIAGNOSIS — I25.5 CARDIOMYOPATHY, ISCHEMIC: ICD-10-CM

## 2020-09-18 DIAGNOSIS — G45.9 TRANSIENT CEREBRAL ISCHEMIA: ICD-10-CM

## 2020-09-18 DIAGNOSIS — I51.3 LEFT VENTRICULAR THROMBUS: ICD-10-CM

## 2020-09-18 LAB
CAPILLARY BLOOD COLLECTION: NORMAL
INR PPP: 2.2 (ref 0.86–1.14)

## 2020-09-18 PROCEDURE — 99207 ZZC NO CHARGE NURSE ONLY: CPT

## 2020-09-18 PROCEDURE — 36416 COLLJ CAPILLARY BLOOD SPEC: CPT | Performed by: INTERNAL MEDICINE

## 2020-09-18 PROCEDURE — 85610 PROTHROMBIN TIME: CPT | Performed by: INTERNAL MEDICINE

## 2020-09-18 NOTE — PROGRESS NOTES
ANTICOAGULATION FOLLOW-UP CLINIC VISIT    Patient Name:  Sawyer Renteria  Date:  2020  Contact Type:  Telephone    SUBJECTIVE:  Patient Findings     Positives:   Change in medications (amlodipine stopped by nephrologist about 1 month ago)        Clinical Outcomes     Negatives:   Major bleeding event, Thromboembolic event, Anticoagulation-related hospital admission, Anticoagulation-related ED visit, Anticoagulation-related fatality           OBJECTIVE    Recent labs: (last 7 days)     20  1429   INR 2.20*       ASSESSMENT / PLAN  INR assessment THER    Recheck INR In: 4 WEEKS    INR Location Outside lab      Anticoagulation Summary  As of 2020    INR goal:   2.0-3.0   TTR:   64.2 % (1 y)   INR used for dosin.20 (2020)   Warfarin maintenance plan:   2.5 mg (5 mg x 0.5) every Mon, Wed, Fri; 5 mg (5 mg x 1) all other days   Full warfarin instructions:   2.5 mg every Mon, Wed, Fri; 5 mg all other days   Weekly warfarin total:   27.5 mg   No change documented:   Donna Colby RN   Plan last modified:   Donna Colby RN (2019)   Next INR check:   10/16/2020   Target end date:   Indefinite    Indications    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]  Transient cerebral ischemia [G45.9]  Long term current use of anticoagulants with INR goal of 2.0-3.0 [Z79.01]  Transient cerebral ischemia  unspecified type [G45.9]             Anticoagulation Episode Summary     INR check location:       Preferred lab:       Send INR reminders to:   [facility] HEART INR NURSE    Comments:         Anticoagulation Care Providers     Provider Role Specialty Phone number    Satya Newton MD Referring Cardiology [phone]            See the Encounter Report to view Anticoagulation Flowsheet and Dosing Calendar (Go to Encounters tab in chart review, and find the Anticoagulation Therapy Visit)    INR 2.20 No change in diet. Stopped Amlodipine (per nephrologist).  Denies abnormal bleeding or bruising.  Will continue  current dosing of 2.5 mg MWF and 5 mg all other days with recheck in 4 weeks.   Donna Colby RN

## 2020-10-19 ENCOUNTER — ANTICOAGULATION THERAPY VISIT (OUTPATIENT)
Dept: CARDIOLOGY | Facility: CLINIC | Age: 85
End: 2020-10-19

## 2020-10-19 DIAGNOSIS — G45.9 TRANSIENT CEREBRAL ISCHEMIA: ICD-10-CM

## 2020-10-19 DIAGNOSIS — I51.3 LEFT VENTRICULAR THROMBUS: ICD-10-CM

## 2020-10-19 DIAGNOSIS — Z79.01 LONG TERM CURRENT USE OF ANTICOAGULANTS WITH INR GOAL OF 2.0-3.0: ICD-10-CM

## 2020-10-19 DIAGNOSIS — G45.9 TRANSIENT CEREBRAL ISCHEMIA, UNSPECIFIED TYPE: ICD-10-CM

## 2020-10-19 LAB
CAPILLARY BLOOD COLLECTION: NORMAL
INR PPP: 1.4 (ref 0.86–1.14)

## 2020-10-19 PROCEDURE — 36416 COLLJ CAPILLARY BLOOD SPEC: CPT | Performed by: INTERNAL MEDICINE

## 2020-10-19 PROCEDURE — 85610 PROTHROMBIN TIME: CPT | Performed by: INTERNAL MEDICINE

## 2020-10-19 PROCEDURE — 99207 PR NO CHARGE NURSE ONLY: CPT

## 2020-10-19 NOTE — PROGRESS NOTES
ANTICOAGULATION FOLLOW-UP CLINIC VISIT    Patient Name:  Sawyer Renteria  Date:  10/19/2020  Contact Type:  Telephone    SUBJECTIVE:  Patient Findings     Positives:  Missed doses (May have missed a few doses), Change in diet/appetite (Eating spinach 1x every other week; drinking suresh juice multiple times a day but this is not new)        Clinical Outcomes     Negatives:  Major bleeding event, Thromboembolic event, Anticoagulation-related hospital admission, Anticoagulation-related ED visit, Anticoagulation-related fatality           OBJECTIVE    Recent labs: (last 7 days)     10/19/20  1212   INR 1.40*       ASSESSMENT / PLAN  INR assessment SUB    Recheck INR In: 2 WEEKS    INR Location Outside lab      Anticoagulation Summary  As of 10/19/2020    INR goal:  2.0-3.0   TTR:  62.7 % (1 y)   INR used for dosin.40 (10/19/2020)   Warfarin maintenance plan:  2.5 mg (5 mg x 0.5) every Mon, Wed, Fri; 5 mg (5 mg x 1) all other days   Full warfarin instructions:  10/19: 5 mg; Otherwise 2.5 mg every Mon, Wed, Fri; 5 mg all other days   Weekly warfarin total:  27.5 mg   Plan last modified:  Donna Colby RN (2019)   Next INR check:  2020   Target end date:  Indefinite    Indications    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]  Transient cerebral ischemia [G45.9]  Long term current use of anticoagulants with INR goal of 2.0-3.0 [Z79.01]  Transient cerebral ischemia  unspecified type [G45.9]             Anticoagulation Episode Summary     INR check location:      Preferred lab:      Send INR reminders to:  [facility] HEART INR NURSE    Comments:        Anticoagulation Care Providers     Provider Role Specialty Phone number    Satya Newton MD Referring Cardiology [phone]            See the Encounter Report to view Anticoagulation Flowsheet and Dosing Calendar (Go to Encounters tab in chart review, and find the Anticoagulation Therapy Visit)    INR 1.40 today at outside clinic. Spoke to patient, no abnormal  bleeding/bruising or signs of clotting. Pt states he may have missed a dose last week but isn't sure what day. Did not make up. No other changes to meds. No changes to diet, eating spinach once very other week and drinking suresh juice daily (can raise INR, not new for patient). Will boost patient to 5mg today and then resume normal maintenance dosing of 2.5mg MWF and 5mg all other days. Dose adjustment per protocol. Pt will avoid greens for the next few days and continue the suresh juice. Recheck in 2wks. Reminded patient to make up missed doses and encouraged him to use a pill box. Reviewed signs/symptoms of a stroke and to call 911 for these.     Kimberly Noyola RN

## 2020-10-22 ENCOUNTER — TELEPHONE (OUTPATIENT)
Dept: CARDIOLOGY | Facility: CLINIC | Age: 85
End: 2020-10-22

## 2020-10-22 NOTE — PROGRESS NOTES
ANTICOAGULATION FOLLOW-UP CLINIC VISIT    Patient Name:  Sawyer Renteria  Date:  6/22/2018  Contact Type:  Face to Face    SUBJECTIVE:     Patient Findings     Positives No Problem Findings           OBJECTIVE    INR Protime   Date Value Ref Range Status   06/22/2018 2.0 (A) 0.86 - 1.14 Final       ASSESSMENT / PLAN  INR assessment THER    Recheck INR In: 2 WEEKS    INR Location Clinic      Anticoagulation Summary as of 6/22/2018     INR goal 2.0-3.0   Today's INR 2.0   Warfarin maintenance plan 2.5 mg (5 mg x 0.5) on Mon, Wed, Fri; 5 mg (5 mg x 1) all other days   Full warfarin instructions 2.5 mg on Mon, Wed, Fri; 5 mg all other days   Weekly warfarin total 27.5 mg   No change documented Penfield, Lynette E, RN   Plan last modified Donna Colby RN (6/8/2018)   Next INR check 7/6/2018   Target end date Indefinite    Indications   Left ventricular thrombus [OXX2586]  Transient cerebral ischemia [G45.9]  Long-term (current) use of anticoagulants [Z79.01] [Z79.01]         Anticoagulation Episode Summary     INR check location     Preferred lab     Send INR reminders to [facility] HEART INR NURSE    Comments       Anticoagulation Care Providers     Provider Role Specialty Phone number    Satya Newton MD Responsible Cardiology [phone]            See the Encounter Report to view Anticoagulation Flowsheet and Dosing Calendar (Go to Encounters tab in chart review, and find the Anticoagulation Therapy Visit)    INR=2.0  No changes in meds. He has been eating few more greens. He prefers to decrease greens than increase dose. Will try this and recheck in 2 weeks. He gave himself the Praluent injection in the right thigh without complications.   Lynette E. Penfield, RN                no

## 2020-10-28 ENCOUNTER — ANTICOAGULATION THERAPY VISIT (OUTPATIENT)
Dept: CARDIOLOGY | Facility: CLINIC | Age: 85
End: 2020-10-28
Payer: MEDICARE

## 2020-10-28 DIAGNOSIS — I51.3 LEFT VENTRICULAR THROMBUS: ICD-10-CM

## 2020-10-28 DIAGNOSIS — Z79.01 LONG TERM CURRENT USE OF ANTICOAGULANTS WITH INR GOAL OF 2.0-3.0: ICD-10-CM

## 2020-10-28 DIAGNOSIS — G45.9 TRANSIENT CEREBRAL ISCHEMIA, UNSPECIFIED TYPE: ICD-10-CM

## 2020-10-28 DIAGNOSIS — G45.9 TRANSIENT CEREBRAL ISCHEMIA: ICD-10-CM

## 2020-10-28 LAB
CAPILLARY BLOOD COLLECTION: NORMAL
INR PPP: 2.5 (ref 0.86–1.14)

## 2020-10-28 PROCEDURE — 99207 PR NO CHARGE NURSE ONLY: CPT | Performed by: INTERNAL MEDICINE

## 2020-10-28 PROCEDURE — 85610 PROTHROMBIN TIME: CPT | Performed by: INTERNAL MEDICINE

## 2020-10-28 PROCEDURE — 36416 COLLJ CAPILLARY BLOOD SPEC: CPT | Performed by: INTERNAL MEDICINE

## 2020-10-28 NOTE — PROGRESS NOTES
ANTICOAGULATION FOLLOW-UP CLINIC VISIT    Patient Name:  Sawyer Renteria  Date:  10/28/2020  Contact Type:  Telephone    SUBJECTIVE:         OBJECTIVE    Recent labs: (last 7 days)     10/28/20  1508   INR 2.50*       ASSESSMENT / PLAN  INR assessment THER    Recheck INR In: 3 WEEKS    INR Location Outside lab      Anticoagulation Summary  As of 10/28/2020    INR goal:  2.0-3.0   TTR:  63.2 % (1 y)   INR used for dosin.50 (10/28/2020)   Warfarin maintenance plan:  2.5 mg (5 mg x 0.5) every Mon, Wed, Fri; 5 mg (5 mg x 1) all other days   Full warfarin instructions:  2.5 mg every Mon, Wed, Fri; 5 mg all other days   Weekly warfarin total:  27.5 mg   No change documented:  Alexandra Rust RN   Plan last modified:  Donna Colby RN (2019)   Next INR check:  2020   Target end date:  Indefinite    Indications    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]  Transient cerebral ischemia [G45.9]  Long term current use of anticoagulants with INR goal of 2.0-3.0 [Z79.01]  Transient cerebral ischemia  unspecified type [G45.9]             Anticoagulation Episode Summary     INR check location:      Preferred lab:      Send INR reminders to:  [facility] HEART INR NURSE    Comments:        Anticoagulation Care Providers     Provider Role Specialty Phone number    Satya Newton MD Referring Cardiovascular Disease [phone]            See the Encounter Report to view Anticoagulation Flowsheet and Dosing Calendar (Go to Encounters tab in chart review, and find the Anticoagulation Therapy Visit)    INR was 2.5 today. Pt denies any abnormal bleeding or bruising. Denies any recent medication or dietary changes or recent illness. Eats approximately one servingsof greens weekly and drinks 3 bottles of [place] juice weekly on average. Will continue current dosing of 2.5 mg every MWF and 5 mg all other days of the week. Next INR check is scheduled in 3 weeks. Pt verbalized understanding.    Alexandra Rust, OKSANA

## 2020-11-09 ENCOUNTER — OFFICE VISIT (OUTPATIENT)
Dept: CARDIOLOGY | Facility: CLINIC | Age: 85
End: 2020-11-09
Payer: MEDICARE

## 2020-11-09 VITALS
BODY MASS INDEX: 21.16 KG/M2 | DIASTOLIC BLOOD PRESSURE: 52 MMHG | HEIGHT: 62 IN | HEART RATE: 80 BPM | WEIGHT: 115 LBS | SYSTOLIC BLOOD PRESSURE: 108 MMHG

## 2020-11-09 DIAGNOSIS — I63.419 CEREBROVASCULAR ACCIDENT (CVA) DUE TO EMBOLISM OF MIDDLE CEREBRAL ARTERY, UNSPECIFIED BLOOD VESSEL LATERALITY (H): ICD-10-CM

## 2020-11-09 DIAGNOSIS — N40.1 BENIGN PROSTATIC HYPERPLASIA WITH POST-VOID DRIBBLING: ICD-10-CM

## 2020-11-09 DIAGNOSIS — Z79.01 LONG TERM CURRENT USE OF ANTICOAGULANTS WITH INR GOAL OF 2.0-3.0: ICD-10-CM

## 2020-11-09 DIAGNOSIS — E78.2 MIXED HYPERLIPIDEMIA: ICD-10-CM

## 2020-11-09 DIAGNOSIS — I25.5 CARDIOMYOPATHY, ISCHEMIC: ICD-10-CM

## 2020-11-09 DIAGNOSIS — I23.6 LV (LEFT VENTRICULAR) MURAL THROMBUS FOLLOWING MI (H): ICD-10-CM

## 2020-11-09 DIAGNOSIS — N18.32 CHRONIC RENAL IMPAIRMENT, STAGE 3B (H): ICD-10-CM

## 2020-11-09 DIAGNOSIS — N39.43 BENIGN PROSTATIC HYPERPLASIA WITH POST-VOID DRIBBLING: ICD-10-CM

## 2020-11-09 DIAGNOSIS — Z78.9 STATIN INTOLERANCE: ICD-10-CM

## 2020-11-09 DIAGNOSIS — I25.10 CORONARY ARTERY DISEASE INVOLVING NATIVE CORONARY ARTERY OF NATIVE HEART WITHOUT ANGINA PECTORIS: Primary | ICD-10-CM

## 2020-11-09 PROCEDURE — 99214 OFFICE O/P EST MOD 30 MIN: CPT | Performed by: INTERNAL MEDICINE

## 2020-11-09 ASSESSMENT — MIFFLIN-ST. JEOR: SCORE: 1080.89

## 2020-11-09 NOTE — PROGRESS NOTES
HPI and Plan:   Sawyer Renteria is a 86 year old male with history of coronary artery disease.  He has a history of an anterior wall MI in 2006 resulting in an LAD stent and angioplasty to the diagonal. He has an ongoing OM-1 with 60% stenosis. 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     He returns for a follow-up.  He is doing well.  He has had no chest pain or shortness of breath.  He has been following social distancing guidelines due to ongoing coronavirus pandemic.  He had some urination issues with post void dripping.  Talk to his primary care office and was given some local ointment has helped.  Is also on Avodart.    This January, he had a stress MRI attempted.  He was unable to tolerate it.  However function could not be obtained and revealed EF of around 52%.  IV contrast was not administrated.     He has statin intolerant and has been on Repatha which he tolerates well.  His recent lipid profile numbers are not available as it was done by his primary care physician.      He has chronic renal insufficiency with creatinine stable at 1.31.  He follows with internal medicine consultants and nephrology      Physical exam  See below     Impression/Plan:       1. Coronary artery disease with anterior wall MI status post LAD stenting with the balloon angioplasty to the diagonal branch.   Mild ischemic cardiomyopathy   ejection fraction is stable at 45-50% on echo.  Last evaluation with cardiac MRI revealed EF of 52%.  He has no signs or symptoms of congestive heart failure.2.  Ischemic cardiomyopathy with no heart failure symptoms currently.     2. 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 He uses brand name warfarin.      3. Dyslipidemia with statin intolerance. He is tolerating Praluent with HDL of 60 and LDL of 77.  -recheck lipids in January 2019.  Recent lipid numbers not  available.     4.  Hypertension  - controlled     5.  Chronic kidney disease  -Overall stable baseline 1.3 to 1.5.  - follow-up with Dr. Oneill annually     6.  Likely benign prostate hypertrophy        He will return to see us in follow-up in 6 months with my nurse practitioner and an echocardiogram prior to visit with her.  Have asked him to call if there is worsening chest pain or shortness of breath    Sincerely,    Satya Newton MD      Orders Placed This Encounter   Procedures     Follow-Up with Cardiac Advanced Practice Provider     Echocardiogram Complete       No orders of the defined types were placed in this encounter.      There are no discontinued medications.      Encounter Diagnoses   Name Primary?     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      Benign prostatic hyperplasia with post-void dribbling      Statin intolerance      Cerebrovascular accident (CVA) due to embolism of middle cerebral artery, unspecified blood vessel laterality (H)        CURRENT MEDICATIONS:  Current Outpatient Medications   Medication Sig Dispense Refill     aspirin 81 MG tablet Take 81 mg by mouth daily       Cholecalciferol (VITAMIN D3) 2000 UNITS CAPS Take by mouth daily       dutasteride (AVODART) 0.5 MG capsule Take 1 capsule (0.5 mg) by mouth daily 30 capsule 11     evolocumab (REPATHA) 140 MG/ML prefilled autoinjector Inject 1 mL (140 mg) Subcutaneous every 14 days 2 mL 11     Fish Oil-Cholecalciferol (FISH OIL + D3) 8027-4848 MG-UNIT CAPS Take 2 tablets by mouth daily       lisinopril (ZESTRIL) 2.5 MG tablet One tablet twice per day 180 tablet 0     melatonin 5 MG tablet Take 5 mg by mouth nightly as needed for sleep       metoprolol  succinate ER (TOPROL-XL) 25 MG 24 hr tablet Take 0.5 tablets (12.5 mg) by mouth daily 45 tablet 1     nitroGLYcerin (NITROSTAT) 0.4 MG sublingual tablet Place 1 tablet (0.4 mg) under the tongue every 5 minutes as needed for chest pain Take as directed 25 tablet 3     warfarin ANTICOAGULANT (COUMADIN) 5 MG tablet Take 1/2 tab on Mondays, Wednesdays and Fridays and 1 tab all other days or as directed by INR clinic, Coumadin Dispense as written 90 tablet 1       ALLERGIES     Allergies   Allergen Reactions     Flomax [Tamsulosin]      Weakness and dizzyness     Aldactone [Spironolactone]      High potassium and worsening creat when on lisinopril and spironalactone     Carvedilol      dizziness     Colesevelam      Epigastric pain     Ezetimibe      Lisinopril Itching     Hyperkalemia and inc. Creat. At 20 mg daily, itching , skin red when dose goes above 2.5 mg a day--elevated creat      Pravastatin      Abdominal pain     Rosuvastatin      Simvastatin        PAST MEDICAL HISTORY:  Past Medical History:   Diagnosis Date     BPH (benign prostatic hyperplasia)      Cardiomyopathy, ischemic     EF 49% by cardiac MRI 1/15/2014     Coronary artery disease 1/24/06    Cypher CANDIDA to LAD     Gastro-oesophageal reflux disease      Hyperlipidaemia      Hypertension      Left ventricular thrombus      Myocardial infarction (H) 1/2006    Anterior     TIA (transient ischaemic attack)        PAST SURGICAL HISTORY:  Past Surgical History:   Procedure Laterality Date     CORONARY ANGIOGRAPHY ADULT ORDER  1/24/06    Cypher CANDIDA to LAD     HEART CATH, ANGIOPLASTY  1/2006    Cypher CANDIDA to LAD     TONSILLECTOMY & ADENOIDECTOMY         FAMILY HISTORY:  Family History   Problem Relation Age of Onset     Heart Disease Mother         heart failure     Heart Disease Brother        SOCIAL HISTORY:  Social History     Socioeconomic History     Marital status:      Spouse name: None     Number of children: None     Years of education: None      Highest education level: None   Occupational History     None   Social Needs     Financial resource strain: None     Food insecurity     Worry: None     Inability: None     Transportation needs     Medical: None     Non-medical: None   Tobacco Use     Smoking status: Never Smoker     Smokeless tobacco: Never Used   Substance and Sexual Activity     Alcohol use: No     Drug use: Never     Sexual activity: None   Lifestyle     Physical activity     Days per week: None     Minutes per session: None     Stress: None   Relationships     Social connections     Talks on phone: None     Gets together: None     Attends Mandaen service: None     Active member of club or organization: None     Attends meetings of clubs or organizations: None     Relationship status: None     Intimate partner violence     Fear of current or ex partner: None     Emotionally abused: None     Physically abused: None     Forced sexual activity: None   Other Topics Concern     Parent/sibling w/ CABG, MI or angioplasty before 65F 55M? No      Service Not Asked     Blood Transfusions Not Asked     Caffeine Concern No     Occupational Exposure Not Asked     Hobby Hazards Not Asked     Sleep Concern No     Stress Concern No     Weight Concern Yes     Comment: weight loss     Special Diet No     Back Care Not Asked     Exercise Yes     Comment: bicycle 10 min, walking, 3 days week     Bike Helmet Not Asked     Seat Belt Yes     Self-Exams Not Asked   Social History Narrative     None       Review of Systems:  Skin:  Negative       Eyes:  Negative      ENT:  Negative      Respiratory:  Negative       Cardiovascular:  Negative;palpitations;chest pain;lightheadedness;dizziness;syncope or near-syncope;cyanosis;fatigue;edema      Gastroenterology: Negative      Genitourinary:  Negative      Musculoskeletal:  Negative      Neurologic:  Negative      Psychiatric:  Negative      Heme/Lymph/Imm:  Negative      Endocrine:  Negative        Physical  "Exam:  Vitals: /52   Pulse 80   Ht 1.575 m (5' 2\")   Wt 52.2 kg (115 lb)   BMI 21.03 kg/m      Constitutional:  well developed        Skin:  warm and dry to the touch          Head:  not assessed this visit        Eyes:  sclera white        Lymph:      ENT:  no pallor or cyanosis        Neck:  carotid pulses are full and equal bilaterally        Respiratory:  clear to auscultation         Cardiac: normal S1 and S2   S4 no presence of murmur          not assessed this visit                                        GI:  not assessed this visit        Extremities and Muscular Skeletal:  no edema         ecchymosis over left flank following fall at home    Neurological:  no gross motor deficits        Psych:  Alert and Oriented x 3        CC  Francisco Doshi MD  [address]  [place] [address]              "

## 2020-11-10 DIAGNOSIS — G45.9 TRANSIENT CEREBRAL ISCHEMIA: ICD-10-CM

## 2020-11-10 DIAGNOSIS — I23.6 LV (LEFT VENTRICULAR) MURAL THROMBUS FOLLOWING MI (H): ICD-10-CM

## 2020-11-10 RX ORDER — WARFARIN SODIUM 5 MG/1
TABLET ORAL
Qty: 90 TABLET | Refills: 1 | Status: SHIPPED | OUTPATIENT
Start: 2020-11-10 | End: 2020-11-12

## 2020-11-10 NOTE — TELEPHONE ENCOUNTER
Pt requesting refill of Coumadin. Reviewed with pt that brand name Coumadin may not be available at his pharmacy but will send a refill request for Coumadin and if it isn't available then the Rx may be changed to Warfarin or Jantoven. Pt verbalized understanding. Joana

## 2020-11-12 RX ORDER — WARFARIN SODIUM 5 MG/1
TABLET ORAL
Qty: 90 TABLET | Refills: 1 | Status: SHIPPED | OUTPATIENT
Start: 2020-11-12 | End: 2021-07-20

## 2020-11-12 NOTE — TELEPHONE ENCOUNTER
Received notice that brand name Coumadin is not available from the pharmacy. Rx for Warfarin escripted to [employer] and pt notified that Coumadin is not available so Warfarin will be dispensed. He has about 15 Coumadin tablets to take then will switch to Warfarin. Reviewed with him that he will need an INR checked about 2 weeks after transitioning to Warfarin. Joana

## 2020-11-18 ENCOUNTER — ANTICOAGULATION THERAPY VISIT (OUTPATIENT)
Dept: CARDIOLOGY | Facility: CLINIC | Age: 85
End: 2020-11-18
Payer: MEDICARE

## 2020-11-18 DIAGNOSIS — Z79.01 LONG TERM CURRENT USE OF ANTICOAGULANTS WITH INR GOAL OF 2.0-3.0: ICD-10-CM

## 2020-11-18 DIAGNOSIS — G45.9 TRANSIENT CEREBRAL ISCHEMIA: ICD-10-CM

## 2020-11-18 DIAGNOSIS — I51.3 LEFT VENTRICULAR THROMBUS: ICD-10-CM

## 2020-11-18 DIAGNOSIS — G45.9 TRANSIENT CEREBRAL ISCHEMIA, UNSPECIFIED TYPE: ICD-10-CM

## 2020-11-18 LAB
CAPILLARY BLOOD COLLECTION: NORMAL
INR PPP: 3.9 (ref 0.86–1.14)

## 2020-11-18 PROCEDURE — 36416 COLLJ CAPILLARY BLOOD SPEC: CPT | Performed by: INTERNAL MEDICINE

## 2020-11-18 PROCEDURE — 99207 PR NO CHARGE NURSE ONLY: CPT

## 2020-11-18 PROCEDURE — 85610 PROTHROMBIN TIME: CPT | Performed by: INTERNAL MEDICINE

## 2020-11-18 NOTE — PROGRESS NOTES
ANTICOAGULATION FOLLOW-UP CLINIC VISIT    Patient Name:  Sawyer Renteria  Date:  11/18/2020  Contact Type:  Telephone    SUBJECTIVE:  Patient Findings     Positives:  Change in diet/appetite (ate less greens and drank more suresh juice)        Clinical Outcomes     Negatives:  Major bleeding event, Thromboembolic event, Anticoagulation-related hospital admission, Anticoagulation-related ED visit, Anticoagulation-related fatality           OBJECTIVE    Recent labs: (last 7 days)     11/18/20  1530   INR 3.90*       ASSESSMENT / PLAN  INR assessment SUPRA    Recheck INR In: 2 WEEKS    INR Location Outside lab      Anticoagulation Summary  As of 11/18/2020    INR goal:  2.0-3.0   TTR:  61.0 % (1 y)   INR used for dosing:  3.90 (11/18/2020)   Warfarin maintenance plan:  2.5 mg (5 mg x 0.5) every Mon, Wed, Fri; 5 mg (5 mg x 1) all other days   Full warfarin instructions:  11/18: Hold; Otherwise 2.5 mg every Mon, Wed, Fri; 5 mg all other days   Weekly warfarin total:  27.5 mg   Plan last modified:  Donna Colby RN (6/21/2019)   Next INR check:  12/2/2020   Target end date:  Indefinite    Indications    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]  Transient cerebral ischemia [G45.9]  Long term current use of anticoagulants with INR goal of 2.0-3.0 [Z79.01]  Transient cerebral ischemia  unspecified type [G45.9]             Anticoagulation Episode Summary     INR check location:      Preferred lab:      Send INR reminders to:  [facility] HEART INR NURSE    Comments:        Anticoagulation Care Providers     Provider Role Specialty Phone number    Satya Newton MD Referring Cardiovascular Disease [phone]            See the Encounter Report to view Anticoagulation Flowsheet and Dosing Calendar (Go to Encounters tab in chart review, and find the Anticoagulation Therapy Visit)    INR 3.90 No change in meds. He admits to eating less greens and drinking more suresh juice recently. Denies abnormal bleeding or bruising. Will hold  Coumadin today then resume usual dose of 2.5 mg MWF and 5 mg all other days. He will eat spinach today then resume usual amount of greens and drink less suresh juice (no more than 3/wk). Recheck in 2 weeks. He will be switching to Warfarin in December (brand name Coumadin isn't available from the pharmacy).  Dosage adjustment made based on physician directed care plan.    Donna Colby RN

## 2020-11-27 DIAGNOSIS — Z79.01 LONG TERM CURRENT USE OF ANTICOAGULANTS WITH INR GOAL OF 2.0-3.0: ICD-10-CM

## 2020-11-27 LAB
CAPILLARY BLOOD COLLECTION: NORMAL
INR PPP: 2.1 (ref 0.86–1.14)

## 2020-11-27 PROCEDURE — 85610 PROTHROMBIN TIME: CPT | Performed by: INTERNAL MEDICINE

## 2020-11-27 PROCEDURE — 36416 COLLJ CAPILLARY BLOOD SPEC: CPT | Performed by: INTERNAL MEDICINE

## 2020-11-30 ENCOUNTER — ANTICOAGULATION THERAPY VISIT (OUTPATIENT)
Dept: CARDIOLOGY | Facility: CLINIC | Age: 85
End: 2020-11-30
Payer: MEDICARE

## 2020-11-30 DIAGNOSIS — G45.9 TRANSIENT CEREBRAL ISCHEMIA, UNSPECIFIED TYPE: ICD-10-CM

## 2020-11-30 DIAGNOSIS — I51.3 LEFT VENTRICULAR THROMBUS: ICD-10-CM

## 2020-11-30 DIAGNOSIS — G45.9 TRANSIENT CEREBRAL ISCHEMIA: ICD-10-CM

## 2020-11-30 DIAGNOSIS — Z79.01 LONG TERM CURRENT USE OF ANTICOAGULANTS WITH INR GOAL OF 2.0-3.0: ICD-10-CM

## 2020-11-30 PROCEDURE — 99207 PR NO CHARGE NURSE ONLY: CPT

## 2020-11-30 NOTE — PROGRESS NOTES
ANTICOAGULATION FOLLOW-UP CLINIC VISIT    Patient Name:  Sawyer Renteria  Date:  2020  Contact Type:  Telephone    SUBJECTIVE:  Patient Findings     Positives:  Change in medications (Has 2wks left of coumadin before switching to warfarin), Change in diet/appetite (Pt reduced suresh juice to 2-3x/wk, no greens since last INR check, usually only 1x/wk)        Clinical Outcomes     Negatives:  Major bleeding event, Thromboembolic event, Anticoagulation-related hospital admission, Anticoagulation-related ED visit, Anticoagulation-related fatality           OBJECTIVE    Recent labs: (last 7 days)     20  1323   INR 2.10*       ASSESSMENT / PLAN  INR assessment THER    Recheck INR In: 3 WEEKS    INR Location Outside lab      Anticoagulation Summary  As of 2020    INR goal:  2.0-3.0   TTR:  60.8 % (1 y)   INR used for dosin.10 (2020)   Warfarin maintenance plan:  2.5 mg (5 mg x 0.5) every Mon, Wed, Fri; 5 mg (5 mg x 1) all other days   Full warfarin instructions:  2.5 mg every Mon, Wed, Fri; 5 mg all other days   Weekly warfarin total:  27.5 mg   No change documented:  Kimberly Noyola RN   Plan last modified:  Donna Colby, RN (2019)   Next INR check:  2020   Target end date:  Indefinite    Indications    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]  Transient cerebral ischemia [G45.9]  Long term current use of anticoagulants with INR goal of 2.0-3.0 [Z79.01]  Transient cerebral ischemia  unspecified type [G45.9]             Anticoagulation Episode Summary     INR check location:      Preferred lab:      Send INR reminders to:  [facility] HEART INR NURSE    Comments:        Anticoagulation Care Providers     Provider Role Specialty Phone number    Satya Newton MD Referring Cardiovascular Disease [phone]            See the Encounter Report to view Anticoagulation Flowsheet and Dosing Calendar (Go to Encounters tab in chart review, and find the Anticoagulation Therapy Visit)    INR  2.1 on 11/27/20 (clinic was closed that day). Spoke to patient, no abnormal bleeding/bruising. No changes to meds at this time. He thinks he has enough coumadin for about 2-3wks before switching to warfarin. He is having the suresh juice 2-3x/wk but has not had any greens since his last INR check, usually has them 1x/wk. Will continue on current coumadin maintenance dosing of 2.5mg MWF and 5mg all other days.  Recheck INR in 3wks. Will need to recheck INR about 2wks after transitioning to warfarin to insure INR is staying in range.     Kimberly Noyola RN

## 2020-12-11 ENCOUNTER — ANTICOAGULATION THERAPY VISIT (OUTPATIENT)
Dept: CARDIOLOGY | Facility: CLINIC | Age: 85
End: 2020-12-11
Payer: MEDICARE

## 2020-12-11 DIAGNOSIS — G45.9 TRANSIENT CEREBRAL ISCHEMIA, UNSPECIFIED TYPE: ICD-10-CM

## 2020-12-11 DIAGNOSIS — Z79.01 LONG TERM CURRENT USE OF ANTICOAGULANTS WITH INR GOAL OF 2.0-3.0: ICD-10-CM

## 2020-12-11 DIAGNOSIS — I51.3 LEFT VENTRICULAR THROMBUS: ICD-10-CM

## 2020-12-11 DIAGNOSIS — G45.9 TRANSIENT CEREBRAL ISCHEMIA: ICD-10-CM

## 2020-12-11 LAB — INR PPP: 1.9 (ref 0.86–1.14)

## 2020-12-11 PROCEDURE — 99207 PR NO CHARGE NURSE ONLY: CPT

## 2020-12-11 PROCEDURE — 36416 COLLJ CAPILLARY BLOOD SPEC: CPT | Performed by: INTERNAL MEDICINE

## 2020-12-11 PROCEDURE — 85610 PROTHROMBIN TIME: CPT | Performed by: INTERNAL MEDICINE

## 2020-12-11 NOTE — PROGRESS NOTES
ANTICOAGULATION FOLLOW-UP CLINIC VISIT    Patient Name:  Sawyer Renteria  Date:  2020  Contact Type:  Telephone    SUBJECTIVE:         OBJECTIVE    Recent labs: (last 7 days)     20  1409   INR 1.90*       ASSESSMENT / PLAN  INR assessment SUB    Recheck INR In: 2 WEEKS    INR Location Outside lab      Anticoagulation Summary  As of 2020    INR goal:  2.0-3.0   TTR:  59.2 % (1 y)   INR used for dosin.90 (2020)   Warfarin maintenance plan:  2.5 mg (5 mg x 0.5) every Mon, Wed, Fri; 5 mg (5 mg x 1) all other days   Full warfarin instructions:  2.5 mg every Mon, Wed, Fri; 5 mg all other days   Weekly warfarin total:  27.5 mg   Plan last modified:  Donna Colby RN (2019)   Next INR check:     Target end date:  Indefinite    Indications    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]  Transient cerebral ischemia [G45.9]  Long term current use of anticoagulants with INR goal of 2.0-3.0 [Z79.01]  Transient cerebral ischemia  unspecified type [G45.9]             Anticoagulation Episode Summary     INR check location:      Preferred lab:      Send INR reminders to:  [facility] HEART INR NURSE    Comments:        Anticoagulation Care Providers     Provider Role Specialty Phone number    Satya Newton MD Referring Cardiovascular Disease [phone]            See the Encounter Report to view Anticoagulation Flowsheet and Dosing Calendar (Go to Encounters tab in chart review, and find the Anticoagulation Therapy Visit)    INR 1.90 Pt went in for INR appt 1 week early. He hasn't drank any suresh juice this week (had been drinking it 2-3x/wk). He didn't eat any greens this week either. He switched from Coumadin to Warfarin last night. No change in other meds. He will drink a small bottle of suresh juice today so will continue current dosing of 2.6 mg MWF and 5 mg all other days with recheck in 1 week as previously scheduled. If INR still low after resuming drinking suresh juice, then will consider  increasing maintenance dose.  Donna Colby RN

## 2020-12-29 ENCOUNTER — TELEPHONE (OUTPATIENT)
Dept: CARDIOLOGY | Facility: CLINIC | Age: 85
End: 2020-12-29

## 2020-12-29 DIAGNOSIS — I25.5 CARDIOMYOPATHY, ISCHEMIC: ICD-10-CM

## 2020-12-29 RX ORDER — METOPROLOL SUCCINATE 25 MG/1
12.5 TABLET, EXTENDED RELEASE ORAL DAILY
Qty: 45 TABLET | Refills: 1 | Status: SHIPPED | OUTPATIENT
Start: 2020-12-29 | End: 2022-03-07

## 2020-12-30 ENCOUNTER — ANTICOAGULATION THERAPY VISIT (OUTPATIENT)
Dept: CARDIOLOGY | Facility: CLINIC | Age: 85
End: 2020-12-30
Payer: MEDICARE

## 2020-12-30 DIAGNOSIS — Z79.01 LONG TERM CURRENT USE OF ANTICOAGULANTS WITH INR GOAL OF 2.0-3.0: ICD-10-CM

## 2020-12-30 DIAGNOSIS — G45.9 TRANSIENT CEREBRAL ISCHEMIA: ICD-10-CM

## 2020-12-30 DIAGNOSIS — I51.3 LEFT VENTRICULAR THROMBUS: ICD-10-CM

## 2020-12-30 DIAGNOSIS — G45.9 TRANSIENT CEREBRAL ISCHEMIA, UNSPECIFIED TYPE: ICD-10-CM

## 2020-12-30 LAB — INR PPP: 2.4 (ref 0.86–1.14)

## 2020-12-30 PROCEDURE — 99207 PR NO CHARGE NURSE ONLY: CPT | Performed by: INTERNAL MEDICINE

## 2020-12-30 PROCEDURE — 85610 PROTHROMBIN TIME: CPT | Performed by: INTERNAL MEDICINE

## 2020-12-30 PROCEDURE — 36416 COLLJ CAPILLARY BLOOD SPEC: CPT | Performed by: INTERNAL MEDICINE

## 2020-12-30 NOTE — PROGRESS NOTES
ANTICOAGULATION FOLLOW-UP CLINIC VISIT    Patient Name:  Sawyer Renteria  Date:  2020  Contact Type:  Telephone    SUBJECTIVE:  Patient Findings     Positives:  Change in medications (Changed from coumadin to warfarin 3wks ago)        Clinical Outcomes     Negatives:  Major bleeding event, Thromboembolic event, Anticoagulation-related hospital admission, Anticoagulation-related ED visit, Anticoagulation-related fatality           OBJECTIVE    Recent labs: (last 7 days)     20  1300   INR 2.40*       ASSESSMENT / PLAN  INR assessment THER    Recheck INR In: 3 WEEKS    INR Location Outside lab      Anticoagulation Summary  As of 2020    INR goal:  2.0-3.0   TTR:  60.4 % (1 y)   INR used for dosin.40 (2020)   Warfarin maintenance plan:  2.5 mg (5 mg x 0.5) every Mon, Wed, Fri; 5 mg (5 mg x 1) all other days   Full warfarin instructions:  2.5 mg every Mon, Wed, Fri; 5 mg all other days   Weekly warfarin total:  27.5 mg   No change documented:  Kimberly Noyola RN   Plan last modified:  Donna Colby RN (2019)   Next INR check:  2021   Target end date:  Indefinite    Indications    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]  Transient cerebral ischemia [G45.9]  Long term current use of anticoagulants with INR goal of 2.0-3.0 [Z79.01]  Transient cerebral ischemia  unspecified type [G45.9]             Anticoagulation Episode Summary     INR check location:      Preferred lab:      Send INR reminders to:  [facility] HEART INR NURSE    Comments:        Anticoagulation Care Providers     Provider Role Specialty Phone number    Satya Newton MD Referring Cardiovascular Disease [phone]            See the Encounter Report to view Anticoagulation Flowsheet and Dosing Calendar (Go to Encounters tab in chart review, and find the Anticoagulation Therapy Visit)    INR 2.4 today at outside clinic. Spoke to patient, no abnormal bleeding/bruising. No changes to diet. He switched from coumadin to  warfarin about 3wks ago otherwise no med changes. Will continue on current warfarin maintenance dosing of 2.5mg MWF and 5mg all other days. Recheck in 3wks.     Pt asks about his repatha authorization/renewal for 2021, routed to [facility] for follow up.     Kimberly Noyola RN

## 2021-01-04 ENCOUNTER — CARE COORDINATION (OUTPATIENT)
Dept: CARDIOLOGY | Facility: CLINIC | Age: 86
End: 2021-01-04

## 2021-01-04 DIAGNOSIS — E78.2 MIXED HYPERLIPIDEMIA: ICD-10-CM

## 2021-01-04 NOTE — PROGRESS NOTES
"Received message from INR nurse that states, \"This patient was wondering about his repatha approval/renewal for 2021 since he hasn't heard anything yet.\"    Routed to [facility] Liaison, Omar, to reach out to pt to discuss PASS renewal for 2021.    OKSANA Dunne 3:50 PM 1/4/2021    "

## 2021-01-06 NOTE — PROGRESS NOTES
Called and spoke with pt and reviewed above information with him and recommended he call [facility] mail order pharmacy directly for more information on his order of Repatha. Also provided specialty pharmacy liaison's number for further information regarding Repatha coverage if needed.     OKSANA Dunne 2:13 PM 1/6/2021

## 2021-01-12 ENCOUNTER — TELEPHONE (OUTPATIENT)
Dept: CARDIOLOGY | Facility: CLINIC | Age: 86
End: 2021-01-12

## 2021-01-12 NOTE — TELEPHONE ENCOUNTER
Hello, I spoke with AJ last night because his [employer] termed at the end of last year, and I was unable to find any active part d coverage-the medicare portal is also not showing any active part d.  Pt stated he also has [employer], but I was unable to pull up pharmacy coverage on [employer] portal and [employer] shows no pharmacy coverage.  He is due for his Repatha and I was wondering if someone could assist him in enrolling in free drug? I tried calling today, but was unable to leave a message.     Johana Deng PFA

## 2021-01-13 NOTE — TELEPHONE ENCOUNTER
I sent the patient a [employer] message offering my assistance with enrolling in [employer] [facility] free drug program.     Thank you,    Kimberley Peters [facility]  Special[facility] Pharm[facility] [place] [address]  Ph: [phone] Fax: [phone]  [email or web address]

## 2021-01-14 ENCOUNTER — HEALTH MAINTENANCE LETTER (OUTPATIENT)
Age: 86
End: 2021-01-14

## 2021-01-20 ENCOUNTER — ANTICOAGULATION THERAPY VISIT (OUTPATIENT)
Dept: CARDIOLOGY | Facility: CLINIC | Age: 86
End: 2021-01-20
Payer: MEDICARE

## 2021-01-20 DIAGNOSIS — Z79.01 LONG TERM CURRENT USE OF ANTICOAGULANTS WITH INR GOAL OF 2.0-3.0: ICD-10-CM

## 2021-01-20 DIAGNOSIS — G45.9 TRANSIENT CEREBRAL ISCHEMIA: ICD-10-CM

## 2021-01-20 DIAGNOSIS — G45.9 TRANSIENT CEREBRAL ISCHEMIA, UNSPECIFIED TYPE: ICD-10-CM

## 2021-01-20 DIAGNOSIS — I51.3 LEFT VENTRICULAR THROMBUS: ICD-10-CM

## 2021-01-20 LAB
CAPILLARY BLOOD COLLECTION: NORMAL
INR PPP: 2.6 (ref 0.86–1.14)

## 2021-01-20 PROCEDURE — 85610 PROTHROMBIN TIME: CPT | Performed by: INTERNAL MEDICINE

## 2021-01-20 PROCEDURE — 99207 PR NO CHARGE NURSE ONLY: CPT

## 2021-01-20 PROCEDURE — 36416 COLLJ CAPILLARY BLOOD SPEC: CPT | Performed by: INTERNAL MEDICINE

## 2021-01-20 NOTE — PROGRESS NOTES
ANTICOAGULATION FOLLOW-UP CLINIC VISIT    Patient Name:  Sawyer Renteria  Date:  2021  Contact Type:  Telephone    SUBJECTIVE:  Patient Findings         Clinical Outcomes     Negatives:  Major bleeding event, Thromboembolic event, Anticoagulation-related hospital admission, Anticoagulation-related ED visit, Anticoagulation-related fatality           OBJECTIVE    Recent labs: (last 7 days)     21  1253   INR 2.60*       ASSESSMENT / PLAN  INR assessment THER    Recheck INR In: 4 WEEKS    INR Location Outside lab      Anticoagulation Summary  As of 2021    INR goal:  2.0-3.0   TTR:  60.4 % (1 y)   INR used for dosin.60 (2021)   Warfarin maintenance plan:  2.5 mg (5 mg x 0.5) every Mon, Wed, Fri; 5 mg (5 mg x 1) all other days   Full warfarin instructions:  2.5 mg every Mon, Wed, Fri; 5 mg all other days   Weekly warfarin total:  27.5 mg   No change documented:  Donna Colby RN   Plan last modified:  Donna Colby RN (2019)   Next INR check:  2021   Target end date:  Indefinite    Indications    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]  Transient cerebral ischemia [G45.9]  Long term current use of anticoagulants with INR goal of 2.0-3.0 [Z79.01]  Transient cerebral ischemia  unspecified type [G45.9]             Anticoagulation Episode Summary     INR check location:      Preferred lab:      Send INR reminders to:  [facility] HEART INR NURSE    Comments:        Anticoagulation Care Providers     Provider Role Specialty Phone number    Satya Newton MD Referring Cardiovascular Disease [phone]            See the Encounter Report to view Anticoagulation Flowsheet and Dosing Calendar (Go to Encounters tab in chart review, and find the Anticoagulation Therapy Visit)    INR 2.60 He switched from brand name Coumadin to generic Warfarin about 6 weeks ago (brand name Coumadin isn't available at the pharmacy now). No change in other meds or diet. Denies abnormal bleeding issues.  Will  continue current dosing of 2.5 mg MWF and 5 mg all other days with recheck in 3-4 weeks. He has not spoken with the pharmacy liaison regarding repatha coverage yet but he states that he will call her today.  Donna Colby RN

## 2021-02-17 ENCOUNTER — TELEPHONE (OUTPATIENT)
Dept: CARDIOLOGY | Facility: CLINIC | Age: 86
End: 2021-02-17

## 2021-02-19 ENCOUNTER — ANTICOAGULATION THERAPY VISIT (OUTPATIENT)
Dept: CARDIOLOGY | Facility: CLINIC | Age: 86
End: 2021-02-19
Payer: MEDICARE

## 2021-02-19 DIAGNOSIS — Z79.01 LONG TERM CURRENT USE OF ANTICOAGULANTS WITH INR GOAL OF 2.0-3.0: ICD-10-CM

## 2021-02-19 DIAGNOSIS — I51.3 LEFT VENTRICULAR THROMBUS: ICD-10-CM

## 2021-02-19 DIAGNOSIS — G45.9 TRANSIENT CEREBRAL ISCHEMIA, UNSPECIFIED TYPE: ICD-10-CM

## 2021-02-19 LAB
CAPILLARY BLOOD COLLECTION: NORMAL
INR PPP: 1.7 (ref 0.86–1.14)

## 2021-02-19 PROCEDURE — 36416 COLLJ CAPILLARY BLOOD SPEC: CPT | Performed by: INTERNAL MEDICINE

## 2021-02-19 PROCEDURE — 85610 PROTHROMBIN TIME: CPT | Performed by: INTERNAL MEDICINE

## 2021-02-19 PROCEDURE — 99207 PR NO CHARGE NURSE ONLY: CPT | Performed by: INTERNAL MEDICINE

## 2021-02-19 NOTE — PROGRESS NOTES
ANTICOAGULATION FOLLOW-UP CLINIC VISIT    Patient Name:  Sawyer Renteria  Date:  2021  Contact Type:  Telephone    SUBJECTIVE:  Patient Findings         Clinical Outcomes     Negatives:  Major bleeding event, Thromboembolic event, Anticoagulation-related hospital admission, Anticoagulation-related ED visit, Anticoagulation-related fatality           OBJECTIVE    Recent labs: (last 7 days)     21  1428   INR 1.70*       ASSESSMENT / PLAN  INR assessment SUB    Recheck INR In: 10 DAYS    INR Location Outside lab      Anticoagulation Summary  As of 2021    INR goal:  2.0-3.0   TTR:  61.2 % (1 y)   INR used for dosin.70 (2021)   Warfarin maintenance plan:  2.5 mg (5 mg x 0.5) every Mon, Wed, Fri; 5 mg (5 mg x 1) all other days   Full warfarin instructions:  : 5 mg; Otherwise 2.5 mg every Mon, Wed, Fri; 5 mg all other days   Weekly warfarin total:  27.5 mg   Plan last modified:  Donna Colby RN (2019)   Next INR check:  3/2/2021   Target end date:  Indefinite    Indications    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]  Transient cerebral ischemia [G45.9]  Long term current use of anticoagulants with INR goal of 2.0-3.0 [Z79.01]  Transient cerebral ischemia  unspecified type [G45.9]             Anticoagulation Episode Summary     INR check location:      Preferred lab:      Send INR reminders to:  [facility] HEART INR NURSE    Comments:        Anticoagulation Care Providers     Provider Role Specialty Phone number    Satya Newton MD Referring Cardiovascular Disease [phone]            See the Encounter Report to view Anticoagulation Flowsheet and Dosing Calendar (Go to Encounters tab in chart review, and find the Anticoagulation Therapy Visit)    INR 1.7.  Home phone line was out of service and left message on listed cell phone number.  Left instructions for patient to increase dosing today from 2.5mg to 5mg x1 then back to normal and recheck in about 10 days.  Asked that patient call  the INR clinic to discuss further.  Turner Antonio RN        2/22/21: Left another message for patient asking for an update and to see if he got the message from Friday 2/19 regarding his dosing and the recheck date. Vijay GANDHI     2/23/21: Left another message for patient.  Turner GANDHI    2/24/21: Left another message for patient. Vijay GANDHI     3/1: Spoke to the patient.  He has a new home phone number: [phone].  Sent message to have help updating his phone number in the chart.  Scheduled patient for INR on Thursday so we could have a current INR then will call patient to discuss dosing.  Turner GANDHI

## 2021-03-04 ENCOUNTER — TELEPHONE (OUTPATIENT)
Dept: CARDIOLOGY | Facility: CLINIC | Age: 86
End: 2021-03-04
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
## 2021-03-05 ENCOUNTER — ANTICOAGULATION THERAPY VISIT (OUTPATIENT)
Dept: CARDIOLOGY | Facility: CLINIC | Age: 86
End: 2021-03-05
Payer: MEDICARE

## 2021-03-05 DIAGNOSIS — G45.9 TRANSIENT CEREBRAL ISCHEMIA: ICD-10-CM

## 2021-03-05 DIAGNOSIS — I51.3 LEFT VENTRICULAR THROMBUS: ICD-10-CM

## 2021-03-05 DIAGNOSIS — Z79.01 LONG TERM CURRENT USE OF ANTICOAGULANTS WITH INR GOAL OF 2.0-3.0: ICD-10-CM

## 2021-03-05 DIAGNOSIS — G45.9 TRANSIENT CEREBRAL ISCHEMIA, UNSPECIFIED TYPE: ICD-10-CM

## 2021-03-05 LAB — INR PPP: 4 (ref 0.86–1.14)

## 2021-03-05 PROCEDURE — 85610 PROTHROMBIN TIME: CPT | Performed by: INTERNAL MEDICINE

## 2021-03-05 PROCEDURE — 36416 COLLJ CAPILLARY BLOOD SPEC: CPT | Performed by: INTERNAL MEDICINE

## 2021-03-05 PROCEDURE — 99207 PR NO CHARGE NURSE ONLY: CPT

## 2021-03-05 NOTE — PROGRESS NOTES
ANTICOAGULATION FOLLOW-UP CLINIC VISIT    Patient Name:  Sawyer Renteria  Date:  3/5/2021  Contact Type:  Telephone    SUBJECTIVE:         OBJECTIVE    Recent labs: (last 7 days)     21  1339   INR 4.00*       ASSESSMENT / PLAN  No question data found.  Anticoagulation Summary  As of 3/5/2021    INR goal:  2.0-3.0   TTR:  61.4 % (1 y)   INR used for dosin.00 (3/5/2021)   Warfarin maintenance plan:  2.5 mg (5 mg x 0.5) every Mon, Wed, Fri; 5 mg (5 mg x 1) all other days   Full warfarin instructions:  3/5: Hold; 3/6: Hold; Otherwise 2.5 mg every Mon, Wed, Fri; 5 mg all other days   Weekly warfarin total:  27.5 mg   Plan last modified:  Donna Colby RN (2019)   Next INR check:  3/15/2021   Target end date:  Indefinite    Indications    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]  Transient cerebral ischemia [G45.9]  Long term current use of anticoagulants with INR goal of 2.0-3.0 [Z79.01]  Transient cerebral ischemia  unspecified type [G45.9]             Anticoagulation Episode Summary     INR check location:      Preferred lab:      Send INR reminders to:  [facility] HEART INR NURSE    Comments:   new home phone number: [phone]       Anticoagulation Care Providers     Provider Role Specialty Phone number    Satya Newton MD Referring Cardiovascular Disease [phone]            See the Encounter Report to view Anticoagulation Flowsheet and Dosing Calendar (Go to Encounters tab in chart review, and find the Anticoagulation Therapy Visit)    INR 4.0 today at outside clinic. Spoke to patient, no abnormal bleeding/bruising. He states he took a full tablet instead of a half tablet a few times since his last check, does not remember what days/when but he knows it was more than once. He did not really explain why he did this, other than he thought he probably added extra pills by mistake. Does not use a pill box, states he doesn't need to. No other med changes. He is not currently drinking suresh juice  or eating greens. Will have patient hold warfarin today and tomorrow and then resume normal maintenance dosing of 2.5mg MWF and 5mg all other days. Recheck in 10 days.     Pt also due for INR clinic referral renewal, routed to MD for signature.     Has the patient previously taken warfarin? yes  If yes, for what indication? Afib, LV thrombus    Does the patient have any of the following indications for a higher range of 2.5-3.5:    Mitral position mechanical valve? no    Carlos-Shiley, Ball and Cage or Monoleaflet valve (regardless of position) no    Other (if yes, please explain) no      Kimberly Noyola, RN

## 2021-03-10 ENCOUNTER — PATIENT OUTREACH (OUTPATIENT)
Dept: CARE COORDINATION | Facility: CLINIC | Age: 86
End: 2021-03-10

## 2021-03-10 DIAGNOSIS — Z65.8 PSYCHOSOCIAL DISTRESS: Primary | ICD-10-CM

## 2021-03-11 ENCOUNTER — PATIENT OUTREACH (OUTPATIENT)
Dept: CARE COORDINATION | Facility: CLINIC | Age: 86
End: 2021-03-11
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
## 2021-03-12 ENCOUNTER — PATIENT OUTREACH (OUTPATIENT)
Dept: CARE COORDINATION | Facility: CLINIC | Age: 86
End: 2021-03-12
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
## 2021-03-15 ENCOUNTER — ANTICOAGULATION THERAPY VISIT (OUTPATIENT)
Dept: CARDIOLOGY | Facility: CLINIC | Age: 86
End: 2021-03-15
Payer: MEDICARE

## 2021-03-15 DIAGNOSIS — Z79.01 LONG TERM CURRENT USE OF ANTICOAGULANTS WITH INR GOAL OF 2.0-3.0: ICD-10-CM

## 2021-03-15 DIAGNOSIS — I51.3 LEFT VENTRICULAR THROMBUS: ICD-10-CM

## 2021-03-15 DIAGNOSIS — G45.9 TRANSIENT CEREBRAL ISCHEMIA: ICD-10-CM

## 2021-03-15 DIAGNOSIS — G45.9 TRANSIENT CEREBRAL ISCHEMIA, UNSPECIFIED TYPE: ICD-10-CM

## 2021-03-15 LAB — INR PPP: 1.9 (ref 0.86–1.14)

## 2021-03-15 PROCEDURE — 36416 COLLJ CAPILLARY BLOOD SPEC: CPT | Performed by: INTERNAL MEDICINE

## 2021-03-15 PROCEDURE — 99207 PR NO CHARGE NURSE ONLY: CPT | Performed by: INTERNAL MEDICINE

## 2021-03-15 PROCEDURE — 85610 PROTHROMBIN TIME: CPT | Performed by: INTERNAL MEDICINE

## 2021-03-15 NOTE — PROGRESS NOTES
ANTICOAGULATION FOLLOW-UP CLINIC VISIT    Patient Name:  Sawyer Renteria  Date:  3/15/2021  Contact Type:  Telephone    SUBJECTIVE:  Patient Findings         Clinical Outcomes     Negatives:  Major bleeding event, Thromboembolic event, Anticoagulation-related hospital admission, Anticoagulation-related ED visit, Anticoagulation-related fatality           OBJECTIVE    Recent labs: (last 7 days)     03/15/21  1403   INR 1.90*       ASSESSMENT / PLAN  No question data found.  Anticoagulation Summary  As of 3/15/2021    INR goal:  2.0-3.0   TTR:  61.9 % (1 y)   INR used for dosin.90 (3/15/2021)   Warfarin maintenance plan:  2.5 mg (5 mg x 0.5) every Mon, Wed, Fri; 5 mg (5 mg x 1) all other days   Full warfarin instructions:  2.5 mg every Mon, Wed, Fri; 5 mg all other days   Weekly warfarin total:  27.5 mg   Plan last modified:  Donna Colby RN (3/15/2021)   Next INR check:  3/29/2021   Target end date:  Indefinite    Indications    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]  Transient cerebral ischemia [G45.9]  Long term current use of anticoagulants with INR goal of 2.0-3.0 [Z79.01]  Transient cerebral ischemia  unspecified type [G45.9]             Anticoagulation Episode Summary     INR check location:      Preferred lab:      Send INR reminders to:  [facility] HEART INR NURSE    Comments:   new home phone number: [phone]       Anticoagulation Care Providers     Provider Role Specialty Phone number    Satya Newton MD Referring Cardiovascular Disease [phone]            See the Encounter Report to view Anticoagulation Flowsheet and Dosing Calendar (Go to Encounters tab in chart review, and find the Anticoagulation Therapy Visit)    INR 1.90 INR slightly low today after holding 2 doses after INR 4.0 on 3/5 (he had taken extra doses prior to that INR). Pt not available by phone yet. Will call him later today to review results and dosing.  3:30pm Pt still not available on home phone and no voicemail set  up on home phone. Left voicemail message on pt's cell number asking pt to call back to ask if he has had any change in meds, diet or bleeding/clotting symptoms. Left detailed instructions for pt to avoid greens for 2 days then resume usual diet and continue current dosing of 2.5 mg MWF and 5 mg all other days with recheck in 2 weeks. Asked that he call back to review his status, dosing instructions and to schedule the next INR appt. Dosage adjustment made based on physician directed care plan.  4:06pm No change in meds or diet (no extra greens recently). Denies abnormal bleeding or bruising. He wants to drink some suresh juice today so will continue current dosing of 2.5 mg MWF and 5 mg all other days with recheck in 2 weeks. He will avoid greens today and tomorrow then resume usual diet.  Donna Colby RN

## 2021-03-16 ENCOUNTER — PATIENT OUTREACH (OUTPATIENT)
Dept: CARE COORDINATION | Facility: CLINIC | Age: 86
End: 2021-03-16

## 2021-03-16 NOTE — PROGRESS NOTES
Clinic Care Coordination Contact    Follow Up Progress Note      Assessment: PC to AJ. Introduced myself to him as SWCC from [facility]. Pt was pleasant and receptive to the outreach call. He was still in bed at the time of the call. Pt. Is in mourning for his wife who recently passed away on 3/5. Pt. Has a son who comes by to help when he can, pt. Said his son would come today. SWCC informed AJ that I would reach out to him again to check in on him and assess for any needs. At this time, he expressed no needs.   SWCC also left a vm for pt's son.    Goals addressed this encounter:   Goals Addressed    None          Intervention/Education provided during outreach:      Outreach Frequency: weekly    Plan:   SWCC will outreach next week and assess for needs.  Care Coordinator will follow up in 1 week.

## 2021-03-17 ENCOUNTER — PATIENT OUTREACH (OUTPATIENT)
Dept: CARE COORDINATION | Facility: CLINIC | Age: 86
End: 2021-03-17
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
## 2021-03-18 ENCOUNTER — PATIENT OUTREACH (OUTPATIENT)
Dept: CARE COORDINATION | Facility: CLINIC | Age: 86
End: 2021-03-18

## 2021-03-18 NOTE — PROGRESS NOTES
Clinic Care Coordination Contact  [facility]/Voicemail       Clinical Data: Care Coordinator Outreach  Outreach attempted x 1.  Left message on patient's voicemail with call back information and requested return call.   Care Coordinator will try to reach patient again in 1-2 business days.

## 2021-03-25 ENCOUNTER — PATIENT OUTREACH (OUTPATIENT)
Dept: CARE COORDINATION | Facility: CLINIC | Age: 86
End: 2021-03-25
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
## 2021-03-29 ENCOUNTER — PATIENT OUTREACH (OUTPATIENT)
Dept: CARE COORDINATION | Facility: CLINIC | Age: 86
End: 2021-03-29

## 2021-03-29 ENCOUNTER — ANTICOAGULATION THERAPY VISIT (OUTPATIENT)
Dept: CARDIOLOGY | Facility: CLINIC | Age: 86
End: 2021-03-29
Payer: MEDICARE

## 2021-03-29 DIAGNOSIS — G45.9 TRANSIENT CEREBRAL ISCHEMIA, UNSPECIFIED TYPE: ICD-10-CM

## 2021-03-29 DIAGNOSIS — Z79.01 LONG TERM CURRENT USE OF ANTICOAGULANTS WITH INR GOAL OF 2.0-3.0: ICD-10-CM

## 2021-03-29 DIAGNOSIS — I51.3 LEFT VENTRICULAR THROMBUS: ICD-10-CM

## 2021-03-29 LAB — INR PPP: 2.9 (ref 0.86–1.14)

## 2021-03-29 PROCEDURE — 85610 PROTHROMBIN TIME: CPT | Performed by: INTERNAL MEDICINE

## 2021-03-29 PROCEDURE — 36416 COLLJ CAPILLARY BLOOD SPEC: CPT | Performed by: INTERNAL MEDICINE

## 2021-03-29 PROCEDURE — 99207 PR NO CHARGE NURSE ONLY: CPT | Performed by: INTERNAL MEDICINE

## 2021-03-29 NOTE — PROGRESS NOTES
ANTICOAGULATION FOLLOW-UP CLINIC VISIT    Patient Name:  Sawyer Renteria  Date:  3/29/2021  Contact Type:  Telephone    SUBJECTIVE:  Patient Findings         Clinical Outcomes     Negatives:  Major bleeding event, Thromboembolic event, Anticoagulation-related hospital admission, Anticoagulation-related ED visit, Anticoagulation-related fatality           OBJECTIVE    Recent labs: (last 7 days)     21  1417   INR 2.90*       ASSESSMENT / PLAN  INR assessment THER    Recheck INR In: 2 WEEKS    INR Location Outside lab      Anticoagulation Summary  As of 3/29/2021    INR goal:  2.0-3.0   TTR:  62.1 % (1 y)   INR used for dosin.90 (3/29/2021)   Warfarin maintenance plan:  2.5 mg (5 mg x 0.5) every Mon, Wed, Fri; 5 mg (5 mg x 1) all other days   Full warfarin instructions:  2.5 mg every Mon, Wed, Fri; 5 mg all other days   Weekly warfarin total:  27.5 mg   Plan last modified:  Donna Colby RN (3/31/2021)   Next INR check:  2021   Target end date:  Indefinite    Indications    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]  Transient cerebral ischemia [G45.9]  Long term current use of anticoagulants with INR goal of 2.0-3.0 [Z79.01]  Transient cerebral ischemia  unspecified type [G45.9]             Anticoagulation Episode Summary     INR check location:      Preferred lab:      Send INR reminders to:  [facility] HEART INR NURSE    Comments:   new home phone number: [phone]       Anticoagulation Care Providers     Provider Role Specialty Phone number    Satya Newton MD Referring Cardiovascular Disease [phone]            See the Encounter Report to view Anticoagulation Flowsheet and Dosing Calendar (Go to Encounters tab in chart review, and find the Anticoagulation Therapy Visit)    INR 2.9.  Called and left message on listed cell phone and no answer or answering machine at home number.  Continue same schedule and recheck in 2 weeks.  Try calling patient again.  Turner GANDHI    3/30:  Tried calling home  number again but no answer and no answering machine.  Turner Antonio RN     3/31/21 10:22am Spoke with pt. One day he ate more spinach but then he drank suresh juice to balance it out. No change in meds. Denies abnormal bleeding or bruising. Will continue current dosing of 2.5 mg MWF and 5 mg all other days with recheck in 2 weeks. Joana
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
## 2021-04-05 ENCOUNTER — PATIENT OUTREACH (OUTPATIENT)
Dept: CARE COORDINATION | Facility: CLINIC | Age: 86
End: 2021-04-05
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
## 2021-04-08 ENCOUNTER — PATIENT OUTREACH (OUTPATIENT)
Dept: CARE COORDINATION | Facility: CLINIC | Age: 86
End: 2021-04-08

## 2021-04-08 NOTE — PROGRESS NOTES
Clinic Care Coordination Contact    Follow Up Progress Note      Assessment: PC to AJ, He states that he spent several hours at the bank and reconfigured his accounts with his friend. He states he went from 2 to 1 bank accounts. He reports that Efraín from [employer] is to be visiting him today at 11:00 a.m and asked this writer to call him tomorrow to see what happened at the visit.     Goals addressed this encounter:   Goals Addressed    None          Intervention/Education provided during outreach:      Outreach Frequency: weekly    Plan:   DERICK to meet with [employer] on 4/8  Care Coordinator will follow up in 1 day

## 2021-04-12 ENCOUNTER — ANTICOAGULATION THERAPY VISIT (OUTPATIENT)
Dept: CARDIOLOGY | Facility: CLINIC | Age: 86
End: 2021-04-12
Payer: MEDICARE

## 2021-04-12 DIAGNOSIS — G45.9 TRANSIENT CEREBRAL ISCHEMIA, UNSPECIFIED TYPE: ICD-10-CM

## 2021-04-12 DIAGNOSIS — Z79.01 LONG TERM CURRENT USE OF ANTICOAGULANTS WITH INR GOAL OF 2.0-3.0: ICD-10-CM

## 2021-04-12 DIAGNOSIS — I51.3 LEFT VENTRICULAR THROMBUS: ICD-10-CM

## 2021-04-12 LAB
CAPILLARY BLOOD COLLECTION: NORMAL
INR PPP: 2.5 (ref 0.86–1.14)

## 2021-04-12 PROCEDURE — 99207 PR NO CHARGE NURSE ONLY: CPT | Performed by: INTERNAL MEDICINE

## 2021-04-12 PROCEDURE — 85610 PROTHROMBIN TIME: CPT | Performed by: INTERNAL MEDICINE

## 2021-04-12 PROCEDURE — 36416 COLLJ CAPILLARY BLOOD SPEC: CPT | Performed by: INTERNAL MEDICINE

## 2021-04-12 NOTE — PROGRESS NOTES
ANTICOAGULATION FOLLOW-UP CLINIC VISIT    Patient Name:  Sawyer Renteria  Date:  2021  Contact Type:  Telephone    SUBJECTIVE:  Patient Findings         Clinical Outcomes     Negatives:  Major bleeding event, Thromboembolic event, Anticoagulation-related hospital admission, Anticoagulation-related ED visit, Anticoagulation-related fatality           OBJECTIVE    Recent labs: (last 7 days)     21  1340   INR 2.50*       ASSESSMENT / PLAN  INR assessment THER    Recheck INR In: 3 WEEKS    INR Location Outside lab      Anticoagulation Summary  As of 2021    INR goal:  2.0-3.0   TTR:  62.5 % (1 y)   INR used for dosin.50 (2021)   Warfarin maintenance plan:  2.5 mg (5 mg x 0.5) every Mon, Wed, Fri; 5 mg (5 mg x 1) all other days   Full warfarin instructions:  2.5 mg every Mon, Wed, Fri; 5 mg all other days   Weekly warfarin total:  27.5 mg   No change documented:  Tayla Antonio RN   Plan last modified:  Donna Colby RN (3/31/2021)   Next INR check:  5/3/2021   Target end date:  Indefinite    Indications    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]  Transient cerebral ischemia [G45.9]  Long term current use of anticoagulants with INR goal of 2.0-3.0 [Z79.01]  Transient cerebral ischemia  unspecified type [G45.9]             Anticoagulation Episode Summary     INR check location:      Preferred lab:      Send INR reminders to:  [facility] HEART INR NURSE    Comments:   new home phone number: [phone]       Anticoagulation Care Providers     Provider Role Specialty Phone number    Satya Newton MD Referring Cardiovascular Disease [phone]            See the Encounter Report to view Anticoagulation Flowsheet and Dosing Calendar (Go to Encounters tab in chart review, and find the Anticoagulation Therapy Visit)    INR 2.5.  Called and spoke to the patient.  No changes.  Continue same schedule and recheck in 3 weeks.  Turner Antonio, OKSANA

## 2021-04-15 ENCOUNTER — PATIENT OUTREACH (OUTPATIENT)
Dept: CARE COORDINATION | Facility: CLINIC | Age: 86
End: 2021-04-15

## 2021-04-15 NOTE — PROGRESS NOTES
Clinic Care Coordination Contact  [facility]/Voicemail       Clinical Data: Care Coordinator Outreach  Outreach attempted x 1. Phone rang and rang, unable to leave vm Care Coordinator will try to reach patient again in 1-2 business days.

## 2021-04-16 ENCOUNTER — PATIENT OUTREACH (OUTPATIENT)
Dept: CARE COORDINATION | Facility: CLINIC | Age: 86
End: 2021-04-16

## 2021-04-16 NOTE — PROGRESS NOTES
Clinic Care Coordination Contact  [facility]/Voicemail       Clinical Data: Care Coordinator Outreach  Outreach attempted x 2. Unable to leave a voicemail.Care Coordinator will try to reach patient again in 3-5 business days.

## 2021-04-27 ENCOUNTER — PATIENT OUTREACH (OUTPATIENT)
Dept: CARE COORDINATION | Facility: CLINIC | Age: 86
End: 2021-04-27

## 2021-04-27 NOTE — PROGRESS NOTES
Clinic Care Coordination Contact    Follow Up Progress Note      Assessment: PC to AJ. He states that he has not spoken to his son in a long while however his daughter in law will come see him tomorrow. He reports he was out the door to PT on his knee. CC stated I would call him back    Goals addressed this encounter:   Goals Addressed    None          Intervention/Education provided during outreach:      Outreach Frequency: 2 weeks    Plan:   AJ to attend PT regularly  Care Coordinator will follow up in 2 weeks and will close out CC after that as pt. Is out of scope

## 2021-05-03 ENCOUNTER — ANTICOAGULATION THERAPY VISIT (OUTPATIENT)
Dept: CARDIOLOGY | Facility: CLINIC | Age: 86
End: 2021-05-03
Payer: MEDICARE

## 2021-05-03 DIAGNOSIS — Z79.01 LONG TERM CURRENT USE OF ANTICOAGULANTS WITH INR GOAL OF 2.0-3.0: ICD-10-CM

## 2021-05-03 DIAGNOSIS — G45.9 TRANSIENT CEREBRAL ISCHEMIA: ICD-10-CM

## 2021-05-03 DIAGNOSIS — I51.3 LEFT VENTRICULAR THROMBUS: ICD-10-CM

## 2021-05-03 DIAGNOSIS — G45.9 TRANSIENT CEREBRAL ISCHEMIA, UNSPECIFIED TYPE: ICD-10-CM

## 2021-05-03 LAB
CAPILLARY BLOOD COLLECTION: NORMAL
INR PPP: 2.9 (ref 0.86–1.14)

## 2021-05-03 PROCEDURE — 36416 COLLJ CAPILLARY BLOOD SPEC: CPT | Performed by: INTERNAL MEDICINE

## 2021-05-03 PROCEDURE — 85610 PROTHROMBIN TIME: CPT | Performed by: INTERNAL MEDICINE

## 2021-05-03 PROCEDURE — 99207 PR NO CHARGE NURSE ONLY: CPT | Performed by: INTERNAL MEDICINE

## 2021-05-03 NOTE — PROGRESS NOTES
ANTICOAGULATION FOLLOW-UP CLINIC VISIT    Patient Name:  Sawyer Renteria  Date:  5/3/2021  Contact Type:  Telephone    SUBJECTIVE:  Patient Findings     Positives:  Change in diet/appetite (Maybe more suresh juice lately, less greens)             OBJECTIVE    Recent labs: (last 7 days)     21  1254   INR 2.90*       ASSESSMENT / PLAN  INR assessment THER    Recheck INR In: 3 WEEKS    INR Location Outside lab      Anticoagulation Summary  As of 5/3/2021    INR goal:  2.0-3.0   TTR:  67.9 % (1 y)   INR used for dosin.90 (5/3/2021)   Warfarin maintenance plan:  2.5 mg (5 mg x 0.5) every Mon, Wed, Fri; 5 mg (5 mg x 1) all other days   Full warfarin instructions:  2.5 mg every Mon, Wed, Fri; 5 mg all other days   Weekly warfarin total:  27.5 mg   No change documented:  Kimberly Noyola RN   Plan last modified:  Donna Colby RN (3/31/2021)   Next INR check:  2021   Target end date:  Indefinite    Indications    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]  Transient cerebral ischemia [G45.9]  Long term current use of anticoagulants with INR goal of 2.0-3.0 [Z79.01]  Transient cerebral ischemia  unspecified type [G45.9]             Anticoagulation Episode Summary     INR check location:      Preferred lab:      Send INR reminders to:  [facility] HEART INR NURSE    Comments:   new home phone number: [phone]       Anticoagulation Care Providers     Provider Role Specialty Phone number    Satya Newtno MD Referring Cardiovascular Disease [phone]            See the Encounter Report to view Anticoagulation Flowsheet and Dosing Calendar (Go to Encounters tab in chart review, and find the Anticoagulation Therapy Visit)    INR 2.9 today at outside clinic. Spoke to patient, no abnormal bleeding/bruising. He states he may have taken a full tablet of warfarin one day instead of a half but isn't certain and doesn't know when that would have been. He does not use a pill box. He thinks he may have had less  greens lately, usually has spinach once a week. Drinking suresh juice like normal. Will continue on curretn warfarin maintenance dosing of 2.5mg MWF and 5mg all other days. Pt will resume normal diet and monitor suresh juice intake. Recheck in 3wks.     Kimberly Noyola RN

## 2021-05-08 ENCOUNTER — HEALTH MAINTENANCE LETTER (OUTPATIENT)
Age: 86
End: 2021-05-08

## 2021-05-11 ENCOUNTER — PATIENT OUTREACH (OUTPATIENT)
Dept: CARE COORDINATION | Facility: CLINIC | Age: 86
End: 2021-05-11

## 2021-05-11 NOTE — PROGRESS NOTES
Clinic Care Coordination Contact  [facility]/Voicemail       Clinical Data: Care Coordinator Outreach  Outreach attempted x 1.  Left message on patient's voicemail with call back information and requested return call.   Care Coordinator will try to reach patient again in 10 business days.

## 2021-05-21 ENCOUNTER — PATIENT OUTREACH (OUTPATIENT)
Dept: CARE COORDINATION | Facility: CLINIC | Age: 86
End: 2021-05-21

## 2021-05-21 NOTE — PROGRESS NOTES
Clinic Care Coordination Contact  [facility]/Voicemail       Clinical Data: Care Coordinator Outreach  Outreach attempted x 2.  Left message on patient's voicemail with call back information and requested return call.  . Care Coordinator will try to reach patient again in 10 business days.

## 2021-05-24 ENCOUNTER — PATIENT OUTREACH (OUTPATIENT)
Dept: CARE COORDINATION | Facility: CLINIC | Age: 86
End: 2021-05-24

## 2021-05-24 ENCOUNTER — ANTICOAGULATION THERAPY VISIT (OUTPATIENT)
Dept: CARDIOLOGY | Facility: CLINIC | Age: 86
End: 2021-05-24
Payer: MEDICARE

## 2021-05-24 DIAGNOSIS — Z79.01 LONG TERM CURRENT USE OF ANTICOAGULANTS WITH INR GOAL OF 2.0-3.0: ICD-10-CM

## 2021-05-24 DIAGNOSIS — G45.9 TRANSIENT CEREBRAL ISCHEMIA: ICD-10-CM

## 2021-05-24 DIAGNOSIS — I51.3 LEFT VENTRICULAR THROMBUS: ICD-10-CM

## 2021-05-24 DIAGNOSIS — G45.9 TRANSIENT CEREBRAL ISCHEMIA, UNSPECIFIED TYPE: ICD-10-CM

## 2021-05-24 LAB
CAPILLARY BLOOD COLLECTION: NORMAL
INR PPP: 3.6 (ref 0.86–1.14)

## 2021-05-24 PROCEDURE — 85610 PROTHROMBIN TIME: CPT | Performed by: INTERNAL MEDICINE

## 2021-05-24 PROCEDURE — 36416 COLLJ CAPILLARY BLOOD SPEC: CPT | Performed by: INTERNAL MEDICINE

## 2021-05-24 PROCEDURE — 99207 PR NO CHARGE NURSE ONLY: CPT | Performed by: INTERNAL MEDICINE

## 2021-05-24 NOTE — PROGRESS NOTES
ANTICOAGULATION FOLLOW-UP CLINIC VISIT    Patient Name:  Sawyer Renteria  Date:  5/24/2021  Contact Type:  Telephone    SUBJECTIVE:  Patient Findings     Positives:  Change in diet/appetite (no spinach for a couple weeks and drank 3-4 small bottles of suresh juice over the last week)        Clinical Outcomes     Negatives:  Major bleeding event, Thromboembolic event, Anticoagulation-related hospital admission, Anticoagulation-related ED visit, Anticoagulation-related fatality           OBJECTIVE    Recent labs: (last 7 days)     05/24/21  1354   INR 3.60*       ASSESSMENT / PLAN  INR assessment SUPRA    Recheck INR In: 2 WEEKS    INR Location Outside lab      Anticoagulation Summary  As of 5/24/2021    INR goal:  2.0-3.0   TTR:  63.0 % (1 y)   INR used for dosing:  3.60 (5/24/2021)   Warfarin maintenance plan:  2.5 mg (5 mg x 0.5) every Mon, Wed, Fri; 5 mg (5 mg x 1) all other days   Full warfarin instructions:  5/24: Hold; Otherwise 2.5 mg every Mon, Wed, Fri; 5 mg all other days   Weekly warfarin total:  27.5 mg   Plan last modified:  Donna Colby RN (5/24/2021)   Next INR check:  6/7/2021   Target end date:  Indefinite    Indications    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]  Transient cerebral ischemia [G45.9]  Long term current use of anticoagulants with INR goal of 2.0-3.0 [Z79.01]  Transient cerebral ischemia  unspecified type [G45.9]             Anticoagulation Episode Summary     INR check location:      Preferred lab:      Send INR reminders to:  [facility] HEART INR NURSE    Comments:  03/21 new home phone number: [phone]       Anticoagulation Care Providers     Provider Role Specialty Phone number    Satya Newton MD Referring Cardiovascular Disease [phone]            See the Encounter Report to view Anticoagulation Flowsheet and Dosing Calendar (Go to Encounters tab in chart review, and find the Anticoagulation Therapy Visit)    INR 3.60 No spinach for a couple weeks and he drank 3-4 small  bottles of suresh juice over the last week. No change in meds. Denies abnormal bleeding or bruising. Will hold today's dose then resume usual dosing of 2.5 mg MWF and 5 mg all other days with recheck in 2 weeks. He will eat spinach today then try to resume his usual diet (spinach once every 1-2 weeks and less suresh juice). Dosage adjustment made based on physician directed care plan.    Donna Colby RN
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
## 2021-06-07 ENCOUNTER — ANTICOAGULATION THERAPY VISIT (OUTPATIENT)
Dept: CARDIOLOGY | Facility: CLINIC | Age: 86
End: 2021-06-07
Payer: MEDICARE

## 2021-06-07 DIAGNOSIS — G45.9 TRANSIENT CEREBRAL ISCHEMIA, UNSPECIFIED TYPE: ICD-10-CM

## 2021-06-07 DIAGNOSIS — I51.3 LEFT VENTRICULAR THROMBUS: ICD-10-CM

## 2021-06-07 DIAGNOSIS — G45.9 TRANSIENT CEREBRAL ISCHEMIA: ICD-10-CM

## 2021-06-07 DIAGNOSIS — Z79.01 LONG TERM CURRENT USE OF ANTICOAGULANTS WITH INR GOAL OF 2.0-3.0: ICD-10-CM

## 2021-06-07 LAB
CAPILLARY BLOOD COLLECTION: NORMAL
INR PPP: 2.1 (ref 0.86–1.14)

## 2021-06-07 PROCEDURE — 99207 PR NO CHARGE NURSE ONLY: CPT

## 2021-06-07 PROCEDURE — 36416 COLLJ CAPILLARY BLOOD SPEC: CPT | Performed by: INTERNAL MEDICINE

## 2021-06-07 PROCEDURE — 85610 PROTHROMBIN TIME: CPT | Performed by: INTERNAL MEDICINE

## 2021-06-07 NOTE — PROGRESS NOTES
ANTICOAGULATION FOLLOW-UP CLINIC VISIT    Patient Name:  Sawyer Renteria  Date:  2021  Contact Type:  Telephone    SUBJECTIVE:         OBJECTIVE    Recent labs: (last 7 days)     21  1341   INR 2.10*       ASSESSMENT / PLAN  No question data found.  Anticoagulation Summary  As of 2021    INR goal:  2.0-3.0   TTR:  61.5 % (1 y)   INR used for dosin.10 (2021)   Warfarin maintenance plan:  2.5 mg (5 mg x 0.5) every Mon, Wed, Fri; 5 mg (5 mg x 1) all other days   Full warfarin instructions:  2.5 mg every Mon, Wed, Fri; 5 mg all other days   Weekly warfarin total:  27.5 mg   No change documented:  Donna Colby RN   Plan last modified:  Donna Colby RN (2021)   Next INR check:  2021   Target end date:  Indefinite    Indications    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]  Transient cerebral ischemia [G45.9]  Long term current use of anticoagulants with INR goal of 2.0-3.0 [Z79.01]  Transient cerebral ischemia  unspecified type [G45.9]             Anticoagulation Episode Summary     INR check location:      Preferred lab:      Send INR reminders to:  [facility] HEART INR NURSE    Comments:   new home phone number: [phone]       Anticoagulation Care Providers     Provider Role Specialty Phone number    Satya Newton MD Referring Cardiovascular Disease [phone]            See the Encounter Report to view Anticoagulation Flowsheet and Dosing Calendar (Go to Encounters tab in chart review, and find the Anticoagulation Therapy Visit)    INR 2.10 INR back in range after a one time hold and pt was to resume his usual diet (spinach once every 1-2 weeks and less suresh juice).   Pt not available by phone yet. Will call him later today.  2:50pm Spoke with pt. He ate cooked greens for several meals last week and avoided surehs juice but plans to have a glass of suresh juice today.  He will decrease his intake of the cooked greens slightly. No change in meds. Denies abnormal bleeding or  bruising. Will continue current dosing of 2.5 mg MWF and 5 mg all other days with recheck in 3 weeks. Donna Colby RN

## 2021-06-11 ENCOUNTER — HOSPITAL ENCOUNTER (OUTPATIENT)
Dept: CARDIOLOGY | Facility: CLINIC | Age: 86
Discharge: HOME | End: 2021-06-11
Attending: INTERNAL MEDICINE | Admitting: INTERNAL MEDICINE
Payer: MEDICARE

## 2021-06-11 DIAGNOSIS — I25.5 CARDIOMYOPATHY, ISCHEMIC: Primary | ICD-10-CM

## 2021-06-11 PROCEDURE — 255N000002 HC RX 255 OP 636: Performed by: INTERNAL MEDICINE

## 2021-06-11 PROCEDURE — 999N000208 ECHOCARDIOGRAM COMPLETE

## 2021-06-11 PROCEDURE — 93306 TTE W/DOPPLER COMPLETE: CPT | Mod: 26 | Performed by: INTERNAL MEDICINE

## 2021-06-11 RX ADMIN — HUMAN ALBUMIN MICROSPHERES AND PERFLUTREN 9 ML: 10; .22 INJECTION, SOLUTION INTRAVENOUS at 14:38

## 2021-06-16 NOTE — PROGRESS NOTES
History of Present Illness:      Sawyer Renteria is a 89 year old male with history of coronary artery disease.  He has a history of an anterior wall MI in 2006 resulting in an LAD stent and angioplasty to the diagonal. He has an ongoing OM-1 with 60% stenosis. 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      He returns for a follow-up and to review his echocardiogram.    He denies any chest discomfort or shortness of breath.  In January of 2020, he had a stress MRI attempted.  He was unable to tolerate it.    LV function was obtained of around 52%.  IV contrast was not administrated.     He has statin intolerant and has been on Repatha which he tolerates well.    I do not have a lipid on file since January 2019.  He continues to tolerate the Repatha well.    He has chronic renal insufficiency with creatinine stable at 1.31 as of over a year ago.  He follows with nephrology but has not seen them for quite some time.    His wife passed about three months ago. He is feeling very lonely since then.  He appears more frail than the last time I saw him    Continues to have dysuria is now on Avodart and Flomax.    Echocardiogram has been reviewed with him in detail showing LV function is stable at 45%.  In the past has been 45 to 50%.  There is no LV thrombus visible. Mild AI.    Blood pressure was slightly high when he first arrived but did improve somewhat.  He does not check his blood pressure at home as often as he used to.    Physical exam  See below     Impression/Plan:       1. Coronary artery disease with anterior wall MI status post LAD stenting with the balloon angioplasty to the diagonal branch.   Mild ischemic cardiomyopathy   ejection fraction is stable at 45% on echo.  Last evaluation with cardiac MRI revealed EF of 52%.      2.  Ischemic cardiomyopathy with no heart failure symptoms currently.     He had a difficult time in the CMR and would prefer not to  have one again  We can follow EF by echo    2. 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 He uses brand name warfarin.    No thrombus apparent on the recent echo     3. Dyslipidemia with statin intolerance. He is tolerating Praluent with HDL of 60 and LDL of 77.  Due for a repeat cholesterol check     4.  Hypertension  - controlled-borderline     5.  Chronic kidney disease  -Overall stable baseline 1.3 to 1.5.  - follow-up with Dr. Oneill annually and he is due overdue       6.  Likely benign prostate hypertrophy  On Flomax and Avodart        I will see him back in 6 to 8 weeks check a lipid profile fasting and a basic metabolic panel.    31 minutes spent on the date of the encounter doing chart review, review of test results, patient visit and documentation   Randell Izaguirre, MSN, APRN-BC, CNP  Cardiology    Orders Placed This Encounter   Procedures     Basic metabolic panel     Lipid Profile     ALT     Follow-Up with Cardiac Advanced Practice Provider     No orders of the defined types were placed in this encounter.    There are no discontinued medications.      Encounter Diagnoses   Name Primary?     Cardiomyopathy, ischemic      Coronary artery disease involving native coronary artery of native heart without angina pectoris        CURRENT MEDICATIONS:  Current Outpatient Medications   Medication Sig Dispense Refill     aspirin 81 MG tablet Take 81 mg by mouth daily       Cholecalciferol (VITAMIN D3) 2000 UNITS CAPS Take by mouth daily       dutasteride (AVODART) 0.5 MG capsule Take 1 capsule (0.5 mg) by mouth daily 30 capsule 11     evolocumab (REPATHA) 140 MG/ML prefilled autoinjector Inject 1 mL (140 mg) Subcutaneous every 14 days 6 mL 3     Fish Oil-Cholecalciferol (FISH OIL + D3) 3587-3828 MG-UNIT CAPS Take 2 tablets by mouth daily       lisinopril (ZESTRIL) 2.5 MG tablet One tablet twice per day 180 tablet 0     metoprolol succinate ER (TOPROL-XL) 25 MG 24 hr  tablet Take 0.5 tablets (12.5 mg) by mouth daily 45 tablet 1     warfarin ANTICOAGULANT (COUMADIN) 5 MG tablet Take 1/2 tab on Mondays, Wednesdays and Fridays and 1 tab all other days or as directed by INR clinic 90 tablet 1     melatonin 5 MG tablet Take 5 mg by mouth nightly as needed for sleep       nitroGLYcerin (NITROSTAT) 0.4 MG sublingual tablet Place 1 tablet (0.4 mg) under the tongue every 5 minutes as needed for chest pain Take as directed (Patient not taking: Reported on 6/17/2021) 25 tablet 3       ALLERGIES     Allergies   Allergen Reactions     Flomax [Tamsulosin]      Weakness and dizzyness     Aldactone [Spironolactone]      High potassium and worsening creat when on lisinopril and spironalactone     Carvedilol      dizziness     Colesevelam      Epigastric pain     Ezetimibe      Lisinopril Itching     Hyperkalemia and inc. Creat. At 20 mg daily, itching , skin red when dose goes above 2.5 mg a day--elevated creat      Pravastatin      Abdominal pain     Rosuvastatin      Simvastatin        PAST MEDICAL HISTORY:  Past Medical History:   Diagnosis Date     BPH (benign prostatic hyperplasia)      Cardiomyopathy, ischemic     EF 49% by cardiac MRI 1/15/2014     Coronary artery disease 1/24/06    Cypher CANDIDA to LAD     Gastro-oesophageal reflux disease      Hyperlipidaemia      Hypertension      Left ventricular thrombus      Myocardial infarction (H) 1/2006    Anterior     TIA (transient ischaemic attack)        PAST SURGICAL HISTORY:  Past Surgical History:   Procedure Laterality Date     CORONARY ANGIOGRAPHY ADULT ORDER  1/24/06    Cypher CANDIDA to LAD     HEART CATH, ANGIOPLASTY  1/2006    Cypher CANDIDA to LAD     TONSILLECTOMY & ADENOIDECTOMY         FAMILY HISTORY:  Family History   Problem Relation Age of Onset     Heart Disease Mother         heart failure     Heart Disease Brother        SOCIAL HISTORY:  Social History     Socioeconomic History     Marital status:      Spouse name: None      Number of children: None     Years of education: None     Highest education level: None   Occupational History     None   Social Needs     Financial resource strain: None     Food insecurity     Worry: None     Inability: None     Transportation needs     Medical: None     Non-medical: None   Tobacco Use     Smoking status: Never Smoker     Smokeless tobacco: Never Used   Substance and Sexual Activity     Alcohol use: No     Drug use: Never     Sexual activity: None   Lifestyle     Physical activity     Days per week: None     Minutes per session: None     Stress: None   Relationships     Social connections     Talks on phone: None     Gets together: None     Attends Christianity service: None     Active member of club or organization: None     Attends meetings of clubs or organizations: None     Relationship status: None     Intimate partner violence     Fear of current or ex partner: None     Emotionally abused: None     Physically abused: None     Forced sexual activity: None   Other Topics Concern     Parent/sibling w/ CABG, MI or angioplasty before 65F 55M? No      Service Not Asked     Blood Transfusions Not Asked     Caffeine Concern No     Occupational Exposure Not Asked     Hobby Hazards Not Asked     Sleep Concern No     Stress Concern No     Weight Concern Yes     Comment: weight loss     Special Diet No     Back Care Not Asked     Exercise Yes     Comment: bicycle 10 min, walking, 3 days week     Bike Helmet Not Asked     Seat Belt Yes     Self-Exams Not Asked   Social History Narrative     None       Review of Systems:  Skin:  Negative       Eyes:  Negative glasses reading glasses  ENT:  Negative postnasal drainage    Respiratory:  Negative cough intermittent    Cardiovascular:  Negative;palpitations;chest pain;lightheadedness;dizziness;syncope or near-syncope;cyanosis;fatigue;edema fatigue    Gastroenterology: Negative excessive gas or bloating    Genitourinary:  Negative nocturia;urinary  "frequency    Musculoskeletal:  Negative joint pain stiffness in legs.more stiffness in legs/body  Neurologic:  Negative numbness or tingling of hands;numbness or tingling of feet sometimes get pinpoint needle tingle on legs and arms--last short periods of time  Psychiatric:  Negative sleep disturbances    Heme/Lymph/Imm:  Negative allergies    Endocrine:  Negative        Physical Exam:  Vitals: /60   Pulse 66   Ht 1.6 m (5' 3\")   Wt 53.1 kg (117 lb)   SpO2 100%   BMI 20.73 kg/m      Constitutional:    frail      Skin:  warm and dry to the touch          Head:  not assessed this visit        Eyes:  sclera white        Lymph:      ENT:  no pallor or cyanosis        Neck:  carotid pulses are full and equal bilaterally        Respiratory:  clear to auscultation         Cardiac: normal S1 and S2   S4 no presence of murmur systolic murmur;grade 2;apical        not assessed this visit                                        GI:  not assessed this visit        Extremities and Muscular Skeletal:  no edema         ecchymosis over left flank following fall at home    Neurological:  no gross motor deficits        Psych:  Alert and Oriented x 3      Recent Lab Results:  LIPID RESULTS:  Lab Results   Component Value Date    CHOL 162 01/28/2019    HDL 60 01/28/2019    LDL 77 01/28/2019    TRIG 124 01/28/2019    CHOLHDLRATIO 4.3 10/13/2015       LIVER ENZYME RESULTS:  Lab Results   Component Value Date    AST 21 07/25/2017    ALT <5 (L) 01/28/2019       CBC RESULTS:  Lab Results   Component Value Date    WBC 5.4 03/11/2020    RBC 3.96 (L) 03/11/2020    HGB 12.0 (L) 03/11/2020    HCT 35.4 (L) 03/11/2020    MCV 89 03/11/2020    MCH 30.3 03/11/2020    MCHC 33.9 03/11/2020    RDW 13.7 03/11/2020     03/11/2020       BMP RESULTS:  Lab Results   Component Value Date     03/11/2020    POTASSIUM 4.6 03/11/2020    CHLORIDE 116 (H) 03/11/2020    CO2 28 03/11/2020    ANIONGAP <1 (L) 03/11/2020    GLC 98 03/11/2020    " BUN 27 03/11/2020    CR 1.31 (H) 03/11/2020    GFRESTIMATED 49 (L) 03/11/2020    GFRESTBLACK 57 (L) 03/11/2020    AURORA 8.5 03/11/2020        A1C RESULTS:  No results found for: A1C    INR RESULTS:  Lab Results   Component Value Date    INR 2.10 (H) 06/07/2021    INR 3.60 (H) 05/24/2021           CC  Satya Newton MD  [address]  [id number]  [place] [id number]

## 2021-06-17 ENCOUNTER — DOCUMENTATION ONLY (OUTPATIENT)
Dept: CARDIOLOGY | Facility: CLINIC | Age: 86
End: 2021-06-17

## 2021-06-17 ENCOUNTER — OFFICE VISIT (OUTPATIENT)
Dept: CARDIOLOGY | Facility: CLINIC | Age: 86
End: 2021-06-17
Payer: MEDICARE

## 2021-06-17 VITALS
HEIGHT: 63 IN | HEART RATE: 66 BPM | WEIGHT: 117 LBS | SYSTOLIC BLOOD PRESSURE: 138 MMHG | DIASTOLIC BLOOD PRESSURE: 60 MMHG | BODY MASS INDEX: 20.73 KG/M2 | OXYGEN SATURATION: 100 %

## 2021-06-17 DIAGNOSIS — I25.5 CARDIOMYOPATHY, ISCHEMIC: ICD-10-CM

## 2021-06-17 DIAGNOSIS — I25.10 CORONARY ARTERY DISEASE INVOLVING NATIVE CORONARY ARTERY OF NATIVE HEART WITHOUT ANGINA PECTORIS: Primary | ICD-10-CM

## 2021-06-17 PROCEDURE — 99214 OFFICE O/P EST MOD 30 MIN: CPT | Performed by: NURSE PRACTITIONER

## 2021-06-17 RX ORDER — TAMSULOSIN HYDROCHLORIDE 0.4 MG/1
0.4 CAPSULE ORAL DAILY
COMMUNITY

## 2021-06-17 ASSESSMENT — MIFFLIN-ST. JEOR: SCORE: 1100.84

## 2021-06-17 NOTE — LETTER
6/17/2021    Francisco Doshi MD  [address]  [facility] [address]    RE: Sawyer Renteria       Dear Colleague,    I had the pleasure of seeing Sawyer Renteria in the [facility].    History of Present Illness:      Sawyer Renteria is a 89 year old male with history of coronary artery disease.  He has a history of an anterior wall MI in 2006 resulting in an LAD stent and angioplasty to the diagonal. He has an ongoing OM-1 with 60% stenosis. 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      He returns for a follow-up and to review his echocardiogram.    He denies any chest discomfort or shortness of breath.  In January of 2020, he had a stress MRI attempted.  He was unable to tolerate it.    LV function was obtained of around 52%.  IV contrast was not administrated.     He has statin intolerant and has been on Repatha which he tolerates well.    I do not have a lipid on file since January 2019.  He continues to tolerate the Repatha well.    He has chronic renal insufficiency with creatinine stable at 1.31 as of over a year ago.  He follows with nephrology but has not seen them for quite some time.    His wife passed about three months ago. He is feeling very lonely since then.  He appears more frail than the last time I saw him    Continues to have dysuria is now on Avodart and Flomax.    Echocardiogram has been reviewed with him in detail showing LV function is stable at 45%.  In the past has been 45 to 50%.  There is no LV thrombus visible. Mild AI.    Blood pressure was slightly high when he first arrived but did improve somewhat.  He does not check his blood pressure at home as often as he used to.    Physical exam  See below     Impression/Plan:       1. Coronary artery disease with anterior wall MI status post LAD stenting with the balloon angioplasty to the diagonal branch.   Mild ischemic  cardiomyopathy   ejection fraction is stable at 45% on echo.  Last evaluation with cardiac MRI revealed EF of 52%.      2.  Ischemic cardiomyopathy with no heart failure symptoms currently.     He had a difficult time in the CMR and would prefer not to have one again  We can follow EF by echo    2. 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 He uses brand name warfarin.    No thrombus apparent on the recent echo     3. Dyslipidemia with statin intolerance. He is tolerating Praluent with HDL of 60 and LDL of 77.  Due for a repeat cholesterol check     4.  Hypertension  - controlled-borderline     5.  Chronic kidney disease  -Overall stable baseline 1.3 to 1.5.  - follow-up with Dr. Oneill annually and he is due overdue       6.  Likely benign prostate hypertrophy  On Flomax and Avodart        I will see him back in 6 to 8 weeks check a lipid profile fasting and a basic metabolic panel.    31 minutes spent on the date of the encounter doing chart review, review of test results, patient visit and documentation   Randell Izaguirre, MSN, APRN-BC, CNP  Cardiology    Orders Placed This Encounter   Procedures     Basic metabolic panel     Lipid Profile     ALT     Follow-Up with Cardiac Advanced Practice Provider     No orders of the defined types were placed in this encounter.    There are no discontinued medications.      Encounter Diagnoses   Name Primary?     Cardiomyopathy, ischemic      Coronary artery disease involving native coronary artery of native heart without angina pectoris        CURRENT MEDICATIONS:  Current Outpatient Medications   Medication Sig Dispense Refill     aspirin 81 MG tablet Take 81 mg by mouth daily       Cholecalciferol (VITAMIN D3) 2000 UNITS CAPS Take by mouth daily       dutasteride (AVODART) 0.5 MG capsule Take 1 capsule (0.5 mg) by mouth daily 30 capsule 11     evolocumab (REPATHA) 140 MG/ML prefilled autoinjector Inject 1 mL (140 mg)  Subcutaneous every 14 days 6 mL 3     Fish Oil-Cholecalciferol (FISH OIL + D3) 4547-8364 MG-UNIT CAPS Take 2 tablets by mouth daily       lisinopril (ZESTRIL) 2.5 MG tablet One tablet twice per day 180 tablet 0     metoprolol succinate ER (TOPROL-XL) 25 MG 24 hr tablet Take 0.5 tablets (12.5 mg) by mouth daily 45 tablet 1     warfarin ANTICOAGULANT (COUMADIN) 5 MG tablet Take 1/2 tab on Mondays, Wednesdays and Fridays and 1 tab all other days or as directed by INR clinic 90 tablet 1     melatonin 5 MG tablet Take 5 mg by mouth nightly as needed for sleep       nitroGLYcerin (NITROSTAT) 0.4 MG sublingual tablet Place 1 tablet (0.4 mg) under the tongue every 5 minutes as needed for chest pain Take as directed (Patient not taking: Reported on 6/17/2021) 25 tablet 3       ALLERGIES     Allergies   Allergen Reactions     Flomax [Tamsulosin]      Weakness and dizzyness     Aldactone [Spironolactone]      High potassium and worsening creat when on lisinopril and spironalactone     Carvedilol      dizziness     Colesevelam      Epigastric pain     Ezetimibe      Lisinopril Itching     Hyperkalemia and inc. Creat. At 20 mg daily, itching , skin red when dose goes above 2.5 mg a day--elevated creat      Pravastatin      Abdominal pain     Rosuvastatin      Simvastatin        PAST MEDICAL HISTORY:  Past Medical History:   Diagnosis Date     BPH (benign prostatic hyperplasia)      Cardiomyopathy, ischemic     EF 49% by cardiac MRI 1/15/2014     Coronary artery disease 1/24/06    Cypher CANDIDA to LAD     Gastro-oesophageal reflux disease      Hyperlipidaemia      Hypertension      Left ventricular thrombus      Myocardial infarction (H) 1/2006    Anterior     TIA (transient ischaemic attack)        PAST SURGICAL HISTORY:  Past Surgical History:   Procedure Laterality Date     CORONARY ANGIOGRAPHY ADULT ORDER  1/24/06    Cypher CANDIDA to LAD     HEART CATH, ANGIOPLASTY  1/2006    Cypher CANDIDA to LAD     TONSILLECTOMY & ADENOIDECTOMY          FAMILY HISTORY:  Family History   Problem Relation Age of Onset     Heart Disease Mother         heart failure     Heart Disease Brother        SOCIAL HISTORY:  Social History     Socioeconomic History     Marital status:      Spouse name: None     Number of children: None     Years of education: None     Highest education level: None   Occupational History     None   Social Needs     Financial resource strain: None     Food insecurity     Worry: None     Inability: None     Transportation needs     Medical: None     Non-medical: None   Tobacco Use     Smoking status: Never Smoker     Smokeless tobacco: Never Used   Substance and Sexual Activity     Alcohol use: No     Drug use: Never     Sexual activity: None   Lifestyle     Physical activity     Days per week: None     Minutes per session: None     Stress: None   Relationships     Social connections     Talks on phone: None     Gets together: None     Attends Christian service: None     Active member of club or organization: None     Attends meetings of clubs or organizations: None     Relationship status: None     Intimate partner violence     Fear of current or ex partner: None     Emotionally abused: None     Physically abused: None     Forced sexual activity: None   Other Topics Concern     Parent/sibling w/ CABG, MI or angioplasty before 65F 55M? No      Service Not Asked     Blood Transfusions Not Asked     Caffeine Concern No     Occupational Exposure Not Asked     Hobby Hazards Not Asked     Sleep Concern No     Stress Concern No     Weight Concern Yes     Comment: weight loss     Special Diet No     Back Care Not Asked     Exercise Yes     Comment: bicycle 10 min, walking, 3 days week     Bike Helmet Not Asked     Seat Belt Yes     Self-Exams Not Asked   Social History Narrative     None       Review of Systems:  Skin:  Negative       Eyes:  Negative glasses reading glasses  ENT:  Negative postnasal drainage    Respiratory:  Negative  "cough intermittent    Cardiovascular:  Negative;palpitations;chest pain;lightheadedness;dizziness;syncope or near-syncope;cyanosis;fatigue;edema fatigue    Gastroenterology: Negative excessive gas or bloating    Genitourinary:  Negative nocturia;urinary frequency    Musculoskeletal:  Negative joint pain stiffness in legs.more stiffness in legs/body  Neurologic:  Negative numbness or tingling of hands;numbness or tingling of feet sometimes get pinpoint needle tingle on legs and arms--last short periods of time  Psychiatric:  Negative sleep disturbances    Heme/Lymph/Imm:  Negative allergies    Endocrine:  Negative        Physical Exam:  Vitals: /60   Pulse 66   Ht 1.6 m (5' 3\")   Wt 53.1 kg (117 lb)   SpO2 100%   BMI 20.73 kg/m      Constitutional:    frail      Skin:  warm and dry to the touch          Head:  not assessed this visit        Eyes:  sclera white        Lymph:      ENT:  no pallor or cyanosis        Neck:  carotid pulses are full and equal bilaterally        Respiratory:  clear to auscultation         Cardiac: normal S1 and S2   S4 no presence of murmur systolic murmur;grade 2;apical        not assessed this visit                                        GI:  not assessed this visit        Extremities and Muscular Skeletal:  no edema         ecchymosis over left flank following fall at home    Neurological:  no gross motor deficits        Psych:  Alert and Oriented x 3      Recent Lab Results:  LIPID RESULTS:  Lab Results   Component Value Date    CHOL 162 01/28/2019    HDL 60 01/28/2019    LDL 77 01/28/2019    TRIG 124 01/28/2019    CHOLHDLRATIO 4.3 10/13/2015       LIVER ENZYME RESULTS:  Lab Results   Component Value Date    AST 21 07/25/2017    ALT <5 (L) 01/28/2019       CBC RESULTS:  Lab Results   Component Value Date    WBC 5.4 03/11/2020    RBC 3.96 (L) 03/11/2020    HGB 12.0 (L) 03/11/2020    HCT 35.4 (L) 03/11/2020    MCV 89 03/11/2020    MCH 30.3 03/11/2020    MCHC 33.9 03/11/2020    " RDW 13.7 03/11/2020     03/11/2020       BMP RESULTS:  Lab Results   Component Value Date     03/11/2020    POTASSIUM 4.6 03/11/2020    CHLORIDE 116 (H) 03/11/2020    CO2 28 03/11/2020    ANIONGAP <1 (L) 03/11/2020    GLC 98 03/11/2020    BUN 27 03/11/2020    CR 1.31 (H) 03/11/2020    GFRESTIMATED 49 (L) 03/11/2020    GFRESTBLACK 57 (L) 03/11/2020    AURORA 8.5 03/11/2020        A1C RESULTS:  No results found for: A1C    INR RESULTS:  Lab Results   Component Value Date    INR 2.10 (H) 06/07/2021    INR 3.60 (H) 05/24/2021     Thank you for allowing me to participate in the care of your patient.      Sincerely,     JAE Meza CNP     [facility]    cc:   Satya Newton MD  [address]  [address]  [place] [address]

## 2021-06-17 NOTE — PROGRESS NOTES
Received call from pt who reports the other medication he is taking for his prostate is tamsulosin 0.4mg daily. Updated MANJULA Guy with this information and added medication to his med list in [facility].     OKSANA Dunne 4:18 PM 6/17/2021

## 2021-06-17 NOTE — PROGRESS NOTES
He is supposed to call you with the second med he is on for his prostate   He is on avodart but is on another one

## 2021-06-28 ENCOUNTER — ANTICOAGULATION THERAPY VISIT (OUTPATIENT)
Dept: CARDIOLOGY | Facility: CLINIC | Age: 86
End: 2021-06-28
Payer: MEDICARE

## 2021-06-28 DIAGNOSIS — G45.9 TRANSIENT CEREBRAL ISCHEMIA: ICD-10-CM

## 2021-06-28 DIAGNOSIS — G45.9 TRANSIENT CEREBRAL ISCHEMIA, UNSPECIFIED TYPE: ICD-10-CM

## 2021-06-28 DIAGNOSIS — I51.3 LEFT VENTRICULAR THROMBUS: ICD-10-CM

## 2021-06-28 DIAGNOSIS — Z79.01 LONG TERM CURRENT USE OF ANTICOAGULANTS WITH INR GOAL OF 2.0-3.0: ICD-10-CM

## 2021-06-28 LAB
CAPILLARY BLOOD COLLECTION: NORMAL
INR PPP: 1.8 (ref 0.86–1.14)

## 2021-06-28 PROCEDURE — 36416 COLLJ CAPILLARY BLOOD SPEC: CPT | Performed by: INTERNAL MEDICINE

## 2021-06-28 PROCEDURE — 85610 PROTHROMBIN TIME: CPT | Performed by: INTERNAL MEDICINE

## 2021-06-28 PROCEDURE — 99207 PR NO CHARGE NURSE ONLY: CPT

## 2021-06-28 NOTE — PROGRESS NOTES
ANTICOAGULATION FOLLOW-UP CLINIC VISIT    Patient Name:  Sawyer Renteria  Date:  2021  Contact Type:  Telephone    SUBJECTIVE:         OBJECTIVE    Recent labs: (last 7 days)     21  1344   INR 1.80*       ASSESSMENT / PLAN  INR assessment SUB    Recheck INR In: 2 WEEKS    INR Location Outside lab      Anticoagulation Summary  As of 2021    INR goal:  2.0-3.0   TTR:  58.1 % (1 y)   INR used for dosin.80 (2021)   Warfarin maintenance plan:  2.5 mg (5 mg x 0.5) every Mon, Wed, Fri; 5 mg (5 mg x 1) all other days   Full warfarin instructions:  2.5 mg every Mon, Wed, Fri; 5 mg all other days   Weekly warfarin total:  27.5 mg   Plan last modified:  Donna Colby RN (2021)   Next INR check:     Target end date:  Indefinite    Indications    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]  Transient cerebral ischemia [G45.9]  Long term current use of anticoagulants with INR goal of 2.0-3.0 [Z79.01]  Transient cerebral ischemia  unspecified type [G45.9]             Anticoagulation Episode Summary     INR check location:      Preferred lab:      Send INR reminders to:  [facility] HEART INR NURSE    Comments:   new home phone number: [phone]       Anticoagulation Care Providers     Provider Role Specialty Phone number    Satya Newton MD Referring Cardiovascular Disease [phone]            See the Encounter Report to view Anticoagulation Flowsheet and Dosing Calendar (Go to Encounters tab in chart review, and find the Anticoagulation Therapy Visit)    INR 1.80  No change in meds. Diet has been variable since his wife passed away.  He used to eat spinach about once every 1-2 weeks but hasn't had it recently. He did not eat spinach or drink suresh juice last week. He ate cauliflower and carrots recently. Denies abnormal bleeding or bruising. He wants to drink suresh juice so will have 3 small bottles this week then have 2 bottles a week. Recent INR's have been in range or high so will continue  current dosing of 2.5 mg MWF and 5 mg all other days with recheck in 2 weeks. Dosage adjustment made based on physician directed care plan.  Donna Colby RN

## 2021-07-07 DIAGNOSIS — I23.6 LV (LEFT VENTRICULAR) MURAL THROMBUS FOLLOWING MI (H): ICD-10-CM

## 2021-07-07 DIAGNOSIS — Z79.01 LONG TERM CURRENT USE OF ANTICOAGULANTS WITH INR GOAL OF 2.0-3.0: ICD-10-CM

## 2021-07-07 DIAGNOSIS — I63.419 CEREBROVASCULAR ACCIDENT (CVA) DUE TO EMBOLISM OF MIDDLE CEREBRAL ARTERY, UNSPECIFIED BLOOD VESSEL LATERALITY (H): Primary | ICD-10-CM

## 2021-07-08 ENCOUNTER — ANTICOAGULATION THERAPY VISIT (OUTPATIENT)
Dept: CARDIOLOGY | Facility: CLINIC | Age: 86
End: 2021-07-08

## 2021-07-08 DIAGNOSIS — Z79.01 LONG TERM CURRENT USE OF ANTICOAGULANTS WITH INR GOAL OF 2.0-3.0: ICD-10-CM

## 2021-07-08 DIAGNOSIS — I63.419 CEREBROVASCULAR ACCIDENT (CVA) DUE TO EMBOLISM OF MIDDLE CEREBRAL ARTERY, UNSPECIFIED BLOOD VESSEL LATERALITY (H): ICD-10-CM

## 2021-07-08 DIAGNOSIS — G45.9 TRANSIENT CEREBRAL ISCHEMIA: ICD-10-CM

## 2021-07-08 DIAGNOSIS — G45.9 TRANSIENT CEREBRAL ISCHEMIA, UNSPECIFIED TYPE: ICD-10-CM

## 2021-07-08 DIAGNOSIS — I63.419: Primary | ICD-10-CM

## 2021-07-08 DIAGNOSIS — I51.3 LEFT VENTRICULAR THROMBUS: ICD-10-CM

## 2021-07-08 DIAGNOSIS — I23.6 LV (LEFT VENTRICULAR) MURAL THROMBUS FOLLOWING MI (H): ICD-10-CM

## 2021-07-08 LAB
CAPILLARY BLOOD COLLECTION: NORMAL
INR PPP: 3.5 (ref 0.86–1.14)

## 2021-07-08 PROCEDURE — 85610 PROTHROMBIN TIME: CPT | Performed by: INTERNAL MEDICINE

## 2021-07-08 PROCEDURE — 36416 COLLJ CAPILLARY BLOOD SPEC: CPT | Performed by: INTERNAL MEDICINE

## 2021-07-08 PROCEDURE — 99207 PR NO CHARGE NURSE ONLY: CPT

## 2021-07-08 NOTE — PROGRESS NOTES
ANTICOAGULATION FOLLOW-UP CLINIC VISIT    Patient Name:  Sawyer Renteria  Date:  7/8/2021  Contact Type:  Telephone    SUBJECTIVE:  Patient Findings     Positives:  Change in diet/appetite (No greens, too much suresh juice)        Clinical Outcomes     Negatives:  Major bleeding event, Thromboembolic event, Anticoagulation-related hospital admission, Anticoagulation-related ED visit, Anticoagulation-related fatality           OBJECTIVE    Recent labs: (last 7 days)     07/08/21  1358   INR 3.50*       ASSESSMENT / PLAN  INR assessment SUPRA    Recheck INR In: 2 WEEKS    INR Location Outside lab      Anticoagulation Summary  As of 7/8/2021    INR goal:  2.0-3.0   TTR:  59.7 % (1 y)   INR used for dosing:  3.50 (7/8/2021)   Warfarin maintenance plan:  2.5 mg (5 mg x 0.5) every Mon, Wed, Fri; 5 mg (5 mg x 1) all other days   Full warfarin instructions:  7/8: 2.5 mg; Otherwise 2.5 mg every Mon, Wed, Fri; 5 mg all other days   Weekly warfarin total:  27.5 mg   Plan last modified:  Donna Colby RN (5/24/2021)   Next INR check:  7/22/2021   Target end date:  Indefinite    Indications    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]  Transient cerebral ischemia [G45.9]  Long term current use of anticoagulants with INR goal of 2.0-3.0 [Z79.01]  Transient cerebral ischemia  unspecified type [G45.9]             Anticoagulation Episode Summary     INR check location:      Preferred lab:      Send INR reminders to:  [facility] HEART INR NURSE    Comments:  03/21 new home phone number: [phone]       Anticoagulation Care Providers     Provider Role Specialty Phone number    Satya Newton MD Referring Cardiovascular Disease [phone]            See the Encounter Report to view Anticoagulation Flowsheet and Dosing Calendar (Go to Encounters tab in chart review, and find the Anticoagulation Therapy Visit)    INR 3.5 today at outside clinic. Spoke to patient, no abnormal bleeding/bruising and no med changes. He thinks he may have had  too much suresh juice but he did by some spinach and kale today as well as cucumber. Will have patient take 1/2 tablet tonight and then resume normal maintenance dosing of 2.5mg MWF and 5mg all other days. Pt plans to eat some cooked spinach tonight like he used to, will try to have greens 2x/wk while he drinks the suresh juice (2-3x/wk). Recommended taking the peel off the cucumber as to not have too much VitK. Recheck INR in 2wks.     Kimberly Noyola RN

## 2021-07-12 ENCOUNTER — ANTICOAGULATION THERAPY VISIT (OUTPATIENT)
Dept: CARDIOLOGY | Facility: CLINIC | Age: 86
End: 2021-07-12
Payer: MEDICARE

## 2021-07-12 ENCOUNTER — LAB (OUTPATIENT)
Dept: LAB | Facility: CLINIC | Age: 86
End: 2021-07-12
Payer: MEDICARE

## 2021-07-12 DIAGNOSIS — Z79.01 LONG TERM CURRENT USE OF ANTICOAGULANTS WITH INR GOAL OF 2.0-3.0: ICD-10-CM

## 2021-07-12 DIAGNOSIS — I23.6 LV (LEFT VENTRICULAR) MURAL THROMBUS FOLLOWING MI (H): ICD-10-CM

## 2021-07-12 DIAGNOSIS — G45.9 TRANSIENT CEREBRAL ISCHEMIA: ICD-10-CM

## 2021-07-12 DIAGNOSIS — I63.419 CEREBROVASCULAR ACCIDENT (CVA) DUE TO EMBOLISM OF MIDDLE CEREBRAL ARTERY, UNSPECIFIED BLOOD VESSEL LATERALITY (H): ICD-10-CM

## 2021-07-12 DIAGNOSIS — I51.3 LEFT VENTRICULAR THROMBUS: Primary | ICD-10-CM

## 2021-07-12 DIAGNOSIS — G45.9 TRANSIENT CEREBRAL ISCHEMIA, UNSPECIFIED TYPE: ICD-10-CM

## 2021-07-12 LAB — INR BLD: 1.9 (ref 0.9–1.1)

## 2021-07-12 PROCEDURE — 85610 PROTHROMBIN TIME: CPT

## 2021-07-12 PROCEDURE — 36416 COLLJ CAPILLARY BLOOD SPEC: CPT

## 2021-07-12 PROCEDURE — 99207 PR NO CHARGE NURSE ONLY: CPT

## 2021-07-12 NOTE — PROGRESS NOTES
ANTICOAGULATION FOLLOW-UP CLINIC VISIT    Patient Name:  Sawyer Renetria  Date:  2021  Contact Type:  Telephone    SUBJECTIVE:  Patient Findings     Positives:  Change in diet/appetite (Spinach/kale 2x/wk; suresh juice 2-3x/wk)             OBJECTIVE    Recent labs: (last 7 days)     21  1414   INR 1.9*       ASSESSMENT / PLAN  INR assessment SUB    Recheck INR In: 10 DAYS    INR Location Outside lab      Anticoagulation Summary  As of 2021    INR goal:  2.0-3.0   TTR:  60.4 % (1 y)   INR used for dosin.9 (2021)   Warfarin maintenance plan:  2.5 mg (5 mg x 0.5) every Mon, Wed, Fri; 5 mg (5 mg x 1) all other days   Full warfarin instructions:  2.5 mg every Mon, Wed, Fri; 5 mg all other days   Weekly warfarin total:  27.5 mg   Plan last modified:  Donna Colby RN (2021)   Next INR check:  2021   Target end date:  Indefinite    Indications    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]  Transient cerebral ischemia [G45.9]  Long term current use of anticoagulants with INR goal of 2.0-3.0 [Z79.01]  Transient cerebral ischemia  unspecified type [G45.9]             Anticoagulation Episode Summary     INR check location:      Preferred lab:      Send INR reminders to:  [facility] HEART INR NURSE    Comments:   new home phone number: [phone]       Anticoagulation Care Providers     Provider Role Specialty Phone number    Satya Newton MD Referring Cardiovascular Disease [phone]            See the Encounter Report to view Anticoagulation Flowsheet and Dosing Calendar (Go to Encounters tab in chart review, and find the Anticoagulation Therapy Visit)    INR 1.9 today at outside clinic. Spoke to patient, no abnormal bleeding/bruising. He ate cooked spinach/kale once since last Thursday, plans to have 2x/wk. He stopped drinking suresh juice, discussed that if he is having dark greens he can have the suresh juice a few times a week. No med changes. Will have patient avoid greens today and  then resume usual diet as above. Continue on current warfarin maintenance dosing of 2.5mg MWF and 5mg all other days. Recheck in 10 days as previously scheduled.     Kimberly Noyola RN

## 2021-07-20 DIAGNOSIS — I23.6 LV (LEFT VENTRICULAR) MURAL THROMBUS FOLLOWING MI (H): ICD-10-CM

## 2021-07-20 DIAGNOSIS — G45.9 TRANSIENT CEREBRAL ISCHEMIA: ICD-10-CM

## 2021-07-20 RX ORDER — WARFARIN SODIUM 5 MG/1
TABLET ORAL
Qty: 80 TABLET | Refills: 0 | Status: SHIPPED | OUTPATIENT
Start: 2021-07-20 | End: 2021-10-06

## 2021-07-22 ENCOUNTER — LAB (OUTPATIENT)
Dept: LAB | Facility: CLINIC | Age: 86
End: 2021-07-22
Payer: MEDICARE

## 2021-07-22 ENCOUNTER — ANTICOAGULATION THERAPY VISIT (OUTPATIENT)
Dept: CARDIOLOGY | Facility: CLINIC | Age: 86
End: 2021-07-22
Payer: MEDICARE

## 2021-07-22 ENCOUNTER — TELEPHONE (OUTPATIENT)
Dept: CARDIOLOGY | Facility: CLINIC | Age: 86
End: 2021-07-22

## 2021-07-22 DIAGNOSIS — Z79.01 LONG TERM CURRENT USE OF ANTICOAGULANTS WITH INR GOAL OF 2.0-3.0: ICD-10-CM

## 2021-07-22 DIAGNOSIS — I51.3 LEFT VENTRICULAR THROMBUS: Primary | ICD-10-CM

## 2021-07-22 DIAGNOSIS — G45.9 TRANSIENT CEREBRAL ISCHEMIA: ICD-10-CM

## 2021-07-22 DIAGNOSIS — I63.419 CEREBROVASCULAR ACCIDENT (CVA) DUE TO EMBOLISM OF MIDDLE CEREBRAL ARTERY, UNSPECIFIED BLOOD VESSEL LATERALITY (H): ICD-10-CM

## 2021-07-22 DIAGNOSIS — G45.9 TRANSIENT CEREBRAL ISCHEMIA, UNSPECIFIED TYPE: ICD-10-CM

## 2021-07-22 DIAGNOSIS — I23.6 LV (LEFT VENTRICULAR) MURAL THROMBUS FOLLOWING MI (H): ICD-10-CM

## 2021-07-22 LAB — INR BLD: 2.5 (ref 0.9–1.1)

## 2021-07-22 PROCEDURE — 99207 PR NO CHARGE NURSE ONLY: CPT

## 2021-07-22 PROCEDURE — 85610 PROTHROMBIN TIME: CPT

## 2021-07-22 PROCEDURE — 36416 COLLJ CAPILLARY BLOOD SPEC: CPT

## 2021-07-22 NOTE — PROGRESS NOTES
ANTICOAGULATION FOLLOW-UP CLINIC VISIT    Patient Name:  Sawyer Renteria  Date:  2021  Contact Type:  Telephone    SUBJECTIVE:         OBJECTIVE    Recent labs: (last 7 days)     21  1526   INR 2.5*       ASSESSMENT / PLAN  No question data found.  Anticoagulation Summary  As of 2021    INR goal:  2.0-3.0   TTR:  62.7 % (1 y)   INR used for dosin.5 (2021)   Warfarin maintenance plan:  2.5 mg (5 mg x 0.5) every Mon, Wed, Fri; 5 mg (5 mg x 1) all other days   Full warfarin instructions:  2.5 mg every Mon, Wed, Fri; 5 mg all other days   Weekly warfarin total:  27.5 mg   No change documented:  Donna Colby RN   Plan last modified:  Donna Colby RN (2021)   Next INR check:  2021   Target end date:  Indefinite    Indications    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]  Transient cerebral ischemia [G45.9]  Long term current use of anticoagulants with INR goal of 2.0-3.0 [Z79.01]  Transient cerebral ischemia  unspecified type [G45.9]             Anticoagulation Episode Summary     INR check location:      Preferred lab:      Send INR reminders to:  [facility] HEART INR NURSE    Comments:   new home phone number: [phone]       Anticoagulation Care Providers     Provider Role Specialty Phone number    Satya Newton MD Referring Cardiovascular Disease [phone]            See the Encounter Report to view Anticoagulation Flowsheet and Dosing Calendar (Go to Encounters tab in chart review, and find the Anticoagulation Therapy Visit)    INR 2.5 INR back in range. He is eating spinach 2x/wk and drank 1 1/2 small bottles of suresh juice last week. No change in meds. Denies abnormal bleeding or bruising. Will continue current dosing of 2.5 mg MWF and 5 mg all other days with recheck in 3 weeks.   Donna Colby RN

## 2021-08-12 ENCOUNTER — LAB (OUTPATIENT)
Dept: LAB | Facility: CLINIC | Age: 86
End: 2021-08-12
Payer: MEDICARE

## 2021-08-12 ENCOUNTER — ANTICOAGULATION THERAPY VISIT (OUTPATIENT)
Dept: CARDIOLOGY | Facility: CLINIC | Age: 86
End: 2021-08-12
Payer: MEDICARE

## 2021-08-12 DIAGNOSIS — I63.419 CEREBROVASCULAR ACCIDENT (CVA) DUE TO EMBOLISM OF MIDDLE CEREBRAL ARTERY, UNSPECIFIED BLOOD VESSEL LATERALITY (H): ICD-10-CM

## 2021-08-12 DIAGNOSIS — I51.3 LEFT VENTRICULAR THROMBUS: Primary | ICD-10-CM

## 2021-08-12 DIAGNOSIS — I23.6 LV (LEFT VENTRICULAR) MURAL THROMBUS FOLLOWING MI (H): ICD-10-CM

## 2021-08-12 DIAGNOSIS — G45.9 TRANSIENT CEREBRAL ISCHEMIA, UNSPECIFIED TYPE: ICD-10-CM

## 2021-08-12 DIAGNOSIS — Z79.01 LONG TERM CURRENT USE OF ANTICOAGULANTS WITH INR GOAL OF 2.0-3.0: ICD-10-CM

## 2021-08-12 DIAGNOSIS — G45.9 TRANSIENT CEREBRAL ISCHEMIA: ICD-10-CM

## 2021-08-12 LAB — INR BLD: 2.6 (ref 0.9–1.1)

## 2021-08-12 PROCEDURE — 85610 PROTHROMBIN TIME: CPT

## 2021-08-12 PROCEDURE — 99207 PR NO CHARGE NURSE ONLY: CPT

## 2021-08-12 PROCEDURE — 36416 COLLJ CAPILLARY BLOOD SPEC: CPT

## 2021-08-12 NOTE — PROGRESS NOTES
ANTICOAGULATION FOLLOW-UP CLINIC VISIT    Patient Name:  Sawyer Renteria  Date:  2021  Contact Type:  Telephone    SUBJECTIVE:  Patient Findings         Clinical Outcomes     Negatives:  Major bleeding event, Thromboembolic event, Anticoagulation-related hospital admission, Anticoagulation-related ED visit, Anticoagulation-related fatality           OBJECTIVE    Recent labs: (last 7 days)     21  1334   INR 2.6*       ASSESSMENT / PLAN  INR assessment THER    Recheck INR In: 4 WEEKS    INR Location Outside lab      Anticoagulation Summary  As of 2021    INR goal:  2.0-3.0   TTR:  65.4 % (1 y)   INR used for dosin.6 (2021)   Warfarin maintenance plan:  2.5 mg (5 mg x 0.5) every Mon, Wed, Fri; 5 mg (5 mg x 1) all other days   Full warfarin instructions:  2.5 mg every Mon, Wed, Fri; 5 mg all other days   Weekly warfarin total:  27.5 mg   No change documented:  Alexandra Rust RN   Plan last modified:  Donna Colby RN (2021)   Next INR check:     Target end date:  Indefinite    Indications    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]  Transient cerebral ischemia [G45.9]  Long term current use of anticoagulants with INR goal of 2.0-3.0 [Z79.01]  Transient cerebral ischemia  unspecified type [G45.9]             Anticoagulation Episode Summary     INR check location:      Preferred lab:      Send INR reminders to:  [facility] HEART INR NURSE    Comments:   new home phone number: [phone]       Anticoagulation Care Providers     Provider Role Specialty Phone number    Satya Newton MD Referring Cardiovascular Disease [phone]            See the Encounter Report to view Anticoagulation Flowsheet and Dosing Calendar (Go to Encounters tab in chart review, and find the Anticoagulation Therapy Visit)    INR was 2.6 today. Pt's INR remains in range this check with dietary change of eating spinach 2 x per week and suresh juice per last INR note. Writer attempted to call pt to review INR  results and POC and VM left on both home and cell phone numbers to return my call.  Will continue current dosing of 2.5 mg every MWF and 5 mg all other days of the week. Next INR check is to be scheduled in 4 weeks.     Addendum at 1630:   Second attempt at trying to contact pt, but again, no answer and he has not returned my VM. Second message left instructing pt to continue current warfarin dosing as above. Instructed to call back and verify understanding of instructions and to let us know if he has had any abnormal bleeding, bruising, medication, dietary changes or illness. Also to assist in scheduling next INR in 4 weeks.    Alexandra Rust RN      Addendum 8/13/21 at 1019: Spoke to patient, no changes. Continue same dosing as above and recheck in 4wks.     Vijay GANDHI

## 2021-08-16 ENCOUNTER — LAB (OUTPATIENT)
Dept: LAB | Facility: CLINIC | Age: 86
End: 2021-08-16
Payer: MEDICARE

## 2021-08-16 DIAGNOSIS — I25.5 CARDIOMYOPATHY, ISCHEMIC: ICD-10-CM

## 2021-08-16 DIAGNOSIS — I25.10 CORONARY ARTERY DISEASE INVOLVING NATIVE CORONARY ARTERY OF NATIVE HEART WITHOUT ANGINA PECTORIS: ICD-10-CM

## 2021-08-16 LAB
ALT SERPL W P-5'-P-CCNC: 19 U/L (ref 0–70)
ANION GAP SERPL CALCULATED.3IONS-SCNC: 2 MMOL/L (ref 3–14)
BUN SERPL-MCNC: 25 MG/DL (ref 7–30)
CALCIUM SERPL-MCNC: 8.6 MG/DL (ref 8.5–10.1)
CHLORIDE BLD-SCNC: 107 MMOL/L (ref 94–109)
CHOLEST SERPL-MCNC: 156 MG/DL
CO2 SERPL-SCNC: 27 MMOL/L (ref 20–32)
CREAT SERPL-MCNC: 1.5 MG/DL (ref 0.66–1.25)
FASTING STATUS PATIENT QL REPORTED: YES
GFR SERPL CREATININE-BSD FRML MDRD: 41 ML/MIN/1.73M2
GLUCOSE BLD-MCNC: 97 MG/DL (ref 70–99)
HDLC SERPL-MCNC: 66 MG/DL
LDLC SERPL CALC-MCNC: 68 MG/DL
NONHDLC SERPL-MCNC: 90 MG/DL
POTASSIUM BLD-SCNC: 4.5 MMOL/L (ref 3.4–5.3)
SODIUM SERPL-SCNC: 136 MMOL/L (ref 133–144)
TRIGL SERPL-MCNC: 112 MG/DL

## 2021-08-16 PROCEDURE — 36415 COLL VENOUS BLD VENIPUNCTURE: CPT | Performed by: NURSE PRACTITIONER

## 2021-08-16 PROCEDURE — 80061 LIPID PANEL: CPT | Performed by: NURSE PRACTITIONER

## 2021-08-16 PROCEDURE — 80048 BASIC METABOLIC PNL TOTAL CA: CPT | Performed by: NURSE PRACTITIONER

## 2021-08-16 PROCEDURE — 84460 ALANINE AMINO (ALT) (SGPT): CPT | Performed by: NURSE PRACTITIONER

## 2021-08-16 NOTE — PROGRESS NOTES
History of Present Illness:        Sawyer Renteria is a 89 year old male with history of coronary artery disease.  He has a history of an anterior wall MI in 2006 resulting in an LAD stent and angioplasty to the diagonal. He has an ongoing OM-1 with 60% stenosis. 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      He returns for a follow-up and to review his echocardiogram.     He denies any chest discomfort or shortness of breath.  In January of 2020, he had a stress MRI attempted.  He was unable to tolerate it.    LV function was obtained of around 52%.  IV contrast was not administrated.     He has statin intolerant and has been on Repatha which he tolerates well.    I do not have a lipid on file since January 2019.  He continues to tolerate the Repatha well.     He has chronic renal insufficiency with creatinine stable at 1.31 as of over a year ago.  He follows with nephrology but has not seen them for quite some time.     His wife passed about five months ago. He is feeling very lonely since then.  He appears more frail than the last time I saw him     Continues to have dysuria is now on Avodart and Flomax.     Echocardiogram has been reviewed with him in detail showing LV function is stable at 45%.  In the past has been 45 to 50%.  There is no LV thrombus visible. Mild AI.     Blood pressure was slightly high when he first arrived but did improve.  He does not check his blood pressure at home as often as he used to.      Physical exam  See below     Impression/Plan:       1. Coronary artery disease with anterior wall MI status post LAD stenting with the balloon angioplasty to the diagonal branch.   Mild ischemic cardiomyopathy   ejection fraction is stable at 45% on echo.  Last evaluation with cardiac MRI revealed EF of 52%.       2.  Ischemic cardiomyopathy with no heart failure symptoms currently.     He had a difficult time in the CMR and would prefer not to  have one again  We can follow EF by echo     2. 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 He uses brand name warfarin.    No thrombus apparent on the recent echo     3. Dyslipidemia with statin intolerance.   He is tolerating Praluent with HDL of 66 and LDL of 68 .  Check lipids in one year (late summer 2022)     4.  Hypertension  - controlled on a recheck     5.  Chronic kidney disease  -Overall stable baseline 1.3 to 1.5.  - follow-up with Dr. Oneill annually; he has follow up with Dr. Oneill in November 6.  Likely benign prostate hypertrophy  On Flomax and Avodart      Overall he looks reasonably stable; Dr. Newton will see him back in six months. We will check a BMP at that time.  Dr Newton stopped into this visit and we discussed follow up.    25 minutes spent on the date of the encounter doing chart review, review of test results, patient visit, documentation and discussion with other provider(s)   Randell Izaguirre, MSN, APRN-BC, CNP  Cardiology    Orders Placed This Encounter   Procedures     Basic metabolic panel     Follow-Up with Cardiologist     No orders of the defined types were placed in this encounter.    There are no discontinued medications.      Encounter Diagnosis   Name Primary?     Cardiomyopathy, ischemic        CURRENT MEDICATIONS:  Current Outpatient Medications   Medication Sig Dispense Refill     aspirin 81 MG tablet Take 81 mg by mouth daily       dutasteride (AVODART) 0.5 MG capsule Take 1 capsule (0.5 mg) by mouth daily 30 capsule 11     evolocumab (REPATHA) 140 MG/ML prefilled autoinjector Inject 1 mL (140 mg) Subcutaneous every 14 days 6 mL 3     Fish Oil-Cholecalciferol (FISH OIL + D3) 8676-3017 MG-UNIT CAPS Take 2 tablets by mouth daily       lisinopril (ZESTRIL) 2.5 MG tablet One tablet twice per day 180 tablet 0     melatonin 5 MG tablet Take 5 mg by mouth nightly as needed for sleep       metoprolol succinate ER (TOPROL-XL) 25  MG 24 hr tablet Take 0.5 tablets (12.5 mg) by mouth daily 45 tablet 1     tamsulosin (FLOMAX) 0.4 MG capsule Take 0.4 mg by mouth daily       warfarin ANTICOAGULANT (COUMADIN) 5 MG tablet Take 1/2 tab on Mondays, Wednesdays and Fridays and 1 tab all other days or as directed by INR clinic 80 tablet 0     Cholecalciferol (VITAMIN D3) 2000 UNITS CAPS Take by mouth daily       nitroGLYcerin (NITROSTAT) 0.4 MG sublingual tablet Place 1 tablet (0.4 mg) under the tongue every 5 minutes as needed for chest pain Take as directed (Patient not taking: Reported on 6/17/2021) 25 tablet 3       ALLERGIES     Allergies   Allergen Reactions     Flomax [Tamsulosin]      Weakness and dizzyness     Aldactone [Spironolactone]      High potassium and worsening creat when on lisinopril and spironalactone     Carvedilol      dizziness     Colesevelam      Epigastric pain     Ezetimibe      Lisinopril Itching     Hyperkalemia and inc. Creat. At 20 mg daily, itching , skin red when dose goes above 2.5 mg a day--elevated creat      Pravastatin      Abdominal pain     Rosuvastatin      Simvastatin        PAST MEDICAL HISTORY:  Past Medical History:   Diagnosis Date     BPH (benign prostatic hyperplasia)      Cardiomyopathy, ischemic     EF 49% by cardiac MRI 1/15/2014     Coronary artery disease 1/24/06    Cypher CANDIDA to LAD     Gastro-oesophageal reflux disease      Hyperlipidaemia      Hypertension      Left ventricular thrombus      Myocardial infarction (H) 1/2006    Anterior     TIA (transient ischaemic attack)        PAST SURGICAL HISTORY:  Past Surgical History:   Procedure Laterality Date     CORONARY ANGIOGRAPHY ADULT ORDER  1/24/06    Cypher CANDIDA to LAD     HEART CATH, ANGIOPLASTY  1/2006    Cypher CANDIDA to LAD     TONSILLECTOMY & ADENOIDECTOMY         FAMILY HISTORY:  Family History   Problem Relation Age of Onset     Heart Disease Mother         heart failure     Heart Disease Brother        SOCIAL HISTORY:  Social History      Socioeconomic History     Marital status:      Spouse name: None     Number of children: None     Years of education: None     Highest education level: None   Occupational History     None   Tobacco Use     Smoking status: Never Smoker     Smokeless tobacco: Never Used   Substance and Sexual Activity     Alcohol use: No     Drug use: Never     Sexual activity: None   Other Topics Concern     Parent/sibling w/ CABG, MI or angioplasty before 65F 55M? No      Service Not Asked     Blood Transfusions Not Asked     Caffeine Concern No     Occupational Exposure Not Asked     Hobby Hazards Not Asked     Sleep Concern No     Stress Concern No     Weight Concern Yes     Comment: weight loss     Special Diet No     Back Care Not Asked     Exercise Yes     Comment: bicycle 10 min, walking, 3 days week     Bike Helmet Not Asked     Seat Belt Yes     Self-Exams Not Asked   Social History Narrative     None     Social Determinants of Health     Financial Resource Strain:      Difficulty of Paying Living Expenses:    Food Insecurity:      Worried About Running Out of Food in the Last Year:      Ran Out of Food in the Last Year:    Transportation Needs:      Lack of Transportation (Medical):      Lack of Transportation (Non-Medical):    Physical Activity:      Days of Exercise per Week:      Minutes of Exercise per Session:    Stress:      Feeling of Stress :    Social Connections:      Frequency of Communication with Friends and Family:      Frequency of Social Gatherings with Friends and Family:      Attends Confucianist Services:      Active Member of Clubs or Organizations:      Attends Club or Organization Meetings:      Marital Status:    Intimate Partner Violence:      Fear of Current or Ex-Partner:      Emotionally Abused:      Physically Abused:      Sexually Abused:        Review of Systems:  Skin:  Negative       Eyes:    glasses reading glasses  ENT:  Negative postnasal drainage    Respiratory:  Negative        Cardiovascular:  Negative;palpitations;chest pain;lightheadedness;dizziness;syncope or near-syncope;cyanosis;fatigue;edema      Gastroenterology: Positive for abdominal pain left lower belly pain occas  Genitourinary:  Negative nocturia;urinary frequency    Musculoskeletal:  Negative joint pain stiffness in legs.more stiffness in legs/body  Neurologic:  Negative numbness or tingling of hands;numbness or tingling of feet sometimes get pinpoint needle tingle on legs and arms--last short periods of time  Psychiatric:  Negative sleep disturbances    Heme/Lymph/Imm:  Negative allergies    Endocrine:  Negative        Physical Exam:  Vitals: /78   Pulse 71   Wt 52.2 kg (115 lb)   SpO2 98%   BMI 20.37 kg/m      Constitutional:  well developed frail;thin      Skin:  warm and dry to the touch          Head:  not assessed this visit        Eyes:  sclera white        Lymph:      ENT:  no pallor or cyanosis        Neck:  carotid pulses are full and equal bilaterally        Respiratory:  clear to auscultation         Cardiac: normal S1 and S2   S4 no presence of murmur systolic murmur;grade 2;apical        not assessed this visit                                        GI:  not assessed this visit        Extremities and Muscular Skeletal:  no edema         ecchymosis over left flank following fall at home    Neurological:  no gross motor deficits        Psych:  Alert and Oriented x 3      Recent Lab Results:  LIPID RESULTS:  Lab Results   Component Value Date    CHOL 156 08/16/2021    CHOL 162 01/28/2019    HDL 66 08/16/2021    HDL 60 01/28/2019    LDL 68 08/16/2021    LDL 77 01/28/2019    TRIG 112 08/16/2021    TRIG 124 01/28/2019    CHOLHDLRATIO 4.3 10/13/2015       LIVER ENZYME RESULTS:  Lab Results   Component Value Date    AST 21 07/25/2017    ALT 19 08/16/2021    ALT <5 (L) 01/28/2019       CBC RESULTS:  Lab Results   Component Value Date    WBC 5.4 03/11/2020    RBC 3.96 (L) 03/11/2020    HGB 12.0 (L)  03/11/2020    HCT 35.4 (L) 03/11/2020    MCV 89 03/11/2020    MCH 30.3 03/11/2020    MCHC 33.9 03/11/2020    RDW 13.7 03/11/2020     03/11/2020       BMP RESULTS:  Lab Results   Component Value Date     08/16/2021     03/11/2020    POTASSIUM 4.5 08/16/2021    POTASSIUM 4.6 03/11/2020    CHLORIDE 107 08/16/2021    CHLORIDE 116 (H) 03/11/2020    CO2 27 08/16/2021    CO2 28 03/11/2020    ANIONGAP 2 (L) 08/16/2021    ANIONGAP <1 (L) 03/11/2020    GLC 97 08/16/2021    GLC 98 03/11/2020    BUN 25 08/16/2021    BUN 27 03/11/2020    CR 1.50 (H) 08/16/2021    CR 1.31 (H) 03/11/2020    GFRESTIMATED 41 (L) 08/16/2021    GFRESTIMATED 49 (L) 03/11/2020    GFRESTBLACK 57 (L) 03/11/2020    AURORA 8.6 08/16/2021    AURORA 8.5 03/11/2020        A1C RESULTS:  No results found for: A1C    INR RESULTS:  Lab Results   Component Value Date    INR 2.6 (H) 08/12/2021    INR 2.5 (H) 07/22/2021    INR 3.50 (H) 07/08/2021    INR 1.80 (H) 06/28/2021           CC  Sarah Izaguirre, APRN CNP  [address]  [place] [id number]

## 2021-08-17 ENCOUNTER — OFFICE VISIT (OUTPATIENT)
Dept: CARDIOLOGY | Facility: CLINIC | Age: 86
End: 2021-08-17
Attending: NURSE PRACTITIONER
Payer: MEDICARE

## 2021-08-17 VITALS
OXYGEN SATURATION: 98 % | BODY MASS INDEX: 20.37 KG/M2 | HEART RATE: 71 BPM | WEIGHT: 115 LBS | DIASTOLIC BLOOD PRESSURE: 78 MMHG | SYSTOLIC BLOOD PRESSURE: 132 MMHG

## 2021-08-17 DIAGNOSIS — E78.2 MIXED HYPERLIPIDEMIA: Primary | ICD-10-CM

## 2021-08-17 DIAGNOSIS — I25.5 CARDIOMYOPATHY, ISCHEMIC: ICD-10-CM

## 2021-08-17 PROCEDURE — 99213 OFFICE O/P EST LOW 20 MIN: CPT | Performed by: NURSE PRACTITIONER

## 2021-08-17 NOTE — LETTER
8/17/2021    Francisco Doshi MD  [address]  [facility] [address]    RE: Sawyer Renteria       Dear Colleague,    I had the pleasure of seeing Sawyer Renteria in the [facility].    History of Present Illness:        Sawyer Renteria is a 89 year old male with history of coronary artery disease.  He has a history of an anterior wall MI in 2006 resulting in an LAD stent and angioplasty to the diagonal. He has an ongoing OM-1 with 60% stenosis. 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      He returns for a follow-up and to review his echocardiogram.     He denies any chest discomfort or shortness of breath.  In January of 2020, he had a stress MRI attempted.  He was unable to tolerate it.    LV function was obtained of around 52%.  IV contrast was not administrated.     He has statin intolerant and has been on Repatha which he tolerates well.    I do not have a lipid on file since January 2019.  He continues to tolerate the Repatha well.     He has chronic renal insufficiency with creatinine stable at 1.31 as of over a year ago.  He follows with nephrology but has not seen them for quite some time.     His wife passed about five months ago. He is feeling very lonely since then.  He appears more frail than the last time I saw him     Continues to have dysuria is now on Avodart and Flomax.     Echocardiogram has been reviewed with him in detail showing LV function is stable at 45%.  In the past has been 45 to 50%.  There is no LV thrombus visible. Mild AI.     Blood pressure was slightly high when he first arrived but did improve.  He does not check his blood pressure at home as often as he used to.      Physical exam  See below     Impression/Plan:       1. Coronary artery disease with anterior wall MI status post LAD stenting with the balloon angioplasty to the diagonal branch.   Mild ischemic  cardiomyopathy   ejection fraction is stable at 45% on echo.  Last evaluation with cardiac MRI revealed EF of 52%.       2.  Ischemic cardiomyopathy with no heart failure symptoms currently.     He had a difficult time in the CMR and would prefer not to have one again  We can follow EF by echo     2. 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 He uses brand name warfarin.    No thrombus apparent on the recent echo     3. Dyslipidemia with statin intolerance.   He is tolerating Praluent with HDL of 66 and LDL of 68 .  Check lipids in one year (late summer 2022)     4.  Hypertension  - controlled on a recheck     5.  Chronic kidney disease  -Overall stable baseline 1.3 to 1.5.  - follow-up with Dr. Oneill annually; he has follow up with Dr. Oneill in November 6.  Likely benign prostate hypertrophy  On Flomax and Avodart      Overall he looks reasonably stable; Dr. Newton will see him back in six months. We will check a BMP at that time.  Dr Newton stopped into this visit and we discussed follow up.    25 minutes spent on the date of the encounter doing chart review, review of test results, patient visit, documentation and discussion with other provider(s)   Randell Izaguirre, MSN, APRN-BC, CNP  Cardiology    Orders Placed This Encounter   Procedures     Basic metabolic panel     Follow-Up with Cardiologist     No orders of the defined types were placed in this encounter.    There are no discontinued medications.      Encounter Diagnosis   Name Primary?     Cardiomyopathy, ischemic        CURRENT MEDICATIONS:  Current Outpatient Medications   Medication Sig Dispense Refill     aspirin 81 MG tablet Take 81 mg by mouth daily       dutasteride (AVODART) 0.5 MG capsule Take 1 capsule (0.5 mg) by mouth daily 30 capsule 11     evolocumab (REPATHA) 140 MG/ML prefilled autoinjector Inject 1 mL (140 mg) Subcutaneous every 14 days 6 mL 3     Fish Oil-Cholecalciferol (FISH OIL +  D3) 1606-9700 MG-UNIT CAPS Take 2 tablets by mouth daily       lisinopril (ZESTRIL) 2.5 MG tablet One tablet twice per day 180 tablet 0     melatonin 5 MG tablet Take 5 mg by mouth nightly as needed for sleep       metoprolol succinate ER (TOPROL-XL) 25 MG 24 hr tablet Take 0.5 tablets (12.5 mg) by mouth daily 45 tablet 1     tamsulosin (FLOMAX) 0.4 MG capsule Take 0.4 mg by mouth daily       warfarin ANTICOAGULANT (COUMADIN) 5 MG tablet Take 1/2 tab on Mondays, Wednesdays and Fridays and 1 tab all other days or as directed by INR clinic 80 tablet 0     Cholecalciferol (VITAMIN D3) 2000 UNITS CAPS Take by mouth daily       nitroGLYcerin (NITROSTAT) 0.4 MG sublingual tablet Place 1 tablet (0.4 mg) under the tongue every 5 minutes as needed for chest pain Take as directed (Patient not taking: Reported on 6/17/2021) 25 tablet 3       ALLERGIES     Allergies   Allergen Reactions     Flomax [Tamsulosin]      Weakness and dizzyness     Aldactone [Spironolactone]      High potassium and worsening creat when on lisinopril and spironalactone     Carvedilol      dizziness     Colesevelam      Epigastric pain     Ezetimibe      Lisinopril Itching     Hyperkalemia and inc. Creat. At 20 mg daily, itching , skin red when dose goes above 2.5 mg a day--elevated creat      Pravastatin      Abdominal pain     Rosuvastatin      Simvastatin        PAST MEDICAL HISTORY:  Past Medical History:   Diagnosis Date     BPH (benign prostatic hyperplasia)      Cardiomyopathy, ischemic     EF 49% by cardiac MRI 1/15/2014     Coronary artery disease 1/24/06    Cypher CANDIDA to LAD     Gastro-oesophageal reflux disease      Hyperlipidaemia      Hypertension      Left ventricular thrombus      Myocardial infarction (H) 1/2006    Anterior     TIA (transient ischaemic attack)        PAST SURGICAL HISTORY:  Past Surgical History:   Procedure Laterality Date     CORONARY ANGIOGRAPHY ADULT ORDER  1/24/06    Cypher CANDIDA to LAD     HEART CATH, ANGIOPLASTY   1/2006    Katharine TIRADO to LAD     TONSILLECTOMY & ADENOIDECTOMY         FAMILY HISTORY:  Family History   Problem Relation Age of Onset     Heart Disease Mother         heart failure     Heart Disease Brother        SOCIAL HISTORY:  Social History     Socioeconomic History     Marital status:      Spouse name: None     Number of children: None     Years of education: None     Highest education level: None   Occupational History     None   Tobacco Use     Smoking status: Never Smoker     Smokeless tobacco: Never Used   Substance and Sexual Activity     Alcohol use: No     Drug use: Never     Sexual activity: None   Other Topics Concern     Parent/sibling w/ CABG, MI or angioplasty before 65F 55M? No      Service Not Asked     Blood Transfusions Not Asked     Caffeine Concern No     Occupational Exposure Not Asked     Hobby Hazards Not Asked     Sleep Concern No     Stress Concern No     Weight Concern Yes     Comment: weight loss     Special Diet No     Back Care Not Asked     Exercise Yes     Comment: bicycle 10 min, walking, 3 days week     Bike Helmet Not Asked     Seat Belt Yes     Self-Exams Not Asked   Social History Narrative     None     Social Determinants of Health     Financial Resource Strain:      Difficulty of Paying Living Expenses:    Food Insecurity:      Worried About Running Out of Food in the Last Year:      Ran Out of Food in the Last Year:    Transportation Needs:      Lack of Transportation (Medical):      Lack of Transportation (Non-Medical):    Physical Activity:      Days of Exercise per Week:      Minutes of Exercise per Session:    Stress:      Feeling of Stress :    Social Connections:      Frequency of Communication with Friends and Family:      Frequency of Social Gatherings with Friends and Family:      Attends Protestant Services:      Active Member of Clubs or Organizations:      Attends Club or Organization Meetings:      Marital Status:    Intimate Partner Violence:       Fear of Current or Ex-Partner:      Emotionally Abused:      Physically Abused:      Sexually Abused:        Review of Systems:  Skin:  Negative       Eyes:    glasses reading glasses  ENT:  Negative postnasal drainage    Respiratory:  Negative       Cardiovascular:  Negative;palpitations;chest pain;lightheadedness;dizziness;syncope or near-syncope;cyanosis;fatigue;edema      Gastroenterology: Positive for abdominal pain left lower belly pain occas  Genitourinary:  Negative nocturia;urinary frequency    Musculoskeletal:  Negative joint pain stiffness in legs.more stiffness in legs/body  Neurologic:  Negative numbness or tingling of hands;numbness or tingling of feet sometimes get pinpoint needle tingle on legs and arms--last short periods of time  Psychiatric:  Negative sleep disturbances    Heme/Lymph/Imm:  Negative allergies    Endocrine:  Negative        Physical Exam:  Vitals: /78   Pulse 71   Wt 52.2 kg (115 lb)   SpO2 98%   BMI 20.37 kg/m      Constitutional:  well developed frail;thin      Skin:  warm and dry to the touch          Head:  not assessed this visit        Eyes:  sclera white        Lymph:      ENT:  no pallor or cyanosis        Neck:  carotid pulses are full and equal bilaterally        Respiratory:  clear to auscultation         Cardiac: normal S1 and S2   S4 no presence of murmur systolic murmur;grade 2;apical        not assessed this visit                                        GI:  not assessed this visit        Extremities and Muscular Skeletal:  no edema         ecchymosis over left flank following fall at home    Neurological:  no gross motor deficits        Psych:  Alert and Oriented x 3      Recent Lab Results:  LIPID RESULTS:  Lab Results   Component Value Date    CHOL 156 08/16/2021    CHOL 162 01/28/2019    HDL 66 08/16/2021    HDL 60 01/28/2019    LDL 68 08/16/2021    LDL 77 01/28/2019    TRIG 112 08/16/2021    TRIG 124 01/28/2019    CHOLHDLRATIO 4.3 10/13/2015        LIVER ENZYME RESULTS:  Lab Results   Component Value Date    AST 21 07/25/2017    ALT 19 08/16/2021    ALT <5 (L) 01/28/2019       CBC RESULTS:  Lab Results   Component Value Date    WBC 5.4 03/11/2020    RBC 3.96 (L) 03/11/2020    HGB 12.0 (L) 03/11/2020    HCT 35.4 (L) 03/11/2020    MCV 89 03/11/2020    MCH 30.3 03/11/2020    MCHC 33.9 03/11/2020    RDW 13.7 03/11/2020     03/11/2020       BMP RESULTS:  Lab Results   Component Value Date     08/16/2021     03/11/2020    POTASSIUM 4.5 08/16/2021    POTASSIUM 4.6 03/11/2020    CHLORIDE 107 08/16/2021    CHLORIDE 116 (H) 03/11/2020    CO2 27 08/16/2021    CO2 28 03/11/2020    ANIONGAP 2 (L) 08/16/2021    ANIONGAP <1 (L) 03/11/2020    GLC 97 08/16/2021    GLC 98 03/11/2020    BUN 25 08/16/2021    BUN 27 03/11/2020    CR 1.50 (H) 08/16/2021    CR 1.31 (H) 03/11/2020    GFRESTIMATED 41 (L) 08/16/2021    GFRESTIMATED 49 (L) 03/11/2020    GFRESTBLACK 57 (L) 03/11/2020    AURORA 8.6 08/16/2021    AURORA 8.5 03/11/2020        A1C RESULTS:  No results found for: A1C    INR RESULTS:  Lab Results   Component Value Date    INR 2.6 (H) 08/12/2021    INR 2.5 (H) 07/22/2021    INR 3.50 (H) 07/08/2021    INR 1.80 (H) 06/28/2021           CC  JAE Meza CNP  [address]  [place] [address]                    Thank you for allowing me to participate in the care of your patient.      Sincerely,     JAE Meza CNP     [facility]  cc:   JAE Meza CNP  [address]  [place] [id number]

## 2021-09-09 ENCOUNTER — ANTICOAGULATION THERAPY VISIT (OUTPATIENT)
Dept: CARDIOLOGY | Facility: CLINIC | Age: 86
End: 2021-09-09
Payer: MEDICARE

## 2021-09-09 ENCOUNTER — LAB (OUTPATIENT)
Dept: LAB | Facility: CLINIC | Age: 86
End: 2021-09-09
Payer: MEDICARE

## 2021-09-09 DIAGNOSIS — I51.3 LEFT VENTRICULAR THROMBUS: Primary | ICD-10-CM

## 2021-09-09 DIAGNOSIS — G45.9 TRANSIENT CEREBRAL ISCHEMIA: ICD-10-CM

## 2021-09-09 DIAGNOSIS — Z79.01 LONG TERM CURRENT USE OF ANTICOAGULANTS WITH INR GOAL OF 2.0-3.0: ICD-10-CM

## 2021-09-09 DIAGNOSIS — I23.6 LV (LEFT VENTRICULAR) MURAL THROMBUS FOLLOWING MI (H): ICD-10-CM

## 2021-09-09 DIAGNOSIS — G45.9 TRANSIENT CEREBRAL ISCHEMIA, UNSPECIFIED TYPE: ICD-10-CM

## 2021-09-09 DIAGNOSIS — I63.419 CEREBROVASCULAR ACCIDENT (CVA) DUE TO EMBOLISM OF MIDDLE CEREBRAL ARTERY, UNSPECIFIED BLOOD VESSEL LATERALITY (H): ICD-10-CM

## 2021-09-09 LAB — INR BLD: 2.7 (ref 0.9–1.1)

## 2021-09-09 PROCEDURE — 36416 COLLJ CAPILLARY BLOOD SPEC: CPT

## 2021-09-09 PROCEDURE — 85610 PROTHROMBIN TIME: CPT

## 2021-09-09 PROCEDURE — 99207 PR NO CHARGE NURSE ONLY: CPT

## 2021-09-09 NOTE — PROGRESS NOTES
ANTICOAGULATION FOLLOW-UP CLINIC VISIT    Patient Name:  Sawyer Renteria  Date:  2021  Contact Type:  Telephone    SUBJECTIVE:  Patient Findings         Clinical Outcomes     Negatives:  Major bleeding event, Thromboembolic event, Anticoagulation-related hospital admission, Anticoagulation-related ED visit, Anticoagulation-related fatality           OBJECTIVE    Recent labs: (last 7 days)     21  1338   INR 2.7*       ASSESSMENT / PLAN  No question data found.  Anticoagulation Summary  As of 2021    INR goal:  2.0-3.0   TTR:  65.4 % (1 y)   INR used for dosin.7 (2021)   Warfarin maintenance plan:  2.5 mg (5 mg x 0.5) every Mon, Wed, Fri; 5 mg (5 mg x 1) all other days   Full warfarin instructions:  2.5 mg every Mon, Wed, Fri; 5 mg all other days   Weekly warfarin total:  27.5 mg   Plan last modified:  Donna Colby RN (2021)   Next INR check:  10/7/2021   Target end date:  Indefinite    Indications    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]  Transient cerebral ischemia [G45.9]  Long term current use of anticoagulants with INR goal of 2.0-3.0 [Z79.01]  Transient cerebral ischemia  unspecified type [G45.9]             Anticoagulation Episode Summary     INR check location:      Preferred lab:      Send INR reminders to:  [facility] INR NURSE    Comments:   new home phone number: [phone]       Anticoagulation Care Providers     Provider Role Specialty Phone number    Satya Newton MD Referring Cardiovascular Disease [phone]            See the Encounter Report to view Anticoagulation Flowsheet and Dosing Calendar (Go to Encounters tab in chart review, and find the Anticoagulation Therapy Visit)    INR 2.7 No change in meds or diet (greens 1-2x/wk and 2 small bottles of suresh juice a week). Denies abnormal bleeding or bruising. Will continue current dosing of 2.5 mg MWF and 5 mg all other days with recheck in 4 weeks.  Donna Colby RN

## 2021-10-06 DIAGNOSIS — I23.6 LV (LEFT VENTRICULAR) MURAL THROMBUS FOLLOWING MI (H): ICD-10-CM

## 2021-10-06 DIAGNOSIS — G45.9 TRANSIENT CEREBRAL ISCHEMIA: ICD-10-CM

## 2021-10-06 RX ORDER — WARFARIN SODIUM 5 MG/1
TABLET ORAL
Qty: 90 TABLET | Refills: 1 | Status: SHIPPED | OUTPATIENT
Start: 2021-10-06 | End: 2022-02-17

## 2021-10-07 ENCOUNTER — ANTICOAGULATION THERAPY VISIT (OUTPATIENT)
Dept: CARDIOLOGY | Facility: CLINIC | Age: 86
End: 2021-10-07
Payer: MEDICARE

## 2021-10-07 ENCOUNTER — LAB (OUTPATIENT)
Dept: LAB | Facility: CLINIC | Age: 86
End: 2021-10-07
Payer: MEDICARE

## 2021-10-07 DIAGNOSIS — Z79.01 LONG TERM CURRENT USE OF ANTICOAGULANTS WITH INR GOAL OF 2.0-3.0: ICD-10-CM

## 2021-10-07 DIAGNOSIS — I51.3 LEFT VENTRICULAR THROMBUS: Primary | ICD-10-CM

## 2021-10-07 DIAGNOSIS — I63.419 CEREBROVASCULAR ACCIDENT (CVA) DUE TO EMBOLISM OF MIDDLE CEREBRAL ARTERY, UNSPECIFIED BLOOD VESSEL LATERALITY (H): ICD-10-CM

## 2021-10-07 DIAGNOSIS — G45.9 TRANSIENT CEREBRAL ISCHEMIA, UNSPECIFIED TYPE: ICD-10-CM

## 2021-10-07 DIAGNOSIS — I23.6 LV (LEFT VENTRICULAR) MURAL THROMBUS FOLLOWING MI (H): ICD-10-CM

## 2021-10-07 LAB — INR BLD: 3.1 (ref 0.9–1.1)

## 2021-10-07 PROCEDURE — 85610 PROTHROMBIN TIME: CPT

## 2021-10-07 PROCEDURE — 99207 PR NO CHARGE NURSE ONLY: CPT

## 2021-10-07 PROCEDURE — 36416 COLLJ CAPILLARY BLOOD SPEC: CPT

## 2021-10-07 NOTE — PROGRESS NOTES
ANTICOAGULATION FOLLOW-UP CLINIC VISIT    Patient Name:  Sawyer Renteria  Date:  10/7/2021  Contact Type:  Telephone    SUBJECTIVE:  Patient Findings         Clinical Outcomes     Negatives:  Major bleeding event, Thromboembolic event, Anticoagulation-related hospital admission, Anticoagulation-related ED visit, Anticoagulation-related fatality           OBJECTIVE    Recent labs: (last 7 days)     10/07/21  1311   INR 3.1*       ASSESSMENT / PLAN  INR assessment SUPRA    Recheck INR In: 3 WEEKS    INR Location Outside lab      Anticoagulation Summary  As of 10/7/2021    INR goal:  2.0-3.0   TTR:  66.4 % (1 y)   INR used for dosing:  3.1 (10/7/2021)   Warfarin maintenance plan:  2.5 mg (5 mg x 0.5) every Mon, Wed, Fri; 5 mg (5 mg x 1) all other days   Full warfarin instructions:  2.5 mg every Mon, Wed, Fri; 5 mg all other days   Weekly warfarin total:  27.5 mg   No change documented:  Tayla Antonio RN   Plan last modified:  Donna Cloby RN (9/9/2021)   Next INR check:  10/28/2021   Target end date:  Indefinite    Indications    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]  Transient cerebral ischemia [G45.9]  Long term current use of anticoagulants with INR goal of 2.0-3.0 [Z79.01]  Transient cerebral ischemia  unspecified type [G45.9]             Anticoagulation Episode Summary     INR check location:      Preferred lab:      Send INR reminders to:  [facility] HEART INR NURSE    Comments:  03/21 new home phone number: [phone]       Anticoagulation Care Providers     Provider Role Specialty Phone number    Satya Newton MD Referring Cardiovascular Disease [phone]            See the Encounter Report to view Anticoagulation Flowsheet and Dosing Calendar (Go to Encounters tab in chart review, and find the Anticoagulation Therapy Visit)    INR 3.1.  Called and spoke to the patient and also left message on his cell phone.  Patient said less greens recently and possibly more suresh juice.  No bleeding.   Continue same dosing schedule and recheck in 3 weeks.  He is able to eat greens today and get back on normal diet when INRs were in range the last few checks.  Turner Antonio, RN

## 2021-10-28 ENCOUNTER — ANTICOAGULATION THERAPY VISIT (OUTPATIENT)
Dept: CARDIOLOGY | Facility: CLINIC | Age: 86
End: 2021-10-28
Payer: MEDICARE

## 2021-10-28 ENCOUNTER — LAB (OUTPATIENT)
Dept: LAB | Facility: CLINIC | Age: 86
End: 2021-10-28
Payer: MEDICARE

## 2021-10-28 DIAGNOSIS — I51.3 LEFT VENTRICULAR THROMBUS: Primary | ICD-10-CM

## 2021-10-28 DIAGNOSIS — Z79.01 LONG TERM CURRENT USE OF ANTICOAGULANTS WITH INR GOAL OF 2.0-3.0: ICD-10-CM

## 2021-10-28 DIAGNOSIS — I23.6 LV (LEFT VENTRICULAR) MURAL THROMBUS FOLLOWING MI (H): ICD-10-CM

## 2021-10-28 DIAGNOSIS — G45.9 TRANSIENT CEREBRAL ISCHEMIA, UNSPECIFIED TYPE: ICD-10-CM

## 2021-10-28 DIAGNOSIS — I63.419 CEREBROVASCULAR ACCIDENT (CVA) DUE TO EMBOLISM OF MIDDLE CEREBRAL ARTERY, UNSPECIFIED BLOOD VESSEL LATERALITY (H): ICD-10-CM

## 2021-10-28 LAB — INR BLD: 2.2 (ref 0.9–1.1)

## 2021-10-28 PROCEDURE — 99207 PR NO CHARGE NURSE ONLY: CPT

## 2021-10-28 PROCEDURE — 36416 COLLJ CAPILLARY BLOOD SPEC: CPT

## 2021-10-28 PROCEDURE — 85610 PROTHROMBIN TIME: CPT

## 2021-10-28 NOTE — PROGRESS NOTES
ANTICOAGULATION FOLLOW-UP CLINIC VISIT    Patient Name:  Sawyer Renteria  Date:  10/28/2021  Contact Type:  Telephone    SUBJECTIVE:  Patient Findings         Clinical Outcomes     Negatives:  Major bleeding event, Thromboembolic event, Anticoagulation-related hospital admission, Anticoagulation-related ED visit, Anticoagulation-related fatality           OBJECTIVE    Recent labs: (last 7 days)     10/28/21  1256   INR 2.2*       ASSESSMENT / PLAN  INR assessment THER    Recheck INR In: 4 WEEKS    INR Location Outside lab      Anticoagulation Summary  As of 10/28/2021    INR goal:  2.0-3.0   TTR:  70.6 % (1 y)   INR used for dosin.2 (10/28/2021)   Warfarin maintenance plan:  2.5 mg (5 mg x 0.5) every Mon, Wed, Fri; 5 mg (5 mg x 1) all other days   Full warfarin instructions:  2.5 mg every Mon, Wed, Fri; 5 mg all other days   Weekly warfarin total:  27.5 mg   No change documented:  Tayla Antonio RN   Plan last modified:  Donna Colby RN (2021)   Next INR check:  2021   Priority:  Maintenance   Target end date:  Indefinite    Indications    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]  Transient cerebral ischemia [G45.9]  Long term current use of anticoagulants with INR goal of 2.0-3.0 [Z79.01]  Transient cerebral ischemia  unspecified type [G45.9]             Anticoagulation Episode Summary     INR check location:      Preferred lab:      Send INR reminders to:  [facility] HEART INR NURSE    Comments:   new home phone number: [phone]       Anticoagulation Care Providers     Provider Role Specialty Phone number    Satya Newton MD Referring Cardiovascular Disease [phone]            See the Encounter Report to view Anticoagulation Flowsheet and Dosing Calendar (Go to Encounters tab in chart review, and find the Anticoagulation Therapy Visit)    INR 2.2.  Called and spoke to the patient.  He increased his greens since the last check when INR was elevated.  Continue same dosing and  recheck in 4 weeks.  Turner Antonio, RN

## 2021-11-24 ENCOUNTER — ANTICOAGULATION THERAPY VISIT (OUTPATIENT)
Dept: CARDIOLOGY | Facility: CLINIC | Age: 86
End: 2021-11-24
Payer: MEDICARE

## 2021-11-24 ENCOUNTER — LAB (OUTPATIENT)
Dept: LAB | Facility: CLINIC | Age: 86
End: 2021-11-24
Payer: MEDICARE

## 2021-11-24 DIAGNOSIS — I63.419 CEREBROVASCULAR ACCIDENT (CVA) DUE TO EMBOLISM OF MIDDLE CEREBRAL ARTERY, UNSPECIFIED BLOOD VESSEL LATERALITY (H): ICD-10-CM

## 2021-11-24 DIAGNOSIS — I51.3 LEFT VENTRICULAR THROMBUS: Primary | ICD-10-CM

## 2021-11-24 DIAGNOSIS — Z79.01 LONG TERM CURRENT USE OF ANTICOAGULANTS WITH INR GOAL OF 2.0-3.0: ICD-10-CM

## 2021-11-24 DIAGNOSIS — I23.6 LV (LEFT VENTRICULAR) MURAL THROMBUS FOLLOWING MI (H): ICD-10-CM

## 2021-11-24 DIAGNOSIS — G45.9 TRANSIENT CEREBRAL ISCHEMIA, UNSPECIFIED TYPE: ICD-10-CM

## 2021-11-24 DIAGNOSIS — G45.9 TRANSIENT CEREBRAL ISCHEMIA: ICD-10-CM

## 2021-11-24 LAB — INR BLD: 2 (ref 0.9–1.1)

## 2021-11-24 PROCEDURE — 85610 PROTHROMBIN TIME: CPT

## 2021-11-24 PROCEDURE — 99207 PR NO CHARGE NURSE ONLY: CPT

## 2021-11-24 PROCEDURE — 36416 COLLJ CAPILLARY BLOOD SPEC: CPT

## 2021-11-24 NOTE — PROGRESS NOTES
ANTICOAGULATION FOLLOW-UP CLINIC VISIT    Patient Name:  Sawyer Renteria  Date:  2021  Contact Type:  Telephone    SUBJECTIVE:  Patient Findings     Positives:  Change in diet/appetite (More greens lately )        Clinical Outcomes     Negatives:  Major bleeding event, Thromboembolic event, Anticoagulation-related hospital admission, Anticoagulation-related ED visit, Anticoagulation-related fatality           OBJECTIVE    Recent labs: (last 7 days)     21  1258   INR 2.0*       ASSESSMENT / PLAN  INR assessment THER    Recheck INR In: 4 WEEKS    INR Location Outside lab      Anticoagulation Summary  As of 2021    INR goal:  2.0-3.0   TTR:  75.5 % (1 y)   INR used for dosin.0 (2021)   Warfarin maintenance plan:  2.5 mg (5 mg x 0.5) every Mon, Wed, Fri; 5 mg (5 mg x 1) all other days   Full warfarin instructions:  2.5 mg every Mon, Wed, Fri; 5 mg all other days   Weekly warfarin total:  27.5 mg   No change documented:  Kimberly Noyola RN   Plan last modified:  Donna Colby RN (2021)   Next INR check:  2021   Priority:  Maintenance   Target end date:  Indefinite    Indications    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]  Transient cerebral ischemia [G45.9]  Long term current use of anticoagulants with INR goal of 2.0-3.0 [Z79.01]  Transient cerebral ischemia  unspecified type [G45.9]             Anticoagulation Episode Summary     INR check location:      Preferred lab:      Send INR reminders to:  [facility] HEART INR NURSE    Comments:   new home phone number: [phone]       Anticoagulation Care Providers     Provider Role Specialty Phone number    Satya Newton MD Referring Cardiovascular Disease [phone]            See the Encounter Report to view Anticoagulation Flowsheet and Dosing Calendar (Go to Encounters tab in chart review, and find the Anticoagulation Therapy Visit)    INR 2.0 today at outside clinic. Spoke to patient, no abnormal bleeding/bruising. No  med changes. He had greens several days in a row this week which is why he thinks his INR is at the low end of his range. No other dietary changes. Will have patient avoid greens for the next few days and then resume normal diet going forward. Pt will continue same warfarin dosing of 2.5mg MWF and 5mg ROW and recheck in 4wks.     Kimberly Noyola RN

## 2021-12-05 ENCOUNTER — HOSPITAL ENCOUNTER (OUTPATIENT)
Facility: CLINIC | Age: 86
Setting detail: OBSERVATION
Discharge: HOME | End: 2021-12-06
Attending: NURSE PRACTITIONER | Admitting: INTERNAL MEDICINE
Payer: MEDICARE

## 2021-12-05 ENCOUNTER — APPOINTMENT (OUTPATIENT)
Dept: MRI IMAGING | Facility: CLINIC | Age: 86
End: 2021-12-05
Attending: NURSE PRACTITIONER
Payer: MEDICARE

## 2021-12-05 DIAGNOSIS — R20.0 LEFT ARM NUMBNESS: ICD-10-CM

## 2021-12-05 DIAGNOSIS — N18.9 ACUTE-ON-CHRONIC KIDNEY INJURY (H): ICD-10-CM

## 2021-12-05 DIAGNOSIS — N17.9 ACUTE-ON-CHRONIC KIDNEY INJURY (H): ICD-10-CM

## 2021-12-05 LAB
ANION GAP SERPL CALCULATED.3IONS-SCNC: 7 MMOL/L (ref 3–14)
APTT PPP: 42 SECONDS (ref 22–38)
ATRIAL RATE - MUSE: 71 BPM
BASOPHILS # BLD AUTO: 0 10E3/UL (ref 0–0.2)
BASOPHILS NFR BLD AUTO: 0 %
BUN SERPL-MCNC: 46 MG/DL (ref 7–30)
CALCIUM SERPL-MCNC: 8 MG/DL (ref 8.5–10.1)
CHLORIDE BLD-SCNC: 108 MMOL/L (ref 94–109)
CO2 SERPL-SCNC: 23 MMOL/L (ref 20–32)
CREAT SERPL-MCNC: 1.74 MG/DL (ref 0.66–1.25)
DIASTOLIC BLOOD PRESSURE - MUSE: NORMAL MMHG
EOSINOPHIL # BLD AUTO: 0.1 10E3/UL (ref 0–0.7)
EOSINOPHIL NFR BLD AUTO: 1 %
ERYTHROCYTE [DISTWIDTH] IN BLOOD BY AUTOMATED COUNT: 13.4 % (ref 10–15)
GFR SERPL CREATININE-BSD FRML MDRD: 34 ML/MIN/1.73M2
GLUCOSE BLD-MCNC: 99 MG/DL (ref 70–99)
HCT VFR BLD AUTO: 33.3 % (ref 40–53)
HGB BLD-MCNC: 10.6 G/DL (ref 13.3–17.7)
IMM GRANULOCYTES # BLD: 0.1 10E3/UL
IMM GRANULOCYTES NFR BLD: 1 %
INR PPP: 2.82 (ref 0.85–1.15)
INTERPRETATION ECG - MUSE: NORMAL
LYMPHOCYTES # BLD AUTO: 2 10E3/UL (ref 0.8–5.3)
LYMPHOCYTES NFR BLD AUTO: 25 %
MCH RBC QN AUTO: 28.6 PG (ref 26.5–33)
MCHC RBC AUTO-ENTMCNC: 31.8 G/DL (ref 31.5–36.5)
MCV RBC AUTO: 90 FL (ref 78–100)
MONOCYTES # BLD AUTO: 0.7 10E3/UL (ref 0–1.3)
MONOCYTES NFR BLD AUTO: 9 %
NEUTROPHILS # BLD AUTO: 5.2 10E3/UL (ref 1.6–8.3)
NEUTROPHILS NFR BLD AUTO: 64 %
NRBC # BLD AUTO: 0 10E3/UL
NRBC BLD AUTO-RTO: 0 /100
P AXIS - MUSE: 57 DEGREES
PLATELET # BLD AUTO: 293 10E3/UL (ref 150–450)
POTASSIUM BLD-SCNC: 4.5 MMOL/L (ref 3.4–5.3)
PR INTERVAL - MUSE: 144 MS
QRS DURATION - MUSE: 78 MS
QT - MUSE: 362 MS
QTC - MUSE: 393 MS
R AXIS - MUSE: 43 DEGREES
RBC # BLD AUTO: 3.7 10E6/UL (ref 4.4–5.9)
SARS-COV-2 RNA RESP QL NAA+PROBE: NEGATIVE
SODIUM SERPL-SCNC: 138 MMOL/L (ref 133–144)
SYSTOLIC BLOOD PRESSURE - MUSE: NORMAL MMHG
T AXIS - MUSE: 90 DEGREES
TROPONIN I SERPL HS-MCNC: 5 NG/L
VENTRICULAR RATE- MUSE: 71 BPM
WBC # BLD AUTO: 8.2 10E3/UL (ref 4–11)

## 2021-12-05 PROCEDURE — 93005 ELECTROCARDIOGRAM TRACING: CPT

## 2021-12-05 PROCEDURE — C9803 HOPD COVID-19 SPEC COLLECT: HCPCS

## 2021-12-05 PROCEDURE — G1004 CDSM NDSC: HCPCS

## 2021-12-05 PROCEDURE — 99220 PR INITIAL OBSERVATION CARE,LEVEL III: CPT | Performed by: INTERNAL MEDICINE

## 2021-12-05 PROCEDURE — 250N000011 HC RX IP 250 OP 636: Performed by: NURSE PRACTITIONER

## 2021-12-05 PROCEDURE — G0378 HOSPITAL OBSERVATION PER HR: HCPCS

## 2021-12-05 PROCEDURE — 96374 THER/PROPH/DIAG INJ IV PUSH: CPT | Mod: 59

## 2021-12-05 PROCEDURE — 80048 BASIC METABOLIC PNL TOTAL CA: CPT | Performed by: NURSE PRACTITIONER

## 2021-12-05 PROCEDURE — 250N000013 HC RX MED GY IP 250 OP 250 PS 637: Performed by: INTERNAL MEDICINE

## 2021-12-05 PROCEDURE — 70553 MRI BRAIN STEM W/O & W/DYE: CPT | Mod: MG

## 2021-12-05 PROCEDURE — 85610 PROTHROMBIN TIME: CPT | Performed by: NURSE PRACTITIONER

## 2021-12-05 PROCEDURE — 96376 TX/PRO/DX INJ SAME DRUG ADON: CPT | Mod: 59

## 2021-12-05 PROCEDURE — 36415 COLL VENOUS BLD VENIPUNCTURE: CPT | Performed by: NURSE PRACTITIONER

## 2021-12-05 PROCEDURE — 84484 ASSAY OF TROPONIN QUANT: CPT | Performed by: NURSE PRACTITIONER

## 2021-12-05 PROCEDURE — 85004 AUTOMATED DIFF WBC COUNT: CPT | Performed by: NURSE PRACTITIONER

## 2021-12-05 PROCEDURE — 70549 MR ANGIOGRAPH NECK W/O&W/DYE: CPT | Mod: MG,GZ

## 2021-12-05 PROCEDURE — 85730 THROMBOPLASTIN TIME PARTIAL: CPT | Performed by: NURSE PRACTITIONER

## 2021-12-05 PROCEDURE — 70544 MR ANGIOGRAPHY HEAD W/O DYE: CPT | Mod: MG,XS

## 2021-12-05 PROCEDURE — 87635 SARS-COV-2 COVID-19 AMP PRB: CPT | Performed by: NURSE PRACTITIONER

## 2021-12-05 PROCEDURE — 255N000002 HC RX 255 OP 636: Performed by: NURSE PRACTITIONER

## 2021-12-05 PROCEDURE — A9585 GADOBUTROL INJECTION: HCPCS | Performed by: NURSE PRACTITIONER

## 2021-12-05 PROCEDURE — 99285 EMERGENCY DEPT VISIT HI MDM: CPT | Mod: 25

## 2021-12-05 RX ORDER — LORAZEPAM 2 MG/ML
0.25 INJECTION INTRAMUSCULAR ONCE
Status: COMPLETED | OUTPATIENT
Start: 2021-12-05 | End: 2021-12-05

## 2021-12-05 RX ORDER — WARFARIN SODIUM 5 MG/1
5 TABLET ORAL
Status: COMPLETED | OUTPATIENT
Start: 2021-12-05 | End: 2021-12-05

## 2021-12-05 RX ORDER — GADOBUTROL 604.72 MG/ML
10 INJECTION INTRAVENOUS ONCE
Status: COMPLETED | OUTPATIENT
Start: 2021-12-05 | End: 2021-12-05

## 2021-12-05 RX ORDER — ONDANSETRON 4 MG/1
4 TABLET, ORALLY DISINTEGRATING ORAL EVERY 6 HOURS PRN
Status: DISCONTINUED | OUTPATIENT
Start: 2021-12-05 | End: 2021-12-06 | Stop reason: HOSPADM

## 2021-12-05 RX ORDER — ACETAMINOPHEN 500 MG
1000 TABLET ORAL ONCE
Status: DISCONTINUED | OUTPATIENT
Start: 2021-12-05 | End: 2021-12-05

## 2021-12-05 RX ORDER — DUTASTERIDE 0.5 MG/1
0.5 CAPSULE, LIQUID FILLED ORAL DAILY
Status: DISCONTINUED | OUTPATIENT
Start: 2021-12-06 | End: 2021-12-06 | Stop reason: HOSPADM

## 2021-12-05 RX ORDER — TAMSULOSIN HYDROCHLORIDE 0.4 MG/1
0.4 CAPSULE ORAL DAILY
Status: DISCONTINUED | OUTPATIENT
Start: 2021-12-06 | End: 2021-12-06 | Stop reason: HOSPADM

## 2021-12-05 RX ORDER — ONDANSETRON 2 MG/ML
4 INJECTION INTRAMUSCULAR; INTRAVENOUS EVERY 6 HOURS PRN
Status: DISCONTINUED | OUTPATIENT
Start: 2021-12-05 | End: 2021-12-06 | Stop reason: HOSPADM

## 2021-12-05 RX ORDER — LISINOPRIL 2.5 MG/1
2.5 TABLET ORAL 2 TIMES DAILY
Status: DISCONTINUED | OUTPATIENT
Start: 2021-12-05 | End: 2021-12-06 | Stop reason: HOSPADM

## 2021-12-05 RX ADMIN — LORAZEPAM 0.25 MG: 2 INJECTION INTRAMUSCULAR; INTRAVENOUS at 14:46

## 2021-12-05 RX ADMIN — LISINOPRIL 2.5 MG: 2.5 TABLET ORAL at 21:30

## 2021-12-05 RX ADMIN — WARFARIN SODIUM 5 MG: 5 TABLET ORAL at 21:32

## 2021-12-05 RX ADMIN — LORAZEPAM 0.25 MG: 2 INJECTION INTRAMUSCULAR; INTRAVENOUS at 14:19

## 2021-12-05 RX ADMIN — GADOBUTROL 10 ML: 604.72 INJECTION INTRAVENOUS at 15:39

## 2021-12-05 ASSESSMENT — MIFFLIN-ST. JEOR
SCORE: 1080.42
SCORE: 1090.41

## 2021-12-05 ASSESSMENT — ENCOUNTER SYMPTOMS
FEVER: 0
WEAKNESS: 0
NECK PAIN: 0
NUMBNESS: 1
HEADACHES: 0

## 2021-12-05 NOTE — ED NOTES
Call light answered. Patient flailing in bed. States son didn't  the phone. Patient crying and states he didn't tell anyone he was coming to the ER.

## 2021-12-05 NOTE — ED PROVIDER NOTES
"  History   Chief Complaint:  Numbness     The history is provided by the patient.      Sawyer Renteria is a 87 year old male on Coumadin with history of TIA, MI and hypertension who presents with left arm numbness. Patient reports that he woke up around 0900 with left upper extremity numbness. States that it felt normal, but as if there was \"no blood in the arm\". He is unsure if the arm was tingling and his arm was not weak. Endorses that after he rubbed it for around 10 minutes, the numbness resolved. No numbness in the hospital. No headache. No visual disturbance. No fevers or recent illness. No recent falls or trauma. No chest pain or neck pain.     He has a history of stroke which caused his symptoms this morning to be concerning to him. He did not have difficulty driving or walking this morning, following his symptoms. He adds that he went to bed around midnight last night and was well at that time. Patient is compliant with his Coumadin.     Review of Systems   Constitutional: Negative for fever.   Eyes: Negative for visual disturbance.   Cardiovascular: Negative for chest pain.   Musculoskeletal: Negative for neck pain.   Neurological: Positive for numbness. Negative for weakness and headaches.   All other systems reviewed and are negative.    Allergies:  Flomax [Tamsulosin]  Aldactone [Spironolactone]  Carvedilol  Colesevelam  Ezetimibe  Lisinopril  Pravastatin  Rosuvastatin  Simvastatin    Medications:  Repatha  Flomax  Aspirin 81 mg   Lisinopril  Metoprolol succinate ER  Nitrostat  Coumadin    Past Medical History:     BPH  Cardiomyopathy  CAD  GERD  Hyperlipidemia  Hypertension  TIA  MI    Past Surgical History:    Coronary angiography  Heart cath, angioplasty    Family History:    Mother - CHF    Social History:  Presents alone  Wife recently passed away, in March 2021    Physical Exam     Patient Vitals for the past 24 hrs:   BP Temp Temp src Pulse Resp SpO2 Height Weight   12/05/21 1302 137/51 -- -- 69 " "18 99 % -- --   12/05/21 1212 (!) 148/36 98.5  F (36.9  C) Oral 78 18 98 % 1.575 m (5' 2\") 52.6 kg (116 lb)       Physical Exam  Nursing notes reviewed. Vitals reviewed.  General: Alert. Well kept.  Eyes:  Conjunctiva non-injected, non-icteric.  Neck/Throat: Moist mucous membranes. Normal voice.  Cardiac: Regular rhythm. Normal heart sounds.  Pulmonary: Clear and equal breath sounds bilaterally.   Abdomen: soft and non-tender.  Musculoskeletal: Normal gross range of motion of all 4 extremities.   Skin: Warm and dry. Normal appearance of visualized exposed skin without rashes or petechiae.  Psych: Affect normal. Good eye contact.  Neurological:  Mental: alert and oriented x 4, follows commands, no aphasia or dysarthria.   Cranial Nerves:  CN II: visual acuity - able to accurately count fingers with each eye. Visual fields intact  CN III, IV, VI: EOM intact, pupils equal and reactive  CN V: facial sensation nl  CN VII: face symmetric, no facial droop  CN VIII: hearing normal  CN IX: palate elevation symmetric, uvula at midline  CN XI SCM normal, shoulder shrug nl  CN XII: tongue midline  Motor: Strength: 5/5 in all major groups of all extremities. Normal tone. No abnormal movements. No pronator drift b/l.  Sensory: temperature, light touch intact. Romberg: negative.  Coordination: FNF and heel-shin tests intact b/l.   Gait:  Normal.    National Institutes of Health Stroke Scale  Exam Interval: Baseline   Score    Level of consciousness: (0)   Alert, keenly responsive    LOC questions: (0)   Answers both questions correctly    LOC commands: (0)   Performs both tasks correctly    Best gaze: (0)   Normal    Visual: (0)   No visual loss    Facial palsy: (0)   Normal symmetrical movements    Motor arm (left): (0)   No drift    Motor arm (right): (0)   No drift    Motor leg (left): (0)   No drift    Motor leg (right): (0)   No drift    Limb ataxia: (0)   Absent    Sensory: (0)   Normal- no sensory loss    Best language: (0) "   Normal- no aphasia    Dysarthria: (0)   Normal    Extinction and inattention: (0)   No abnormality        Total Score:  0       Emergency Department Course   ECG  ECG obtained at 1247, ECG read at 1255  NSR  Anteroseptal infarct, age undetermined   No significant change as compared to prior, dated 01/07/20.  Rate 71 bpm. MS interval 144 ms. QRS duration 78 ms. QT/QTc 362/393 ms. P-R-T axes 57 43 90.     Imaging:  MRA Neck (Carotids) wo & w Contrast   Preliminary Result   IMPRESSION:   HEAD MRI:    1.  No acute intracranial pathology.   2.  Tiny left frontal convexity meningioma. No significant mass effect.   3.  Mild chronic small vessel ischemic disease and generalized brain parenchymal volume loss.      HEAD MRA:    1.  Normal MRA [place].      NECK MRA:   1.  Normal neck MRA.         MRA Brain ([place]) wo Contrast   Preliminary Result   IMPRESSION:   HEAD MRI:    1.  No acute intracranial pathology.   2.  Tiny left frontal convexity meningioma. No significant mass effect.   3.  Mild chronic small vessel ischemic disease and generalized brain parenchymal volume loss.      HEAD MRA:    1.  Normal MRA [place].      NECK MRA:   1.  Normal neck MRA.         MR Brain w/o & w Contrast   Preliminary Result   IMPRESSION:   HEAD MRI:    1.  No acute intracranial pathology.   2.  Tiny left frontal convexity meningioma. No significant mass effect.   3.  Mild chronic small vessel ischemic disease and generalized brain parenchymal volume loss.      HEAD MRA:    1.  Normal MRA [place].      NECK MRA:   1.  Normal neck MRA.           Report per radiology    Laboratory:  Labs Ordered and Resulted from Time of ED Arrival to Time of ED Departure   BASIC METABOLIC PANEL - Abnormal       Result Value    Sodium 138      Potassium 4.5      Chloride 108      Carbon Dioxide (CO2) 23      Anion Gap 7      Urea Nitrogen 46 (*)     Creatinine 1.74 (*)     Calcium 8.0 (*)     Glucose 99      GFR  Estimate 34 (*)    INR - Abnormal    INR 2.82 (*)    PARTIAL THROMBOPLASTIN TIME - Abnormal    aPTT 42 (*)    CBC WITH PLATELETS AND DIFFERENTIAL - Abnormal    WBC Count 8.2      RBC Count 3.70 (*)     Hemoglobin 10.6 (*)     Hematocrit 33.3 (*)     MCV 90      MCH 28.6      MCHC 31.8      RDW 13.4      Platelet Count 293      % Neutrophils 64      % Lymphocytes 25      % Monocytes 9      % Eosinophils 1      % Basophils 0      % Immature Granulocytes 1      NRBCs per 100 WBC 0      Absolute Neutrophils 5.2      Absolute Lymphocytes 2.0      Absolute Monocytes 0.7      Absolute Eosinophils 0.1      Absolute Basophils 0.0      Absolute Immature Granulocytes 0.1      Absolute NRBCs 0.0     TROPONIN I - Normal    Troponin I High Sensitivity 5     COVID-19 VIRUS (CORONAVIRUS) BY PCR - Normal    SARS CoV2 PCR Negative        Emergency Department Course:  Reviewed:  I reviewed nursing notes, vitals, past medical history and Care Everywhere    Assessments:  1229 I obtained history and examined the patient as noted above.   1443 I rechecked the patient and we discussed plan.   1521    Rechecked.   1618    Rechecked and updated on MRI findings.   1636    I paged out to stroke neuro, regarding MRI findings.   1642    I attempted to call the patient's son, Renae, for the third time, with no response.     Consults:  1234 I spoke with Dr. Fredo Seals, of stroke neurology, regarding this patient and course of treatment recommended.   1240 Dr. Seals called back and would like an MRI instead of CT/CTA.  1640 I consulted stroke neurology regarding the patient and their recommended treatment plan.   1659 I spoke with Dr. Zazueta, of the hospital service, regarding admission.     Interventions:  1419 Ativan, 0.25 mg, IV  1446 Ativan, 0.25 mg, IV    Disposition:  The patient was admitted to the hospital under the care of Dr. Zazueta.     Impression & Plan   Medical Decision Making:  Sawyer Renteria is a 87 year old male on Coumadin with  history of TIA, MI and hypertension who presents with left arm numbness. On exam the patient has an NIH stroke scale of zero. He does have a history of CVA. I spoke with stroke neurology who recommended MR LILO. Patient agreed to plan. EKG negative for acute ischemia. Troponin returns negative. Covid testing is also negative. The patient's INR is therapeutic at 2.82. He has no signs of infection with no leukocytosis or fever. His creatinine is above his baseline at 1.74 with BUN of 46 with his prior 1.5 creat/25 BUN. He has no electrolyte abnormalities. While in the MRI patient had significant anxiety and was unable to tolerate MRI, therefore he was treated with Ativan and did require a second dose to complete his MRI. MRI does return with no acute intracranial pathology there is a left frontal likely meningioma which was discussed with the patient and chronic small vessel ischemic disease. The patient does live alone and drove his car to the emergency department. I attempted to contact his family member multiple times in an attempt to discharge patient home. He is not appropriate to be discharged home or to drive due to Ativan. I spoke with Dr. Zazueta, hospitalist who accepts patient for observation admission. Patient is in agreement with this plan.    Covid-19  Sawyer Renteria was evaluated during a global COVID-19 pandemic, which necessitated consideration that the patient might be at risk for infection with the SARS-CoV-2 virus that causes COVID-19.   Applicable protocols for evaluation were followed during the patient's care.   COVID-19 was considered as part of the patient's evaluation. The plan for testing is:  a test was obtained during this visit.    Diagnosis:    ICD-10-CM    1. Left arm numbness  R20.0     resolved   2. Acute-on-chronic kidney injury (H)  N17.9     N18.9        Scribe Disclosure:  Ilda NAGY, roseline serving as a scribe at 12:21 PM on 12/5/2021 to document services personally performed by  Fani Banks, DEBBIE based on my observations and the provider's statements to me.            Fani Banks, CNP  12/05/21 0471

## 2021-12-05 NOTE — ED TRIAGE NOTES
Went to bed at midnight/1am was normal. Woke up around 10am with LUE numbness, pt states he kept rubbing his arm for 10 mins and then numbness resolved. Denies any numbness now

## 2021-12-05 NOTE — ED NOTES
"[facility]  ED Nurse Handoff Report    ED Chief complaint: Numbness      ED Diagnosis:   Final diagnoses:   None       Code Status: Full Code    Allergies:   Allergies   Allergen Reactions     Flomax [Tamsulosin]      Weakness and dizzyness     Aldactone [Spironolactone]      High potassium and worsening creat when on lisinopril and spironalactone     Carvedilol      dizziness     Colesevelam      Epigastric pain     Ezetimibe      Lisinopril Itching     Hyperkalemia and inc. Creat. At 20 mg daily, itching , skin red when dose goes above 2.5 mg a day--elevated creat      Pravastatin      Abdominal pain     Rosuvastatin      Simvastatin        Patient Story: numbness  Focused Assessment:  87 year old male on Coumadin with history of TIA, MI and hypertension who presents with left arm numbness. Patient reports that he woke up around 0900 with left upper extremity numbness. States that it felt normal, but as if there was \"no blood in the arm\". He is unsure if the arm was tingling and his arm was not weak. Endorses that after he rubbed it for around 10 minutes, the numbness resolved. No numbness in the hospital. No headache. No visual disturbance. No fevers or recent illness. No recent falls or trauma. No chest pain or neck pain.      He has a history of stroke which caused his symptoms this morning to be concerning to him. He did not have difficulty driving or walking this morning, following his symptoms. He adds that he went to bed around midnight last night and was well at that time. Patient is compliant with his Coumadin.  Of note, pt's spouse recently passed and pt expresses feeling very lonely.    Treatments and/or interventions provided: ativan  Patient's response to treatments and/or interventions: stable    To be done/followed up on inpatient unit:        Does this patient have any cognitive concerns?: Forgetful    Activity level - Baseline/Home:  Independent  Activity Level - Current:   Stand " "with Assist    Patient's Preferred language: English   Needed?: No    Isolation: None  Infection: Not Applicable  Patient tested for COVID 19 prior to admission: YES  Bariatric?: No    Vital Signs:   Vitals:    12/05/21 1212 12/05/21 1302   BP: (!) 148/36 137/51   Pulse: 78 69   Resp: 18 18   Temp: 98.5  F (36.9  C)    TempSrc: Oral    SpO2: 98% 99%   Weight: 52.6 kg (116 lb)    Height: 1.575 m (5' 2\")        Cardiac Rhythm:     Was the PSS-3 completed:   Yes  What interventions are required if any?               Family Comments:     OBS brochure/video discussed/provided to patient/family: N/A              Name of person given brochure if not patient:                 Relationship to patient:       For the majority of the shift this patient's behavior was Green.   Behavioral interventions performed were   .    ED NURSE PHONE NUMBER: [phone]         "

## 2021-12-05 NOTE — ED NOTES
Patient states had a brief episode of left arm numbness and difficulty moving for 10 minutes. Denies numbness currently. MRI checklist reviewed and signed by patient.

## 2021-12-05 NOTE — CONSULTS
"    [facility]    Stroke Telephone Note    I was called by Fani Banks on 12/05/21 regarding patient Sawyer Renteria. The patient is a 87 year old male with history of cardiac thrombus on Warfarin, hx of TIA, who presents to ED for evaluation of transient LUE numbness. Per report, patient noted symptoms starting around 9 AM this morning. In ED symptom resolved with NIH of 0.     LKW: 12 AM, initial BP in ED: 148/36     Imaging Findings   Pending     Impression  87 year old male who is anticoagulated for hx of cardiac thrombus presents to ED for evaluation of LUE numbness. Images pending     Recommendations   -MRI brain, MRA head/neck    My recommendations are based on the information provided over the phone by Sawyer Renteria's in-person providers. They are not intended to replace the clinical judgment of his in-person providers. I was not requested to personally see or examine the patient at this time.    Loren Rodrigues PA-C   Neurology  To page me or covering stroke neurology team member, click here: AMCOM   Choose \"On Call\" tab at top, then search dropdown box for \"Neurology Adult\", select location, press Enter, then look for stroke/neuro ICU/telestroke.         "

## 2021-12-06 ENCOUNTER — APPOINTMENT (OUTPATIENT)
Dept: CARDIOLOGY | Facility: CLINIC | Age: 86
End: 2021-12-06
Attending: INTERNAL MEDICINE
Payer: MEDICARE

## 2021-12-06 VITALS
TEMPERATURE: 97.8 F | HEIGHT: 63 IN | OXYGEN SATURATION: 99 % | RESPIRATION RATE: 18 BRPM | BODY MASS INDEX: 20.32 KG/M2 | WEIGHT: 114.7 LBS | SYSTOLIC BLOOD PRESSURE: 113 MMHG | DIASTOLIC BLOOD PRESSURE: 45 MMHG | HEART RATE: 69 BPM

## 2021-12-06 LAB
INR PPP: 2.82 (ref 0.85–1.15)
LVEF ECHO: NORMAL

## 2021-12-06 PROCEDURE — 99217 PR OBSERVATION CARE DISCHARGE: CPT | Performed by: PHYSICIAN ASSISTANT

## 2021-12-06 PROCEDURE — 93306 TTE W/DOPPLER COMPLETE: CPT | Mod: 26 | Performed by: INTERNAL MEDICINE

## 2021-12-06 PROCEDURE — 36415 COLL VENOUS BLD VENIPUNCTURE: CPT | Performed by: INTERNAL MEDICINE

## 2021-12-06 PROCEDURE — 250N000013 HC RX MED GY IP 250 OP 250 PS 637: Performed by: INTERNAL MEDICINE

## 2021-12-06 PROCEDURE — G0378 HOSPITAL OBSERVATION PER HR: HCPCS

## 2021-12-06 PROCEDURE — 255N000002 HC RX 255 OP 636: Performed by: INTERNAL MEDICINE

## 2021-12-06 PROCEDURE — 85610 PROTHROMBIN TIME: CPT | Performed by: INTERNAL MEDICINE

## 2021-12-06 PROCEDURE — 999N000208 ECHOCARDIOGRAM COMPLETE

## 2021-12-06 RX ORDER — WARFARIN SODIUM 2.5 MG/1
2.5 TABLET ORAL
Status: DISCONTINUED | OUTPATIENT
Start: 2021-12-06 | End: 2021-12-06 | Stop reason: HOSPADM

## 2021-12-06 RX ADMIN — METOPROLOL SUCCINATE 12.5 MG: 25 TABLET, EXTENDED RELEASE ORAL at 08:58

## 2021-12-06 RX ADMIN — LISINOPRIL 2.5 MG: 2.5 TABLET ORAL at 08:58

## 2021-12-06 RX ADMIN — TAMSULOSIN HYDROCHLORIDE 0.4 MG: 0.4 CAPSULE ORAL at 08:58

## 2021-12-06 RX ADMIN — HUMAN ALBUMIN MICROSPHERES AND PERFLUTREN 9 ML: 10; .22 INJECTION, SOLUTION INTRAVENOUS at 11:08

## 2021-12-06 RX ADMIN — DUTASTERIDE 0.5 MG: 0.5 CAPSULE, LIQUID FILLED ORAL at 08:58

## 2021-12-06 NOTE — PLAN OF CARE
Pt A&OX4. VSS on RA. Denies pain. Ambulates ind. PIV removed. AVS printed and explained. Question answered. No medication changes. Explained to patient to take Coumadin and Lisinopril in the evening. and Pt neighbor coming to pick at 1700. Discharged at door 6, 5pm.

## 2021-12-06 NOTE — PROGRESS NOTES
Security collected pt's car and house keys as pt's neighbor, Hugh Patino, came to [facility] to drive pt's car home and do some home cares for pt. Per pt, Hugh will probably be the person to pick him up at discharge.

## 2021-12-06 NOTE — PLAN OF CARE
Pt admitted an oriented to unit and plan of care, alert and oriented, vss ofers no c/o . Neuros wnl. To have echo in am. Tele NSR cont to monitor,

## 2021-12-06 NOTE — PROGRESS NOTES
OBS GOALS    -diagnostic tests and consults completed and resulted: NOT MET; echo in AM    -vital signs normal or at patient baseline: MET

## 2021-12-06 NOTE — PROGRESS NOTES
"PRIMARY DIAGNOSIS: \"GENERIC\" NURSING  OUTPATIENT/OBSERVATION GOALS TO BE MET BEFORE DISCHARGE:  1. ADLs back to baseline: Yes    2. Activity and level of assistance: Ambulating independently.    3. Pain status: Pain free.    4. Return to near baseline physical activity: Yes     Discharge Planner Nurse   Safe discharge environment identified: Yes  Barriers to discharge: Yes, results of echo pending        Entered by: Yecenia Resendiz 12/06/2021 12:16 PM     Please review provider order for any additional goals.   Nurse to notify provider when observation goals have been met and patient is ready for discharge.  "

## 2021-12-06 NOTE — PLAN OF CARE
Patient alert and oriented x4, VSS on RA, up independent, tolerating diet, continent bowel and bladder, tele NSR, INR elevated again today - 2.82, piv x1 - SL, neuros intact, denies numbness. Echo w/ bubble study completed this AM and has been read - hospitalist notified. Discharge this evening, ride will be here at 5pm, door 6.

## 2021-12-06 NOTE — PHARMACY-ANTICOAGULATION SERVICE
Clinical Pharmacy - Warfarin Dosing Consult     Pharmacy has been consulted to manage this patient s warfarin therapy.  Indication: Other - specify in comments  Therapy Goal: INR 2-3  Warfarin Prior to Admission: Yes  Warfarin PTA Regimen: 2.5 mg every Mon, Wed, Fri; 5 mg all other days  Dose Comments: Ventricular thrombus    INR   Date Value Ref Range Status   12/05/2021 2.82 (H) 0.85 - 1.15 Final   11/24/2021 2.0 (H) 0.9 - 1.1 Final       Recommend warfarin 5 mg today.  Pharmacy will monitor Sawyer DOLAN Myra daily and order warfarin doses to achieve specified goal.      Please contact pharmacy as soon as possible if the warfarin needs to be held for a procedure or if the warfarin goals change.

## 2021-12-06 NOTE — PROGRESS NOTES
"PRIMARY DIAGNOSIS: \"GENERIC\" NURSING  OUTPATIENT/OBSERVATION GOALS TO BE MET BEFORE DISCHARGE:  1. ADLs back to baseline: Yes    2. Activity and level of assistance: Ambulating independently.    3. Pain status: Pain free.    4. Return to near baseline physical activity: Yes     Discharge Planner Nurse   Safe discharge environment identified: Yes  Barriers to discharge: Yes - echo w/ bubble study to be done prior to leaving        Entered by: Yecenia Resendiz 12/06/2021 9:25 AM     Please review provider order for any additional goals.   Nurse to notify provider when observation goals have been met and patient is ready for discharge.  "

## 2021-12-06 NOTE — PLAN OF CARE
A&Ox4. VSS on RA. Tele: SR. Very pleasant. All neuros intact. Denies pain/numbness/tingling/SOB. Fadi reg diet. IV SL. Up SBA. AM INR pending, yesterday was 2.82 (takes Warfarin). Plan for echo w/ bubble study today. Pt says his neighbor, Hugh Patino, is likely who will be picking him up when cleared for discharge.

## 2021-12-06 NOTE — PROGRESS NOTES
RECEIVING UNIT ED HANDOFF REVIEW    ED Nurse Handoff Report was reviewed by: Allison Aldridge RN on December 5, 2021 at 6:30 PM

## 2021-12-06 NOTE — H&P
"[facility]    History and Physical - Hospitalist Service       Date of Admission:  12/5/2021    Assessment & Plan      Sawyer Renteria is a 87 year old male with a history of CAD, ischemic cardiomyopathy, HTN, HLP, TIA And LV thrombus who presented to the ED on 12/5/2021 after an episode of left arm numbness/tingling     Left arm numbness/tingling, suspect 2/2 paresthesia from lack of blood flow  H/o TIA   This morning on waking the patient had an episode of numbness/tingling traveling down his left arm.  This lasted for approximately 10 minutes.  He believe it was due to \"no blood in the arm.\"  No weakness or decreased  strength noted.  No other neurologic symptoms.  Given his history of TIA he decided to drive himself in to the ED.  Upon arrival here stroke neurology was consulted and they recommended a MRI.  Unfortunately the patient became agitated and required sedation to obtain the MRI.  This was approximately 1-2 hours prior to admission.  Patient had driven himself in and the ED staff was unable to find a family member to come get the patient and this is his primary reason for admission to observation   * MRI brain:  1.  No acute intracranial pathology.  2.  Tiny left frontal convexity meningioma. No significant mass effect.  3.  Mild chronic small vessel ischemic disease and generalized brain parenchymal volume loss.  * MRA Head/Neck;  Normal [place].  Normal neck   - Admission to observation  - Will repeat echocardiogram given his history of LV thrombus  - Further evaluation as below     H/o LV thrombus   On coumadin. INR 2.82  - Pharmacy to dose coumadin     H/o CAD   Ischemic cardiomyopathy, not decompensated   HTN/HLP   No issues currently   - PTA Lisinopril, Toprol XL       Diet:   Regular diet   DVT Prophylaxis: Warfarin  Jose Catheter: Not present  Central Lines: None  Code Status:   Full code     Clinically Significant Risk Factors Present on Admission          " "   # Coagulation Defect: home medication list includes an anticoagulant medication  # Platelet Defect: home medication list includes an antiplatelet medication      Disposition Plan   Expected discharge: Tomorrow after echocardiogram      The patient's care was discussed with the Patient and ED provider.    Curtis Zazueta [facility]  Securely message with the Vocera Web Console (learn more here)  Text page via [employer] Paging/Directory        ______________________________________________________________________    Chief Complaint   Left arm numbness/tingling     History is obtained from the patient    History of Present Illness   Sawyer Renteria is a 87 year old male with a history of CAD, ischemic cardiomyopathy, HTN, HLP, TIA And LV thrombus who presented to the ED on 12/5/2021 after an episode of left arm numbness/tingling.  This morning on waking the patient had an episode of numbness/tingling traveling down his left arm.  This lasted for approximately 10 minutes.  He believe it was due to \"no blood in the arm.\"  No weakness or decreased  strength noted.  No other neurologic symptoms.  Given his history of TIA he decided to drive himself in to the ED.  No symptoms while in the ED and back to baseline.  He denies any fevers, chills, chest pain, SOB, abdominal pain, N/V/D or urinary issues.  No other complaints     Review of Systems    The 10 point Review of Systems is negative other than noted in the HPI    Past Medical History    I have reviewed this patient's medical history and updated it with pertinent information if needed.   Past Medical History:   Diagnosis Date     BPH (benign prostatic hyperplasia)      Cardiomyopathy, ischemic     EF 49% by cardiac MRI 1/15/2014     Coronary artery disease 1/24/06    Cypher CANDIDA to LAD     Gastro-oesophageal reflux disease      Hyperlipidaemia      Hypertension      Left ventricular thrombus      Myocardial infarction (H) 1/2006    " Anterior     TIA (transient ischaemic attack)        Past Surgical History   I have reviewed this patient's surgical history and updated it with pertinent information if needed.  Past Surgical History:   Procedure Laterality Date     CORONARY ANGIOGRAPHY ADULT ORDER  1/24/06    Cypher TIRADO to [facility]     HEART CATH, ANGIOPLASTY  1/2006    Katharine CANDIDA to LAD     TONSILLECTOMY & ADENOIDECTOMY         Social History   I have reviewed this patient's social history and updated it with pertinent information if needed.  Social History     Tobacco Use     Smoking status: Never Smoker     Smokeless tobacco: Never Used   Substance Use Topics     Alcohol use: No     Drug use: Never       Family History   I have reviewed this patient's family history and updated it with pertinent information if needed.  Family History   Problem Relation Age of Onset     Heart Disease Mother         heart failure     Heart Disease Brother        Prior to Admission Medications   Prior to Admission Medications   Prescriptions Last Dose Informant Patient Reported? Taking?   Cholecalciferol (VITAMIN D3) 2000 UNITS CAPS   Yes Yes   Sig: Take by mouth daily   Fish Oil-Cholecalciferol (FISH OIL + D3) 5999-6140 MG-UNIT CAPS   Yes Yes   Sig: Take 2 tablets by mouth daily   aspirin 81 MG tablet   Yes Yes   Sig: Take 81 mg by mouth daily   dutasteride (AVODART) 0.5 MG capsule   No Yes   Sig: Take 1 capsule (0.5 mg) by mouth daily   evolocumab (REPATHA) 140 MG/ML prefilled autoinjector   No Yes   Sig: Inject 1 mL (140 mg) Subcutaneous every 14 days   lisinopril (ZESTRIL) 2.5 MG tablet   No Yes   Sig: One tablet twice per day   melatonin 5 MG tablet   Yes Yes   Sig: Take 5 mg by mouth nightly as needed for sleep   metoprolol succinate ER (TOPROL-XL) 25 MG 24 hr tablet   No Yes   Sig: Take 0.5 tablets (12.5 mg) by mouth daily   nitroGLYcerin (NITROSTAT) 0.4 MG sublingual tablet   No Yes   Sig: Place 1 tablet (0.4 mg) under the tongue every 5 minutes as needed for  chest pain Take as directed   tamsulosin (FLOMAX) 0.4 MG capsule   Yes Yes   Sig: Take 0.4 mg by mouth daily   warfarin ANTICOAGULANT (COUMADIN) 5 MG tablet   No Yes   Sig: Take 1/2 tab on Mondays, Wednesdays and Fridays and 1 tab all other days or as directed by INR clinic      Facility-Administered Medications: None     Allergies   Allergies   Allergen Reactions     Flomax [Tamsulosin]      Weakness and dizzyness     Aldactone [Spironolactone]      High potassium and worsening creat when on lisinopril and spironalactone     Carvedilol      dizziness     Colesevelam      Epigastric pain     Ezetimibe      Lisinopril Itching     Hyperkalemia and inc. Creat. At 20 mg daily, itching , skin red when dose goes above 2.5 mg a day--elevated creat      Pravastatin      Abdominal pain     Rosuvastatin      Simvastatin        Physical Exam   Vital Signs: Temp: 98.5  F (36.9  C) Temp src: Oral BP: 137/51 Pulse: 69   Resp: 18 SpO2: 99 % O2 Device: None (Room air)    Weight: 116 lbs 0 oz    General Appearance: Resting comfortably. NAD   Eyes: EOMI.  Normal conjuctiva  HEENT: NC/AT.  Moist mucous membranes  Respiratory: Clear to auscultation.  No respiratory distress  Cardiovascular: RRR.  No obvious murmurs  GI: Soft.  Non-distended  Skin: No obvious rashes or cyanosis  Musculoskeletal: No edema.  No calf tenderness  Neurologic: CN appear intact.  Moving all extremities grossly   Psychiatric: Alert.  Pleasant     Data   Data reviewed today: I reviewed all medications, new labs and imaging results over the last 24 hours. I personally reviewed MRI as below  EKG:  Sinus rhythm.  Rate 71 BPM.      Recent Labs   Lab 12/05/21  1259   WBC 8.2   HGB 10.6*   MCV 90      INR 2.82*      POTASSIUM 4.5   CHLORIDE 108   CO2 23   BUN 46*   CR 1.74*   ANIONGAP 7   AURORA 8.0*   GLC 99     Recent Results (from the past 24 hour(s))   MR Brain w/o & w Contrast    Narrative    EXAM: MR BRAIN WITHOUT AND WITH CONTRAST, MRA NECK (CAROTIDS)  WITHOUT AND WITH CONTRAST, MRA BRAIN ([place] OF [place]) WITHOUT CONTRAST  LOCATION: [facility]  DATE/TIME: 12/05/2021, 3:17 PM    INDICATION: Acute neuro deficit, stroke suspected.  COMPARISON: None.  CONTRAST: 10 mL Gadavist.  TECHNIQUE:   1) Routine multiplanar multisequence head MRI without and with intravenous contrast.  2) 3D time-of-flight head MRA without intravenous contrast.  3) Neck MRA without and with IV contrast. Stenosis measurements made according to NASCET criteria unless otherwise specified.    FINDINGS:  HEAD MRI:  INTRACRANIAL CONTENTS: No acute or subacute infarct. No acute hemorrhage or extra-axial fluid collections. There is a 10 x 6 x 8 mm solid nodular enhancing dural based mass overlying the left middle frontal gyrus, consistent with a meningioma. No   significant mass effect or associated reactive brain vasogenic edema. Scattered nonspecific T2/FLAIR hyperintensities within the cerebral white matter most consistent with mild chronic microvascular ischemic change. Mild generalized cerebral atrophy. No   hydrocephalus. Normal position of the cerebellar tonsils.     SELLA: No abnormality accounting for technique.    OSSEOUS STRUCTURES/SOFT TISSUES: Normal marrow signal. The major intracranial vascular flow-voids are maintained.     ORBITS: No abnormality accounting for technique.     SINUSES/MASTOIDS: Mild mucosal thickening scattered about the paranasal sinuses. No middle ear or mastoid effusion.     HEAD MRA:   ANTERIOR CIRCULATION: No stenosis/occlusion, aneurysm, or high-flow vascular malformation. Standard [place] of [place] anatomy.    POSTERIOR CIRCULATION: No stenosis/occlusion, aneurysm, or high-flow vascular malformation. Balanced vertebral arteries supply a normal basilar artery.     NECK MRA:   RIGHT CAROTID: No measurable stenosis or dissection.    LEFT CAROTID: No measurable stenosis or dissection.    VERTEBRAL ARTERIES: No focal stenosis or dissection.  Balanced vertebral arteries.    AORTIC ARCH: Classic aortic arch anatomy with no significant stenosis at the origin of the great vessels.      Impression    IMPRESSION:  HEAD MRI:   1.  No acute intracranial pathology.  2.  Tiny left frontal convexity meningioma. No significant mass effect.  3.  Mild chronic small vessel ischemic disease and generalized brain parenchymal volume loss.    HEAD MRA:   1.  Normal MRA [place] of [place].    NECK MRA:  1.  Normal neck MRA.     MRA Brain ([place] of [place]) wo Contrast    Narrative    EXAM: MR BRAIN WITHOUT AND WITH CONTRAST, MRA NECK (CAROTIDS) WITHOUT AND WITH CONTRAST, MRA BRAIN ([place] OF [place]) WITHOUT CONTRAST  LOCATION: [facility]  DATE/TIME: 12/05/2021, 3:17 PM    INDICATION: Acute neuro deficit, stroke suspected.  COMPARISON: None.  CONTRAST: 10 mL Gadavist.  TECHNIQUE:   1) Routine multiplanar multisequence head MRI without and with intravenous contrast.  2) 3D time-of-flight head MRA without intravenous contrast.  3) Neck MRA without and with IV contrast. Stenosis measurements made according to NASCET criteria unless otherwise specified.    FINDINGS:  HEAD MRI:  INTRACRANIAL CONTENTS: No acute or subacute infarct. No acute hemorrhage or extra-axial fluid collections. There is a 10 x 6 x 8 mm solid nodular enhancing dural based mass overlying the left middle frontal gyrus, consistent with a meningioma. No   significant mass effect or associated reactive brain vasogenic edema. Scattered nonspecific T2/FLAIR hyperintensities within the cerebral white matter most consistent with mild chronic microvascular ischemic change. Mild generalized cerebral atrophy. No   hydrocephalus. Normal position of the cerebellar tonsils.     SELLA: No abnormality accounting for technique.    OSSEOUS STRUCTURES/SOFT TISSUES: Normal marrow signal. The major intracranial vascular flow-voids are maintained.     ORBITS: No abnormality accounting for technique.      SINUSES/MASTOIDS: Mild mucosal thickening scattered about the paranasal sinuses. No middle ear or mastoid effusion.     HEAD MRA:   ANTERIOR CIRCULATION: No stenosis/occlusion, aneurysm, or high-flow vascular malformation. Standard [place] of [place] anatomy.    POSTERIOR CIRCULATION: No stenosis/occlusion, aneurysm, or high-flow vascular malformation. Balanced vertebral arteries supply a normal basilar artery.     NECK MRA:   RIGHT CAROTID: No measurable stenosis or dissection.    LEFT CAROTID: No measurable stenosis or dissection.    VERTEBRAL ARTERIES: No focal stenosis or dissection. Balanced vertebral arteries.    AORTIC ARCH: Classic aortic arch anatomy with no significant stenosis at the origin of the great vessels.      Impression    IMPRESSION:  HEAD MRI:   1.  No acute intracranial pathology.  2.  Tiny left frontal convexity meningioma. No significant mass effect.  3.  Mild chronic small vessel ischemic disease and generalized brain parenchymal volume loss.    HEAD MRA:   1.  Normal MRA [place] of [place].    NECK MRA:  1.  Normal neck MRA.     MRA Neck (Carotids) wo & w Contrast    Narrative    EXAM: MR BRAIN WITHOUT AND WITH CONTRAST, MRA NECK (CAROTIDS) WITHOUT AND WITH CONTRAST, MRA BRAIN ([place] OF [place]) WITHOUT CONTRAST  LOCATION: [facility]  DATE/TIME: 12/05/2021, 3:17 PM    INDICATION: Acute neuro deficit, stroke suspected.  COMPARISON: None.  CONTRAST: 10 mL Gadavist.  TECHNIQUE:   1) Routine multiplanar multisequence head MRI without and with intravenous contrast.  2) 3D time-of-flight head MRA without intravenous contrast.  3) Neck MRA without and with IV contrast. Stenosis measurements made according to NASCET criteria unless otherwise specified.    FINDINGS:  HEAD MRI:  INTRACRANIAL CONTENTS: No acute or subacute infarct. No acute hemorrhage or extra-axial fluid collections. There is a 10 x 6 x 8 mm solid nodular enhancing dural based mass overlying the left middle  frontal gyrus, consistent with a meningioma. No   significant mass effect or associated reactive brain vasogenic edema. Scattered nonspecific T2/FLAIR hyperintensities within the cerebral white matter most consistent with mild chronic microvascular ischemic change. Mild generalized cerebral atrophy. No   hydrocephalus. Normal position of the cerebellar tonsils.     SELLA: No abnormality accounting for technique.    OSSEOUS STRUCTURES/SOFT TISSUES: Normal marrow signal. The major intracranial vascular flow-voids are maintained.     ORBITS: No abnormality accounting for technique.     SINUSES/MASTOIDS: Mild mucosal thickening scattered about the paranasal sinuses. No middle ear or mastoid effusion.     HEAD MRA:   ANTERIOR CIRCULATION: No stenosis/occlusion, aneurysm, or high-flow vascular malformation. Standard [place] of [place] anatomy.    POSTERIOR CIRCULATION: No stenosis/occlusion, aneurysm, or high-flow vascular malformation. Balanced vertebral arteries supply a normal basilar artery.     NECK MRA:   RIGHT CAROTID: No measurable stenosis or dissection.    LEFT CAROTID: No measurable stenosis or dissection.    VERTEBRAL ARTERIES: No focal stenosis or dissection. Balanced vertebral arteries.    AORTIC ARCH: Classic aortic arch anatomy with no significant stenosis at the origin of the great vessels.      Impression    IMPRESSION:  HEAD MRI:   1.  No acute intracranial pathology.  2.  Tiny left frontal convexity meningioma. No significant mass effect.  3.  Mild chronic small vessel ischemic disease and generalized brain parenchymal volume loss.    HEAD MRA:   1.  Normal MRA [place] of [place].    NECK MRA:  1.  Normal neck MRA.

## 2021-12-06 NOTE — DISCHARGE SUMMARY
"[facility]  Hospitalist Discharge Summary      Date of Admission:  12/5/2021  Date of Discharge:  12/6/2021  Discharging Provider: Whitney Tuttle PA-C      Discharge Diagnoses   Transient left arm numbness/tingling, suspect 2/2 paresthesia from reduced blood flow upon waking, resolved    Follow-ups Needed After Discharge   Follow-up Appointments     Follow-up and recommended labs and tests       Follow up with primary care provider, Francisco Doshi, within 7 days for   hospital follow- up.  No follow up labs or test are needed.    Follow up with Cardiology as scheduled.             Discharge Disposition   Discharged to home  Condition at discharge: Stable    Hospital Course   Sawyer Renteria is a 87 year old male with a history of CAD, ischemic cardiomyopathy, HTN, HLP, TIA And LV thrombus who presented to the ED on 12/5/2021 after an episode of left arm numbness/tingling.  For full HPI please see admission H&P from Dr. Curtis Zazueta dated 12/5/2021.     Left arm numbness/tingling, suspect 2/2 paresthesia from lack of blood flow  H/o TIA   This morning on waking the patient had an episode of numbness/tingling traveling down his left arm.  This lasted for approximately 10 minutes.  He believe it was due to \"no blood in the arm.\"  No weakness or decreased  strength noted.  No other neurologic symptoms.  Given his history of TIA he decided to drive himself in to the ED.  Upon arrival here stroke neurology was consulted and they recommended a MRI.  Unfortunately the patient became agitated and required sedation to obtain the MRI.  This was approximately 1-2 hours prior to admission.  Patient had driven himself in and the ED staff was unable to find a family member to come get the patient and this is his primary reason for admission to observation   * MRI brain:  1.  No acute intracranial pathology.  2.  Tiny left frontal convexity meningioma. No significant mass effect.  3.  Mild " chronic small vessel ischemic disease and generalized brain parenchymal volume loss.  * MRA Head/Neck;  Normal [place].  Normal neck   Patient was registered to observation unit and an echocardiogram was repeated.  He had no recurrence of this numbness and tingling.  Neurologically stable.  Echo appears similar to prior echo 6 months ago, from 40 to 45% in the past.  No LV thrombus noted.  There was debility of echocardiogram the patient was discharged home with follow-up with his primary cardiologist in the outpatient setting.     H/o LV thrombus with long-term use anticoagulation  On coumadin. INR 2.82  - Pharmacy to dose coumadin      H/o CAD   Ischemic cardiomyopathy, not decompensated   HTN/HLP   No issues currently   - PTA Lisinopril, Toprol XL      Asymptomatic COVID-19 admission screen:  Negative on admission.  Fully vaccinated with Pfizer 20/2021, booster given 11/11/2021      Consultations This Hospital Stay   PHARMACY TO DOSE WARFARIN    Code Status   Full Code    Time Spent on this Encounter   I, Whitney Tuttle PA-C, personally saw the patient today and spent greater than 30 minutes discharging this patient.       Whitney Tuttle PA-C  [facility]  [address] [place] [id number]  Phone: [phone]  ______________________________________________________________________    Physical Exam   Vital Signs: Temp: 98.2  F (36.8  C) Temp src: Oral BP: 120/45 Pulse: 62   Resp: 18 SpO2: 98 % O2 Device: None (Room air)    Weight: 114 lbs 11.2 oz    General: Awake, alert, frail elderly gentleman who appears stated age. Looks comfortable sitting up in bed. No acute distress.  HEENT: Normocephalic, atraumatic. Extraocular movements intact.   Respiratory: Clear to auscultation bilaterally, no rales, wheezing, or rhonchi.  Cardiovascular: Regular rate and rhythm, +S1 and S2, no murmur auscultated. Trace peripheral edema.    Gastrointestinal: Soft, non-tender, non-distended. Bowel sounds present.  Skin: Warm, dry. No obvious rashes or lesions on exposed skin. Dorsalis pedis pulses palpable bilaterally.  Musculoskeletal: No joint swelling, erythema or tenderness. Moves all extremities equally.  Neurologic: AAO x3. Cranial nerves 2-12 grossly intact, normal strength and sensation.  Psychiatric: Appropriate mood and affect. No obvious anxiety or depression.       Primary Care Physician   Francisco Doshi    Discharge Orders      Reason for your hospital stay    You were admitted with numbness of your arm. You did not have a stroke.  You had an heart ultrasound which was similar to your prior echocardiograms.     Follow-up and recommended labs and tests     Follow up with primary care provider, Francisco Doshi, within 7 days for hospital follow- up.  No follow up labs or test are needed.    Follow up with Cardiology as scheduled.     Activity    Your activity upon discharge: activity as tolerated     Diet    Follow this diet upon discharge: Orders Placed This Encounter      Regular Diet Adult       Significant Results and Procedures   Results for orders placed or performed during the hospital encounter of 12/05/21   MR Brain w/o & w Contrast    Narrative    EXAM: MR BRAIN WITHOUT AND WITH CONTRAST, MRA NECK (CAROTIDS) WITHOUT AND WITH CONTRAST, MRA BRAIN ([place]) WITHOUT CONTRAST  LOCATION: [facility]  DATE/TIME: 12/05/2021, 3:17 PM    INDICATION: Acute neuro deficit, stroke suspected.  COMPARISON: None.  CONTRAST: 10 mL Gadavist.  TECHNIQUE:   1) Routine multiplanar multisequence head MRI without and with intravenous contrast.  2) 3D time-of-flight head MRA without intravenous contrast.  3) Neck MRA without and with IV contrast. Stenosis measurements made according to NASCET criteria unless otherwise specified.    FINDINGS:  HEAD MRI:  INTRACRANIAL CONTENTS: No acute or subacute infarct. No acute hemorrhage  or extra-axial fluid collections. There is a 10 x 6 x 8 mm solid nodular enhancing dural based mass overlying the left middle frontal gyrus, consistent with a meningioma. No   significant mass effect or associated reactive brain vasogenic edema. Scattered nonspecific T2/FLAIR hyperintensities within the cerebral white matter most consistent with mild chronic microvascular ischemic change. Mild generalized cerebral atrophy. No   hydrocephalus. Normal position of the cerebellar tonsils.     SELLA: No abnormality accounting for technique.    OSSEOUS STRUCTURES/SOFT TISSUES: Normal marrow signal. The major intracranial vascular flow-voids are maintained.     ORBITS: No abnormality accounting for technique.     SINUSES/MASTOIDS: Mild mucosal thickening scattered about the paranasal sinuses. No middle ear or mastoid effusion.     HEAD MRA:   ANTERIOR CIRCULATION: No stenosis/occlusion, aneurysm, or high-flow vascular malformation. Standard [place] anatomy.    POSTERIOR CIRCULATION: No stenosis/occlusion, aneurysm, or high-flow vascular malformation. Balanced vertebral arteries supply a normal basilar artery.     NECK MRA:   RIGHT CAROTID: No measurable stenosis or dissection.    LEFT CAROTID: No measurable stenosis or dissection.    VERTEBRAL ARTERIES: No focal stenosis or dissection. Balanced vertebral arteries.    AORTIC ARCH: Classic aortic arch anatomy with no significant stenosis at the origin of the great vessels.      Impression    IMPRESSION:  HEAD MRI:   1.  No acute intracranial pathology.  2.  Tiny left frontal convexity meningioma. No significant mass effect.  3.  Mild chronic small vessel ischemic disease and generalized brain parenchymal volume loss.    HEAD MRA:   1.  Normal MRA [place].    NECK MRA:  1.  Normal neck MRA.     MRA Brain ([place]) wo Contrast    Narrative    EXAM: MR BRAIN WITHOUT AND WITH CONTRAST, MRA NECK (CAROTIDS) WITHOUT AND WITH CONTRAST, MRA BRAIN ([place] OF  [place]) WITHOUT CONTRAST  LOCATION: [place] [facility]  DATE/TIME: 12/05/2021, 3:17 PM    INDICATION: Acute neuro deficit, stroke suspected.  COMPARISON: None.  CONTRAST: 10 mL Gadavist.  TECHNIQUE:   1) Routine multiplanar multisequence head MRI without and with intravenous contrast.  2) 3D time-of-flight head MRA without intravenous contrast.  3) Neck MRA without and with IV contrast. Stenosis measurements made according to NASCET criteria unless otherwise specified.    FINDINGS:  HEAD MRI:  INTRACRANIAL CONTENTS: No acute or subacute infarct. No acute hemorrhage or extra-axial fluid collections. There is a 10 x 6 x 8 mm solid nodular enhancing dural based mass overlying the left middle frontal gyrus, consistent with a meningioma. No   significant mass effect or associated reactive brain vasogenic edema. Scattered nonspecific T2/FLAIR hyperintensities within the cerebral white matter most consistent with mild chronic microvascular ischemic change. Mild generalized cerebral atrophy. No   hydrocephalus. Normal position of the cerebellar tonsils.     SELLA: No abnormality accounting for technique.    OSSEOUS STRUCTURES/SOFT TISSUES: Normal marrow signal. The major intracranial vascular flow-voids are maintained.     ORBITS: No abnormality accounting for technique.     SINUSES/MASTOIDS: Mild mucosal thickening scattered about the paranasal sinuses. No middle ear or mastoid effusion.     HEAD MRA:   ANTERIOR CIRCULATION: No stenosis/occlusion, aneurysm, or high-flow vascular malformation. Standard [place] anatomy.    POSTERIOR CIRCULATION: No stenosis/occlusion, aneurysm, or high-flow vascular malformation. Balanced vertebral arteries supply a normal basilar artery.     NECK MRA:   RIGHT CAROTID: No measurable stenosis or dissection.    LEFT CAROTID: No measurable stenosis or dissection.    VERTEBRAL ARTERIES: No focal stenosis or dissection. Balanced vertebral arteries.    AORTIC ARCH:  Classic aortic arch anatomy with no significant stenosis at the origin of the great vessels.      Impression    IMPRESSION:  HEAD MRI:   1.  No acute intracranial pathology.  2.  Tiny left frontal convexity meningioma. No significant mass effect.  3.  Mild chronic small vessel ischemic disease and generalized brain parenchymal volume loss.    HEAD MRA:   1.  Normal MRA [place].    NECK MRA:  1.  Normal neck MRA.     MRA Neck (Carotids) wo & w Contrast    Narrative    EXAM: MR BRAIN WITHOUT AND WITH CONTRAST, MRA NECK (CAROTIDS) WITHOUT AND WITH CONTRAST, MRA BRAIN ([place]) WITHOUT CONTRAST  LOCATION: [facility]  DATE/TIME: 12/05/2021, 3:17 PM    INDICATION: Acute neuro deficit, stroke suspected.  COMPARISON: None.  CONTRAST: 10 mL Gadavist.  TECHNIQUE:   1) Routine multiplanar multisequence head MRI without and with intravenous contrast.  2) 3D time-of-flight head MRA without intravenous contrast.  3) Neck MRA without and with IV contrast. Stenosis measurements made according to NASCET criteria unless otherwise specified.    FINDINGS:  HEAD MRI:  INTRACRANIAL CONTENTS: No acute or subacute infarct. No acute hemorrhage or extra-axial fluid collections. There is a 10 x 6 x 8 mm solid nodular enhancing dural based mass overlying the left middle frontal gyrus, consistent with a meningioma. No   significant mass effect or associated reactive brain vasogenic edema. Scattered nonspecific T2/FLAIR hyperintensities within the cerebral white matter most consistent with mild chronic microvascular ischemic change. Mild generalized cerebral atrophy. No   hydrocephalus. Normal position of the cerebellar tonsils.     SELLA: No abnormality accounting for technique.    OSSEOUS STRUCTURES/SOFT TISSUES: Normal marrow signal. The major intracranial vascular flow-voids are maintained.     ORBITS: No abnormality accounting for technique.     SINUSES/MASTOIDS: Mild mucosal thickening scattered  about the paranasal sinuses. No middle ear or mastoid effusion.     HEAD MRA:   ANTERIOR CIRCULATION: No stenosis/occlusion, aneurysm, or high-flow vascular malformation. Standard [place] anatomy.    POSTERIOR CIRCULATION: No stenosis/occlusion, aneurysm, or high-flow vascular malformation. Balanced vertebral arteries supply a normal basilar artery.     NECK MRA:   RIGHT CAROTID: No measurable stenosis or dissection.    LEFT CAROTID: No measurable stenosis or dissection.    VERTEBRAL ARTERIES: No focal stenosis or dissection. Balanced vertebral arteries.    AORTIC ARCH: Classic aortic arch anatomy with no significant stenosis at the origin of the great vessels.      Impression    IMPRESSION:  HEAD MRI:   1.  No acute intracranial pathology.  2.  Tiny left frontal convexity meningioma. No significant mass effect.  3.  Mild chronic small vessel ischemic disease and generalized brain parenchymal volume loss.    HEAD MRA:   1.  Normal MRA [place].    NECK MRA:  1.  Normal neck MRA.     Echocardiogram Complete with Bubble Study     Value    LVEF  30-35%    [facility]    [id number]  [id number]  [id number]  [id number]^MISTI^CHRISTINE^ANTOINETTE     [facility]  Echocardiography Laboratory  [address] [place] [address]     Name: DAVIE RIVERA  MRN: [id number]  : 1934  Study Date: 2021 10:28 AM  Age: 87 yrs  Gender: Male  Patient Location: [facility]  Reason For Study: TIA  Ordering Physician: CHRISTINE CHAPPELL  Referring Physician: Francisco Doshi  Performed By: Jae Hunter     BSA: 1.5 m2  Height: 63 in  Weight: 114 lb  HR: 68  BP: 109/50 mmHg  ______________________________________________________________________________  Procedure  Complete Portable Bubble Echo Adult. [employer] (NDC #[id number]) given  intravenously.  ______________________________________________________________________________  Interpretation Summary     Severely decreased left ventricular systolic  function  The visual ejection fraction is 35-40% although some variation likely in  setting of 2D evaluation with ischemic wall motion abnormalities.  Severe hypokinesis of the mid-apical inferior, mid-apical anterior, mid  anteroseptal, apical septal, and apical lateral segments.  The right ventricle is normal in structure, function and size.  Mild (35-45mmHg) pulmonary hypertension is present.  The right ventricular systolic pressure is approximated at 35mmHg plus the  right atrial pressure.  The inferior vena cava was normal in size with preserved respiratory  variability.  There is no pericardial effusion.     On visual comparison to study dated 6/11/2021, findings are similar.  ______________________________________________________________________________  Left Ventricle  The left ventricle is normal in size. There is normal left ventricular wall  thickness. The visual ejection fraction is 30-35%. Severely decreased left  ventricular systolic function. Grade I or early diastolic dysfunction. Severe  hypokinesis of the mid-apical inferior, mid-apical anterior, mid anteroseptal,  apical septal, and apical lateral segments.     Right Ventricle  The right ventricle is normal in structure, function and size.     Atria  The left atrium is mildly dilated. The right atrium is moderately dilated.     Mitral Valve  There is moderate mitral annular calcification. There is mild (1+) mitral  regurgitation.     Tricuspid Valve  The tricuspid valve is normal in structure and function. Mild (35-45mmHg)  pulmonary hypertension is present. The right ventricular systolic pressure is  approximated at 35mmHg plus the right atrial pressure.     Aortic Valve  There is mild trileaflet aortic sclerosis. There is mild (1+) aortic  regurgitation.     Pulmonic Valve  The pulmonic valve is normal in structure and function. There is mild (1+)  pulmonic valvular regurgitation.     Vessels  The aortic root is normal size. Normal size  ascending aorta. The inferior vena  cava was normal in size with preserved respiratory variability.     Pericardium  There is no pericardial effusion.     ______________________________________________________________________________  MMode/2D Measurements & Calculations  IVSd: 0.98 cm  LVIDd: 4.9 cm  LVIDs: 3.7 cm  LVPWd: 0.76 cm  FS: 24.5 %  LV mass(C)d: 147.6 grams  LV mass(C)dI: 96.9 grams/m2     Ao root diam: 3.0 cm  LA dimension: 3.0 cm  asc Aorta Diam: 2.9 cm  LA/Ao: 0.97  LVOT diam: 2.1 cm  LVOT area: 3.5 cm2  LA Volume (BP): 45.7 ml  LA Volume Index (BP): 30.1 ml/m2  RWT: 0.31     Doppler Measurements & Calculations  MV E max adilson: 67.4 cm/sec  MV A max adilson: 80.5 cm/sec  MV E/A: 0.84  MV dec slope: 388.5 cm/sec2  MV dec time: 0.18 sec     AI P1/2t: 379.0 msec  PA acc time: 0.11 sec  TR max adilson: 293.2 cm/sec  TR max P.4 mmHg  E/E' av.5  Lateral E/e': 9.1  Medial E/e': 9.8     ______________________________________________________________________________  Report approved by: Javier Martin 2021 12:41 PM               Discharge Medications   Current Discharge Medication List      CONTINUE these medications which have NOT CHANGED    Details   aspirin 81 MG tablet Take 81 mg by mouth daily      Cholecalciferol (VITAMIN D3) 2000 UNITS CAPS Take by mouth daily      dutasteride (AVODART) 0.5 MG capsule Take 1 capsule (0.5 mg) by mouth daily  Qty: 30 capsule, Refills: 11    Associated Diagnoses: Cardiomyopathy, ischemic      evolocumab (REPATHA) 140 MG/ML prefilled autoinjector Inject 1 mL (140 mg) Subcutaneous every 14 days  Qty: 6 mL, Refills: 3    Associated Diagnoses: Mixed hyperlipidemia      Fish Oil-Cholecalciferol (FISH OIL + D3) 7108-9529 MG-UNIT CAPS Take 2 tablets by mouth daily      lisinopril (ZESTRIL) 2.5 MG tablet One tablet twice per day  Qty: 180 tablet, Refills: 0    Associated Diagnoses: Coronary artery disease involving native coronary artery of native heart without angina  pectoris      melatonin 5 MG tablet Take 5 mg by mouth nightly as needed for sleep      metoprolol succinate ER (TOPROL-XL) 25 MG 24 hr tablet Take 0.5 tablets (12.5 mg) by mouth daily  Qty: 45 tablet, Refills: 1    Associated Diagnoses: Cardiomyopathy, ischemic      nitroGLYcerin (NITROSTAT) 0.4 MG sublingual tablet Place 1 tablet (0.4 mg) under the tongue every 5 minutes as needed for chest pain Take as directed  Qty: 25 tablet, Refills: 3    Associated Diagnoses: Cardiomyopathy, ischemic      tamsulosin (FLOMAX) 0.4 MG capsule Take 0.4 mg by mouth daily      warfarin ANTICOAGULANT (COUMADIN) 5 MG tablet Take 1/2 tab on Mondays, Wednesdays and Fridays and 1 tab all other days or as directed by INR clinic  Qty: 90 tablet, Refills: 1    Associated Diagnoses: 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; Transient cerebral ischemia           Allergies   Allergies   Allergen Reactions     Flomax [Tamsulosin]      Weakness and dizzyness     Aldactone [Spironolactone]      High potassium and worsening creat when on lisinopril and spironalactone     Carvedilol      dizziness     Colesevelam      Epigastric pain     Ezetimibe      Lisinopril Itching     Hyperkalemia and inc. Creat. At 20 mg daily, itching , skin red when dose goes above 2.5 mg a day--elevated creat      Pravastatin      Abdominal pain     Rosuvastatin      Simvastatin

## 2021-12-07 ENCOUNTER — TELEPHONE (OUTPATIENT)
Dept: CARDIOLOGY | Facility: CLINIC | Age: 86
End: 2021-12-07
Payer: MEDICARE

## 2021-12-07 DIAGNOSIS — I51.3 LEFT VENTRICULAR THROMBUS: Primary | ICD-10-CM

## 2021-12-07 DIAGNOSIS — G45.9 TRANSIENT CEREBRAL ISCHEMIA, UNSPECIFIED TYPE: ICD-10-CM

## 2021-12-07 DIAGNOSIS — Z79.01 LONG TERM CURRENT USE OF ANTICOAGULANTS WITH INR GOAL OF 2.0-3.0: ICD-10-CM

## 2021-12-07 DIAGNOSIS — G45.9 TRANSIENT CEREBRAL ISCHEMIA: ICD-10-CM

## 2021-12-07 NOTE — TELEPHONE ENCOUNTER
Per Hospital ADT, pt was seen in ER on 12/5 for transient left arm numbness/tingling suspect 2/2 paresthesia from reduced blood flow upon waking, resolved. No acute process found on MRI. He had driven himself to ER but had to receive sedation for a MRI - they couldn't reach his family so pt was kept for observation and discharged 12/6. INR 2.82 with no change in warfarin dosing. Pt to follow up with PMD and cardiology. Next INR appt is on 12/22. Joana

## 2021-12-22 ENCOUNTER — ANTICOAGULATION THERAPY VISIT (OUTPATIENT)
Dept: CARDIOLOGY | Facility: CLINIC | Age: 86
End: 2021-12-22
Payer: MEDICARE

## 2021-12-22 ENCOUNTER — LAB (OUTPATIENT)
Dept: LAB | Facility: CLINIC | Age: 86
End: 2021-12-22
Payer: MEDICARE

## 2021-12-22 DIAGNOSIS — G45.9 TRANSIENT CEREBRAL ISCHEMIA: ICD-10-CM

## 2021-12-22 DIAGNOSIS — I63.419 CEREBROVASCULAR ACCIDENT (CVA) DUE TO EMBOLISM OF MIDDLE CEREBRAL ARTERY, UNSPECIFIED BLOOD VESSEL LATERALITY (H): ICD-10-CM

## 2021-12-22 DIAGNOSIS — Z79.01 LONG TERM CURRENT USE OF ANTICOAGULANTS WITH INR GOAL OF 2.0-3.0: ICD-10-CM

## 2021-12-22 DIAGNOSIS — G45.9 TRANSIENT CEREBRAL ISCHEMIA, UNSPECIFIED TYPE: ICD-10-CM

## 2021-12-22 DIAGNOSIS — I51.3 LEFT VENTRICULAR THROMBUS: Primary | ICD-10-CM

## 2021-12-22 DIAGNOSIS — I23.6 LV (LEFT VENTRICULAR) MURAL THROMBUS FOLLOWING MI (H): ICD-10-CM

## 2021-12-22 LAB — INR BLD: 2 (ref 0.9–1.1)

## 2021-12-22 PROCEDURE — 36416 COLLJ CAPILLARY BLOOD SPEC: CPT

## 2021-12-22 PROCEDURE — 99207 PR NO CHARGE NURSE ONLY: CPT

## 2021-12-22 PROCEDURE — 85610 PROTHROMBIN TIME: CPT

## 2021-12-22 NOTE — PROGRESS NOTES
ANTICOAGULATION FOLLOW-UP CLINIC VISIT    Patient Name:  Sawyer Renteria  Date:  2021  Contact Type:  Telephone    SUBJECTIVE:  Patient Findings         Clinical Outcomes     Negatives:  Major bleeding event, Thromboembolic event, Anticoagulation-related hospital admission, Anticoagulation-related ED visit, Anticoagulation-related fatality           OBJECTIVE    Recent labs: (last 7 days)     21  1230   INR 2.0*       ASSESSMENT / PLAN  INR assessment THER    Recheck INR In: 4 WEEKS    INR Location Outside lab      Anticoagulation Summary  As of 2021    INR goal:  2.0-3.0   TTR:  78.4 % (1 y)   INR used for dosin.0 (2021)   Warfarin maintenance plan:  2.5 mg (5 mg x 0.5) every Mon, Wed, Fri; 5 mg (5 mg x 1) all other days   Full warfarin instructions:  2.5 mg every Mon, Wed, Fri; 5 mg all other days   Weekly warfarin total:  27.5 mg   No change documented:  Alexandra Rust RN   Plan last modified:  Donna Colby RN (2021)   Next INR check:  2022   Target end date:  Indefinite    Indications    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]  Transient cerebral ischemia [G45.9]  Long term current use of anticoagulants with INR goal of 2.0-3.0 [Z79.01]  Transient cerebral ischemia  unspecified type [G45.9]             Anticoagulation Episode Summary     INR check location:      Preferred lab:      Send INR reminders to:  [facility] HEART INR NURSE    Comments:   new home phone number: [phone]       Anticoagulation Care Providers     Provider Role Specialty Phone number    Satya Newton MD Referring Cardiovascular Disease [phone]            See the Encounter Report to view Anticoagulation Flowsheet and Dosing Calendar (Go to Encounters tab in chart review, and find the Anticoagulation Therapy Visit)    INR was 2.0 today. Pt denies any abnormal bleeding or bruising. Denies any recent medication or dietary changes or recent illness. Instructed no greens today or tomorrow. Pt  generally eats 2 Tbls of cooked spinach or kale 3-4 times per week on average. States he has also been drinking 10 oz of suresh juice x 3 servings over the past 2 weeks. Reminded pt that suresh juice will raise INR and to keep serving at no more than 4 oz once weekly. Also reminded pt that spinach and kale become more concentrated with vit K when cooked. Encouraged pt to try dropping one serving weekly and to select a different fresh vegetable option, as INR remains in low therapeutic range. Pt verbalized understanding and agreeable with plan of care. Will continue current dosing of 2.5 mg every MWF and 5 mg all other days of the week. Next INR check is scheduled in 4 weeks.     Alexandra Rust RN

## 2021-12-27 DIAGNOSIS — E78.2 MIXED HYPERLIPIDEMIA: ICD-10-CM

## 2022-01-19 ENCOUNTER — LAB (OUTPATIENT)
Dept: LAB | Facility: CLINIC | Age: 87
End: 2022-01-19
Payer: MEDICARE

## 2022-01-19 ENCOUNTER — ANTICOAGULATION THERAPY VISIT (OUTPATIENT)
Dept: ANTICOAGULATION | Facility: CLINIC | Age: 87
End: 2022-01-19

## 2022-01-19 DIAGNOSIS — I51.3 LEFT VENTRICULAR THROMBUS: Primary | ICD-10-CM

## 2022-01-19 DIAGNOSIS — G45.9 TRANSIENT CEREBRAL ISCHEMIA, UNSPECIFIED TYPE: ICD-10-CM

## 2022-01-19 DIAGNOSIS — I63.419 CEREBROVASCULAR ACCIDENT (CVA) DUE TO EMBOLISM OF MIDDLE CEREBRAL ARTERY, UNSPECIFIED BLOOD VESSEL LATERALITY (H): ICD-10-CM

## 2022-01-19 DIAGNOSIS — G45.9 TRANSIENT CEREBRAL ISCHEMIA: ICD-10-CM

## 2022-01-19 DIAGNOSIS — Z79.01 LONG TERM CURRENT USE OF ANTICOAGULANTS WITH INR GOAL OF 2.0-3.0: ICD-10-CM

## 2022-01-19 DIAGNOSIS — I23.6 LV (LEFT VENTRICULAR) MURAL THROMBUS FOLLOWING MI (H): ICD-10-CM

## 2022-01-19 LAB — INR BLD: 2 (ref 0.9–1.1)

## 2022-01-19 PROCEDURE — 85610 PROTHROMBIN TIME: CPT

## 2022-01-19 PROCEDURE — 36416 COLLJ CAPILLARY BLOOD SPEC: CPT

## 2022-01-19 NOTE — PROGRESS NOTES
Anticoagulation Management    Unable to reach AJ today.    Today's INR result of 2.0 is therapeutic (goal INR of 2.0-3.0).  Result received from: Clinic Lab    Follow up required to confirm warfarin dose taken and assess for changes    No instructions provided. Unable to leave voicemail.      Anticoagulation clinic to follow up    Colton Sheppard RN

## 2022-01-19 NOTE — PROGRESS NOTES
ANTICOAGULATION MANAGEMENT     Sawyer Renteria 87 year old male is on warfarin with therapeutic INR result. (Goal INR 2.0-3.0)    Recent labs: (last 7 days)     01/19/22  1257   INR 2.0*       ASSESSMENT     Source(s): Chart Review and Patient/Caregiver Call       Warfarin doses taken: Warfarin taken as instructed    Diet: No new diet changes identified    New illness, injury, or hospitalization: No    Medication/supplement changes: None noted    Signs or symptoms of bleeding or clotting: No    Previous INR: Therapeutic last 2(+) visits    Additional findings: None     PLAN     Recommended plan for no diet, medication or health factor changes affecting INR     Dosing Instructions: Continue your current warfarin dose with next INR in 4 weeks       Summary  As of 1/19/2022    Full warfarin instructions:  2.5 mg every Mon, Wed, Fri; 5 mg all other days   Next INR check:  2/16/2022             Telephone call with Sawyer who verbalizes understanding and agrees to plan    Lab visit scheduled    Education provided: Goal range and significance of current result and Importance of therapeutic range    Plan made per [facility] anticoagulation protocol    Colton Sheppard RN  Anticoagulation Clinic  1/19/2022    _______________________________________________________________________     Anticoagulation Episode Summary     Current INR goal:  2.0-3.0   TTR:  78.4 % (1 y)   Target end date:  Indefinite   Send INR reminders to:  [facility] HEART INR NURSE    Indications    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

## 2022-02-07 ENCOUNTER — TELEPHONE (OUTPATIENT)
Dept: CARDIOLOGY | Facility: CLINIC | Age: 87
End: 2022-02-07
Payer: MEDICARE

## 2022-02-07 NOTE — TELEPHONE ENCOUNTER
Returned call to Pt informed him would have scheduling call him to set up labs and Office visit. TIANA Painter RN

## 2022-02-07 NOTE — TELEPHONE ENCOUNTER
[facility] Call Center    Phone Message    May a detailed message be left on voicemail: yes     Reason for Call: Other: Pt would like a call back as he does not want to schedule until he speaks with his team regarding all the orders he needs before his appt     Action Taken: Message routed to:  [facility] ([facility]): Cardio    Travel Screening: Not Applicable

## 2022-02-16 ENCOUNTER — ANTICOAGULATION THERAPY VISIT (OUTPATIENT)
Dept: ANTICOAGULATION | Facility: CLINIC | Age: 87
End: 2022-02-16

## 2022-02-16 ENCOUNTER — LAB (OUTPATIENT)
Dept: LAB | Facility: CLINIC | Age: 87
End: 2022-02-16
Payer: MEDICARE

## 2022-02-16 DIAGNOSIS — I23.6 LV (LEFT VENTRICULAR) MURAL THROMBUS FOLLOWING MI (H): ICD-10-CM

## 2022-02-16 DIAGNOSIS — Z79.01 LONG TERM CURRENT USE OF ANTICOAGULANTS WITH INR GOAL OF 2.0-3.0: ICD-10-CM

## 2022-02-16 DIAGNOSIS — I63.419 CEREBROVASCULAR ACCIDENT (CVA) DUE TO EMBOLISM OF MIDDLE CEREBRAL ARTERY, UNSPECIFIED BLOOD VESSEL LATERALITY (H): ICD-10-CM

## 2022-02-16 DIAGNOSIS — G45.9 TRANSIENT CEREBRAL ISCHEMIA, UNSPECIFIED TYPE: ICD-10-CM

## 2022-02-16 DIAGNOSIS — G45.9 TRANSIENT CEREBRAL ISCHEMIA: ICD-10-CM

## 2022-02-16 DIAGNOSIS — I51.3 LEFT VENTRICULAR THROMBUS: Primary | ICD-10-CM

## 2022-02-16 LAB — INR BLD: 3.3 (ref 0.9–1.1)

## 2022-02-16 PROCEDURE — 36416 COLLJ CAPILLARY BLOOD SPEC: CPT

## 2022-02-16 PROCEDURE — 85610 PROTHROMBIN TIME: CPT

## 2022-02-17 DIAGNOSIS — G45.9 TRANSIENT CEREBRAL ISCHEMIA: ICD-10-CM

## 2022-02-17 DIAGNOSIS — I23.6 LV (LEFT VENTRICULAR) MURAL THROMBUS FOLLOWING MI (H): ICD-10-CM

## 2022-02-17 RX ORDER — WARFARIN SODIUM 5 MG/1
TABLET ORAL
Qty: 90 TABLET | Refills: 1 | Status: SHIPPED | OUTPATIENT
Start: 2022-02-17 | End: 2022-10-11

## 2022-02-24 ENCOUNTER — TELEPHONE (OUTPATIENT)
Dept: CARDIOLOGY | Facility: CLINIC | Age: 87
End: 2022-02-24
Payer: MEDICARE

## 2022-02-25 NOTE — TELEPHONE ENCOUNTER
PA Initiation    Medication: evolocumab (REPATHA) 140 MG/ML prefilled autoinjector   Insurance Company: [employer] - Phone [phone] Fax [phone]  Pharmacy Filling the Rx: [facility] - [place], [place] - [facility] [address]  Filling Pharmacy Phone: [phone]  Filling Pharmacy Fax: [phone]  Start Date: 2/24/2022

## 2022-02-25 NOTE — TELEPHONE ENCOUNTER
Prior Authorization Approval    Authorization Effective Date: 2/24/2022  Authorization Expiration Date: 12/31/2022  Medication: evolocumab (REPATHA) 140 MG/ML prefilled autoinjector--APPROVED  Approved Dose/Quantity:   Reference #:     Insurance Company: [employer] - Phone [phone] Fax [phone]  Expected CoPay:       CoPay Card Available:      Foundation Assistance Needed:    Which Pharmacy is filling the prescription (Not needed for infusion/clinic administered): [facility] - [facility] [address]  Pharmacy Notified: Yes  Patient Notified: Yes **Instructed pharmacy to notify patient when script is ready to /ship.**

## 2022-03-02 ENCOUNTER — ANTICOAGULATION THERAPY VISIT (OUTPATIENT)
Dept: ANTICOAGULATION | Facility: CLINIC | Age: 87
End: 2022-03-02

## 2022-03-02 ENCOUNTER — LAB (OUTPATIENT)
Dept: LAB | Facility: CLINIC | Age: 87
End: 2022-03-02
Payer: MEDICARE

## 2022-03-02 DIAGNOSIS — I63.419 CEREBROVASCULAR ACCIDENT (CVA) DUE TO EMBOLISM OF MIDDLE CEREBRAL ARTERY, UNSPECIFIED BLOOD VESSEL LATERALITY (H): ICD-10-CM

## 2022-03-02 DIAGNOSIS — G45.9 TRANSIENT CEREBRAL ISCHEMIA, UNSPECIFIED TYPE: ICD-10-CM

## 2022-03-02 DIAGNOSIS — G45.9 TRANSIENT CEREBRAL ISCHEMIA: ICD-10-CM

## 2022-03-02 DIAGNOSIS — I23.6 LV (LEFT VENTRICULAR) MURAL THROMBUS FOLLOWING MI (H): ICD-10-CM

## 2022-03-02 DIAGNOSIS — I51.3 LEFT VENTRICULAR THROMBUS: Primary | ICD-10-CM

## 2022-03-02 DIAGNOSIS — Z79.01 LONG TERM CURRENT USE OF ANTICOAGULANTS WITH INR GOAL OF 2.0-3.0: ICD-10-CM

## 2022-03-02 LAB — INR BLD: 1.6 (ref 0.9–1.1)

## 2022-03-02 PROCEDURE — 36416 COLLJ CAPILLARY BLOOD SPEC: CPT

## 2022-03-02 PROCEDURE — 85610 PROTHROMBIN TIME: CPT

## 2022-03-02 NOTE — PROGRESS NOTES
ANTICOAGULATION MANAGEMENT     Sawyer DOLAN Myra 87 year old male is on warfarin with subtherapeutic INR result. (Goal INR 2.0-3.0)    Recent labs: (last 7 days)     03/02/22  1311   INR 1.6*       ASSESSMENT       Source(s): Chart Review and Patient/Caregiver Call       Warfarin doses taken: Warfarin taken as instructed    Diet: Increased greens/vitamin K in diet; plans to resume previous intake    New illness, injury, or hospitalization: No    Medication/supplement changes: None noted    Signs or symptoms of bleeding or clotting: No    Previous INR: Supratherapeutic    Additional findings: None       PLAN     Recommended plan for temporary change(s) affecting INR     Dosing Instructions: Booster dose then continue your current warfarin dose with next INR in 2 weeks       Summary  As of 3/2/2022    Full warfarin instructions:  3/2: 5 mg; Otherwise 2.5 mg every Mon, Wed, Fri; 5 mg all other days   Next INR check:  3/17/2022             Telephone call with Sawyer who verbalizes understanding and agrees to plan    Lab visit scheduled    Education provided: Importance of consistent vitamin K intake, Impact of vitamin K foods on INR, Goal range and significance of current result and Importance of therapeutic range    Plan made per [facility] anticoagulation protocol    Colton Sheppard, RN  Anticoagulation Clinic  3/2/2022    _______________________________________________________________________     Anticoagulation Episode Summary     Current INR goal:  2.0-3.0   TTR:  79.0 % (1 y)   Target end date:  Indefinite   Send INR reminders to:  [facility] HEART INR NURSE    Indications    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

## 2022-03-07 ENCOUNTER — OFFICE VISIT (OUTPATIENT)
Dept: CARDIOLOGY | Facility: CLINIC | Age: 87
End: 2022-03-07
Payer: MEDICARE

## 2022-03-07 ENCOUNTER — LAB (OUTPATIENT)
Dept: LAB | Facility: CLINIC | Age: 87
End: 2022-03-07
Payer: MEDICARE

## 2022-03-07 VITALS
DIASTOLIC BLOOD PRESSURE: 79 MMHG | BODY MASS INDEX: 19.97 KG/M2 | WEIGHT: 117 LBS | SYSTOLIC BLOOD PRESSURE: 130 MMHG | HEART RATE: 76 BPM | HEIGHT: 64 IN

## 2022-03-07 DIAGNOSIS — N18.32 CHRONIC RENAL IMPAIRMENT, STAGE 3B (H): ICD-10-CM

## 2022-03-07 DIAGNOSIS — I25.5 CARDIOMYOPATHY, ISCHEMIC: ICD-10-CM

## 2022-03-07 DIAGNOSIS — I25.10 CORONARY ARTERY DISEASE INVOLVING NATIVE CORONARY ARTERY OF NATIVE HEART WITHOUT ANGINA PECTORIS: ICD-10-CM

## 2022-03-07 DIAGNOSIS — I63.419 CEREBROVASCULAR ACCIDENT (CVA) DUE TO EMBOLISM OF MIDDLE CEREBRAL ARTERY, UNSPECIFIED BLOOD VESSEL LATERALITY (H): ICD-10-CM

## 2022-03-07 DIAGNOSIS — I25.5 CARDIOMYOPATHY, ISCHEMIC: Primary | ICD-10-CM

## 2022-03-07 LAB
ANION GAP SERPL CALCULATED.3IONS-SCNC: 4 MMOL/L (ref 3–14)
BUN SERPL-MCNC: 27 MG/DL (ref 7–30)
CALCIUM SERPL-MCNC: 8.6 MG/DL (ref 8.5–10.1)
CHLORIDE BLD-SCNC: 109 MMOL/L (ref 94–109)
CO2 SERPL-SCNC: 26 MMOL/L (ref 20–32)
CREAT SERPL-MCNC: 1.62 MG/DL (ref 0.66–1.25)
GFR SERPL CREATININE-BSD FRML MDRD: 41 ML/MIN/1.73M2
GLUCOSE BLD-MCNC: 135 MG/DL (ref 70–99)
POTASSIUM BLD-SCNC: 4.1 MMOL/L (ref 3.4–5.3)
SODIUM SERPL-SCNC: 139 MMOL/L (ref 133–144)

## 2022-03-07 PROCEDURE — 99214 OFFICE O/P EST MOD 30 MIN: CPT | Performed by: INTERNAL MEDICINE

## 2022-03-07 PROCEDURE — 80048 BASIC METABOLIC PNL TOTAL CA: CPT | Performed by: NURSE PRACTITIONER

## 2022-03-07 PROCEDURE — 36415 COLL VENOUS BLD VENIPUNCTURE: CPT | Performed by: NURSE PRACTITIONER

## 2022-03-07 RX ORDER — NITROGLYCERIN 0.4 MG/1
0.4 TABLET SUBLINGUAL EVERY 5 MIN PRN
Qty: 25 TABLET | Refills: 1 | Status: SHIPPED | OUTPATIENT
Start: 2022-03-07

## 2022-03-07 RX ORDER — LISINOPRIL 2.5 MG/1
TABLET ORAL
Qty: 180 TABLET | Refills: 3 | Status: SHIPPED | OUTPATIENT
Start: 2022-03-07 | End: 2022-04-05

## 2022-03-07 RX ORDER — METOPROLOL SUCCINATE 25 MG/1
25 TABLET, EXTENDED RELEASE ORAL DAILY
Qty: 90 TABLET | Refills: 3 | Status: SHIPPED | OUTPATIENT
Start: 2022-03-07 | End: 2022-04-18

## 2022-03-07 NOTE — PROGRESS NOTES
HPI and Plan:   Sawyer Renteria is a 87 year old male with history of coronary artery disease.  He has a history of an anterior wall MI in 2006 resulting in an LAD stent and angioplasty to the diagonal. He has an ongoing OM-1 with 60% stenosis. 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   His last echocardiogram revealed EF of 35 to 40% last year on echo over the previous MRI revealed EF of 52%.  This was in 2020.    He returns for a follow-up.  He is doing reasonably well.  Recently was seen by urologist and was put on Avodart and tamsulosin for prostate hypertrophy.  He denies any chest pain or shortness of breath.    He is on low-dose metoprolol and lisinopril.  He has no dizziness or syncope.  No chest pain.    e has statin intolerant and has been on Repatha which he tolerates well.    I do not have a lipid on file since January 2019.  He continues to tolerate the Repatha well.     He has chronic renal insufficiency with creatinine stable at 1.6 but follows with nephrology       Physical exam  See below     Impression/Plan:       1. Coronary artery disease with anterior wall MI status post LAD stenting with the balloon angioplasty to the diagonal branch.   Echocardiogram revealed EF of 35 to 40% from August 2021.  I will suggest titrating his meds and increasing metoprolol XL to 25 mg daily.  Continue lisinopril 2.5 g twice daily.  I will also enroll him in a portioning and try to titrate his cardiac meds as able.    Can consider Entresto instead of lisinopril.  Once medications are titrated, repeat echocardiogram should be considered with follow-up with me.     2.  Ischemic cardiomyopathy with no heart failure symptoms currently.  EF 35 to 40%.  See discussion above     3. 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 He uses brand name warfarin.  No thrombus apparent on the recent echo     4.  Dyslipidemia with statin intolerance.   He is tolerating Praluent with LDL 68 and HDL 66  Check lipids in one year (late summer 2022)     5.  Hypertension  - controlled     5.  Chronic kidney disease  -Overall stable baseline 1.3 to 1.7.       6.  Likely benign prostate hypertrophy  On Flomax and Avodart     Thank you for letting us to personally care of this nice patient.    Sincerely,    Satya Newton MD      Today's clinic visit entailed:    34 minutes spent on the date of the encounter doing chart review, review of test results, patient visit and documentation   Provider  Link to [facility] Help Grid     The level of medical decision making during this visit was of moderate complexity.      Orders Placed This Encounter   Procedures     Follow-Up with Cardiology Core- [facility]HE[facility]       Orders Placed This Encounter   Medications     nitroGLYcerin (NITROSTAT) 0.4 MG sublingual tablet     Sig: Place 1 tablet (0.4 mg) under the tongue every 5 minutes as needed for chest pain Take as directed     Dispense:  25 tablet     Refill:  1     lisinopril (ZESTRIL) 2.5 MG tablet     Sig: One tablet twice per day     Dispense:  180 tablet     Refill:  3     metoprolol succinate ER (TOPROL-XL) 25 MG 24 hr tablet     Sig: Take 1 tablet (25 mg) by mouth daily     Dispense:  90 tablet     Refill:  3       Medications Discontinued During This Encounter   Medication Reason     nitroGLYcerin (NITROSTAT) 0.4 MG sublingual tablet Reorder     lisinopril (ZESTRIL) 2.5 MG tablet Reorder     metoprolol succinate ER (TOPROL-XL) 25 MG 24 hr tablet          Encounter Diagnoses   Name Primary?     Cardiomyopathy, ischemic Yes     Coronary artery disease involving native coronary artery of native heart without angina pectoris      Chronic renal impairment, stage 3b (H)      Cerebrovascular accident (CVA) due to embolism of middle cerebral artery, unspecified blood vessel laterality (H)        CURRENT MEDICATIONS:  Current Outpatient Medications   Medication  Sig Dispense Refill     aspirin 81 MG tablet Take 81 mg by mouth daily       Cholecalciferol (VITAMIN D3) 2000 UNITS CAPS Take by mouth daily       dutasteride (AVODART) 0.5 MG capsule Take 1 capsule (0.5 mg) by mouth daily 30 capsule 11     evolocumab (REPATHA) 140 MG/ML prefilled autoinjector Inject 1 mL (140 mg) Subcutaneous every 14 days 6 mL 3     Fish Oil-Cholecalciferol (FISH OIL + D3) 9845-4498 MG-UNIT CAPS Take 2 tablets by mouth daily       lisinopril (ZESTRIL) 2.5 MG tablet One tablet twice per day 180 tablet 3     metoprolol succinate ER (TOPROL-XL) 25 MG 24 hr tablet Take 1 tablet (25 mg) by mouth daily 90 tablet 3     nitroGLYcerin (NITROSTAT) 0.4 MG sublingual tablet Place 1 tablet (0.4 mg) under the tongue every 5 minutes as needed for chest pain Take as directed 25 tablet 1     tamsulosin (FLOMAX) 0.4 MG capsule Take 0.4 mg by mouth daily       melatonin 5 MG tablet Take 5 mg by mouth nightly as needed for sleep (Patient not taking: Reported on 3/7/2022)       warfarin ANTICOAGULANT (COUMADIN) 5 MG tablet Take 1/2 tab on Mondays, Wednesdays and Fridays and 1 tab all other days or as directed by INR clinic 90 tablet 1       ALLERGIES     Allergies   Allergen Reactions     Flomax [Tamsulosin]      Weakness and dizzyness     Aldactone [Spironolactone]      High potassium and worsening creat when on lisinopril and spironalactone     Carvedilol      dizziness     Colesevelam      Epigastric pain     Ezetimibe      Lisinopril Itching     Hyperkalemia and inc. Creat. At 20 mg daily, itching , skin red when dose goes above 2.5 mg a day--elevated creat      Pravastatin      Abdominal pain     Rosuvastatin      Simvastatin        PAST MEDICAL HISTORY:  Past Medical History:   Diagnosis Date     BPH (benign prostatic hyperplasia)      Cardiomyopathy, ischemic     EF 49% by cardiac MRI 1/15/2014     Coronary artery disease 1/24/06    Cypher CANDIDA to LAD     Gastro-oesophageal reflux disease      Hyperlipidaemia       Hypertension      Left ventricular thrombus      Myocardial infarction (H) 1/2006    Anterior     TIA (transient ischaemic attack)        PAST SURGICAL HISTORY:  Past Surgical History:   Procedure Laterality Date     CORONARY ANGIOGRAPHY ADULT ORDER  1/24/06    Cypher CANDIDA to LAD     HEART CATH, ANGIOPLASTY  1/2006    Cypher CANDIDA to LAD     TONSILLECTOMY & ADENOIDECTOMY         FAMILY HISTORY:  Family History   Problem Relation Age of Onset     Heart Disease Mother         heart failure     Heart Disease Brother        SOCIAL HISTORY:  Social History     Socioeconomic History     Marital status:      Spouse name: None     Number of children: None     Years of education: None     Highest education level: None   Occupational History     None   Tobacco Use     Smoking status: Never Smoker     Smokeless tobacco: Never Used   Substance and Sexual Activity     Alcohol use: No     Drug use: Never     Sexual activity: None   Other Topics Concern     Parent/sibling w/ CABG, MI or angioplasty before 65F 55M? No      Service Not Asked     Blood Transfusions Not Asked     Caffeine Concern No     Occupational Exposure Not Asked     Hobby Hazards Not Asked     Sleep Concern No     Stress Concern No     Weight Concern Yes     Comment: weight loss     Special Diet No     Back Care Not Asked     Exercise Yes     Comment: bicycle 10 min, walking, 3 days week     Bike Helmet Not Asked     Seat Belt Yes     Self-Exams Not Asked   Social History Narrative     None     Social Determinants of Health     Financial Resource Strain: Not on file   Food Insecurity: Not on file   Transportation Needs: Not on file   Physical Activity: Not on file   Stress: Not on file   Social Connections: Not on file   Intimate Partner Violence: Not on file   Housing Stability: Not on file       Review of Systems:  Skin:  Negative       Eyes:  Positive for glasses    ENT:  Negative      Respiratory:  Negative       Cardiovascular:  Negative     "  Gastroenterology: Positive for heartburn;reflux;constipation    Genitourinary:  Positive for nocturia;urinary frequency    Musculoskeletal:  Positive for neck pain    Neurologic:  Positive for stroke    Psychiatric:  Negative      Heme/Lymph/Imm:  Positive for allergies    Endocrine:  Negative        Physical Exam:  Vitals: /79 (BP Location: Left arm, Cuff Size: Adult Regular)   Pulse 76   Ht 1.626 m (5' 4\")   Wt 53.1 kg (117 lb)   BMI 20.08 kg/m      Constitutional:    frail;thin      Skin:  warm and dry to the touch          Head:  not assessed this visit        Eyes:  sclera white        Lymph:      ENT:  no pallor or cyanosis        Neck:  carotid pulses are full and equal bilaterally        Respiratory:  clear to auscultation         Cardiac: normal S1 and S2   S4   systolic murmur;grade 2;apical        not assessed this visit                                        GI:  not assessed this visit        Extremities and Muscular Skeletal:  no edema         ecchymosis over left flank following fall at home    Neurological:  no gross motor deficits        Psych:  Alert and Oriented x 3        CC  Sarah Izaguirre, APRN CNP  [address]  [place] [id number]              "

## 2022-03-07 NOTE — LETTER
3/7/2022    Francisco Doshi MD  [address]  [place] [address]    RE: Sawyer Renteria       Dear Colleague,     I had the pleasure of seeing Sawyer Renteria in the [facility].  HPI and Plan:   Sawyer Renteria is a 87 year old male with history of coronary artery disease.  He has a history of an anterior wall MI in 2006 resulting in an LAD stent and angioplasty to the diagonal. He has an ongoing OM-1 with 60% stenosis. 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   His last echocardiogram revealed EF of 35 to 40% last year on echo over the previous MRI revealed EF of 52%.  This was in 2020.    He returns for a follow-up.  He is doing reasonably well.  Recently was seen by urologist and was put on Avodart and tamsulosin for prostate hypertrophy.  He denies any chest pain or shortness of breath.    He is on low-dose metoprolol and lisinopril.  He has no dizziness or syncope.  No chest pain.    e has statin intolerant and has been on Repatha which he tolerates well.    I do not have a lipid on file since January 2019.  He continues to tolerate the Repatha well.     He has chronic renal insufficiency with creatinine stable at 1.6 but follows with nephrology       Physical exam  See below     Impression/Plan:       1. Coronary artery disease with anterior wall MI status post LAD stenting with the balloon angioplasty to the diagonal branch.   Echocardiogram revealed EF of 35 to 40% from August 2021.  I will suggest titrating his meds and increasing metoprolol XL to 25 mg daily.  Continue lisinopril 2.5 g twice daily.  I will also enroll him in a portioning and try to titrate his cardiac meds as able.    Can consider Entresto instead of lisinopril.  Once medications are titrated, repeat echocardiogram should be considered with follow-up with me.     2.  Ischemic cardiomyopathy with no heart failure symptoms currently.  EF 35 to  40%.  See discussion above     3. 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 He uses brand name warfarin.  No thrombus apparent on the recent echo     4. Dyslipidemia with statin intolerance.   He is tolerating Praluent with LDL 68 and HDL 66  Check lipids in one year (late summer 2022)     5.  Hypertension  - controlled     5.  Chronic kidney disease  -Overall stable baseline 1.3 to 1.7.       6.  Likely benign prostate hypertrophy  On Flomax and Avodart     Thank you for letting us to personally care of this nice patient.    Sincerely,    Satya Newton MD      Today's clinic visit entailed:    34 minutes spent on the date of the encounter doing chart review, review of test results, patient visit and documentation   Provider  Link to MDM Help Grid     The level of medical decision making during this visit was of moderate complexity.      Orders Placed This Encounter   Procedures     Follow-Up with Cardiology [facility]- [facility]       Orders Placed This Encounter   Medications     nitroGLYcerin (NITROSTAT) 0.4 MG sublingual tablet     Sig: Place 1 tablet (0.4 mg) under the tongue every 5 minutes as needed for chest pain Take as directed     Dispense:  25 tablet     Refill:  1     lisinopril (ZESTRIL) 2.5 MG tablet     Sig: One tablet twice per day     Dispense:  180 tablet     Refill:  3     metoprolol succinate ER (TOPROL-XL) 25 MG 24 hr tablet     Sig: Take 1 tablet (25 mg) by mouth daily     Dispense:  90 tablet     Refill:  3       Medications Discontinued During This Encounter   Medication Reason     nitroGLYcerin (NITROSTAT) 0.4 MG sublingual tablet Reorder     lisinopril (ZESTRIL) 2.5 MG tablet Reorder     metoprolol succinate ER (TOPROL-XL) 25 MG 24 hr tablet          Encounter Diagnoses   Name Primary?     Cardiomyopathy, ischemic Yes     Coronary artery disease involving native coronary artery of native heart without angina pectoris      Chronic renal impairment, stage 3b  (H)      Cerebrovascular accident (CVA) due to embolism of middle cerebral artery, unspecified blood vessel laterality (H)        CURRENT MEDICATIONS:  Current Outpatient Medications   Medication Sig Dispense Refill     aspirin 81 MG tablet Take 81 mg by mouth daily       Cholecalciferol (VITAMIN D3) 2000 UNITS CAPS Take by mouth daily       dutasteride (AVODART) 0.5 MG capsule Take 1 capsule (0.5 mg) by mouth daily 30 capsule 11     evolocumab (REPATHA) 140 MG/ML prefilled autoinjector Inject 1 mL (140 mg) Subcutaneous every 14 days 6 mL 3     Fish Oil-Cholecalciferol (FISH OIL + D3) 3920-9553 MG-UNIT CAPS Take 2 tablets by mouth daily       lisinopril (ZESTRIL) 2.5 MG tablet One tablet twice per day 180 tablet 3     metoprolol succinate ER (TOPROL-XL) 25 MG 24 hr tablet Take 1 tablet (25 mg) by mouth daily 90 tablet 3     nitroGLYcerin (NITROSTAT) 0.4 MG sublingual tablet Place 1 tablet (0.4 mg) under the tongue every 5 minutes as needed for chest pain Take as directed 25 tablet 1     tamsulosin (FLOMAX) 0.4 MG capsule Take 0.4 mg by mouth daily       melatonin 5 MG tablet Take 5 mg by mouth nightly as needed for sleep (Patient not taking: Reported on 3/7/2022)       warfarin ANTICOAGULANT (COUMADIN) 5 MG tablet Take 1/2 tab on Mondays, Wednesdays and Fridays and 1 tab all other days or as directed by INR clinic 90 tablet 1       ALLERGIES     Allergies   Allergen Reactions     Flomax [Tamsulosin]      Weakness and dizzyness     Aldactone [Spironolactone]      High potassium and worsening creat when on lisinopril and spironalactone     Carvedilol      dizziness     Colesevelam      Epigastric pain     Ezetimibe      Lisinopril Itching     Hyperkalemia and inc. Creat. At 20 mg daily, itching , skin red when dose goes above 2.5 mg a day--elevated creat      Pravastatin      Abdominal pain     Rosuvastatin      Simvastatin        PAST MEDICAL HISTORY:  Past Medical History:   Diagnosis Date     BPH (benign prostatic  hyperplasia)      Cardiomyopathy, ischemic     EF 49% by cardiac MRI 1/15/2014     Coronary artery disease 1/24/06    Cypher CANDIDA to LAD     Gastro-oesophageal reflux disease      Hyperlipidaemia      Hypertension      Left ventricular thrombus      Myocardial infarction (H) 1/2006    Anterior     TIA (transient ischaemic attack)        PAST SURGICAL HISTORY:  Past Surgical History:   Procedure Laterality Date     CORONARY ANGIOGRAPHY ADULT ORDER  1/24/06    Cypher CANDIDA to LAD     HEART CATH, ANGIOPLASTY  1/2006    Cypher CANDIDA to LAD     TONSILLECTOMY & ADENOIDECTOMY         FAMILY HISTORY:  Family History   Problem Relation Age of Onset     Heart Disease Mother         heart failure     Heart Disease Brother        SOCIAL HISTORY:  Social History     Socioeconomic History     Marital status:      Spouse name: None     Number of children: None     Years of education: None     Highest education level: None   Occupational History     None   Tobacco Use     Smoking status: Never Smoker     Smokeless tobacco: Never Used   Substance and Sexual Activity     Alcohol use: No     Drug use: Never     Sexual activity: None   Other Topics Concern     Parent/sibling w/ CABG, MI or angioplasty before 65F 55M? No      Service Not Asked     Blood Transfusions Not Asked     Caffeine Concern No     Occupational Exposure Not Asked     Hobby Hazards Not Asked     Sleep Concern No     Stress Concern No     Weight Concern Yes     Comment: weight loss     Special Diet No     Back Care Not Asked     Exercise Yes     Comment: bicycle 10 min, walking, 3 days week     Bike Helmet Not Asked     Seat Belt Yes     Self-Exams Not Asked   Social History Narrative     None     Social Determinants of Health     Financial Resource Strain: Not on file   Food Insecurity: Not on file   Transportation Needs: Not on file   Physical Activity: Not on file   Stress: Not on file   Social Connections: Not on file   Intimate Partner Violence: Not on  "file   Housing Stability: Not on file       Review of Systems:  Skin:  Negative       Eyes:  Positive for glasses    ENT:  Negative      Respiratory:  Negative       Cardiovascular:  Negative      Gastroenterology: Positive for heartburn;reflux;constipation    Genitourinary:  Positive for nocturia;urinary frequency    Musculoskeletal:  Positive for neck pain    Neurologic:  Positive for stroke    Psychiatric:  Negative      Heme/Lymph/Imm:  Positive for allergies    Endocrine:  Negative        Physical Exam:  Vitals: /79 (BP Location: Left arm, Cuff Size: Adult Regular)   Pulse 76   Ht 1.626 m (5' 4\")   Wt 53.1 kg (117 lb)   BMI 20.08 kg/m      Constitutional:    frail;thin      Skin:  warm and dry to the touch          Head:  not assessed this visit        Eyes:  sclera white        Lymph:      ENT:  no pallor or cyanosis        Neck:  carotid pulses are full and equal bilaterally        Respiratory:  clear to auscultation         Cardiac: normal S1 and S2   S4   systolic murmur;grade 2;apical        not assessed this visit                                        GI:  not assessed this visit        Extremities and Muscular Skeletal:  no edema         ecchymosis over left flank following fall at home    Neurological:  no gross motor deficits        Psych:  Alert and Oriented x 3        CC  Sarah Izaguirre, APRN CNP  [address]  [place] [address]    Thank you for allowing me to participate in the care of your patient.      Sincerely,     Satya Newton MD     [facility]  "

## 2022-03-17 ENCOUNTER — LAB (OUTPATIENT)
Dept: LAB | Facility: CLINIC | Age: 87
End: 2022-03-17
Payer: MEDICARE

## 2022-03-17 ENCOUNTER — ANTICOAGULATION THERAPY VISIT (OUTPATIENT)
Dept: ANTICOAGULATION | Facility: CLINIC | Age: 87
End: 2022-03-17

## 2022-03-17 DIAGNOSIS — Z79.01 LONG TERM CURRENT USE OF ANTICOAGULANTS WITH INR GOAL OF 2.0-3.0: ICD-10-CM

## 2022-03-17 DIAGNOSIS — I63.419 CEREBROVASCULAR ACCIDENT (CVA) DUE TO EMBOLISM OF MIDDLE CEREBRAL ARTERY, UNSPECIFIED BLOOD VESSEL LATERALITY (H): ICD-10-CM

## 2022-03-17 DIAGNOSIS — I23.6 LV (LEFT VENTRICULAR) MURAL THROMBUS FOLLOWING MI (H): ICD-10-CM

## 2022-03-17 DIAGNOSIS — I51.3 LEFT VENTRICULAR THROMBUS: Primary | ICD-10-CM

## 2022-03-17 DIAGNOSIS — G45.9 TRANSIENT CEREBRAL ISCHEMIA, UNSPECIFIED TYPE: ICD-10-CM

## 2022-03-17 LAB — INR BLD: 1.6 (ref 0.9–1.1)

## 2022-03-17 PROCEDURE — 36416 COLLJ CAPILLARY BLOOD SPEC: CPT

## 2022-03-17 PROCEDURE — 85610 PROTHROMBIN TIME: CPT

## 2022-03-17 NOTE — PROGRESS NOTES
ANTICOAGULATION MANAGEMENT     Sawyer Renteria 87 year old male is on warfarin with subtherapeutic INR result. (Goal INR 2.0-3.0)    Recent labs: (last 7 days)     03/17/22  1254   INR 1.6*       ASSESSMENT       Source(s): Chart Review and Patient/Caregiver Call       Warfarin doses taken: Warfarin taken as instructed    Diet: Increased greens/vitamin K in diet; plans to resume previous intake, last time when INR was 1.6 he said he would decrease greens but he didn't decrease them but he said now he will decrease the amount.  He was drinking suresh juice and hasn't had that for about a month but he plans to buy more and that will increase the INR    New illness, injury, or hospitalization: No    Medication/supplement changes: None noted    Signs or symptoms of bleeding or clotting: No    Previous INR: Subtherapeutic    Additional findings: None       PLAN     Recommended plan for temporary change(s) affecting INR     Dosing Instructions: Booster dose then continue your current warfarin dose with next INR in 2 weeks       Summary  As of 3/17/2022    Full warfarin instructions:  3/17: 7.5 mg; Otherwise 2.5 mg every Mon, Wed, Fri; 5 mg all other days   Next INR check:  3/30/2022             Telephone call with Sawyer who verbalizes understanding and agrees to plan    Lab visit scheduled    Education provided: Impact of vitamin K foods on INR, Goal range and significance of current result and Contact [phone] with any changes, questions or concerns.     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

## 2022-03-23 DIAGNOSIS — Z79.01 LONG TERM CURRENT USE OF ANTICOAGULANTS WITH INR GOAL OF 2.0-3.0: Primary | ICD-10-CM

## 2022-03-23 DIAGNOSIS — I51.3 LEFT VENTRICULAR THROMBUS: ICD-10-CM

## 2022-03-23 DIAGNOSIS — I63.419 CEREBROVASCULAR ACCIDENT (CVA) DUE TO EMBOLISM OF MIDDLE CEREBRAL ARTERY, UNSPECIFIED BLOOD VESSEL LATERALITY (H): ICD-10-CM

## 2022-03-30 ENCOUNTER — LAB (OUTPATIENT)
Dept: LAB | Facility: CLINIC | Age: 87
End: 2022-03-30
Payer: MEDICARE

## 2022-03-30 ENCOUNTER — ANTICOAGULATION THERAPY VISIT (OUTPATIENT)
Dept: ANTICOAGULATION | Facility: CLINIC | Age: 87
End: 2022-03-30

## 2022-03-30 DIAGNOSIS — G45.9 TRANSIENT CEREBRAL ISCHEMIA: ICD-10-CM

## 2022-03-30 DIAGNOSIS — I63.419 CEREBROVASCULAR ACCIDENT (CVA) DUE TO EMBOLISM OF MIDDLE CEREBRAL ARTERY, UNSPECIFIED BLOOD VESSEL LATERALITY (H): ICD-10-CM

## 2022-03-30 DIAGNOSIS — G45.9 TRANSIENT CEREBRAL ISCHEMIA, UNSPECIFIED TYPE: ICD-10-CM

## 2022-03-30 DIAGNOSIS — Z79.01 LONG TERM CURRENT USE OF ANTICOAGULANTS WITH INR GOAL OF 2.0-3.0: ICD-10-CM

## 2022-03-30 DIAGNOSIS — I51.3 LEFT VENTRICULAR THROMBUS: ICD-10-CM

## 2022-03-30 DIAGNOSIS — I51.3 LEFT VENTRICULAR THROMBUS: Primary | ICD-10-CM

## 2022-03-30 LAB — INR BLD: 1.3 (ref 0.9–1.1)

## 2022-03-30 PROCEDURE — 36416 COLLJ CAPILLARY BLOOD SPEC: CPT

## 2022-03-30 PROCEDURE — 85610 PROTHROMBIN TIME: CPT

## 2022-03-30 NOTE — PROGRESS NOTES
ANTICOAGULATION MANAGEMENT     Sawyer Renteria 88 year old male is on warfarin with subtherapeutic INR result. (Goal INR 2.0-3.0)    Recent labs: (last 7 days)     03/30/22  1258   INR 1.3*       ASSESSMENT       Source(s): Chart Review and Patient/Caregiver Call       Warfarin doses taken: Warfarin taken as instructed    Diet: No new diet changes identified    New illness, injury, or hospitalization: No    Medication/supplement changes: None noted    Signs or symptoms of bleeding or clotting: No    Previous INR: Subtherapeutic    Additional findings: None       PLAN     Recommended plan for no diet, medication or health factor changes affecting INR     Dosing Instructions: booster dose then Increase your warfarin dose (9.1% change) with next INR in 1 week       Summary  As of 3/30/2022    Full warfarin instructions:  3/30: 5 mg; Otherwise 2.5 mg every Mon, Wed; 5 mg all other days   Next INR check:  4/6/2022             Telephone call with DERICK who verbalizes understanding and agrees to plan    Lab visit scheduled    Education provided: None required    Plan made per [facility] anticoagulation protocol    Eleno Crystal RN  Anticoagulation Clinic  3/30/2022    _______________________________________________________________________     Anticoagulation Episode Summary     Current INR goal:  2.0-3.0   TTR:  74.2 % (1 y)   Target end date:  Indefinite   Send INR reminders to:  [facility] HEART INR NURSE    Indications    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

## 2022-04-05 ENCOUNTER — OFFICE VISIT (OUTPATIENT)
Dept: CARDIOLOGY | Facility: CLINIC | Age: 87
End: 2022-04-05
Attending: INTERNAL MEDICINE
Payer: MEDICARE

## 2022-04-05 VITALS
SYSTOLIC BLOOD PRESSURE: 138 MMHG | HEIGHT: 63 IN | DIASTOLIC BLOOD PRESSURE: 60 MMHG | BODY MASS INDEX: 21.02 KG/M2 | OXYGEN SATURATION: 98 % | HEART RATE: 68 BPM | WEIGHT: 118.6 LBS

## 2022-04-05 DIAGNOSIS — E78.2 MIXED HYPERLIPIDEMIA: ICD-10-CM

## 2022-04-05 DIAGNOSIS — I25.5 CARDIOMYOPATHY, ISCHEMIC: Primary | ICD-10-CM

## 2022-04-05 DIAGNOSIS — I10 PRIMARY HYPERTENSION: ICD-10-CM

## 2022-04-05 DIAGNOSIS — N18.31 CHRONIC RENAL IMPAIRMENT, STAGE 3A (H): ICD-10-CM

## 2022-04-05 DIAGNOSIS — I25.10 CORONARY ARTERY DISEASE INVOLVING NATIVE CORONARY ARTERY OF NATIVE HEART WITHOUT ANGINA PECTORIS: ICD-10-CM

## 2022-04-05 DIAGNOSIS — I50.22 CHRONIC SYSTOLIC HEART FAILURE (H): ICD-10-CM

## 2022-04-05 PROCEDURE — 99215 OFFICE O/P EST HI 40 MIN: CPT | Performed by: NURSE PRACTITIONER

## 2022-04-05 RX ORDER — LISINOPRIL 2.5 MG/1
TABLET ORAL
Qty: 180 TABLET | Refills: 3 | Status: SHIPPED | OUTPATIENT
Start: 2022-04-05 | End: 2022-04-18

## 2022-04-05 NOTE — PATIENT INSTRUCTIONS
1. INCREASE lisinopril to 5mg 2 x a day    - 2 tablets 2 x a day  2. Will price out Entresto and consider transition at follow up appointment.   3. Continue to monitor weight  4. Please call with any questions/concerns [phone]  5. Follow up with Farrah in 2 weeks with labs prior

## 2022-04-05 NOTE — PROGRESS NOTES
Cardiology Clinic Progress Note  Sawyer Renteria MRN# [id number]   YOB: 1934 Age: 88 year old   Primary Cardiologist: Dr. Newton  Reason for visit: CORE enrollment             Assessment and Plan:   Sawyer Renteria is a very pleasant 88 year old male whom I am seeing today for CORE enrollment.     1. Heart failure with reduced ejection fraction, ischemic cardiomyopathy - LVEF 35-40% per echo 12/2021   - NYHA class II, stage C   - Etiology - ischemic   - Guideline directed medical therapy    - Betablocker: Continue metoprolol XL 25mg daily     - ACEI/ARB/ARNI: INCREASE lisinopril to 5mg BID     - Aldactone antagonist: none, due to CKD  2. Coronary artery disease - s/p stenting to LAD and angioplasty of diagonal in 2006. Stable, no ischemic symptoms   - Continue aspirin, metoprolol and repatha  3. Dyslipidemia with statin intolerance - LDL 68 and HDL 66   - Continue Repatha   4. Hypertension - controlled  5. CKD stage III - baseline creatinine 1.3-1.7  6. Apical wall motion abnormality, with history of apical thrombus and cardioembolic stroke   - Continue warfarin   - No thrombus noted on most recent echo 12/2021    I saw patient today for CORE enrollment. He is doing well from a cardiovascular/heart failure standpoint. He appears compensated and euvolemic on exam. NYHA class II symptoms. Weight stable at home 116-117#. He tolerated titration of metoprolol XL to 25mg with no noted side effects. Today recommend increasing his lisinopril to 5mg BID to further optimize his GDMT. I will reach out to pharmacy liaison to price out Entresto. If he tolerates increase in lisinopril and financially feasible will plan to transition to Entresto at follow up appointment. CORE follow up in 2 weeks with BMP prior.       Changes today: INCREASE lisinopril to 5mg BID, price out Entresto and as long as tolerates increased lisinopril will plan to transition to Entresto if financially feasible.     Follow up plan:     CORE  follow up with me in 2 weeks with labs prior    Pharmacy liaison consultation to price out Entresto    Fasting lipid panel due late summer 2022    Follow up with Dr. Newton in 3-4 months with echocardiogram prior after medication optimization.         History of Presenting Illness:    Sawyer Renteria is a very pleasant 88 year old male with a history of HFrEF, coronary artery disease, ischemic cardiomyopathy, dyslipidemia, hypertension and CKD.    Primary cardiologist Dr. Newton. He had an anterior MI in 2006 and underwent stenting of his LAD and angioplasty of his diagonal. He has residual 60% OM 1 stenosis. He developed an apical thrombus with a cardioembolic CVA and has remained on warfarin.     Last echocardiogram 12/2021 showed LVEF 35-40%, Severe hypokinesis of the mid-apical inferior, mid-apical anterior, mid anteroseptal, apical septal, and apical lateral segments. RV normal size/function. Similar when compared to prior study 6/2021.    Cardiac MRI 1/2020 showed LVEF 52%,no evidence of thrombus. EF and wall motion similar to prior study in 2014.     He was hospitalized in December 2021 with some complaints of transient left arm numbness/tinging, suspected to be secondary to paresthesia from reduced blood flow.     He was most recently seen by Dr. Newton the beginning of March at which time he was doing well. His metoprolol XL was increased to 25mg daily and he was referred to CORE clinic for further consideration of titration of GDMT.     Patient is here today for CORE enrollment.     Patient reports feeling good. Notes tolerating increase of metoprolol well. Monitoring weights daily a home, stable at 116-117#. Denies LE edema. Denies abdominal distention/bloating. Denies shortness of breath at rest. Denies exertional dyspnea. Sleeping good. Denies orthopnea or PND. Denies chest pain or chest tightness. Denies dizziness, lightheadedness or other presyncopal symptoms. Denies tachycardia or palpitations. Denies  episodes of bleeding. Taking medications daily.     Labs from 3/7 showed overall stable kidney function, creatinine 1.62 and stable electrolytes. Blood pressure 138/60 and HR 68 in clinic today.    Appetite good. Eating meals at home, also getting take out from family friends restaurant. No set exercise routine. Living independently in a house, his wife  a little over 1 year ago. Denies alcohol use. Denies tobacco use.         Social History       Social History     Socioeconomic History     Marital status:      Spouse name: Not on file     Number of children: Not on file     Years of education: Not on file     Highest education level: Not on file   Occupational History     Not on file   Tobacco Use     Smoking status: Never Smoker     Smokeless tobacco: Never Used   Substance and Sexual Activity     Alcohol use: No     Drug use: Never     Sexual activity: Not on file   Other Topics Concern     Parent/sibling w/ CABG, MI or angioplasty before 65F 55M? No      Service Not Asked     Blood Transfusions Not Asked     Caffeine Concern No     Occupational Exposure Not Asked     Hobby Hazards Not Asked     Sleep Concern No     Stress Concern No     Weight Concern Yes     Comment: weight loss     Special Diet No     Back Care Not Asked     Exercise Yes     Comment: bicycle 10 min, walking, 3 days week     Bike Helmet Not Asked     Seat Belt Yes     Self-Exams Not Asked   Social History Narrative     Not on file     Social Determinants of Health     Financial Resource Strain: Not on file   Food Insecurity: Not on file   Transportation Needs: Not on file   Physical Activity: Not on file   Stress: Not on file   Social Connections: Not on file   Intimate Partner Violence: Not on file   Housing Stability: Not on file            Review of Systems:   Skin:  Negative     Eyes:  Positive for glasses  ENT:  Negative    Respiratory:  Negative    Cardiovascular:  Negative    Gastroenterology: Positive for  "heartburn;reflux;constipation  Genitourinary:  Positive for nocturia;urinary frequency  Musculoskeletal:  Positive for neck pain  Neurologic:  Positive for stroke  Psychiatric:  Negative    Heme/Lymph/Imm:  Positive for allergies  Endocrine:  Negative           Physical Exam:   Vitals: /60   Pulse 68   Ht 1.6 m (5' 3\")   Wt 53.8 kg (118 lb 9.6 oz)   SpO2 98%   BMI 21.01 kg/m     Wt Readings from Last 4 Encounters:   04/05/22 53.8 kg (118 lb 9.6 oz)   03/07/22 53.1 kg (117 lb)   12/05/21 52 kg (114 lb 11.2 oz)   08/17/21 52.2 kg (115 lb)     GEN: well nourished, in no acute distress.  HEENT:  Pupils equal, round. Sclerae nonicteric.   NECK: Supple, no masses appreciated. JVP appears normal.   C/V:  Regular rate and rhythm, no murmur, rub or gallop.    RESP: Respirations are unlabored. Clear to auscultation bilaterally without wheezing, rales, or rhonchi.  GI: Abdomen soft, nontender.  EXTREM: No LE edema.  NEURO: Alert and oriented, cooperative.  SKIN: Warm and dry       Data:     LIPID RESULTS:  Lab Results   Component Value Date    CHOL 156 08/16/2021    CHOL 162 01/28/2019    HDL 66 08/16/2021    HDL 60 01/28/2019    LDL 68 08/16/2021    LDL 77 01/28/2019    TRIG 112 08/16/2021    TRIG 124 01/28/2019    CHOLHDLRATIO 4.3 10/13/2015     LIVER ENZYME RESULTS:  Lab Results   Component Value Date    AST 21 07/25/2017    ALT 19 08/16/2021    ALT <5 (L) 01/28/2019     CBC RESULTS:  Lab Results   Component Value Date    WBC 8.2 12/05/2021    WBC 5.4 03/11/2020    RBC 3.70 (L) 12/05/2021    RBC 3.96 (L) 03/11/2020    HGB 10.6 (L) 12/05/2021    HGB 12.0 (L) 03/11/2020    HCT 33.3 (L) 12/05/2021    HCT 35.4 (L) 03/11/2020    MCV 90 12/05/2021    MCV 89 03/11/2020    MCH 28.6 12/05/2021    MCH 30.3 03/11/2020    MCHC 31.8 12/05/2021    MCHC 33.9 03/11/2020    RDW 13.4 12/05/2021    RDW 13.7 03/11/2020     12/05/2021     03/11/2020     BMP RESULTS:  Lab Results   Component Value Date     " 03/07/2022     03/11/2020    POTASSIUM 4.1 03/07/2022    POTASSIUM 4.6 03/11/2020    CHLORIDE 109 03/07/2022    CHLORIDE 116 (H) 03/11/2020    CO2 26 03/07/2022    CO2 28 03/11/2020    ANIONGAP 4 03/07/2022    ANIONGAP <1 (L) 03/11/2020     (H) 03/07/2022    GLC 98 03/11/2020    BUN 27 03/07/2022    BUN 27 03/11/2020    CR 1.62 (H) 03/07/2022    CR 1.31 (H) 03/11/2020    GFRESTIMATED 41 (L) 03/07/2022    GFRESTIMATED 49 (L) 03/11/2020    GFRESTBLACK 57 (L) 03/11/2020    AURORA 8.6 03/07/2022    AURORA 8.5 03/11/2020      INR RESULTS:  Lab Results   Component Value Date    INR 1.3 (H) 03/30/2022    INR 1.6 (H) 03/17/2022    INR 2.82 (H) 12/06/2021    INR 2.82 (H) 12/05/2021    INR 3.50 (H) 07/08/2021    INR 1.80 (H) 06/28/2021            Medications     Current Outpatient Medications   Medication Sig Dispense Refill     aspirin 81 MG tablet Take 81 mg by mouth daily       Cholecalciferol (VITAMIN D3) 2000 UNITS CAPS Take by mouth daily       dutasteride (AVODART) 0.5 MG capsule Take 1 capsule (0.5 mg) by mouth daily 30 capsule 11     evolocumab (REPATHA) 140 MG/ML prefilled autoinjector Inject 1 mL (140 mg) Subcutaneous every 14 days 6 mL 3     Fish Oil-Cholecalciferol (FISH OIL + D3) 3667-4633 MG-UNIT CAPS Take 2 tablets by mouth daily       lisinopril (ZESTRIL) 2.5 MG tablet two tablets twice per day 180 tablet 3     metoprolol succinate ER (TOPROL-XL) 25 MG 24 hr tablet Take 1 tablet (25 mg) by mouth daily 90 tablet 3     nitroGLYcerin (NITROSTAT) 0.4 MG sublingual tablet Place 1 tablet (0.4 mg) under the tongue every 5 minutes as needed for chest pain Take as directed 25 tablet 1     tamsulosin (FLOMAX) 0.4 MG capsule Take 0.4 mg by mouth daily       warfarin ANTICOAGULANT (COUMADIN) 5 MG tablet Take 1/2 tab on Mondays, Wednesdays and Fridays and 1 tab all other days or as directed by INR clinic 90 tablet 1     melatonin 5 MG tablet Take 5 mg by mouth nightly as needed for sleep (Patient not taking:  Reported on 3/7/2022)            Past Medical History     Past Medical History:   Diagnosis Date     BPH (benign prostatic hyperplasia)      Cardiomyopathy, ischemic     EF 49% by cardiac MRI 1/15/2014     Coronary artery disease 1/24/06    Cypher CANDIDA to LAD     Gastro-oesophageal reflux disease      Hyperlipidaemia      Hypertension      Left ventricular thrombus      Myocardial infarction (H) 1/2006    Anterior     TIA (transient ischaemic attack)      Past Surgical History:   Procedure Laterality Date     CORONARY ANGIOGRAPHY ADULT ORDER  1/24/06    Cypher CANDIDA to LAD     HEART CATH, ANGIOPLASTY  1/2006    Cypher CANDIDA to LAD     TONSILLECTOMY & ADENOIDECTOMY       Family History   Problem Relation Age of Onset     Heart Disease Mother         heart failure     Heart Disease Brother             Allergies   Flomax [tamsulosin], Aldactone [spironolactone], Carvedilol, Colesevelam, Ezetimibe, Lisinopril, Pravastatin, Rosuvastatin, and Simvastatin    50 minutes spent on the date of the encounter doing chart review, history and exam, documentation and further activities as noted above      JAE Marcos [facility]  Pager: [phone]

## 2022-04-05 NOTE — LETTER
4/5/2022    Francisco Doshi MD  [address]  [facility] [address]    RE: Sawyer Renteria       Dear Colleague,     I had the pleasure of seeing Sawyer Renteria in the [facility].  Cardiology Clinic Progress Note  Sawyer Renteria MRN# [id number]   YOB: 1934 Age: 88 year old   Primary Cardiologist: Dr. Newton  Reason for visit: CORE enrollment             Assessment and Plan:   Sawyer Renteria is a very pleasant 88 year old male whom I am seeing today for CORE enrollment.     1. Heart failure with reduced ejection fraction, ischemic cardiomyopathy - LVEF 35-40% per echo 12/2021   - NYHA class II, stage C   - Etiology - ischemic   - Guideline directed medical therapy    - Betablocker: Continue metoprolol XL 25mg daily     - ACEI/ARB/ARNI: INCREASE lisinopril to 5mg BID     - Aldactone antagonist: none, due to CKD  2. Coronary artery disease - s/p stenting to LAD and angioplasty of diagonal in 2006. Stable, no ischemic symptoms   - Continue aspirin, metoprolol and repatha  3. Dyslipidemia with statin intolerance - LDL 68 and HDL 66   - Continue Repatha   4. Hypertension - controlled  5. CKD stage III - baseline creatinine 1.3-1.7  6. Apical wall motion abnormality, with history of apical thrombus and cardioembolic stroke   - Continue warfarin   - No thrombus noted on most recent echo 12/2021    I saw patient today for CORE enrollment. He is doing well from a cardiovascular/heart failure standpoint. He appears compensated and euvolemic on exam. NYHA class II symptoms. Weight stable at home 116-117#. He tolerated titration of metoprolol XL to 25mg with no noted side effects. Today recommend increasing his lisinopril to 5mg BID to further optimize his GDMT. I will reach out to pharmacy liaison to price out Entresto. If he tolerates increase in lisinopril and financially feasible will plan to transition to Entresto at follow up appointment. CORE follow up in 2 weeks with BMP prior.        Changes today: INCREASE lisinopril to 5mg BID, price out Entresto and as long as tolerates increased lisinopril will plan to transition to Entresto if financially feasible.     Follow up plan:     CORE follow up with me in 2 weeks with labs prior    Pharmacy liaison consultation to price out Entresto    Fasting lipid panel due late summer 2022    Follow up with Dr. Newton in 3-4 months with echocardiogram prior after medication optimization.         History of Presenting Illness:    Sawyer Renteria is a very pleasant 88 year old male with a history of HFrEF, coronary artery disease, ischemic cardiomyopathy, dyslipidemia, hypertension and CKD.    Primary cardiologist Dr. Newton. He had an anterior MI in 2006 and underwent stenting of his LAD and angioplasty of his diagonal. He has residual 60% OM 1 stenosis. He developed an apical thrombus with a cardioembolic CVA and has remained on warfarin.     Last echocardiogram 12/2021 showed LVEF 35-40%, Severe hypokinesis of the mid-apical inferior, mid-apical anterior, mid anteroseptal, apical septal, and apical lateral segments. RV normal size/function. Similar when compared to prior study 6/2021.    Cardiac MRI 1/2020 showed LVEF 52%,no evidence of thrombus. EF and wall motion similar to prior study in 2014.     He was hospitalized in December 2021 with some complaints of transient left arm numbness/tinging, suspected to be secondary to paresthesia from reduced blood flow.     He was most recently seen by Dr. Newton the beginning of March at which time he was doing well. His metoprolol XL was increased to 25mg daily and he was referred to CORE clinic for further consideration of titration of GDMT.     Patient is here today for CORE enrollment.     Patient reports feeling good. Notes tolerating increase of metoprolol well. Monitoring weights daily a home, stable at 116-117#. Denies LE edema. Denies abdominal distention/bloating. Denies shortness of breath at rest. Denies  exertional dyspnea. Sleeping good. Denies orthopnea or PND. Denies chest pain or chest tightness. Denies dizziness, lightheadedness or other presyncopal symptoms. Denies tachycardia or palpitations. Denies episodes of bleeding. Taking medications daily.     Labs from 3/7 showed overall stable kidney function, creatinine 1.62 and stable electrolytes. Blood pressure 138/60 and HR 68 in clinic today.    Appetite good. Eating meals at home, also getting take out from family friends restaurant. No set exercise routine. Living independently in a house, his wife  a little over 1 year ago. Denies alcohol use. Denies tobacco use.         Social History       Social History     Socioeconomic History     Marital status:      Spouse name: Not on file     Number of children: Not on file     Years of education: Not on file     Highest education level: Not on file   Occupational History     Not on file   Tobacco Use     Smoking status: Never Smoker     Smokeless tobacco: Never Used   Substance and Sexual Activity     Alcohol use: No     Drug use: Never     Sexual activity: Not on file   Other Topics Concern     Parent/sibling w/ CABG, MI or angioplasty before 65F 55M? No      Service Not Asked     Blood Transfusions Not Asked     Caffeine Concern No     Occupational Exposure Not Asked     Hobby Hazards Not Asked     Sleep Concern No     Stress Concern No     Weight Concern Yes     Comment: weight loss     Special Diet No     Back Care Not Asked     Exercise Yes     Comment: bicycle 10 min, walking, 3 days week     Bike Helmet Not Asked     Seat Belt Yes     Self-Exams Not Asked   Social History Narrative     Not on file     Social Determinants of Health     Financial Resource Strain: Not on file   Food Insecurity: Not on file   Transportation Needs: Not on file   Physical Activity: Not on file   Stress: Not on file   Social Connections: Not on file   Intimate Partner Violence: Not on file   Housing Stability:  "Not on file            Review of Systems:   Skin:  Negative     Eyes:  Positive for glasses  ENT:  Negative    Respiratory:  Negative    Cardiovascular:  Negative    Gastroenterology: Positive for heartburn;reflux;constipation  Genitourinary:  Positive for nocturia;urinary frequency  Musculoskeletal:  Positive for neck pain  Neurologic:  Positive for stroke  Psychiatric:  Negative    Heme/Lymph/Imm:  Positive for allergies  Endocrine:  Negative           Physical Exam:   Vitals: /60   Pulse 68   Ht 1.6 m (5' 3\")   Wt 53.8 kg (118 lb 9.6 oz)   SpO2 98%   BMI 21.01 kg/m     Wt Readings from Last 4 Encounters:   04/05/22 53.8 kg (118 lb 9.6 oz)   03/07/22 53.1 kg (117 lb)   12/05/21 52 kg (114 lb 11.2 oz)   08/17/21 52.2 kg (115 lb)     GEN: well nourished, in no acute distress.  HEENT:  Pupils equal, round. Sclerae nonicteric.   NECK: Supple, no masses appreciated. JVP appears normal.   C/V:  Regular rate and rhythm, no murmur, rub or gallop.    RESP: Respirations are unlabored. Clear to auscultation bilaterally without wheezing, rales, or rhonchi.  GI: Abdomen soft, nontender.  EXTREM: No LE edema.  NEURO: Alert and oriented, cooperative.  SKIN: Warm and dry       Data:     LIPID RESULTS:  Lab Results   Component Value Date    CHOL 156 08/16/2021    CHOL 162 01/28/2019    HDL 66 08/16/2021    HDL 60 01/28/2019    LDL 68 08/16/2021    LDL 77 01/28/2019    TRIG 112 08/16/2021    TRIG 124 01/28/2019    CHOLHDLRATIO 4.3 10/13/2015     LIVER ENZYME RESULTS:  Lab Results   Component Value Date    AST 21 07/25/2017    ALT 19 08/16/2021    ALT <5 (L) 01/28/2019     CBC RESULTS:  Lab Results   Component Value Date    WBC 8.2 12/05/2021    WBC 5.4 03/11/2020    RBC 3.70 (L) 12/05/2021    RBC 3.96 (L) 03/11/2020    HGB 10.6 (L) 12/05/2021    HGB 12.0 (L) 03/11/2020    HCT 33.3 (L) 12/05/2021    HCT 35.4 (L) 03/11/2020    MCV 90 12/05/2021    MCV 89 03/11/2020    MCH 28.6 12/05/2021    MCH 30.3 03/11/2020    MCHC 31.8 " 12/05/2021    MCHC 33.9 03/11/2020    RDW 13.4 12/05/2021    RDW 13.7 03/11/2020     12/05/2021     03/11/2020     BMP RESULTS:  Lab Results   Component Value Date     03/07/2022     03/11/2020    POTASSIUM 4.1 03/07/2022    POTASSIUM 4.6 03/11/2020    CHLORIDE 109 03/07/2022    CHLORIDE 116 (H) 03/11/2020    CO2 26 03/07/2022    CO2 28 03/11/2020    ANIONGAP 4 03/07/2022    ANIONGAP <1 (L) 03/11/2020     (H) 03/07/2022    GLC 98 03/11/2020    BUN 27 03/07/2022    BUN 27 03/11/2020    CR 1.62 (H) 03/07/2022    CR 1.31 (H) 03/11/2020    GFRESTIMATED 41 (L) 03/07/2022    GFRESTIMATED 49 (L) 03/11/2020    GFRESTBLACK 57 (L) 03/11/2020    AURORA 8.6 03/07/2022    AURORA 8.5 03/11/2020      INR RESULTS:  Lab Results   Component Value Date    INR 1.3 (H) 03/30/2022    INR 1.6 (H) 03/17/2022    INR 2.82 (H) 12/06/2021    INR 2.82 (H) 12/05/2021    INR 3.50 (H) 07/08/2021    INR 1.80 (H) 06/28/2021            Medications     Current Outpatient Medications   Medication Sig Dispense Refill     aspirin 81 MG tablet Take 81 mg by mouth daily       Cholecalciferol (VITAMIN D3) 2000 UNITS CAPS Take by mouth daily       dutasteride (AVODART) 0.5 MG capsule Take 1 capsule (0.5 mg) by mouth daily 30 capsule 11     evolocumab (REPATHA) 140 MG/ML prefilled autoinjector Inject 1 mL (140 mg) Subcutaneous every 14 days 6 mL 3     Fish Oil-Cholecalciferol (FISH OIL + D3) 6112-4002 MG-UNIT CAPS Take 2 tablets by mouth daily       lisinopril (ZESTRIL) 2.5 MG tablet two tablets twice per day 180 tablet 3     metoprolol succinate ER (TOPROL-XL) 25 MG 24 hr tablet Take 1 tablet (25 mg) by mouth daily 90 tablet 3     nitroGLYcerin (NITROSTAT) 0.4 MG sublingual tablet Place 1 tablet (0.4 mg) under the tongue every 5 minutes as needed for chest pain Take as directed 25 tablet 1     tamsulosin (FLOMAX) 0.4 MG capsule Take 0.4 mg by mouth daily       warfarin ANTICOAGULANT (COUMADIN) 5 MG tablet Take 1/2 tab on Mondays,  Wednesdays and Fridays and 1 tab all other days or as directed by INR clinic 90 tablet 1     melatonin 5 MG tablet Take 5 mg by mouth nightly as needed for sleep (Patient not taking: Reported on 3/7/2022)            Past Medical History     Past Medical History:   Diagnosis Date     BPH (benign prostatic hyperplasia)      Cardiomyopathy, ischemic     EF 49% by cardiac MRI 1/15/2014     Coronary artery disease 1/24/06    Cypher CANDIDA to LAD     Gastro-oesophageal reflux disease      Hyperlipidaemia      Hypertension      Left ventricular thrombus      Myocardial infarction (H) 1/2006    Anterior     TIA (transient ischaemic attack)      Past Surgical History:   Procedure Laterality Date     CORONARY ANGIOGRAPHY ADULT ORDER  1/24/06    Cypher CANDIDA to LAD     HEART CATH, ANGIOPLASTY  1/2006    Cypher CANDIDA to LAD     TONSILLECTOMY & ADENOIDECTOMY       Family History   Problem Relation Age of Onset     Heart Disease Mother         heart failure     Heart Disease Brother             Allergies   Flomax [tamsulosin], Aldactone [spironolactone], Carvedilol, Colesevelam, Ezetimibe, Lisinopril, Pravastatin, Rosuvastatin, and Simvastatin    50 minutes spent on the date of the encounter doing chart review, history and exam, documentation and further activities as noted above      JAE Marcos CNP  [facility]  Pager: [phone]    Thank you for allowing me to participate in the care of your patient.      Sincerely,     JAE Marcos CNP     [facility]  cc:   Satya Newton MD  [address]  [address]  [place] [id number]

## 2022-04-06 ENCOUNTER — ANTICOAGULATION THERAPY VISIT (OUTPATIENT)
Dept: ANTICOAGULATION | Facility: CLINIC | Age: 87
End: 2022-04-06

## 2022-04-06 ENCOUNTER — TELEPHONE (OUTPATIENT)
Dept: CARDIOLOGY | Facility: CLINIC | Age: 87
End: 2022-04-06

## 2022-04-06 ENCOUNTER — LAB (OUTPATIENT)
Dept: LAB | Facility: CLINIC | Age: 87
End: 2022-04-06
Payer: MEDICARE

## 2022-04-06 DIAGNOSIS — I51.3 LEFT VENTRICULAR THROMBUS: ICD-10-CM

## 2022-04-06 DIAGNOSIS — I51.3 LEFT VENTRICULAR THROMBUS: Primary | ICD-10-CM

## 2022-04-06 DIAGNOSIS — Z79.01 LONG TERM CURRENT USE OF ANTICOAGULANTS WITH INR GOAL OF 2.0-3.0: ICD-10-CM

## 2022-04-06 DIAGNOSIS — I63.419 CEREBROVASCULAR ACCIDENT (CVA) DUE TO EMBOLISM OF MIDDLE CEREBRAL ARTERY, UNSPECIFIED BLOOD VESSEL LATERALITY (H): ICD-10-CM

## 2022-04-06 DIAGNOSIS — G45.9 TRANSIENT CEREBRAL ISCHEMIA, UNSPECIFIED TYPE: ICD-10-CM

## 2022-04-06 LAB — INR BLD: 2.3 (ref 0.9–1.1)

## 2022-04-06 PROCEDURE — 36415 COLL VENOUS BLD VENIPUNCTURE: CPT

## 2022-04-06 PROCEDURE — 85610 PROTHROMBIN TIME: CPT

## 2022-04-06 NOTE — PROGRESS NOTES
ANTICOAGULATION MANAGEMENT     Sawyer Renteria 88 year old male is on warfarin with therapeutic INR result. (Goal INR 2.0-3.0)    Recent labs: (last 7 days)     04/06/22  1258   INR 2.3*       ASSESSMENT       Source(s): Chart Review and Patient/Caregiver Call       Warfarin doses taken: More warfarin taken than planned which may be affecting INR    Diet: No new diet changes identified    New illness, injury, or hospitalization: No    Medication/supplement changes: Lisinopril increased     Signs or symptoms of bleeding or clotting: No    Previous INR: Subtherapeutic    Additional findings: None       PLAN     Recommended plan for temporary change(s) affecting INR     Dosing Instructions: continue your current warfarin dose with next INR in 1 week.  The plan last week was 1x boost then overall increase but patient took 5mg on Monday instead of 2.5mg.  We will try 2 days of 2.5mg and 5mg ROW as previously planned.         Summary  As of 4/6/2022    Full warfarin instructions:  2.5 mg every Wed, Sat; 5 mg all other days   Next INR check:  4/13/2022             Telephone call with DERICK who verbalizes understanding and agrees to plan    Lab visit scheduled    Education provided: Goal range and significance of current result and Contact [phone] with any changes, questions or concerns.     Plan made per [facility] anticoagulation protocol    Tayla Antonio, RN  Anticoagulation Clinic  4/6/2022    _______________________________________________________________________     Anticoagulation Episode Summary     Current INR goal:  2.0-3.0   TTR:  72.8 % (1 y)   Target end date:  Indefinite   Send INR reminders to:  MAJANO [facility] HEART INR NURSE    Indications    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]  Transient cerebral ischemia [G45.9]  Long term current use of anticoagulants with INR goal of 2.0-3.0 [Z79.01]  Transient cerebral ischemia  unspecified type [G45.9]           Comments:  03/21 new home phone number: [phone]           Anticoagulation Care Providers     Provider Role Specialty Phone number    Satya Newton MD Referring Cardiovascular Disease [phone]

## 2022-04-07 NOTE — TELEPHONE ENCOUNTER
Below is Entresto pricing information, will review with patient during our upcoming OV. If financially feasible will consider transitioning to Entresto.     JAE Marcos CNP  [facility]  Pager: [phone]      ----- Message from Makenna Person sent at 4/5/2022  2:35 PM CDT -----  Regarding: RE: Entresto Pricing  Patient has Medicare D through [employer].    Entresto  --$132/mo ($107 at [employer], [employer], [employer] or [employer] Delivery)  --When total drug costs exceed $4,430, price will increase to a 25% coinsurance, equivalent to $173/mo.  --If total out of pocket exceeds $7,050, coinsurance will be reduced to a 5% coinsurance, equivalent to $35/mo.     Other plans offer better coverage on this medication.  I'd recommend he consider other Part D plans for 2023.    Makenna Person  Pharmacy Technician/Liaison, Discharge Pharmacy   [phone]  [email or web address]  ----- Message -----  From: Farrah Bernstein APRN CNP  Sent: 4/5/2022   2:13 PM CDT  To: Makenna Person  Subject: Entresto Pricing                                 Yunier Mensah,    Could you price out Entresto for this patient?    Thank you,  Farrah

## 2022-04-13 ENCOUNTER — ANTICOAGULATION THERAPY VISIT (OUTPATIENT)
Dept: ANTICOAGULATION | Facility: CLINIC | Age: 87
End: 2022-04-13

## 2022-04-13 ENCOUNTER — LAB (OUTPATIENT)
Dept: LAB | Facility: CLINIC | Age: 87
End: 2022-04-13
Payer: MEDICARE

## 2022-04-13 DIAGNOSIS — Z79.01 LONG TERM CURRENT USE OF ANTICOAGULANTS WITH INR GOAL OF 2.0-3.0: ICD-10-CM

## 2022-04-13 DIAGNOSIS — I51.3 LEFT VENTRICULAR THROMBUS: Primary | ICD-10-CM

## 2022-04-13 DIAGNOSIS — G45.9 TRANSIENT CEREBRAL ISCHEMIA, UNSPECIFIED TYPE: ICD-10-CM

## 2022-04-13 DIAGNOSIS — I63.419 CEREBROVASCULAR ACCIDENT (CVA) DUE TO EMBOLISM OF MIDDLE CEREBRAL ARTERY, UNSPECIFIED BLOOD VESSEL LATERALITY (H): ICD-10-CM

## 2022-04-13 DIAGNOSIS — G45.9 TRANSIENT CEREBRAL ISCHEMIA: ICD-10-CM

## 2022-04-13 DIAGNOSIS — I51.3 LEFT VENTRICULAR THROMBUS: ICD-10-CM

## 2022-04-13 LAB — INR BLD: 2.5 (ref 0.9–1.1)

## 2022-04-13 PROCEDURE — 85610 PROTHROMBIN TIME: CPT

## 2022-04-13 PROCEDURE — 36416 COLLJ CAPILLARY BLOOD SPEC: CPT

## 2022-04-13 NOTE — PROGRESS NOTES
ANTICOAGULATION MANAGEMENT     Sawyer Renteria 88 year old male is on warfarin with therapeutic INR result. (Goal INR 2.0-3.0)    Recent labs: (last 7 days)     04/13/22  1240   INR 2.5*       ASSESSMENT       Source(s): Chart Review and Patient/Caregiver Call       Warfarin doses taken: Warfarin taken as instructed    Diet: No new diet changes identified    New illness, injury, or hospitalization: No    Medication/supplement changes: None noted    Signs or symptoms of bleeding or clotting: No    Previous INR: Therapeutic last visit; previously outside of goal range    Additional findings: None       PLAN     Recommended plan for no diet, medication or health factor changes affecting INR     Dosing Instructions: continue your current warfarin dose with next INR in 2 weeks       Summary  As of 4/13/2022    Full warfarin instructions:  2.5 mg every Wed, Sat; 5 mg all other days   Next INR check:  4/27/2022             Telephone call with DERICK who verbalizes understanding and agrees to plan    Lab visit scheduled    Education provided: Goal range and significance of current result and Contact [phone] with any changes, questions or concerns.     Plan made per [facility] anticoagulation protocol    Mari Saavedra RN  Anticoagulation Clinic  4/13/2022    _______________________________________________________________________     Anticoagulation Episode Summary     Current INR goal:  2.0-3.0   TTR:  72.8 % (1 y)   Target end date:  Indefinite   Send INR reminders to:  MAJAN[facility] HEART INR NURSE    Indications    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]  Transient cerebral ischemia [G45.9]  Long term current use of anticoagulants with INR goal of 2.0-3.0 [Z79.01]  Transient cerebral ischemia  unspecified type [G45.9]           Comments:  03/21 new home phone number: [phone]          Anticoagulation Care Providers     Provider Role Specialty Phone number    Satya Newton MD Referring Cardiovascular Disease [phone]

## 2022-04-15 ENCOUNTER — LAB (OUTPATIENT)
Dept: LAB | Facility: CLINIC | Age: 87
End: 2022-04-15
Payer: MEDICARE

## 2022-04-15 DIAGNOSIS — I25.5 CARDIOMYOPATHY, ISCHEMIC: ICD-10-CM

## 2022-04-15 LAB
ANION GAP SERPL CALCULATED.3IONS-SCNC: 4 MMOL/L (ref 3–14)
BUN SERPL-MCNC: 26 MG/DL (ref 7–30)
CALCIUM SERPL-MCNC: 8.9 MG/DL (ref 8.5–10.1)
CHLORIDE BLD-SCNC: 106 MMOL/L (ref 94–109)
CO2 SERPL-SCNC: 26 MMOL/L (ref 20–32)
CREAT SERPL-MCNC: 1.66 MG/DL (ref 0.66–1.25)
GFR SERPL CREATININE-BSD FRML MDRD: 39 ML/MIN/1.73M2
GLUCOSE BLD-MCNC: 94 MG/DL (ref 70–99)
POTASSIUM BLD-SCNC: 4.2 MMOL/L (ref 3.4–5.3)
SODIUM SERPL-SCNC: 136 MMOL/L (ref 133–144)

## 2022-04-15 PROCEDURE — 80048 BASIC METABOLIC PNL TOTAL CA: CPT | Performed by: NURSE PRACTITIONER

## 2022-04-15 PROCEDURE — 36415 COLL VENOUS BLD VENIPUNCTURE: CPT | Performed by: NURSE PRACTITIONER

## 2022-04-18 ENCOUNTER — OFFICE VISIT (OUTPATIENT)
Dept: CARDIOLOGY | Facility: CLINIC | Age: 87
End: 2022-04-18
Attending: NURSE PRACTITIONER
Payer: MEDICARE

## 2022-04-18 VITALS
BODY MASS INDEX: 20.82 KG/M2 | DIASTOLIC BLOOD PRESSURE: 61 MMHG | WEIGHT: 117.5 LBS | HEIGHT: 63 IN | HEART RATE: 68 BPM | OXYGEN SATURATION: 100 % | SYSTOLIC BLOOD PRESSURE: 153 MMHG

## 2022-04-18 DIAGNOSIS — I50.22 CHRONIC SYSTOLIC HEART FAILURE (H): ICD-10-CM

## 2022-04-18 DIAGNOSIS — I25.10 CORONARY ARTERY DISEASE INVOLVING NATIVE CORONARY ARTERY OF NATIVE HEART WITHOUT ANGINA PECTORIS: ICD-10-CM

## 2022-04-18 DIAGNOSIS — I51.3 LEFT VENTRICULAR THROMBUS: ICD-10-CM

## 2022-04-18 DIAGNOSIS — E78.2 MIXED HYPERLIPIDEMIA: ICD-10-CM

## 2022-04-18 DIAGNOSIS — I10 ESSENTIAL HYPERTENSION: ICD-10-CM

## 2022-04-18 DIAGNOSIS — I25.5 CARDIOMYOPATHY, ISCHEMIC: Primary | ICD-10-CM

## 2022-04-18 PROCEDURE — 99215 OFFICE O/P EST HI 40 MIN: CPT | Performed by: NURSE PRACTITIONER

## 2022-04-18 RX ORDER — SACUBITRIL AND VALSARTAN 24; 26 MG/1; MG/1
1 TABLET, FILM COATED ORAL 2 TIMES DAILY
Qty: 80 TABLET | Refills: 1 | Status: SHIPPED | OUTPATIENT
Start: 2022-04-18 | End: 2022-07-12

## 2022-04-18 RX ORDER — METOPROLOL SUCCINATE 25 MG/1
25 TABLET, EXTENDED RELEASE ORAL DAILY
Qty: 90 TABLET | Refills: 3 | Status: SHIPPED | OUTPATIENT
Start: 2022-04-18 | End: 2022-10-11

## 2022-04-18 NOTE — LETTER
4/18/2022    Francisco Doshi MD  [address]  [facility] [address]    RE: Sawyer Renteria       Dear Colleague,     I had the pleasure of seeing Sawyer Renteria in the [facility].  Cardiology Clinic Progress Note  Sawyer Renteria MRN# [id number]   YOB: 1934 Age: 88 year old   Primary Cardiologist: Dr. Newton  Reason for visit: CORE follow up             Assessment and Plan:   Sawyer Renteria is a very pleasant 88 year old male whom I am seeing today for CORE follow up.       1. Heart failure with reduced ejection fraction, ischemic cardiomyopathy - LVEF 35-40% per echo 12/2021              - NYHA class II, stage C              - Etiology - ischemic              - Guideline directed medical therapy                          - Betablocker: Continue metoprolol XL 25mg daily                           - ACEI/ARB/ARNI: STOP lisinopril to 5mg BID, START Entresto 24/26mg BID (after 36 hour washout period).                           - Aldactone antagonist: none, due to CKD  2. Coronary artery disease - s/p stenting to LAD and angioplasty of diagonal in 2006. Stable, no ischemic symptoms              - Continue aspirin, metoprolol and repatha  3. Dyslipidemia with statin intolerance - LDL 68 and HDL 66              - Continue Repatha   4. Hypertension - controlled  5. CKD stage III - baseline creatinine 1.3-1.7  6. Apical wall motion abnormality, with history of apical thrombus and cardioembolic stroke              - Continue warfarin              - No thrombus noted on most recent echo 12/2021     I saw AJ today for CORE follow up. He continues to do well from a heart failure standpoint. He appears compensated and euvolemic on exam. He has tolerated the increase in lisinopril well with no issues. BMP showed overall stable renal function, creatinine 1.6. He has noted an increase in cough since our last visit, lungs clear on exam today. Reviewed pricing of Entresto and patient feels  financially feasible. Did review consideration of changing pharmacy coverage during his insurance open enrollment this fall. Recommend transitioning from lisinopril to Entresto, advised to stop lisinopril today. Reviewed required washout period. Advised to start Entresto on Wednesday 4/20. Provided written instructions. Recommend follow-up with CORE in 3 weeks with labs prior.       Changes today: STOP lisinopril, START Entresto 24/26mg BID after 36 hour washout period.     Follow up plan:     CORE follow up with me in 3 weeks with labs prior.     Fasting lipid panel due late summer 2022     Follow up with Dr. Newton in 3-4 months with echocardiogram prior after medication optimization.         History of Presenting Illness:    Sawyer Renteria is a very pleasant 88 year old male with a history of HFrEF, coronary artery disease, ischemic cardiomyopathy, dyslipidemia, hypertension and CKD.     Primary cardiologist Dr. Newton. He had an anterior MI in 2006 and underwent stenting of his LAD and angioplasty of his diagonal. He has residual 60% OM 1 stenosis. He developed an apical thrombus with a cardioembolic CVA and has remained on warfarin.      Last echocardiogram 12/2021 showed LVEF 35-40%, Severe hypokinesis of the mid-apical inferior, mid-apical anterior, mid anteroseptal, apical septal, and apical lateral segments. RV normal size/function. Similar when compared to prior study 6/2021.     Cardiac MRI 1/2020 showed LVEF 52%,no evidence of thrombus. EF and wall motion similar to prior study in 2014.      He was hospitalized in December 2021 with some complaints of transient left arm numbness/tinging, suspected to be secondary to paresthesia from reduced blood flow.      He was most recently seen by Dr. Newton the beginning of March at which time he was doing well. His metoprolol XL was increased to 25mg daily and he was referred to CORE clinic for further consideration of titration of GDMT.      I first met patient for  CORE enrollment in 2022 at which time he was doing well. We reviewed his cardiomyopathy and consideration for titration of GDMT. Also reached out to pharmacy liaison to price out Entresto pricing which will be reviewed for consideration today.     Patient is here today for CORE follow up.     Patient reports feeling good. Monitoring weights daily a home. Notes he uses different scales at home, but typically 116-120#. States the last few days slight increase. Denies LE edema. Denies shortness of breath at rest. Has noted increased cough since our last visit. The cough does have some mucus production. Denies exertional dyspnea. Denies orthopnea or PND. Denies chest pain or chest tightness. Denies dizziness, lightheadedness or other presyncopal symptoms. Denies tachycardia or palpitations. Taking medications daily.     Labs from 4/15 show stable kidney function and electrolytes, creatinine 1.66. Blood pressure 153/61 and HR 68 in clinic today. Did not take medications this morning.     Appetite good. Eating meals at home, also getting take out from family friends restaurant. No set exercise routine. Living independently in a house, his wife  a little over 1 year ago. Denies alcohol use. Denies tobacco use.         Social History      Social History     Socioeconomic History     Marital status:      Spouse name: Not on file     Number of children: Not on file     Years of education: Not on file     Highest education level: Not on file   Occupational History     Not on file   Tobacco Use     Smoking status: Never Smoker     Smokeless tobacco: Never Used   Substance and Sexual Activity     Alcohol use: No     Drug use: Never     Sexual activity: Not on file   Other Topics Concern     Parent/sibling w/ CABG, MI or angioplasty before 65F 55M? No      Service Not Asked     Blood Transfusions Not Asked     Caffeine Concern No     Occupational Exposure Not Asked     Hobby Hazards Not Asked     Sleep  "Concern No     Stress Concern No     Weight Concern Yes     Comment: weight loss     Special Diet No     Back Care Not Asked     Exercise Yes     Comment: bicycle 10 min, walking, 3 days week     Bike Helmet Not Asked     Seat Belt Yes     Self-Exams Not Asked   Social History Narrative     Not on file     Social Determinants of Health     Financial Resource Strain: Not on file   Food Insecurity: Not on file   Transportation Needs: Not on file   Physical Activity: Not on file   Stress: Not on file   Social Connections: Not on file   Intimate Partner Violence: Not on file   Housing Stability: Not on file            Review of Systems:   Skin:  Negative     Eyes:  Positive for glasses  ENT:  Negative    Respiratory:  Positive for wheezing  Cardiovascular:  Negative    Gastroenterology: Negative    Genitourinary:  Positive for nocturia;urinary frequency  Musculoskeletal:  Negative    Neurologic:  Positive for stroke  Psychiatric:  Negative    Heme/Lymph/Imm:  Positive for allergies  Endocrine:  Negative           Physical Exam:   Vitals: BP (!) 153/61   Pulse 68   Ht 1.6 m (5' 3\")   Wt 53.3 kg (117 lb 8 oz)   SpO2 100%   BMI 20.81 kg/m     Wt Readings from Last 4 Encounters:   04/18/22 53.3 kg (117 lb 8 oz)   04/05/22 53.8 kg (118 lb 9.6 oz)   03/07/22 53.1 kg (117 lb)   12/05/21 52 kg (114 lb 11.2 oz)     GEN: well nourished, in no acute distress.  HEENT:  Pupils equal, round. Sclerae nonicteric.   NECK: Supple, no masses appreciated. JVP appears normal.   C/V:  Regular rate and rhythm, no murmur, rub or gallop.    RESP: Respirations are unlabored. Clear to auscultation bilaterally without wheezing, rales, or rhonchi.  GI: Abdomen soft, nontender.  EXTREM: No LE edema.  NEURO: Alert and oriented, cooperative.  SKIN: Warm and dry       Data:     LIPID RESULTS:  Lab Results   Component Value Date    CHOL 156 08/16/2021    CHOL 162 01/28/2019    HDL 66 08/16/2021    HDL 60 01/28/2019    LDL 68 08/16/2021    LDL 77 " 01/28/2019    TRIG 112 08/16/2021    TRIG 124 01/28/2019    CHOLHDLRATIO 4.3 10/13/2015     LIVER ENZYME RESULTS:  Lab Results   Component Value Date    AST 21 07/25/2017    ALT 19 08/16/2021    ALT <5 (L) 01/28/2019     CBC RESULTS:  Lab Results   Component Value Date    WBC 8.2 12/05/2021    WBC 5.4 03/11/2020    RBC 3.70 (L) 12/05/2021    RBC 3.96 (L) 03/11/2020    HGB 10.6 (L) 12/05/2021    HGB 12.0 (L) 03/11/2020    HCT 33.3 (L) 12/05/2021    HCT 35.4 (L) 03/11/2020    MCV 90 12/05/2021    MCV 89 03/11/2020    MCH 28.6 12/05/2021    MCH 30.3 03/11/2020    MCHC 31.8 12/05/2021    MCHC 33.9 03/11/2020    RDW 13.4 12/05/2021    RDW 13.7 03/11/2020     12/05/2021     03/11/2020     BMP RESULTS:  Lab Results   Component Value Date     04/15/2022     03/11/2020    POTASSIUM 4.2 04/15/2022    POTASSIUM 4.6 03/11/2020    CHLORIDE 106 04/15/2022    CHLORIDE 116 (H) 03/11/2020    CO2 26 04/15/2022    CO2 28 03/11/2020    ANIONGAP 4 04/15/2022    ANIONGAP <1 (L) 03/11/2020    GLC 94 04/15/2022    GLC 98 03/11/2020    BUN 26 04/15/2022    BUN 27 03/11/2020    CR 1.66 (H) 04/15/2022    CR 1.31 (H) 03/11/2020    GFRESTIMATED 39 (L) 04/15/2022    GFRESTIMATED 49 (L) 03/11/2020    GFRESTBLACK 57 (L) 03/11/2020    AURORA 8.9 04/15/2022    AURORA 8.5 03/11/2020      INR RESULTS:  Lab Results   Component Value Date    INR 2.5 (H) 04/13/2022    INR 2.3 (H) 04/06/2022    INR 2.82 (H) 12/06/2021    INR 2.82 (H) 12/05/2021    INR 3.50 (H) 07/08/2021    INR 1.80 (H) 06/28/2021            Medications     Current Outpatient Medications   Medication Sig Dispense Refill     aspirin 81 MG tablet Take 81 mg by mouth daily       Cholecalciferol (VITAMIN D3) 2000 UNITS CAPS Take by mouth daily       dutasteride (AVODART) 0.5 MG capsule Take 1 capsule (0.5 mg) by mouth daily 30 capsule 11     evolocumab (REPATHA) 140 MG/ML prefilled autoinjector Inject 1 mL (140 mg) Subcutaneous every 14 days 6 mL 3     Fish  Oil-Cholecalciferol (FISH OIL + D3) 5937-2537 MG-UNIT CAPS Take 2 tablets by mouth daily       lisinopril (ZESTRIL) 2.5 MG tablet two tablets twice per day 180 tablet 3     metoprolol succinate ER (TOPROL-XL) 25 MG 24 hr tablet Take 1 tablet (25 mg) by mouth daily 90 tablet 3     nitroGLYcerin (NITROSTAT) 0.4 MG sublingual tablet Place 1 tablet (0.4 mg) under the tongue every 5 minutes as needed for chest pain Take as directed 25 tablet 1     tamsulosin (FLOMAX) 0.4 MG capsule Take 0.4 mg by mouth daily       warfarin ANTICOAGULANT (COUMADIN) 5 MG tablet Take 1/2 tab on Mondays, Wednesdays and Fridays and 1 tab all other days or as directed by INR clinic 90 tablet 1          Past Medical History     Past Medical History:   Diagnosis Date     BPH (benign prostatic hyperplasia)      Cardiomyopathy, ischemic     EF 49% by cardiac MRI 1/15/2014     Coronary artery disease 1/24/06    Cypher CANDIDA to LAD     Gastro-oesophageal reflux disease      Hyperlipidaemia      Hypertension      Left ventricular thrombus      Myocardial infarction (H) 1/2006    Anterior     TIA (transient ischaemic attack)      Past Surgical History:   Procedure Laterality Date     CORONARY ANGIOGRAPHY ADULT ORDER  1/24/06    Cypher CANDIDA to LAD     HEART CATH, ANGIOPLASTY  1/2006    Cypher CANDIDA to LAD     TONSILLECTOMY & ADENOIDECTOMY       Family History   Problem Relation Age of Onset     Heart Disease Mother         heart failure     Heart Disease Brother             Allergies   Flomax [tamsulosin], Aldactone [spironolactone], Carvedilol, Colesevelam, Ezetimibe, Lisinopril, Pravastatin, Rosuvastatin, and Simvastatin    40 minutes spent on the date of the encounter doing chart review, history and exam, documentation and further activities as noted above      JAE Marcos [facility] HEART CARE  Pager: [phone]    Thank you for allowing me to participate in the care of your patient.      Sincerely,     Farrah WILHELM  JAE Bernstein CNP     [facility]  cc:   JAE Valdez CNP  [address]  [place] [address]

## 2022-04-18 NOTE — PATIENT INSTRUCTIONS
STOP lisinopril, need 2 day wash period before starting Entresto.   START Entresto 24/26mg (1 tablet 2 x a day) on Wednesday 4/20/22.   Continue to monitor weight  Follow up with Farrah in 3 weeks with labs prior.   Please call with any questions/concerns [phone]

## 2022-04-18 NOTE — PROGRESS NOTES
Cardiology Clinic Progress Note  Sawyer Renteria MRN# [id number]   YOB: 1934 Age: 88 year old   Primary Cardiologist: Dr. Newton  Reason for visit: CORE follow up             Assessment and Plan:   Sawyer Renteria is a very pleasant 88 year old male whom I am seeing today for CORE follow up.       1. Heart failure with reduced ejection fraction, ischemic cardiomyopathy - LVEF 35-40% per echo 12/2021              - NYHA class II, stage C              - Etiology - ischemic              - Guideline directed medical therapy                          - Betablocker: Continue metoprolol XL 25mg daily                           - ACEI/ARB/ARNI: STOP lisinopril to 5mg BID, START Entresto 24/26mg BID (after 36 hour washout period).                           - Aldactone antagonist: none, due to CKD  2. Coronary artery disease - s/p stenting to LAD and angioplasty of diagonal in 2006. Stable, no ischemic symptoms              - Continue aspirin, metoprolol and repatha  3. Dyslipidemia with statin intolerance - LDL 68 and HDL 66              - Continue Repatha   4. Hypertension - controlled  5. CKD stage III - baseline creatinine 1.3-1.7  6. Apical wall motion abnormality, with history of apical thrombus and cardioembolic stroke              - Continue warfarin              - No thrombus noted on most recent echo 12/2021     I saw AJ today for CORE follow up. He continues to do well from a heart failure standpoint. He appears compensated and euvolemic on exam. He has tolerated the increase in lisinopril well with no issues. BMP showed overall stable renal function, creatinine 1.6. He has noted an increase in cough since our last visit, lungs clear on exam today. Reviewed pricing of Entresto and patient feels financially feasible. Did review consideration of changing pharmacy coverage during his insurance open enrollment this fall. Recommend transitioning from lisinopril to Entresto, advised to stop lisinopril today.  Reviewed required washout period. Advised to start Entresto on Wednesday 4/20. Provided written instructions. Recommend follow-up with CORE in 3 weeks with labs prior.       Changes today: STOP lisinopril, START Entresto 24/26mg BID after 36 hour washout period.     Follow up plan:     CORE follow up with me in 3 weeks with labs prior.     Fasting lipid panel due late summer 2022     Follow up with Dr. Newton in 3-4 months with echocardiogram prior after medication optimization.         History of Presenting Illness:    Sawyer Renteria is a very pleasant 88 year old male with a history of HFrEF, coronary artery disease, ischemic cardiomyopathy, dyslipidemia, hypertension and CKD.     Primary cardiologist Dr. Newton. He had an anterior MI in 2006 and underwent stenting of his LAD and angioplasty of his diagonal. He has residual 60% OM 1 stenosis. He developed an apical thrombus with a cardioembolic CVA and has remained on warfarin.      Last echocardiogram 12/2021 showed LVEF 35-40%, Severe hypokinesis of the mid-apical inferior, mid-apical anterior, mid anteroseptal, apical septal, and apical lateral segments. RV normal size/function. Similar when compared to prior study 6/2021.     Cardiac MRI 1/2020 showed LVEF 52%,no evidence of thrombus. EF and wall motion similar to prior study in 2014.      He was hospitalized in December 2021 with some complaints of transient left arm numbness/tinging, suspected to be secondary to paresthesia from reduced blood flow.      He was most recently seen by Dr. Newton the beginning of March at which time he was doing well. His metoprolol XL was increased to 25mg daily and he was referred to CORE clinic for further consideration of titration of GDMT.      I first met patient for CORE enrollment in April 2022 at which time he was doing well. We reviewed his cardiomyopathy and consideration for titration of GDMT. Also reached out to pharmacy liaison to price out Entresto pricing which will be  reviewed for consideration today.     Patient is here today for CORE follow up.     Patient reports feeling good. Monitoring weights daily a home. Notes he uses different scales at home, but typically 116-120#. States the last few days slight increase. Denies LE edema. Denies shortness of breath at rest. Has noted increased cough since our last visit. The cough does have some mucus production. Denies exertional dyspnea. Denies orthopnea or PND. Denies chest pain or chest tightness. Denies dizziness, lightheadedness or other presyncopal symptoms. Denies tachycardia or palpitations. Taking medications daily.     Labs from 4/15 show stable kidney function and electrolytes, creatinine 1.66. Blood pressure 153/61 and HR 68 in clinic today. Did not take medications this morning.     Appetite good. Eating meals at home, also getting take out from family friends restaurant. No set exercise routine. Living independently in a house, his wife  a little over 1 year ago. Denies alcohol use. Denies tobacco use.         Social History      Social History     Socioeconomic History     Marital status:      Spouse name: Not on file     Number of children: Not on file     Years of education: Not on file     Highest education level: Not on file   Occupational History     Not on file   Tobacco Use     Smoking status: Never Smoker     Smokeless tobacco: Never Used   Substance and Sexual Activity     Alcohol use: No     Drug use: Never     Sexual activity: Not on file   Other Topics Concern     Parent/sibling w/ CABG, MI or angioplasty before 65F 55M? No      Service Not Asked     Blood Transfusions Not Asked     Caffeine Concern No     Occupational Exposure Not Asked     Hobby Hazards Not Asked     Sleep Concern No     Stress Concern No     Weight Concern Yes     Comment: weight loss     Special Diet No     Back Care Not Asked     Exercise Yes     Comment: bicycle 10 min, walking, 3 days week     Bike Helmet Not Asked  "    Seat Belt Yes     Self-Exams Not Asked   Social History Narrative     Not on file     Social Determinants of Health     Financial Resource Strain: Not on file   Food Insecurity: Not on file   Transportation Needs: Not on file   Physical Activity: Not on file   Stress: Not on file   Social Connections: Not on file   Intimate Partner Violence: Not on file   Housing Stability: Not on file            Review of Systems:   Skin:  Negative     Eyes:  Positive for glasses  ENT:  Negative    Respiratory:  Positive for wheezing  Cardiovascular:  Negative    Gastroenterology: Negative    Genitourinary:  Positive for nocturia;urinary frequency  Musculoskeletal:  Negative    Neurologic:  Positive for stroke  Psychiatric:  Negative    Heme/Lymph/Imm:  Positive for allergies  Endocrine:  Negative           Physical Exam:   Vitals: BP (!) 153/61   Pulse 68   Ht 1.6 m (5' 3\")   Wt 53.3 kg (117 lb 8 oz)   SpO2 100%   BMI 20.81 kg/m     Wt Readings from Last 4 Encounters:   04/18/22 53.3 kg (117 lb 8 oz)   04/05/22 53.8 kg (118 lb 9.6 oz)   03/07/22 53.1 kg (117 lb)   12/05/21 52 kg (114 lb 11.2 oz)     GEN: well nourished, in no acute distress.  HEENT:  Pupils equal, round. Sclerae nonicteric.   NECK: Supple, no masses appreciated. JVP appears normal.   C/V:  Regular rate and rhythm, no murmur, rub or gallop.    RESP: Respirations are unlabored. Clear to auscultation bilaterally without wheezing, rales, or rhonchi.  GI: Abdomen soft, nontender.  EXTREM: No LE edema.  NEURO: Alert and oriented, cooperative.  SKIN: Warm and dry       Data:     LIPID RESULTS:  Lab Results   Component Value Date    CHOL 156 08/16/2021    CHOL 162 01/28/2019    HDL 66 08/16/2021    HDL 60 01/28/2019    LDL 68 08/16/2021    LDL 77 01/28/2019    TRIG 112 08/16/2021    TRIG 124 01/28/2019    CHOLHDLRATIO 4.3 10/13/2015     LIVER ENZYME RESULTS:  Lab Results   Component Value Date    AST 21 07/25/2017    ALT 19 08/16/2021    ALT <5 (L) 01/28/2019 "     CBC RESULTS:  Lab Results   Component Value Date    WBC 8.2 12/05/2021    WBC 5.4 03/11/2020    RBC 3.70 (L) 12/05/2021    RBC 3.96 (L) 03/11/2020    HGB 10.6 (L) 12/05/2021    HGB 12.0 (L) 03/11/2020    HCT 33.3 (L) 12/05/2021    HCT 35.4 (L) 03/11/2020    MCV 90 12/05/2021    MCV 89 03/11/2020    MCH 28.6 12/05/2021    MCH 30.3 03/11/2020    MCHC 31.8 12/05/2021    MCHC 33.9 03/11/2020    RDW 13.4 12/05/2021    RDW 13.7 03/11/2020     12/05/2021     03/11/2020     BMP RESULTS:  Lab Results   Component Value Date     04/15/2022     03/11/2020    POTASSIUM 4.2 04/15/2022    POTASSIUM 4.6 03/11/2020    CHLORIDE 106 04/15/2022    CHLORIDE 116 (H) 03/11/2020    CO2 26 04/15/2022    CO2 28 03/11/2020    ANIONGAP 4 04/15/2022    ANIONGAP <1 (L) 03/11/2020    GLC 94 04/15/2022    GLC 98 03/11/2020    BUN 26 04/15/2022    BUN 27 03/11/2020    CR 1.66 (H) 04/15/2022    CR 1.31 (H) 03/11/2020    GFRESTIMATED 39 (L) 04/15/2022    GFRESTIMATED 49 (L) 03/11/2020    GFRESTBLACK 57 (L) 03/11/2020    AURORA 8.9 04/15/2022    AURORA 8.5 03/11/2020      INR RESULTS:  Lab Results   Component Value Date    INR 2.5 (H) 04/13/2022    INR 2.3 (H) 04/06/2022    INR 2.82 (H) 12/06/2021    INR 2.82 (H) 12/05/2021    INR 3.50 (H) 07/08/2021    INR 1.80 (H) 06/28/2021            Medications     Current Outpatient Medications   Medication Sig Dispense Refill     aspirin 81 MG tablet Take 81 mg by mouth daily       Cholecalciferol (VITAMIN D3) 2000 UNITS CAPS Take by mouth daily       dutasteride (AVODART) 0.5 MG capsule Take 1 capsule (0.5 mg) by mouth daily 30 capsule 11     evolocumab (REPATHA) 140 MG/ML prefilled autoinjector Inject 1 mL (140 mg) Subcutaneous every 14 days 6 mL 3     Fish Oil-Cholecalciferol (FISH OIL + D3) 0225-8266 MG-UNIT CAPS Take 2 tablets by mouth daily       lisinopril (ZESTRIL) 2.5 MG tablet two tablets twice per day 180 tablet 3     metoprolol succinate ER (TOPROL-XL) 25 MG 24 hr tablet Take  1 tablet (25 mg) by mouth daily 90 tablet 3     nitroGLYcerin (NITROSTAT) 0.4 MG sublingual tablet Place 1 tablet (0.4 mg) under the tongue every 5 minutes as needed for chest pain Take as directed 25 tablet 1     tamsulosin (FLOMAX) 0.4 MG capsule Take 0.4 mg by mouth daily       warfarin ANTICOAGULANT (COUMADIN) 5 MG tablet Take 1/2 tab on Mondays, Wednesdays and Fridays and 1 tab all other days or as directed by INR clinic 90 tablet 1          Past Medical History     Past Medical History:   Diagnosis Date     BPH (benign prostatic hyperplasia)      Cardiomyopathy, ischemic     EF 49% by cardiac MRI 1/15/2014     Coronary artery disease 1/24/06    Cypher CANDIDA to LAD     Gastro-oesophageal reflux disease      Hyperlipidaemia      Hypertension      Left ventricular thrombus      Myocardial infarction (H) 1/2006    Anterior     TIA (transient ischaemic attack)      Past Surgical History:   Procedure Laterality Date     CORONARY ANGIOGRAPHY ADULT ORDER  1/24/06    Cypher CANDIDA to LAD     HEART CATH, ANGIOPLASTY  1/2006    Cypher CANDIDA to LAD     TONSILLECTOMY & ADENOIDECTOMY       Family History   Problem Relation Age of Onset     Heart Disease Mother         heart failure     Heart Disease Brother             Allergies   Flomax [tamsulosin], Aldactone [spironolactone], Carvedilol, Colesevelam, Ezetimibe, Lisinopril, Pravastatin, Rosuvastatin, and Simvastatin    40 minutes spent on the date of the encounter doing chart review, history and exam, documentation and further activities as noted above      JAE Marcos [facility] CARE  Pager: [phone]

## 2022-04-23 ENCOUNTER — HOSPITAL ENCOUNTER (EMERGENCY)
Facility: CLINIC | Age: 87
Discharge: LEFT BEFORE DISCHARGE | End: 2022-04-23
Admitting: EMERGENCY MEDICINE
Payer: MEDICARE

## 2022-04-23 VITALS
SYSTOLIC BLOOD PRESSURE: 131 MMHG | HEART RATE: 54 BPM | DIASTOLIC BLOOD PRESSURE: 49 MMHG | TEMPERATURE: 99.5 F | OXYGEN SATURATION: 98 % | RESPIRATION RATE: 16 BRPM

## 2022-04-23 LAB
FLUAV RNA SPEC QL NAA+PROBE: NEGATIVE
FLUBV RNA RESP QL NAA+PROBE: NEGATIVE
RSV RNA SPEC NAA+PROBE: NEGATIVE
SARS-COV-2 RNA RESP QL NAA+PROBE: NEGATIVE

## 2022-04-23 PROCEDURE — 999N000104 HC STATISTIC NO CHARGE

## 2022-04-23 PROCEDURE — 87637 SARSCOV2&INF A&B&RSV AMP PRB: CPT | Performed by: EMERGENCY MEDICINE

## 2022-04-23 NOTE — ED NOTES
"Patient does not want to wait to be seen.  Left per pedis with son.  States \"I feel fine\".  Instructed to follow-up with PMD or return to ED for any new or worsening symptoms.  Left per pedis with son.   "

## 2022-04-23 NOTE — ED TRIAGE NOTES
States he was near someone with Covid, and he would like to be checked.  Aside from his small fever he states he feels fine.

## 2022-04-25 ENCOUNTER — CARE COORDINATION (OUTPATIENT)
Dept: CARDIOLOGY | Facility: CLINIC | Age: 87
End: 2022-04-25
Payer: MEDICARE

## 2022-04-25 DIAGNOSIS — I25.5 CARDIOMYOPATHY, ISCHEMIC: Primary | ICD-10-CM

## 2022-04-25 NOTE — PROGRESS NOTES
"[facility]    Background: 4/18 CORE visit with Farrah Bernstein CNP, he was compensated and euvolemic at home weight of 117 lbs. To optimize his medications, lisinopril was stopped and entresto started 4/20.     DERICK and his friend Hugh called today to report that in last several days DERICK has felt \"much different\" with weakness, feeling \"more relaxed\" and sleepy, then at times more nervous. Hugh told me that DERICK reported to him that he was \"feeling down\". He denied lacy dizziness/syncope and said, \"It's hard to describe.\" They wonder if these symptoms could be side effects of entresto.    They attempted to check BP, but device not working. Weight today stable at 116 lbs.    Plan: Will route to Farrah for review. He has INR 4/27 if sooner labs are warranted.       Future Appointments   Date Time Provider Department Center   4/27/2022  1:15 PM CS LAB [facility]    5/12/2022  8:00 AM [facility] LAB [facility]   5/12/2022  8:50 AM [facility] KYLIE Conway RN BSN   3:50 PM 04/25/22        "

## 2022-04-26 NOTE — PROGRESS NOTES
Reviewed CORE phone encounter.     Unclear etiology of symptoms. Did review chart and patient presented to the ED on 4/23 after a COVID exposure at which time he had a fever. COVID and influenza testing negative.     RECOMMENDATIONS  1. Check BMP on 4/27 in coordination with INR.   2. See if blood pressure can be checked when at lab for INR  3.  CORE can you please call tomorrow for an update on symptoms? If continued will consider decreasing his Entresto to 12/13mg qam and continuing 24/26mg qpm.     JAE Marcos CNP  [facility]  Pager: [phone]

## 2022-04-26 NOTE — PROGRESS NOTES
"[facility]    I spoke with DERICK and he was pleased to tell me he feels \"much better\" today. Ate well last night.    He agreed to BMP and BP check at tomorrow's INR visit. Updated lab appt note with request for BMP, called and arranged BP check immediately following lab draw.    As he's feeling better, will continue meds as is. Reminder to RN board to watch for results/BP 4/28.    Jessica Conway RN BSN   11:18 AM 04/26/22        "

## 2022-04-27 ENCOUNTER — ALLIED HEALTH/NURSE VISIT (OUTPATIENT)
Dept: FAMILY MEDICINE | Facility: CLINIC | Age: 87
End: 2022-04-27
Payer: MEDICARE

## 2022-04-27 ENCOUNTER — LAB (OUTPATIENT)
Dept: LAB | Facility: CLINIC | Age: 87
End: 2022-04-27

## 2022-04-27 ENCOUNTER — ANTICOAGULATION THERAPY VISIT (OUTPATIENT)
Dept: ANTICOAGULATION | Facility: CLINIC | Age: 87
End: 2022-04-27

## 2022-04-27 VITALS
DIASTOLIC BLOOD PRESSURE: 59 MMHG | OXYGEN SATURATION: 98 % | HEART RATE: 100 BPM | SYSTOLIC BLOOD PRESSURE: 109 MMHG | WEIGHT: 118 LBS | BODY MASS INDEX: 20.9 KG/M2

## 2022-04-27 DIAGNOSIS — I63.419 CEREBROVASCULAR ACCIDENT (CVA) DUE TO EMBOLISM OF MIDDLE CEREBRAL ARTERY, UNSPECIFIED BLOOD VESSEL LATERALITY (H): ICD-10-CM

## 2022-04-27 DIAGNOSIS — G45.9 TRANSIENT CEREBRAL ISCHEMIA, UNSPECIFIED TYPE: ICD-10-CM

## 2022-04-27 DIAGNOSIS — Z79.01 LONG TERM CURRENT USE OF ANTICOAGULANTS WITH INR GOAL OF 2.0-3.0: ICD-10-CM

## 2022-04-27 DIAGNOSIS — G45.9 TRANSIENT CEREBRAL ISCHEMIA: ICD-10-CM

## 2022-04-27 DIAGNOSIS — I51.3 LEFT VENTRICULAR THROMBUS: Primary | ICD-10-CM

## 2022-04-27 DIAGNOSIS — Z01.30 BP CHECK: Primary | ICD-10-CM

## 2022-04-27 DIAGNOSIS — I25.5 CARDIOMYOPATHY, ISCHEMIC: ICD-10-CM

## 2022-04-27 DIAGNOSIS — I51.3 LEFT VENTRICULAR THROMBUS: ICD-10-CM

## 2022-04-27 LAB — INR BLD: 2 (ref 0.9–1.1)

## 2022-04-27 PROCEDURE — 36415 COLL VENOUS BLD VENIPUNCTURE: CPT

## 2022-04-27 PROCEDURE — 99207 PR NO CHARGE NURSE ONLY: CPT

## 2022-04-27 PROCEDURE — 80048 BASIC METABOLIC PNL TOTAL CA: CPT

## 2022-04-27 PROCEDURE — 85610 PROTHROMBIN TIME: CPT

## 2022-04-27 PROCEDURE — 36416 COLLJ CAPILLARY BLOOD SPEC: CPT

## 2022-04-27 NOTE — PROGRESS NOTES
ANTICOAGULATION MANAGEMENT     Sawyer Renteria 88 year old male is on warfarin with therapeutic INR result. (Goal INR 2.0-3.0)    Recent labs: (last 7 days)     04/27/22  1257   INR 2.0*       ASSESSMENT       Source(s): Chart Review and Patient/Caregiver Call       Warfarin doses taken: More warfarin taken than planned which may be affecting INR- per patient he has been taking 5 mg daily    Diet: No new diet changes identified    New illness, injury, or hospitalization: Yes: ED visit on 4/23 but left without being seen.    Medication/supplement changes: None noted    Signs or symptoms of bleeding or clotting: No    Previous INR: Therapeutic last 2(+) visits    Additional findings: None       PLAN     Recommended plan for ongoing change(s) affecting INR     Dosing Instructions: continue your current warfarin dose taken ( 16.7 % increase from previous dose but the same amount taken by patient ~ with next INR in 2 weeks       Summary  As of 4/27/2022    Full warfarin instructions:  5 mg every day   Next INR check:  5/10/2022             Telephone call with AJ who verbalizes understanding and agrees to plan and who agrees to plan and repeated back plan correctly    Lab visit scheduled    Education provided: Importance of therapeutic range, Importance of following up at instructed interval and Importance of taking warfarin as instructed    Plan made per [facility] anticoagulation protocol    Vashti Romo, RN  Anticoagulation Clinic  4/27/2022    _______________________________________________________________________     Anticoagulation Episode Summary     Current INR goal:  2.0-3.0   TTR:  72.8 % (1 y)   Target end date:  Indefinite   Send INR reminders to:  MAJ[facility] HEART INR NURSE    Indications    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

## 2022-04-27 NOTE — Clinical Note
Sawyer Renteria is a 88 year old patient who comes in today for a Blood Pressure check. Initial BP:  /59 (BP Location: Left arm, Patient Position: Sitting, Cuff Size: Adult Regular)   Pulse 100   Wt 53.5 kg (118 lb)   SpO2 98%   BMI 20.90 kg/m      100

## 2022-04-27 NOTE — PROGRESS NOTES
Sawyer Renteria is a 88 year old patient who comes in today for a Blood Pressure check.  Initial BP:  /59 (BP Location: Left arm, Patient Position: Sitting, Cuff Size: Adult Regular)   Pulse 100   Wt 53.5 kg (118 lb)   SpO2 98%   BMI 20.90 kg/m       100  Disposition: follow-up as previously indicated by provider and results routed to provider.      Melody LUCERO MA on 4/27/2022 at 1:07 PM

## 2022-04-28 LAB
ANION GAP SERPL CALCULATED.3IONS-SCNC: 8 MMOL/L (ref 3–14)
BUN SERPL-MCNC: 27 MG/DL (ref 7–30)
CALCIUM SERPL-MCNC: 8 MG/DL (ref 8.5–10.1)
CHLORIDE BLD-SCNC: 113 MMOL/L (ref 94–109)
CO2 SERPL-SCNC: 21 MMOL/L (ref 20–32)
CREAT SERPL-MCNC: 1.52 MG/DL (ref 0.66–1.25)
GFR SERPL CREATININE-BSD FRML MDRD: 44 ML/MIN/1.73M2
GLUCOSE BLD-MCNC: 127 MG/DL (ref 70–99)
POTASSIUM BLD-SCNC: 4.7 MMOL/L (ref 3.4–5.3)
SODIUM SERPL-SCNC: 142 MMOL/L (ref 133–144)

## 2022-04-28 NOTE — PROGRESS NOTES
Blood pressures reviewed from clinic blood pressure check, stable after change from lisinopril to Entresto.     JAE Marcos CNP  [facility]  Pager: [phone]

## 2022-05-10 ENCOUNTER — LAB (OUTPATIENT)
Dept: LAB | Facility: CLINIC | Age: 87
End: 2022-05-10
Payer: MEDICARE

## 2022-05-10 ENCOUNTER — ANTICOAGULATION THERAPY VISIT (OUTPATIENT)
Dept: ANTICOAGULATION | Facility: CLINIC | Age: 87
End: 2022-05-10

## 2022-05-10 DIAGNOSIS — Z79.01 LONG TERM CURRENT USE OF ANTICOAGULANTS WITH INR GOAL OF 2.0-3.0: ICD-10-CM

## 2022-05-10 DIAGNOSIS — G45.9 TRANSIENT CEREBRAL ISCHEMIA, UNSPECIFIED TYPE: ICD-10-CM

## 2022-05-10 DIAGNOSIS — I51.3 LEFT VENTRICULAR THROMBUS: Primary | ICD-10-CM

## 2022-05-10 DIAGNOSIS — I51.3 LEFT VENTRICULAR THROMBUS: ICD-10-CM

## 2022-05-10 DIAGNOSIS — I63.419 CEREBROVASCULAR ACCIDENT (CVA) DUE TO EMBOLISM OF MIDDLE CEREBRAL ARTERY, UNSPECIFIED BLOOD VESSEL LATERALITY (H): ICD-10-CM

## 2022-05-10 LAB — INR BLD: 3.5 (ref 0.9–1.1)

## 2022-05-10 PROCEDURE — 36416 COLLJ CAPILLARY BLOOD SPEC: CPT

## 2022-05-10 PROCEDURE — 85610 PROTHROMBIN TIME: CPT

## 2022-05-10 NOTE — PROGRESS NOTES
ANTICOAGULATION MANAGEMENT     Sawyer Renteria 88 year old male is on warfarin with supratherapeutic INR result. (Goal INR 2.0-3.0)    Recent labs: (last 7 days)     05/10/22  1114   INR 3.5*       ASSESSMENT       Source(s): Chart Review and Patient/Caregiver Call       Warfarin doses taken: Warfarin taken as instructed, however, at the last check when INR 2.0 patient said he had started taking 5mg/day but hadn't been told to increase his dose and should have been taking 2 days of 2.5mg and 5 days of 5mg but 5mg/day was continued since he was in range    Diet: No new diet changes identified    New illness, injury, or hospitalization: No    Medication/supplement changes: None noted    Signs or symptoms of bleeding or clotting: No    Previous INR: Therapeutic last 2(+) visits    Additional findings: patient gets confused about dosing easily        PLAN     Recommended plan for temporary change(s) affecting INR     Dosing Instructions: decrease your warfarin dose (14.3% change) with next INR in 2 weeks       Summary  As of 5/10/2022    Full warfarin instructions:  2.5 mg every Tue, Fri; 5 mg all other days   Next INR check:  5/24/2022             Telephone call with DERICK who verbalizes understanding and agrees to plan    Lab visit scheduled    Education provided: Goal range and significance of current result and Contact [phone] with any changes, questions or concerns.     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

## 2022-05-12 ENCOUNTER — LAB (OUTPATIENT)
Dept: LAB | Facility: CLINIC | Age: 87
End: 2022-05-12
Payer: MEDICARE

## 2022-05-12 ENCOUNTER — OFFICE VISIT (OUTPATIENT)
Dept: CARDIOLOGY | Facility: CLINIC | Age: 87
End: 2022-05-12
Attending: NURSE PRACTITIONER
Payer: MEDICARE

## 2022-05-12 VITALS
SYSTOLIC BLOOD PRESSURE: 147 MMHG | WEIGHT: 117.7 LBS | BODY MASS INDEX: 20.86 KG/M2 | HEIGHT: 63 IN | HEART RATE: 67 BPM | DIASTOLIC BLOOD PRESSURE: 55 MMHG | OXYGEN SATURATION: 98 %

## 2022-05-12 DIAGNOSIS — I25.5 CARDIOMYOPATHY, ISCHEMIC: ICD-10-CM

## 2022-05-12 DIAGNOSIS — I25.10 CORONARY ARTERY DISEASE INVOLVING NATIVE CORONARY ARTERY OF NATIVE HEART WITHOUT ANGINA PECTORIS: ICD-10-CM

## 2022-05-12 DIAGNOSIS — I50.22 CHRONIC SYSTOLIC HEART FAILURE (H): Primary | ICD-10-CM

## 2022-05-12 DIAGNOSIS — N18.31 CHRONIC RENAL IMPAIRMENT, STAGE 3A (H): ICD-10-CM

## 2022-05-12 DIAGNOSIS — I10 ESSENTIAL HYPERTENSION: ICD-10-CM

## 2022-05-12 DIAGNOSIS — E78.2 MIXED HYPERLIPIDEMIA: ICD-10-CM

## 2022-05-12 LAB
ANION GAP SERPL CALCULATED.3IONS-SCNC: 2 MMOL/L (ref 3–14)
BUN SERPL-MCNC: 26 MG/DL (ref 7–30)
CALCIUM SERPL-MCNC: 8.6 MG/DL (ref 8.5–10.1)
CHLORIDE BLD-SCNC: 111 MMOL/L (ref 94–109)
CO2 SERPL-SCNC: 26 MMOL/L (ref 20–32)
CREAT SERPL-MCNC: 1.43 MG/DL (ref 0.66–1.25)
GFR SERPL CREATININE-BSD FRML MDRD: 47 ML/MIN/1.73M2
GLUCOSE BLD-MCNC: 108 MG/DL (ref 70–99)
POTASSIUM BLD-SCNC: 4.4 MMOL/L (ref 3.4–5.3)
SODIUM SERPL-SCNC: 139 MMOL/L (ref 133–144)

## 2022-05-12 PROCEDURE — 36415 COLL VENOUS BLD VENIPUNCTURE: CPT | Performed by: NURSE PRACTITIONER

## 2022-05-12 PROCEDURE — 99215 OFFICE O/P EST HI 40 MIN: CPT | Performed by: NURSE PRACTITIONER

## 2022-05-12 PROCEDURE — 80048 BASIC METABOLIC PNL TOTAL CA: CPT | Performed by: NURSE PRACTITIONER

## 2022-05-12 NOTE — PATIENT INSTRUCTIONS
Resume metoprolol XL 25mg daily  Continue Entresto 24/26mg 2 x a day  Occasionally check blood pressures and monitor weight at home.   Please call with any questions/concerns [phone]  Follow up with Farrah in 1 month with labs prior.

## 2022-05-12 NOTE — PROGRESS NOTES
Cardiology Clinic Progress Note  Sawyer Renteria MRN# [id number]   YOB: 1934 Age: 88 year old   Primary Cardiologist: Dr. Newton Reason for visit: CORE follow up             Assessment and Plan:   Sawyer Renteria is a very pleasant 88 year old male whom I am seeing today for CORE follow up.       1. Heart failure with reduced ejection fraction, ischemic cardiomyopathy - LVEF 35-40% per echo 12/2021              - NYHA class II, stage C              - Etiology - ischemic              - Guideline directed medical therapy                          - Betablocker: Continue metoprolol XL 25mg daily                           - ACEI/ARB/ARNI: Continue Entresto 24/26mg BID                          - Aldactone antagonist: none, due to CKD  2. Coronary artery disease - s/p stenting to LAD and angioplasty of diagonal in 2006. Stable, no ischemic symptoms              - Continue aspirin, metoprolol and repatha  3. Dyslipidemia with statin intolerance - LDL 68 and HDL 66              - Continue Repatha   4. Hypertension - controlled  5. CKD stage III - baseline creatinine 1.3-1.7  6. Apical wall motion abnormality, with history of apical thrombus and cardioembolic stroke              - Continue warfarin              - No thrombus noted on most recent echo 12/2021    I saw patient today for CORE follow up after starting Entresto, at the time of starting Entresto he stopped his metoprolol in addition to his lisinopril. He has been feeling well and appears compensated/euvolemic. Blood pressure slightly elevated in clinic, he has not been monitoring BP at home. Provided him with a home blood pressure cuff today. To optimize his GDMT recommend resuming his metoprolol XL 25mg daily, will consider further titration of Entresto at his follow up appointment. Support given. Encouraged to call with any questions/concerns.       Changes today: RESUME metoprolol XL 25mg daily     Follow up plan:     CORE follow up with me in 1  month, will consider further titration of GDMT    Fasting lipid panel due late summer 2022     Follow up with Dr. Newton in 3-4 months with echocardiogram prior after medication optimization        History of Presenting Illness:    Sawyer Renteria is a very pleasant 88 year old male with a history of HFrEF, coronary artery disease, ischemic cardiomyopathy, dyslipidemia, hypertension and CKD.     Primary cardiologist Dr. Newton. He had an anterior MI in 2006 and underwent stenting of his LAD and angioplasty of his diagonal. He has residual 60% OM 1 stenosis. He developed an apical thrombus with a cardioembolic CVA and has remained on warfarin.      Last echocardiogram 12/2021 showed LVEF 35-40%, Severe hypokinesis of the mid-apical inferior, mid-apical anterior, mid anteroseptal, apical septal, and apical lateral segments. RV normal size/function. Similar when compared to prior study 6/2021.     Cardiac MRI 1/2020 showed LVEF 52%,no evidence of thrombus. EF and wall motion similar to prior study in 2014.      He was hospitalized in December 2021 with some complaints of transient left arm numbness/tinging, suspected to be secondary to paresthesia from reduced blood flow.      He was most recently seen by Dr. Newton the beginning of March at which time he was doing well. His metoprolol XL was increased to 25mg daily and he was referred to CORE clinic for further consideration of titration of GDMT.      I first met patient for CORE enrollment in April 2022 at which time he was doing well. We reviewed his cardiomyopathy and consideration for titration of GDMT. Also reached out to pharmacy liaison to price out Entresto pricing and was a financially feasible option for patient. During our last visit he was started on Entresto 24/26mg BID.     Patient is here today for CORE follow up.     Patient reports feeling good. Monitoring weights daily a home. States weight stable at home ~ 116#. Denies LE edema. Denies abdominal  distention. Denies shortness of breath. Denies exertional dyspnea. Denies orthopnea or PND. Denies chest pain or chest tightness. Denies dizziness, lightheadedness or other presyncopal symptoms. Denies tachycardia or palpitations. Taking medications daily. Stopped lisinopril and metoprolol when he started Entresto.     Labs today show overall stable renal function and electrolytes. Blood pressure 147/55 and HR 67 in clinic today. Not monitoring blood pressure at home.     Appetite good. Eating meals at home, also getting take out from family friends restaurant. No set exercise routine. Notes bilateral knee pain makes activity difficult. Living independently in a house, his wife  a little over 1 year ago. Denies alcohol use. Denies tobacco use.         Social History       Social History     Socioeconomic History     Marital status:      Spouse name: Not on file     Number of children: Not on file     Years of education: Not on file     Highest education level: Not on file   Occupational History     Not on file   Tobacco Use     Smoking status: Never Smoker     Smokeless tobacco: Never Used   Substance and Sexual Activity     Alcohol use: No     Drug use: Never     Sexual activity: Not on file   Other Topics Concern     Parent/sibling w/ CABG, MI or angioplasty before 65F 55M? No      Service Not Asked     Blood Transfusions Not Asked     Caffeine Concern No     Occupational Exposure Not Asked     Hobby Hazards Not Asked     Sleep Concern No     Stress Concern No     Weight Concern Yes     Comment: weight loss     Special Diet No     Back Care Not Asked     Exercise Yes     Comment: bicycle 10 min, walking, 3 days week     Bike Helmet Not Asked     Seat Belt Yes     Self-Exams Not Asked   Social History Narrative     Not on file     Social Determinants of Health     Financial Resource Strain: Not on file   Food Insecurity: Not on file   Transportation Needs: Not on file   Physical Activity: Not on  "file   Stress: Not on file   Social Connections: Not on file   Intimate Partner Violence: Not on file   Housing Stability: Not on file            Review of Systems:   Skin:  Negative     Eyes:  Positive for glasses  ENT:  Negative    Respiratory:  Negative shortness of breath;dyspnea on exertion  Cardiovascular:  Negative;palpitations;chest pain;edema;fatigue;lightheadedness;dizziness    Gastroenterology: Positive for heartburn  Genitourinary:  Positive for urinary frequency  Musculoskeletal:  Positive for neck pain  Neurologic:  Positive for stroke  Psychiatric:  Positive for depression  Heme/Lymph/Imm:  Positive for allergies  Endocrine:  Negative           Physical Exam:   Vitals: BP (!) 147/55   Pulse 67   Ht 1.6 m (5' 3\")   Wt 53.4 kg (117 lb 11.2 oz)   SpO2 98%   BMI 20.85 kg/m     Wt Readings from Last 4 Encounters:   05/12/22 53.4 kg (117 lb 11.2 oz)   04/27/22 53.5 kg (118 lb)   04/18/22 53.3 kg (117 lb 8 oz)   04/05/22 53.8 kg (118 lb 9.6 oz)     GEN: well nourished, in no acute distress.  HEENT:  Pupils equal, round. Sclerae nonicteric.   NECK: Supple, no masses appreciated. JVP appears normal  C/V:  Regular rate and rhythm, no murmur, rub or gallop.    RESP: Respirations are unlabored. Clear to auscultation bilaterally without wheezing, rales, or rhonchi.  GI: Abdomen soft, nontender.  EXTREM: No LE edema.  NEURO: Alert and oriented, cooperative.  SKIN: Warm and dry       Data:     LIPID RESULTS:  Lab Results   Component Value Date    CHOL 156 08/16/2021    CHOL 162 01/28/2019    HDL 66 08/16/2021    HDL 60 01/28/2019    LDL 68 08/16/2021    LDL 77 01/28/2019    TRIG 112 08/16/2021    TRIG 124 01/28/2019    CHOLHDLRATIO 4.3 10/13/2015     LIVER ENZYME RESULTS:  Lab Results   Component Value Date    AST 21 07/25/2017    ALT 19 08/16/2021    ALT <5 (L) 01/28/2019     CBC RESULTS:  Lab Results   Component Value Date    WBC 8.2 12/05/2021    WBC 5.4 03/11/2020    RBC 3.70 (L) 12/05/2021    RBC 3.96 (L) " 03/11/2020    HGB 10.6 (L) 12/05/2021    HGB 12.0 (L) 03/11/2020    HCT 33.3 (L) 12/05/2021    HCT 35.4 (L) 03/11/2020    MCV 90 12/05/2021    MCV 89 03/11/2020    MCH 28.6 12/05/2021    MCH 30.3 03/11/2020    MCHC 31.8 12/05/2021    MCHC 33.9 03/11/2020    RDW 13.4 12/05/2021    RDW 13.7 03/11/2020     12/05/2021     03/11/2020     BMP RESULTS:  Lab Results   Component Value Date     05/12/2022     03/11/2020    POTASSIUM 4.4 05/12/2022    POTASSIUM 4.6 03/11/2020    CHLORIDE 111 (H) 05/12/2022    CHLORIDE 116 (H) 03/11/2020    CO2 26 05/12/2022    CO2 28 03/11/2020    ANIONGAP 2 (L) 05/12/2022    ANIONGAP <1 (L) 03/11/2020     (H) 05/12/2022    GLC 98 03/11/2020    BUN 26 05/12/2022    BUN 27 03/11/2020    CR 1.43 (H) 05/12/2022    CR 1.31 (H) 03/11/2020    GFRESTIMATED 47 (L) 05/12/2022    GFRESTIMATED 49 (L) 03/11/2020    GFRESTBLACK 57 (L) 03/11/2020    AURORA 8.6 05/12/2022    AURORA 8.5 03/11/2020      INR RESULTS:  Lab Results   Component Value Date    INR 3.5 (H) 05/10/2022    INR 2.0 (H) 04/27/2022    INR 2.82 (H) 12/06/2021    INR 2.82 (H) 12/05/2021    INR 3.50 (H) 07/08/2021    INR 1.80 (H) 06/28/2021            Medications     Current Outpatient Medications   Medication Sig Dispense Refill     aspirin 81 MG tablet Take 81 mg by mouth daily       Cholecalciferol (VITAMIN D3) 2000 UNITS CAPS Take by mouth daily       dutasteride (AVODART) 0.5 MG capsule Take 1 capsule (0.5 mg) by mouth daily 30 capsule 11     evolocumab (REPATHA) 140 MG/ML prefilled autoinjector Inject 1 mL (140 mg) Subcutaneous every 14 days 6 mL 3     Fish Oil-Cholecalciferol (FISH OIL + D3) 9332-0732 MG-UNIT CAPS Take 2 tablets by mouth daily       nitroGLYcerin (NITROSTAT) 0.4 MG sublingual tablet Place 1 tablet (0.4 mg) under the tongue every 5 minutes as needed for chest pain Take as directed 25 tablet 1     sacubitril-valsartan (ENTRESTO) 24-26 MG per tablet Take 1 tablet by mouth 2 times daily 80 tablet  1     tamsulosin (FLOMAX) 0.4 MG capsule Take 0.4 mg by mouth daily       warfarin ANTICOAGULANT (COUMADIN) 5 MG tablet Take 1/2 tab on Mondays, Wednesdays and Fridays and 1 tab all other days or as directed by INR clinic 90 tablet 1     metoprolol succinate ER (TOPROL-XL) 25 MG 24 hr tablet Take 1 tablet (25 mg) by mouth daily (Patient not taking: Reported on 5/12/2022) 90 tablet 3          Past Medical History     Past Medical History:   Diagnosis Date     BPH (benign prostatic hyperplasia)      Cardiomyopathy, ischemic     EF 49% by cardiac MRI 1/15/2014     Coronary artery disease 1/24/06    Cypher CANDIDA to LAD     Gastro-oesophageal reflux disease      Hyperlipidaemia      Hypertension      Left ventricular thrombus      Myocardial infarction (H) 1/2006    Anterior     TIA (transient ischaemic attack)      Past Surgical History:   Procedure Laterality Date     CORONARY ANGIOGRAPHY ADULT ORDER  1/24/06    Cypher CANDIDA to LAD     HEART CATH, ANGIOPLASTY  1/2006    Cypher CANDIDA to LAD     TONSILLECTOMY & ADENOIDECTOMY       Family History   Problem Relation Age of Onset     Heart Disease Mother         heart failure     Heart Disease Brother             Allergies   Flomax [tamsulosin], Aldactone [spironolactone], Carvedilol, Colesevelam, Ezetimibe, Lisinopril, Pravastatin, Rosuvastatin, and Simvastatin    50 minutes spent on the date of the encounter doing chart review, history and exam, documentation and further activities as noted above      JAE Marcos [facility]  Pager: [phone]

## 2022-05-12 NOTE — LETTER
5/12/2022    Francisco Doshi MD  [address]  [facility] [address]    RE: Sawyer Renteria       Dear Colleague,     I had the [facility] of seeing Sawyer [facility] in [facility].  Cardiology Clinic Progress Note  Sawyer Renteria MRN# [id number]   YOB: 1934 Age: 88 year old   Primary Cardiologist: Dr. Newton Reason for visit: CORE follow up             Assessment and Plan:   Sawyer Renteria is a very pleasant 88 year old male whom I am seeing today for CORE follow up.       1. Heart failure with reduced ejection fraction, ischemic cardiomyopathy - LVEF 35-40% per echo 12/2021              - NYHA class II, stage C              - Etiology - ischemic              - Guideline directed medical therapy                          - Betablocker: Continue metoprolol XL 25mg daily                           - ACEI/ARB/ARNI: Continue Entresto 24/26mg BID                          - Aldactone antagonist: none, due to CKD  2. Coronary artery disease - s/p stenting to LAD and angioplasty of diagonal in 2006. Stable, no ischemic symptoms              - Continue aspirin, metoprolol and repatha  3. Dyslipidemia with statin intolerance - LDL 68 and HDL 66              - Continue Repatha   4. Hypertension - controlled  5. CKD stage III - baseline creatinine 1.3-1.7  6. Apical wall motion abnormality, with history of apical thrombus and cardioembolic stroke              - Continue warfarin              - No thrombus noted on most recent echo 12/2021    I saw patient today for CORE follow up after starting Entresto, at the time of starting Entresto he stopped his metoprolol in addition to his lisinopril. He has been feeling well and appears compensated/euvolemic. Blood pressure slightly elevated in clinic, he has not been monitoring BP at home. Provided him with a home blood pressure cuff today. To optimize his GDMT recommend resuming his metoprolol XL 25mg daily, will consider further titration of  Entresto at his follow up appointment. Support given. Encouraged to call with any questions/concerns.       Changes today: RESUME metoprolol XL 25mg daily     Follow up plan:     CORE follow up with me in 1 month, will consider further titration of GDMT    Fasting lipid panel due late summer 2022     Follow up with Dr. Newton in 3-4 months with echocardiogram prior after medication optimization        History of Presenting Illness:    Sawyer Renteria is a very pleasant 88 year old male with a history of HFrEF, coronary artery disease, ischemic cardiomyopathy, dyslipidemia, hypertension and CKD.     Primary cardiologist Dr. Newton. He had an anterior MI in 2006 and underwent stenting of his LAD and angioplasty of his diagonal. He has residual 60% OM 1 stenosis. He developed an apical thrombus with a cardioembolic CVA and has remained on warfarin.      Last echocardiogram 12/2021 showed LVEF 35-40%, Severe hypokinesis of the mid-apical inferior, mid-apical anterior, mid anteroseptal, apical septal, and apical lateral segments. RV normal size/function. Similar when compared to prior study 6/2021.     Cardiac MRI 1/2020 showed LVEF 52%,no evidence of thrombus. EF and wall motion similar to prior study in 2014.      He was hospitalized in December 2021 with some complaints of transient left arm numbness/tinging, suspected to be secondary to paresthesia from reduced blood flow.      He was most recently seen by Dr. Newton the beginning of March at which time he was doing well. His metoprolol XL was increased to 25mg daily and he was referred to CORE clinic for further consideration of titration of GDMT.      I first met patient for CORE enrollment in April 2022 at which time he was doing well. We reviewed his cardiomyopathy and consideration for titration of GDMT. Also reached out to pharmacy liaison to price out Entresto pricing and was a financially feasible option for patient. During our last visit he was started on Entresto  24/26mg BID.     Patient is here today for CORE follow up.     Patient reports feeling good. Monitoring weights daily a home. States weight stable at home ~ 116#. Denies LE edema. Denies abdominal distention. Denies shortness of breath. Denies exertional dyspnea. Denies orthopnea or PND. Denies chest pain or chest tightness. Denies dizziness, lightheadedness or other presyncopal symptoms. Denies tachycardia or palpitations. Taking medications daily. Stopped lisinopril and metoprolol when he started Entresto.     Labs today show overall stable renal function and electrolytes. Blood pressure 147/55 and HR 67 in clinic today. Not monitoring blood pressure at home.     Appetite good. Eating meals at home, also getting take out from family friends restaurant. No set exercise routine. Notes bilateral knee pain makes activity difficult. Living independently in a house, his wife  a little over 1 year ago. Denies alcohol use. Denies tobacco use.         Social History       Social History     Socioeconomic History     Marital status:      Spouse name: Not on file     Number of children: Not on file     Years of education: Not on file     Highest education level: Not on file   Occupational History     Not on file   Tobacco Use     Smoking status: Never Smoker     Smokeless tobacco: Never Used   Substance and Sexual Activity     Alcohol use: No     Drug use: Never     Sexual activity: Not on file   Other Topics Concern     Parent/sibling w/ CABG, MI or angioplasty before 65F 55M? No      Service Not Asked     Blood Transfusions Not Asked     Caffeine Concern No     Occupational Exposure Not Asked     Hobby Hazards Not Asked     Sleep Concern No     Stress Concern No     Weight Concern Yes     Comment: weight loss     Special Diet No     Back Care Not Asked     Exercise Yes     Comment: bicycle 10 min, walking, 3 days week     Bike Helmet Not Asked     Seat Belt Yes     Self-Exams Not Asked   Social History  "Narrative     Not on file     Social Determinants of Health     Financial Resource Strain: Not on file   Food Insecurity: Not on file   Transportation Needs: Not on file   Physical Activity: Not on file   Stress: Not on file   Social Connections: Not on file   Intimate Partner Violence: Not on file   Housing Stability: Not on file            Review of Systems:   Skin:  Negative     Eyes:  Positive for glasses  ENT:  Negative    Respiratory:  Negative shortness of breath;dyspnea on exertion  Cardiovascular:  Negative;palpitations;chest pain;edema;fatigue;lightheadedness;dizziness    Gastroenterology: Positive for heartburn  Genitourinary:  Positive for urinary frequency  Musculoskeletal:  Positive for neck pain  Neurologic:  Positive for stroke  Psychiatric:  Positive for depression  Heme/Lymph/Imm:  Positive for allergies  Endocrine:  Negative           Physical Exam:   Vitals: BP (!) 147/55   Pulse 67   Ht 1.6 m (5' 3\")   Wt 53.4 kg (117 lb 11.2 oz)   SpO2 98%   BMI 20.85 kg/m     Wt Readings from Last 4 Encounters:   05/12/22 53.4 kg (117 lb 11.2 oz)   04/27/22 53.5 kg (118 lb)   04/18/22 53.3 kg (117 lb 8 oz)   04/05/22 53.8 kg (118 lb 9.6 oz)     GEN: well nourished, in no acute distress.  HEENT:  Pupils equal, round. Sclerae nonicteric.   NECK: Supple, no masses appreciated. JVP appears normal  C/V:  Regular rate and rhythm, no murmur, rub or gallop.    RESP: Respirations are unlabored. Clear to auscultation bilaterally without wheezing, rales, or rhonchi.  GI: Abdomen soft, nontender.  EXTREM: No LE edema.  NEURO: Alert and oriented, cooperative.  SKIN: Warm and dry       Data:     LIPID RESULTS:  Lab Results   Component Value Date    CHOL 156 08/16/2021    CHOL 162 01/28/2019    HDL 66 08/16/2021    HDL 60 01/28/2019    LDL 68 08/16/2021    LDL 77 01/28/2019    TRIG 112 08/16/2021    TRIG 124 01/28/2019    CHOLHDLRATIO 4.3 10/13/2015     LIVER ENZYME RESULTS:  Lab Results   Component Value Date    AST 21 " 07/25/2017    ALT 19 08/16/2021    ALT <5 (L) 01/28/2019     CBC RESULTS:  Lab Results   Component Value Date    WBC 8.2 12/05/2021    WBC 5.4 03/11/2020    RBC 3.70 (L) 12/05/2021    RBC 3.96 (L) 03/11/2020    HGB 10.6 (L) 12/05/2021    HGB 12.0 (L) 03/11/2020    HCT 33.3 (L) 12/05/2021    HCT 35.4 (L) 03/11/2020    MCV 90 12/05/2021    MCV 89 03/11/2020    MCH 28.6 12/05/2021    MCH 30.3 03/11/2020    MCHC 31.8 12/05/2021    MCHC 33.9 03/11/2020    RDW 13.4 12/05/2021    RDW 13.7 03/11/2020     12/05/2021     03/11/2020     BMP RESULTS:  Lab Results   Component Value Date     05/12/2022     03/11/2020    POTASSIUM 4.4 05/12/2022    POTASSIUM 4.6 03/11/2020    CHLORIDE 111 (H) 05/12/2022    CHLORIDE 116 (H) 03/11/2020    CO2 26 05/12/2022    CO2 28 03/11/2020    ANIONGAP 2 (L) 05/12/2022    ANIONGAP <1 (L) 03/11/2020     (H) 05/12/2022    GLC 98 03/11/2020    BUN 26 05/12/2022    BUN 27 03/11/2020    CR 1.43 (H) 05/12/2022    CR 1.31 (H) 03/11/2020    GFRESTIMATED 47 (L) 05/12/2022    GFRESTIMATED 49 (L) 03/11/2020    GFRESTBLACK 57 (L) 03/11/2020    AURORA 8.6 05/12/2022    AURORA 8.5 03/11/2020      INR RESULTS:  Lab Results   Component Value Date    INR 3.5 (H) 05/10/2022    INR 2.0 (H) 04/27/2022    INR 2.82 (H) 12/06/2021    INR 2.82 (H) 12/05/2021    INR 3.50 (H) 07/08/2021    INR 1.80 (H) 06/28/2021            Medications     Current Outpatient Medications   Medication Sig Dispense Refill     aspirin 81 MG tablet Take 81 mg by mouth daily       Cholecalciferol (VITAMIN D3) 2000 UNITS CAPS Take by mouth daily       dutasteride (AVODART) 0.5 MG capsule Take 1 capsule (0.5 mg) by mouth daily 30 capsule 11     evolocumab (REPATHA) 140 MG/ML prefilled autoinjector Inject 1 mL (140 mg) Subcutaneous every 14 days 6 mL 3     Fish Oil-Cholecalciferol (FISH OIL + D3) 9860-3880 MG-UNIT CAPS Take 2 tablets by mouth daily       nitroGLYcerin (NITROSTAT) 0.4 MG sublingual tablet Place 1 tablet  (0.4 mg) under the tongue every 5 minutes as needed for chest pain Take as directed 25 tablet 1     sacubitril-valsartan (ENTRESTO) 24-26 MG per tablet Take 1 tablet by mouth 2 times daily 80 tablet 1     tamsulosin (FLOMAX) 0.4 MG capsule Take 0.4 mg by mouth daily       warfarin ANTICOAGULANT (COUMADIN) 5 MG tablet Take 1/2 tab on Mondays, Wednesdays and Fridays and 1 tab all other days or as directed by INR clinic 90 tablet 1     metoprolol succinate ER (TOPROL-XL) 25 MG 24 hr tablet Take 1 tablet (25 mg) by mouth daily (Patient not taking: Reported on 5/12/2022) 90 tablet 3          Past Medical History     Past Medical History:   Diagnosis Date     BPH (benign prostatic hyperplasia)      Cardiomyopathy, ischemic     EF 49% by cardiac MRI 1/15/2014     Coronary artery disease 1/24/06    Cypher CANDIDA to LAD     Gastro-oesophageal reflux disease      Hyperlipidaemia      Hypertension      Left ventricular thrombus      Myocardial infarction (H) 1/2006    Anterior     TIA (transient ischaemic attack)      Past Surgical History:   Procedure Laterality Date     CORONARY ANGIOGRAPHY ADULT ORDER  1/24/06    Cypher CANDIDA to LAD     HEART CATH, ANGIOPLASTY  1/2006    Cypher CANDIDA to LAD     TONSILLECTOMY & ADENOIDECTOMY       Family History   Problem Relation Age of Onset     Heart Disease Mother         heart failure     Heart Disease Brother             Allergies   Flomax [tamsulosin], Aldactone [spironolactone], Carvedilol, Colesevelam, Ezetimibe, Lisinopril, Pravastatin, Rosuvastatin, and Simvastatin    50 minutes spent on the date of the encounter doing chart review, history and exam, documentation and further activities as noted above      JAE Marcos CNP  [facility] CARE  Pager: [phone]    Thank you for allowing me to participate in the care of your patient.      Sincerely,     JAE Marcos CNP     [facility]  cc:    Farrah Bernstein, APRN CNP  [address]  [place] [address]

## 2022-05-16 ENCOUNTER — LAB (OUTPATIENT)
Dept: LAB | Facility: CLINIC | Age: 87
End: 2022-05-16
Payer: MEDICARE

## 2022-05-16 ENCOUNTER — ANTICOAGULATION THERAPY VISIT (OUTPATIENT)
Dept: ANTICOAGULATION | Facility: CLINIC | Age: 87
End: 2022-05-16

## 2022-05-16 DIAGNOSIS — G45.9 TRANSIENT CEREBRAL ISCHEMIA: ICD-10-CM

## 2022-05-16 DIAGNOSIS — Z79.01 LONG TERM CURRENT USE OF ANTICOAGULANTS WITH INR GOAL OF 2.0-3.0: ICD-10-CM

## 2022-05-16 DIAGNOSIS — G45.9 TRANSIENT CEREBRAL ISCHEMIA, UNSPECIFIED TYPE: ICD-10-CM

## 2022-05-16 DIAGNOSIS — I51.3 LEFT VENTRICULAR THROMBUS: Primary | ICD-10-CM

## 2022-05-16 LAB — INR BLD: 1.7 (ref 0.9–1.1)

## 2022-05-16 PROCEDURE — 36416 COLLJ CAPILLARY BLOOD SPEC: CPT

## 2022-05-16 PROCEDURE — 85610 PROTHROMBIN TIME: CPT

## 2022-05-16 NOTE — PROGRESS NOTES
ANTICOAGULATION MANAGEMENT     Sawyer Renteria 88 year old male is on warfarin with subtherapeutic INR result. (Goal INR 2.0-3.0)    Recent labs: (last 7 days)     05/16/22  1019   INR 1.7*       ASSESSMENT       Source(s): Chart Review and Patient/Caregiver Call       Warfarin doses taken: Warfarin taken differently, but did not change total weekly dose    Diet: Increased greens/vitamin K in diet; plans to resume previous intake    New illness, injury, or hospitalization: No    Medication/supplement changes: None noted    Signs or symptoms of bleeding or clotting: No    Previous INR: Supratherapeutic    Additional findings: He gets easily confused with his dose. Also discussed using a pill box. He does not feel like he needs to do that even though he is not really sure if missed any doses.        PLAN     Recommended plan for no diet, medication or health factor changes affecting INR     Dosing Instructions: continue your current warfarin dose with next INR in 1 week   No booster dose given due to a lot of confusion with dosing.     Summary  As of 5/16/2022    Full warfarin instructions:  2.5 mg every Sun, Thu; 5 mg all other days   Next INR check:  5/24/2022             Telephone call with AJ who verbalizes understanding and agrees to plan and who agrees to plan and repeated back plan correctly    Lab visit scheduled    Education provided: Please call back if any changes to your diet, medications or how you've been taking warfarin, Goal range and significance of current result, Importance of therapeutic range and Importance of taking warfarin as instructed    Plan made per [facility] anticoagulation protocol    Tiffanie Burr RN  Anticoagulation Clinic  5/16/2022    _______________________________________________________________________     Anticoagulation Episode Summary     Current INR goal:  2.0-3.0   TTR:  73.5 % (1 y)   Target end date:  Indefinite   Send INR reminders to:  [facility] HEART INR NURSE    Indications     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]  Transient cerebral ischemia [G45.9]  Long term current use of anticoagulants with INR goal of 2.0-3.0 [Z79.01]  Transient cerebral ischemia  unspecified type [G45.9]           Comments:           Anticoagulation Care Providers     Provider Role Specialty Phone number    Satya Newton MD Referring Cardiovascular Disease [phone]

## 2022-05-24 ENCOUNTER — LAB (OUTPATIENT)
Dept: LAB | Facility: CLINIC | Age: 87
End: 2022-05-24
Payer: MEDICARE

## 2022-05-24 ENCOUNTER — ANTICOAGULATION THERAPY VISIT (OUTPATIENT)
Dept: ANTICOAGULATION | Facility: CLINIC | Age: 87
End: 2022-05-24

## 2022-05-24 DIAGNOSIS — Z79.01 LONG TERM CURRENT USE OF ANTICOAGULANTS WITH INR GOAL OF 2.0-3.0: ICD-10-CM

## 2022-05-24 DIAGNOSIS — I51.3 LEFT VENTRICULAR THROMBUS: Primary | ICD-10-CM

## 2022-05-24 DIAGNOSIS — G45.9 TRANSIENT CEREBRAL ISCHEMIA, UNSPECIFIED TYPE: ICD-10-CM

## 2022-05-24 LAB — INR BLD: 2.6 (ref 0.9–1.1)

## 2022-05-24 PROCEDURE — 36416 COLLJ CAPILLARY BLOOD SPEC: CPT

## 2022-05-24 PROCEDURE — 85610 PROTHROMBIN TIME: CPT

## 2022-05-24 NOTE — PROGRESS NOTES
ANTICOAGULATION MANAGEMENT     Sawyer Renteria 88 year old male is on warfarin with therapeutic INR result. (Goal INR 2.0-3.0)    Recent labs: (last 7 days)     05/24/22  1144   INR 2.6*       ASSESSMENT       Source(s): Chart Review and Patient/Caregiver Call       Warfarin doses taken: Warfarin taken as instructed    Diet: No new diet changes identified    New illness, injury, or hospitalization: No    Medication/supplement changes: Lisinopril and metoprolol  stopped on 5/12 No interaction anticipated. Changed to entresto, no anticipated interaction     Signs or symptoms of bleeding or clotting: No    Previous INR: Subtherapeutic    Additional findings: None       PLAN     Recommended plan for on going changes affecting INR     Dosing Instructions: continue your current warfarin dose with next INR in 2 weeks       Summary  As of 5/24/2022    Full warfarin instructions:  2.5 mg every Sun, Thu; 5 mg all other days   Next INR check:  6/7/2022             Telephone call with AJ who verbalizes understanding and agrees to plan    Lab visit scheduled    Education provided: Importance of therapeutic range, Importance of following up at instructed interval and Importance of taking warfarin as instructed    Plan made per [facility] anticoagulation protocol    Merced Meza RN  Anticoagulation Clinic  5/24/2022    _______________________________________________________________________     Anticoagulation Episode Summary     Current INR goal:  2.0-3.0   TTR:  75.1 % (1 y)   Target end date:  Indefinite   Send INR reminders to:  [facility] HEART INR NURSE    Indications    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]  Transient cerebral ischemia [G45.9]  Long term current use of anticoagulants with INR goal of 2.0-3.0 [Z79.01]  Transient cerebral ischemia  unspecified type [G45.9]           Comments:           Anticoagulation Care Providers     Provider Role Specialty Phone number    Satya Newton MD Referring Cardiovascular Disease  [phone]

## 2022-06-04 ENCOUNTER — HEALTH MAINTENANCE LETTER (OUTPATIENT)
Age: 87
End: 2022-06-04

## 2022-06-09 ENCOUNTER — LAB (OUTPATIENT)
Dept: LAB | Facility: CLINIC | Age: 87
End: 2022-06-09
Payer: MEDICARE

## 2022-06-09 ENCOUNTER — ANTICOAGULATION THERAPY VISIT (OUTPATIENT)
Dept: ANTICOAGULATION | Facility: CLINIC | Age: 87
End: 2022-06-09

## 2022-06-09 DIAGNOSIS — I51.3 LEFT VENTRICULAR THROMBUS: Primary | ICD-10-CM

## 2022-06-09 DIAGNOSIS — G45.9 TRANSIENT CEREBRAL ISCHEMIA, UNSPECIFIED TYPE: ICD-10-CM

## 2022-06-09 DIAGNOSIS — G45.9 TRANSIENT CEREBRAL ISCHEMIA: ICD-10-CM

## 2022-06-09 DIAGNOSIS — Z79.01 LONG TERM CURRENT USE OF ANTICOAGULANTS WITH INR GOAL OF 2.0-3.0: ICD-10-CM

## 2022-06-09 LAB — INR BLD: 2.6 (ref 0.9–1.1)

## 2022-06-09 PROCEDURE — 36416 COLLJ CAPILLARY BLOOD SPEC: CPT

## 2022-06-09 PROCEDURE — 85610 PROTHROMBIN TIME: CPT

## 2022-06-09 NOTE — PROGRESS NOTES
ANTICOAGULATION MANAGEMENT     Sawyer Renteria 88 year old male is on warfarin with therapeutic INR result. (Goal INR 2.0-3.0)    Recent labs: (last 7 days)     06/09/22  1126   INR 2.6*       ASSESSMENT       Source(s): Chart Review and Patient/Caregiver Call       Warfarin doses taken: Warfarin taken as instructed    Diet: No new diet changes identified    New illness, injury, or hospitalization: No    Medication/supplement changes: None noted    Signs or symptoms of bleeding or clotting: No    Previous INR: Therapeutic last visit at 2.6; previously outside of goal range aet 1.7    Additional findings: None       PLAN     Recommended plan for no diet, medication or health factor changes affecting INR     Dosing Instructions:   (5mg tabs)    continue your current warfarin dose with next INR in 3 weeks       Summary  As of 6/9/2022    Full warfarin instructions:  2.5 mg every Sun, Thu; 5 mg all other days   Next INR check:  6/30/2022             Telephone call with  DERICK ([phone]) who verbalizes understanding and agrees to plan    Lab visit scheduled - INR on 6/30/22 @ [facility]    Education provided: Importance of consistent vitamin K intake and Goal range and significance of current result    Plan made per [facility] anticoagulation protocol    Poonam Asencio, RN  Anticoagulation Clinic  6/9/2022    _______________________________________________________________________     Anticoagulation Episode Summary     Current INR goal:  2.0-3.0   TTR:  76.6 % (1 y)   Target end date:  Indefinite   Send INR reminders to:  [facility] HEART INR NURSE    Indications    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]  Transient cerebral ischemia [G45.9]  Long term current use of anticoagulants with INR goal of 2.0-3.0 [Z79.01]  Transient cerebral ischemia  unspecified type [G45.9]           Comments:           Anticoagulation Care Providers     Provider Role Specialty Phone number    Satya Newton MD Referring  Cardiovascular Disease [phone]

## 2022-06-13 ENCOUNTER — LAB (OUTPATIENT)
Dept: LAB | Facility: CLINIC | Age: 87
End: 2022-06-13
Payer: MEDICARE

## 2022-06-13 ENCOUNTER — OFFICE VISIT (OUTPATIENT)
Dept: CARDIOLOGY | Facility: CLINIC | Age: 87
End: 2022-06-13
Attending: NURSE PRACTITIONER
Payer: MEDICARE

## 2022-06-13 VITALS
DIASTOLIC BLOOD PRESSURE: 56 MMHG | HEART RATE: 54 BPM | SYSTOLIC BLOOD PRESSURE: 106 MMHG | OXYGEN SATURATION: 98 % | BODY MASS INDEX: 21.02 KG/M2 | WEIGHT: 118.6 LBS | HEIGHT: 63 IN

## 2022-06-13 DIAGNOSIS — I25.5 CARDIOMYOPATHY, ISCHEMIC: ICD-10-CM

## 2022-06-13 DIAGNOSIS — I10 ESSENTIAL HYPERTENSION: ICD-10-CM

## 2022-06-13 DIAGNOSIS — I50.22 CHRONIC SYSTOLIC HEART FAILURE (H): Primary | ICD-10-CM

## 2022-06-13 DIAGNOSIS — E78.2 MIXED HYPERLIPIDEMIA: ICD-10-CM

## 2022-06-13 DIAGNOSIS — I25.10 CORONARY ARTERY DISEASE INVOLVING NATIVE CORONARY ARTERY OF NATIVE HEART WITHOUT ANGINA PECTORIS: ICD-10-CM

## 2022-06-13 DIAGNOSIS — N18.31 CHRONIC RENAL IMPAIRMENT, STAGE 3A (H): ICD-10-CM

## 2022-06-13 LAB
ANION GAP SERPL CALCULATED.3IONS-SCNC: 5 MMOL/L (ref 3–14)
BUN SERPL-MCNC: 22 MG/DL (ref 7–30)
CALCIUM SERPL-MCNC: 8.5 MG/DL (ref 8.5–10.1)
CHLORIDE BLD-SCNC: 109 MMOL/L (ref 94–109)
CO2 SERPL-SCNC: 22 MMOL/L (ref 20–32)
CREAT SERPL-MCNC: 1.66 MG/DL (ref 0.66–1.25)
GFR SERPL CREATININE-BSD FRML MDRD: 39 ML/MIN/1.73M2
GLUCOSE BLD-MCNC: 97 MG/DL (ref 70–99)
POTASSIUM BLD-SCNC: 4.4 MMOL/L (ref 3.4–5.3)
SODIUM SERPL-SCNC: 136 MMOL/L (ref 133–144)

## 2022-06-13 PROCEDURE — 80048 BASIC METABOLIC PNL TOTAL CA: CPT | Performed by: NURSE PRACTITIONER

## 2022-06-13 PROCEDURE — 36415 COLL VENOUS BLD VENIPUNCTURE: CPT | Performed by: NURSE PRACTITIONER

## 2022-06-13 PROCEDURE — 99215 OFFICE O/P EST HI 40 MIN: CPT | Performed by: NURSE PRACTITIONER

## 2022-06-13 NOTE — LETTER
6/13/2022    Francisco Doshi MD  [address]  [facility] [address]    RE: Sawyer Renteria       Dear Colleague,     I had the pleasure of seeing Sawyer Renteria in the [facility].  Cardiology Clinic Progress Note  Sawyer Renteria MRN# [id number]   YOB: 1934 Age: 88 year old   Primary Cardiologist: Dr. Newton  Reason for visit: CORE follow up             Assessment and Plan:   Sawyer Renteria is a very pleasant 88 year old male whom I am seeing today for CORE follow up.       1. Heart failure with reduced ejection fraction, ischemic cardiomyopathy - LVEF 35-40% per echo 12/2021              - NYHA class II, stage C              - Etiology - ischemic              - Guideline directed medical therapy                          - Betablocker: Continue metoprolol XL 25mg daily                           - ACEI/ARB/ARNI: Continue Entresto 24/26mg BID                          - Aldactone antagonist: none, due to CKD  2. Coronary artery disease - s/p stenting to LAD and angioplasty of diagonal in 2006. Stable, no ischemic symptoms              - Continue aspirin, metoprolol and repatha  3. Dyslipidemia with statin intolerance - LDL 68 and HDL 66              - Continue Repatha   4. Hypertension - controlled  5. CKD stage III - baseline creatinine 1.3-1.7  6. Apical wall motion abnormality, with history of apical thrombus and cardioembolic stroke              - Continue warfarin              - No thrombus noted on most recent echo 12/2021     I saw patient today for CORE follow up. He is doing well from a heart failure standpoint, appears to be tolerating Entresto and metoprolol well. BMP in clinic 106/56. Reviewed home blood pressures which show typically 110s systolically but did have readings as low as 92/47. Given his age/living alone and some documented lower blood pressure readings recommend continuing current dosing of GDMT and not further titrating as worry about making patient  hypotensive. Recommend follow up with Dr. Newton this fall with echocardiogram prior, if further decline in LVEF noted more aggressive titration of GDMT will be considered. Support given today. All questions answered.     Changes today: none    Follow up plan:     Cardiology follow up with Dr. Newton this fall with echocardiogram prior while I am out on [place].         History of Presenting Illness:    Sawyer Renteria is a very pleasant 88 year old male with a history of HFrEF, coronary artery disease, ischemic cardiomyopathy, dyslipidemia, hypertension and CKD.     Primary cardiologist Dr. Newton. He had an anterior MI in 2006 and underwent stenting of his LAD and angioplasty of his diagonal. He has residual 60% OM 1 stenosis. He developed an apical thrombus with a cardioembolic CVA and has remained on warfarin.      Last echocardiogram 12/2021 showed LVEF 35-40%, Severe hypokinesis of the mid-apical inferior, mid-apical anterior, mid anteroseptal, apical septal, and apical lateral segments. RV normal size/function. Similar when compared to prior study 6/2021.     Cardiac MRI 1/2020 showed LVEF 52%,no evidence of thrombus. EF and wall motion similar to prior study in 2014.      He was hospitalized in December 2021 with some complaints of transient left arm numbness/tinging, suspected to be secondary to paresthesia from reduced blood flow.      He was most recently seen by Dr. Newton the beginning of March at which time he was doing well. His metoprolol XL was increased to 25mg daily and he was referred to CORE clinic for further consideration of titration of GDMT.      I first met patient for CORE enrollment in April 2022 at which time he was doing well. We reviewed his cardiomyopathy and consideration for titration of GDMT. Also reached out to pharmacy liaison to price out Entresto pricing and was a financially feasible option for patient. He has been following in CORE clinic for optimization of GDMT, he has been started on  entresto and continued on his metoprolol XL.     Patient is here today for CORE follow up.     Patient reports feeling good. Monitoring weights daily a home. Denies shortness of breath at rest. Denies exertional dyspnea. Denies orthopnea or PND. Denies LE edema. Denies chest pain or chest tightness. Denies dizziness, lightheadedness or other presyncopal symptoms. Denies tachycardia or palpitations. Taking medications daily.     Labs today show overall stable renal function, creatinine 1.66, stable electrolytes. Blood pressure 106/56 and HR 54 in clinic today. Monitoring blood pressure 3 x a day at home, shows me his log today, typically 110s systolically, as low as 92/47    Appetite good. Eating meals at home, also getting take out from family friends restaurant. No set exercise routine. Notes bilateral knee pain makes activity difficult. Living independently in a house, his wife  a little over 1 year ago. Denies alcohol use. Denies tobacco use.         Social History      Social History     Socioeconomic History     Marital status:      Spouse name: Not on file     Number of children: Not on file     Years of education: Not on file     Highest education level: Not on file   Occupational History     Not on file   Tobacco Use     Smoking status: Never Smoker     Smokeless tobacco: Never Used   Substance and Sexual Activity     Alcohol use: No     Drug use: Never     Sexual activity: Not on file   Other Topics Concern     Parent/sibling w/ CABG, MI or angioplasty before 65F 55M? No      Service Not Asked     Blood Transfusions Not Asked     Caffeine Concern No     Occupational Exposure Not Asked     Hobby Hazards Not Asked     Sleep Concern No     Stress Concern No     Weight Concern Yes     Comment: weight loss     Special Diet No     Back Care Not Asked     Exercise Yes     Comment: bicycle 10 min, walking, 3 days week     Bike Helmet Not Asked     Seat Belt Yes     Self-Exams Not Asked   Social  "History Narrative     Not on file     Social Determinants of Health     Financial Resource Strain: Not on file   Food Insecurity: Not on file   Transportation Needs: Not on file   Physical Activity: Not on file   Stress: Not on file   Social Connections: Not on file   Intimate Partner Violence: Not on file   Housing Stability: Not on file          Review of Systems:    ROS: 10 point ROS neg other than the symptoms noted above in the HPI.         Physical Exam:   Vitals: /56   Pulse 54   Ht 1.6 m (5' 3\")   Wt 53.8 kg (118 lb 9.6 oz)   SpO2 98%   BMI 21.01 kg/m     Wt Readings from Last 4 Encounters:   06/13/22 53.8 kg (118 lb 9.6 oz)   05/12/22 53.4 kg (117 lb 11.2 oz)   04/27/22 53.5 kg (118 lb)   04/18/22 53.3 kg (117 lb 8 oz)     GEN: well nourished, in no acute distress.  HEENT:  Pupils equal, round. Sclerae nonicteric.   NECK: Supple, no masses appreciated. JVP appears normal.   C/V:  Regular rate and rhythm, no murmur, rub or gallop.    RESP: Respirations are unlabored. Clear to auscultation bilaterally without wheezing, rales, or rhonchi.  GI: Abdomen soft, nontender.  EXTREM: No  LE edema.  NEURO: Alert and oriented, cooperative.  SKIN: Warm and dry       Data:     LIPID RESULTS:  Lab Results   Component Value Date    CHOL 156 08/16/2021    CHOL 162 01/28/2019    HDL 66 08/16/2021    HDL 60 01/28/2019    LDL 68 08/16/2021    LDL 77 01/28/2019    TRIG 112 08/16/2021    TRIG 124 01/28/2019    CHOLHDLRATIO 4.3 10/13/2015     LIVER ENZYME RESULTS:  Lab Results   Component Value Date    AST 21 07/25/2017    ALT 19 08/16/2021    ALT <5 (L) 01/28/2019     CBC RESULTS:  Lab Results   Component Value Date    WBC 8.2 12/05/2021    WBC 5.4 03/11/2020    RBC 3.70 (L) 12/05/2021    RBC 3.96 (L) 03/11/2020    HGB 10.6 (L) 12/05/2021    HGB 12.0 (L) 03/11/2020    HCT 33.3 (L) 12/05/2021    HCT 35.4 (L) 03/11/2020    MCV 90 12/05/2021    MCV 89 03/11/2020    MCH 28.6 12/05/2021    MCH 30.3 03/11/2020    MCHC 31.8 " 12/05/2021    MCHC 33.9 03/11/2020    RDW 13.4 12/05/2021    RDW 13.7 03/11/2020     12/05/2021     03/11/2020     BMP RESULTS:  Lab Results   Component Value Date     06/13/2022     03/11/2020    POTASSIUM 4.4 06/13/2022    POTASSIUM 4.6 03/11/2020    CHLORIDE 109 06/13/2022    CHLORIDE 116 (H) 03/11/2020    CO2 22 06/13/2022    CO2 28 03/11/2020    ANIONGAP 5 06/13/2022    ANIONGAP <1 (L) 03/11/2020    GLC 97 06/13/2022    GLC 98 03/11/2020    BUN 22 06/13/2022    BUN 27 03/11/2020    CR 1.66 (H) 06/13/2022    CR 1.31 (H) 03/11/2020    GFRESTIMATED 39 (L) 06/13/2022    GFRESTIMATED 49 (L) 03/11/2020    GFRESTBLACK 57 (L) 03/11/2020    AURORA 8.5 06/13/2022    AURORA 8.5 03/11/2020      INR RESULTS:  Lab Results   Component Value Date    INR 2.6 (H) 06/09/2022    INR 2.6 (H) 05/24/2022    INR 2.82 (H) 12/06/2021    INR 2.82 (H) 12/05/2021    INR 3.50 (H) 07/08/2021    INR 1.80 (H) 06/28/2021            Medications     Current Outpatient Medications   Medication Sig Dispense Refill     aspirin 81 MG tablet Take 81 mg by mouth daily       Cholecalciferol (VITAMIN D3) 2000 UNITS CAPS Take by mouth daily       dutasteride (AVODART) 0.5 MG capsule Take 1 capsule (0.5 mg) by mouth daily 30 capsule 11     evolocumab (REPATHA) 140 MG/ML prefilled autoinjector Inject 1 mL (140 mg) Subcutaneous every 14 days 6 mL 3     Fish Oil-Cholecalciferol (FISH OIL + D3) 4782-9187 MG-UNIT CAPS Take 2 tablets by mouth daily       metoprolol succinate ER (TOPROL-XL) 25 MG 24 hr tablet Take 1 tablet (25 mg) by mouth daily 90 tablet 3     nitroGLYcerin (NITROSTAT) 0.4 MG sublingual tablet Place 1 tablet (0.4 mg) under the tongue every 5 minutes as needed for chest pain Take as directed 25 tablet 1     sacubitril-valsartan (ENTRESTO) 24-26 MG per tablet Take 1 tablet by mouth 2 times daily 80 tablet 1     tamsulosin (FLOMAX) 0.4 MG capsule Take 0.4 mg by mouth daily       warfarin ANTICOAGULANT (COUMADIN) 5 MG tablet Take  1/2 tab on Mondays, Wednesdays and Fridays and 1 tab all other days or as directed by INR clinic 90 tablet 1          Past Medical History     Past Medical History:   Diagnosis Date     BPH (benign prostatic hyperplasia)      Cardiomyopathy, ischemic     EF 49% by cardiac MRI 1/15/2014     Coronary artery disease 1/24/06    Cypher CANDIDA to LAD     Gastro-oesophageal reflux disease      Hyperlipidaemia      Hypertension      Left ventricular thrombus      Myocardial infarction (H) 1/2006    Anterior     TIA (transient ischaemic attack)      Past Surgical History:   Procedure Laterality Date     CORONARY ANGIOGRAPHY ADULT ORDER  1/24/06    Cypher CANDIDA to LAD     HEART CATH, ANGIOPLASTY  1/2006    Cypher CANDIDA to LAD     TONSILLECTOMY & ADENOIDECTOMY       Family History   Problem Relation Age of Onset     Heart Disease Mother         heart failure     Heart Disease Brother             Allergies   Flomax [tamsulosin], Aldactone [spironolactone], Carvedilol, Colesevelam, Ezetimibe, Lisinopril, Pravastatin, Rosuvastatin, and Simvastatin    40 minutes spent on the date of the encounter doing chart review, history and exam, documentation and further activities as noted above      JAE Marcos CNP  [facility] HEART CARE  Pager: [phone]    Thank you for allowing me to participate in the care of your patient.      Sincerely,     JAE Marcos CNP   [facility] Heart Care  cc:   JAE Valdez CNP  [address] [place] [id number]

## 2022-06-13 NOTE — PROGRESS NOTES
Cardiology Clinic Progress Note  Sawyer Renteria MRN# [id number]   YOB: 1934 Age: 88 year old   Primary Cardiologist: Dr. Newton  Reason for visit: CORE follow up             Assessment and Plan:   Sawyer Renteria is a very pleasant 88 year old male whom I am seeing today for CORE follow up.       1. Heart failure with reduced ejection fraction, ischemic cardiomyopathy - LVEF 35-40% per echo 12/2021              - NYHA class II, stage C              - Etiology - ischemic              - Guideline directed medical therapy                          - Betablocker: Continue metoprolol XL 25mg daily                           - ACEI/ARB/ARNI: Continue Entresto 24/26mg BID                          - Aldactone antagonist: none, due to CKD  2. Coronary artery disease - s/p stenting to LAD and angioplasty of diagonal in 2006. Stable, no ischemic symptoms              - Continue aspirin, metoprolol and repatha  3. Dyslipidemia with statin intolerance - LDL 68 and HDL 66              - Continue Repatha   4. Hypertension - controlled  5. CKD stage III - baseline creatinine 1.3-1.7  6. Apical wall motion abnormality, with history of apical thrombus and cardioembolic stroke              - Continue warfarin              - No thrombus noted on most recent echo 12/2021     I saw patient today for CORE follow up. He is doing well from a heart failure standpoint, appears to be tolerating Entresto and metoprolol well. BMP in clinic 106/56. Reviewed home blood pressures which show typically 110s systolically but did have readings as low as 92/47. Given his age/living alone and some documented lower blood pressure readings recommend continuing current dosing of GDMT and not further titrating as worry about making patient hypotensive. Recommend follow up with Dr. Newton this fall with echocardiogram prior, if further decline in LVEF noted more aggressive titration of GDMT will be considered. Support given today. All questions  answered.     Changes today: none    Follow up plan:     Cardiology follow up with Dr. Newton this fall with echocardiogram prior while I am out on [place].         History of Presenting Illness:    Sawyer Renteria is a very pleasant 88 year old male with a history of HFrEF, coronary artery disease, ischemic cardiomyopathy, dyslipidemia, hypertension and CKD.     Primary cardiologist Dr. Newton. He had an anterior MI in 2006 and underwent stenting of his LAD and angioplasty of his diagonal. He has residual 60% OM 1 stenosis. He developed an apical thrombus with a cardioembolic CVA and has remained on warfarin.      Last echocardiogram 12/2021 showed LVEF 35-40%, Severe hypokinesis of the mid-apical inferior, mid-apical anterior, mid anteroseptal, apical septal, and apical lateral segments. RV normal size/function. Similar when compared to prior study 6/2021.     Cardiac MRI 1/2020 showed LVEF 52%,no evidence of thrombus. EF and wall motion similar to prior study in 2014.      He was hospitalized in December 2021 with some complaints of transient left arm numbness/tinging, suspected to be secondary to paresthesia from reduced blood flow.      He was most recently seen by Dr. Newton the beginning of March at which time he was doing well. His metoprolol XL was increased to 25mg daily and he was referred to CORE clinic for further consideration of titration of GDMT.      I first met patient for CORE enrollment in April 2022 at which time he was doing well. We reviewed his cardiomyopathy and consideration for titration of GDMT. Also reached out to pharmacy liaison to price out Entresto pricing and was a financially feasible option for patient. He has been following in CORE clinic for optimization of GDMT, he has been started on entresto and continued on his metoprolol XL.     Patient is here today for CORE follow up.     Patient reports feeling good. Monitoring weights daily a home. Denies shortness of breath at rest. Denies  exertional dyspnea. Denies orthopnea or PND. Denies LE edema. Denies chest pain or chest tightness. Denies dizziness, lightheadedness or other presyncopal symptoms. Denies tachycardia or palpitations. Taking medications daily.     Labs today show overall stable renal function, creatinine 1.66, stable electrolytes. Blood pressure 106/56 and HR 54 in clinic today. Monitoring blood pressure 3 x a day at home, shows me his log today, typically 110s systolically, as low as 92/47    Appetite good. Eating meals at home, also getting take out from family friends restaurant. No set exercise routine. Notes bilateral knee pain makes activity difficult. Living independently in a house, his wife  a little over 1 year ago. Denies alcohol use. Denies tobacco use.         Social History      Social History     Socioeconomic History     Marital status:      Spouse name: Not on file     Number of children: Not on file     Years of education: Not on file     Highest education level: Not on file   Occupational History     Not on file   Tobacco Use     Smoking status: Never Smoker     Smokeless tobacco: Never Used   Substance and Sexual Activity     Alcohol use: No     Drug use: Never     Sexual activity: Not on file   Other Topics Concern     Parent/sibling w/ CABG, MI or angioplasty before 65F 55M? No      Service Not Asked     Blood Transfusions Not Asked     Caffeine Concern No     Occupational Exposure Not Asked     Hobby Hazards Not Asked     Sleep Concern No     Stress Concern No     Weight Concern Yes     Comment: weight loss     Special Diet No     Back Care Not Asked     Exercise Yes     Comment: bicycle 10 min, walking, 3 days week     Bike Helmet Not Asked     Seat Belt Yes     Self-Exams Not Asked   Social History Narrative     Not on file     Social Determinants of Health     Financial Resource Strain: Not on file   Food Insecurity: Not on file   Transportation Needs: Not on file   Physical Activity: Not  "on file   Stress: Not on file   Social Connections: Not on file   Intimate Partner Violence: Not on file   Housing Stability: Not on file          Review of Systems:    ROS: 10 point ROS neg other than the symptoms noted above in the HPI.         Physical Exam:   Vitals: /56   Pulse 54   Ht 1.6 m (5' 3\")   Wt 53.8 kg (118 lb 9.6 oz)   SpO2 98%   BMI 21.01 kg/m     Wt Readings from Last 4 Encounters:   06/13/22 53.8 kg (118 lb 9.6 oz)   05/12/22 53.4 kg (117 lb 11.2 oz)   04/27/22 53.5 kg (118 lb)   04/18/22 53.3 kg (117 lb 8 oz)     GEN: well nourished, in no acute distress.  HEENT:  Pupils equal, round. Sclerae nonicteric.   NECK: Supple, no masses appreciated. JVP appears normal.   C/V:  Regular rate and rhythm, no murmur, rub or gallop.    RESP: Respirations are unlabored. Clear to auscultation bilaterally without wheezing, rales, or rhonchi.  GI: Abdomen soft, nontender.  EXTREM: No  LE edema.  NEURO: Alert and oriented, cooperative.  SKIN: Warm and dry       Data:     LIPID RESULTS:  Lab Results   Component Value Date    CHOL 156 08/16/2021    CHOL 162 01/28/2019    HDL 66 08/16/2021    HDL 60 01/28/2019    LDL 68 08/16/2021    LDL 77 01/28/2019    TRIG 112 08/16/2021    TRIG 124 01/28/2019    CHOLHDLRATIO 4.3 10/13/2015     LIVER ENZYME RESULTS:  Lab Results   Component Value Date    AST 21 07/25/2017    ALT 19 08/16/2021    ALT <5 (L) 01/28/2019     CBC RESULTS:  Lab Results   Component Value Date    WBC 8.2 12/05/2021    WBC 5.4 03/11/2020    RBC 3.70 (L) 12/05/2021    RBC 3.96 (L) 03/11/2020    HGB 10.6 (L) 12/05/2021    HGB 12.0 (L) 03/11/2020    HCT 33.3 (L) 12/05/2021    HCT 35.4 (L) 03/11/2020    MCV 90 12/05/2021    MCV 89 03/11/2020    MCH 28.6 12/05/2021    MCH 30.3 03/11/2020    MCHC 31.8 12/05/2021    MCHC 33.9 03/11/2020    RDW 13.4 12/05/2021    RDW 13.7 03/11/2020     12/05/2021     03/11/2020     BMP RESULTS:  Lab Results   Component Value Date     06/13/2022    "  03/11/2020    POTASSIUM 4.4 06/13/2022    POTASSIUM 4.6 03/11/2020    CHLORIDE 109 06/13/2022    CHLORIDE 116 (H) 03/11/2020    CO2 22 06/13/2022    CO2 28 03/11/2020    ANIONGAP 5 06/13/2022    ANIONGAP <1 (L) 03/11/2020    GLC 97 06/13/2022    GLC 98 03/11/2020    BUN 22 06/13/2022    BUN 27 03/11/2020    CR 1.66 (H) 06/13/2022    CR 1.31 (H) 03/11/2020    GFRESTIMATED 39 (L) 06/13/2022    GFRESTIMATED 49 (L) 03/11/2020    GFRESTBLACK 57 (L) 03/11/2020    AURORA 8.5 06/13/2022    AURORA 8.5 03/11/2020      INR RESULTS:  Lab Results   Component Value Date    INR 2.6 (H) 06/09/2022    INR 2.6 (H) 05/24/2022    INR 2.82 (H) 12/06/2021    INR 2.82 (H) 12/05/2021    INR 3.50 (H) 07/08/2021    INR 1.80 (H) 06/28/2021            Medications     Current Outpatient Medications   Medication Sig Dispense Refill     aspirin 81 MG tablet Take 81 mg by mouth daily       Cholecalciferol (VITAMIN D3) 2000 UNITS CAPS Take by mouth daily       dutasteride (AVODART) 0.5 MG capsule Take 1 capsule (0.5 mg) by mouth daily 30 capsule 11     evolocumab (REPATHA) 140 MG/ML prefilled autoinjector Inject 1 mL (140 mg) Subcutaneous every 14 days 6 mL 3     Fish Oil-Cholecalciferol (FISH OIL + D3) 7895-0163 MG-UNIT CAPS Take 2 tablets by mouth daily       metoprolol succinate ER (TOPROL-XL) 25 MG 24 hr tablet Take 1 tablet (25 mg) by mouth daily 90 tablet 3     nitroGLYcerin (NITROSTAT) 0.4 MG sublingual tablet Place 1 tablet (0.4 mg) under the tongue every 5 minutes as needed for chest pain Take as directed 25 tablet 1     sacubitril-valsartan (ENTRESTO) 24-26 MG per tablet Take 1 tablet by mouth 2 times daily 80 tablet 1     tamsulosin (FLOMAX) 0.4 MG capsule Take 0.4 mg by mouth daily       warfarin ANTICOAGULANT (COUMADIN) 5 MG tablet Take 1/2 tab on Mondays, Wednesdays and Fridays and 1 tab all other days or as directed by INR clinic 90 tablet 1          Past Medical History     Past Medical History:   Diagnosis Date     BPH (benign  prostatic hyperplasia)      Cardiomyopathy, ischemic     EF 49% by cardiac MRI 1/15/2014     Coronary artery disease 1/24/06    Cypher CANDIDA to LAD     Gastro-oesophageal reflux disease      Hyperlipidaemia      Hypertension      Left ventricular thrombus      Myocardial infarction (H) 1/2006    Anterior     TIA (transient ischaemic attack)      Past Surgical History:   Procedure Laterality Date     CORONARY ANGIOGRAPHY ADULT ORDER  1/24/06    Cypher CANDIDA to LAD     HEART CATH, ANGIOPLASTY  1/2006    Cypher CANDIDA to LAD     TONSILLECTOMY & ADENOIDECTOMY       Family History   Problem Relation Age of Onset     Heart Disease Mother         heart failure     Heart Disease Brother             Allergies   Flomax [tamsulosin], Aldactone [spironolactone], Carvedilol, Colesevelam, Ezetimibe, Lisinopril, Pravastatin, Rosuvastatin, and Simvastatin    40 minutes spent on the date of the encounter doing chart review, history and exam, documentation and further activities as noted above      JAE Marcos [facility]  Pager: [phone]

## 2022-06-30 ENCOUNTER — ANTICOAGULATION THERAPY VISIT (OUTPATIENT)
Dept: ANTICOAGULATION | Facility: CLINIC | Age: 87
End: 2022-06-30

## 2022-06-30 ENCOUNTER — LAB (OUTPATIENT)
Dept: LAB | Facility: CLINIC | Age: 87
End: 2022-06-30
Payer: MEDICARE

## 2022-06-30 DIAGNOSIS — G45.9 TRANSIENT CEREBRAL ISCHEMIA, UNSPECIFIED TYPE: ICD-10-CM

## 2022-06-30 DIAGNOSIS — Z79.01 LONG TERM CURRENT USE OF ANTICOAGULANTS WITH INR GOAL OF 2.0-3.0: ICD-10-CM

## 2022-06-30 DIAGNOSIS — G45.9 TRANSIENT CEREBRAL ISCHEMIA: ICD-10-CM

## 2022-06-30 DIAGNOSIS — I51.3 LEFT VENTRICULAR THROMBUS: Primary | ICD-10-CM

## 2022-06-30 LAB — INR BLD: 3.8 (ref 0.9–1.1)

## 2022-06-30 PROCEDURE — 85610 PROTHROMBIN TIME: CPT

## 2022-06-30 PROCEDURE — 36416 COLLJ CAPILLARY BLOOD SPEC: CPT

## 2022-06-30 NOTE — PROGRESS NOTES
ANTICOAGULATION MANAGEMENT     Sawyer Renteria 88 year old male is on warfarin with supratherapeutic INR result. (Goal INR 2.0-3.0)    Recent labs: (last 7 days)     06/30/22  1218   INR 3.8*       ASSESSMENT       Source(s): Chart Review and Patient/Caregiver Call       Warfarin doses taken: Warfarin taken as instructed    Diet: Decreased greens/vitamin K in diet; plans to resume previous intake    New illness, injury, or hospitalization: No    Medication/supplement changes: started entresto and metoprolol, neither which are expected to affect INR    Signs or symptoms of bleeding or clotting: No    Previous INR: Therapeutic last 2(+) visits    Additional findings: None       PLAN     Recommended plan for temporary change(s) affecting INR     Dosing Instructions: hold dose then continue your current warfarin dose with next INR in 2 weeks       Summary  As of 6/30/2022    Full warfarin instructions:  6/30: Hold; Otherwise 2.5 mg every Sun, Thu; 5 mg all other days   Next INR check:  7/14/2022             Telephone call with AJ who agrees to plan and repeated back plan correctly    Lab visit scheduled    Education provided: Importance of consistent vitamin K intake, Impact of vitamin K foods on INR, Vitamin K content of foods, Goal range and significance of current result, Importance of therapeutic range and No interaction anticipated between warfarin and metoprolol and entresto    Plan made per [facility] anticoagulation protocol    Liz Gates RN  Anticoagulation Clinic  6/30/2022    _______________________________________________________________________     Anticoagulation Episode Summary     Current INR goal:  2.0-3.0   TTR:  76.9 % (1 y)   Target end date:  Indefinite   Send INR reminders to:  [facility] HEART INR NURSE    Indications    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

## 2022-07-12 ENCOUNTER — CARE COORDINATION (OUTPATIENT)
Dept: CARDIOLOGY | Facility: CLINIC | Age: 87
End: 2022-07-12

## 2022-07-12 DIAGNOSIS — I25.5 CARDIOMYOPATHY, ISCHEMIC: ICD-10-CM

## 2022-07-12 RX ORDER — SACUBITRIL AND VALSARTAN 24; 26 MG/1; MG/1
1 TABLET, FILM COATED ORAL 2 TIMES DAILY
Qty: 180 TABLET | Refills: 3 | Status: SHIPPED | OUTPATIENT
Start: 2022-07-12 | End: 2022-07-18

## 2022-07-12 NOTE — PROGRESS NOTES
[facility] - CORE Clinic    Incoming messages from patient requesting entresto refill. patient last seen by Farrah Bernstein on 6/13/22. Per her clinic notes patient tolerating entresto 24/26mg bid and will stay at this dose. Called patient and confirmed this is what he is taking. Rx sent.  Kasey Farias RN on 7/12/2022 at 9:39 AM

## 2022-07-14 ENCOUNTER — LAB (OUTPATIENT)
Dept: LAB | Facility: CLINIC | Age: 87
End: 2022-07-14
Payer: MEDICARE

## 2022-07-14 ENCOUNTER — ANTICOAGULATION THERAPY VISIT (OUTPATIENT)
Dept: ANTICOAGULATION | Facility: CLINIC | Age: 87
End: 2022-07-14

## 2022-07-14 DIAGNOSIS — Z79.01 LONG TERM CURRENT USE OF ANTICOAGULANTS WITH INR GOAL OF 2.0-3.0: ICD-10-CM

## 2022-07-14 DIAGNOSIS — I51.3 LEFT VENTRICULAR THROMBUS: Primary | ICD-10-CM

## 2022-07-14 DIAGNOSIS — G45.9 TRANSIENT CEREBRAL ISCHEMIA, UNSPECIFIED TYPE: ICD-10-CM

## 2022-07-14 DIAGNOSIS — G45.9 TRANSIENT CEREBRAL ISCHEMIA: ICD-10-CM

## 2022-07-14 LAB — INR BLD: 3.1 (ref 0.9–1.1)

## 2022-07-14 PROCEDURE — 36416 COLLJ CAPILLARY BLOOD SPEC: CPT

## 2022-07-14 PROCEDURE — 85610 PROTHROMBIN TIME: CPT

## 2022-07-14 NOTE — PROGRESS NOTES
ANTICOAGULATION MANAGEMENT     Sawyer Renteria 88 year old male is on warfarin with supratherapeutic INR result. (Goal INR 2.0-3.0)    Recent labs: (last 7 days)     07/14/22  1141   INR 3.1*       ASSESSMENT       Source(s): Chart Review and Patient/Caregiver Call       Warfarin doses taken: Warfarin taken as instructed    Diet: No new diet changes identified will add greens one serving weekly    New illness, injury, or hospitalization: No    Medication/supplement changes: None noted    Signs or symptoms of bleeding or clotting: No    Previous INR: Supratherapeutic    Additional findings: None       PLAN     Recommended plan for no diet, medication or health factor changes affecting INR     Dosing Instructions: continue your current warfarin dose with next INR in 2 weeks       Summary  As of 7/14/2022    Full warfarin instructions:  2.5 mg every Sun, Thu; 5 mg all other days   Next INR check:  7/28/2022             Telephone call with DERICK who verbalizes understanding and agrees to plan    Lab visit scheduled    Education provided: None required    Plan made per [facility] anticoagulation protocol    Eleno Crystal RN  Anticoagulation Clinic  7/14/2022    _______________________________________________________________________     Anticoagulation Episode Summary     Current INR goal:  2.0-3.0   TTR:  74.7 % (1 y)   Target end date:  Indefinite   Send INR reminders to:  [facility] HEART INR NURSE    Indications    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

## 2022-07-18 DIAGNOSIS — I25.5 CARDIOMYOPATHY, ISCHEMIC: ICD-10-CM

## 2022-07-18 RX ORDER — SACUBITRIL AND VALSARTAN 24; 26 MG/1; MG/1
1 TABLET, FILM COATED ORAL 2 TIMES DAILY
Qty: 180 TABLET | Refills: 3 | Status: SHIPPED | OUTPATIENT
Start: 2022-07-18 | End: 2023-07-26

## 2022-07-28 ENCOUNTER — LAB (OUTPATIENT)
Dept: LAB | Facility: CLINIC | Age: 87
End: 2022-07-28
Payer: MEDICARE

## 2022-07-28 ENCOUNTER — ANTICOAGULATION THERAPY VISIT (OUTPATIENT)
Dept: ANTICOAGULATION | Facility: CLINIC | Age: 87
End: 2022-07-28

## 2022-07-28 DIAGNOSIS — G45.9 TRANSIENT CEREBRAL ISCHEMIA, UNSPECIFIED TYPE: ICD-10-CM

## 2022-07-28 DIAGNOSIS — I51.3 LEFT VENTRICULAR THROMBUS: Primary | ICD-10-CM

## 2022-07-28 DIAGNOSIS — Z79.01 LONG TERM CURRENT USE OF ANTICOAGULANTS WITH INR GOAL OF 2.0-3.0: ICD-10-CM

## 2022-07-28 LAB — INR BLD: 2.6 (ref 0.9–1.1)

## 2022-07-28 PROCEDURE — 36416 COLLJ CAPILLARY BLOOD SPEC: CPT

## 2022-07-28 PROCEDURE — 85610 PROTHROMBIN TIME: CPT

## 2022-07-28 NOTE — PROGRESS NOTES
ANTICOAGULATION MANAGEMENT     Sawyer Renteria 88 year old male is on warfarin with therapeutic INR result. (Goal INR 2.0-3.0)    Recent labs: (last 7 days)     07/28/22  1115   INR 2.6*       ASSESSMENT       Source(s): Chart Review and Patient/Caregiver Call       Warfarin doses taken: Warfarin taken as instructed    Diet: No new diet changes identified    New illness, injury, or hospitalization: No    Medication/supplement changes: None noted    Signs or symptoms of bleeding or clotting: No    Previous INR: Supratherapeutic    Additional findings: None       PLAN     Recommended plan for no diet, medication or health factor changes affecting INR     Dosing Instructions: Continue your current warfarin dose with next INR in 3 weeks       Summary  As of 7/28/2022    Full warfarin instructions:  2.5 mg every Sun, Thu; 5 mg all other days   Next INR check:  8/18/2022             Telephone call with DERICK who verbalizes understanding and agrees to plan    Lab visit scheduled    Education provided: Goal range and significance of current result and Contact [phone] with any changes, questions or concerns.     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

## 2022-08-11 ENCOUNTER — TRANSFERRED RECORDS (OUTPATIENT)
Dept: HEALTH INFORMATION MANAGEMENT | Facility: CLINIC | Age: 87
End: 2022-08-11

## 2022-08-18 ENCOUNTER — ANTICOAGULATION THERAPY VISIT (OUTPATIENT)
Dept: ANTICOAGULATION | Facility: CLINIC | Age: 87
End: 2022-08-18

## 2022-08-18 ENCOUNTER — LAB (OUTPATIENT)
Dept: LAB | Facility: CLINIC | Age: 87
End: 2022-08-18
Payer: MEDICARE

## 2022-08-18 DIAGNOSIS — Z79.01 LONG TERM CURRENT USE OF ANTICOAGULANTS WITH INR GOAL OF 2.0-3.0: ICD-10-CM

## 2022-08-18 DIAGNOSIS — G45.9 TRANSIENT CEREBRAL ISCHEMIA: ICD-10-CM

## 2022-08-18 DIAGNOSIS — I51.3 LEFT VENTRICULAR THROMBUS: Primary | ICD-10-CM

## 2022-08-18 DIAGNOSIS — G45.9 TRANSIENT CEREBRAL ISCHEMIA, UNSPECIFIED TYPE: ICD-10-CM

## 2022-08-18 LAB — INR BLD: 1.4 (ref 0.9–1.1)

## 2022-08-18 PROCEDURE — 85610 PROTHROMBIN TIME: CPT

## 2022-08-18 PROCEDURE — 36416 COLLJ CAPILLARY BLOOD SPEC: CPT

## 2022-08-18 NOTE — PROGRESS NOTES
ANTICOAGULATION MANAGEMENT     Sawyer Renteria 88 year old male is on warfarin with subtherapeutic INR result. (Goal INR 2.0-3.0)    Recent labs: (last 7 days)     08/18/22  1311   INR 1.4*       ASSESSMENT       Source(s): Chart Review    Previous INR was Therapeutic last visit; previously outside of goal range    Medication, diet, health changes since last INR chart reviewed; none identified           PLAN     Unable to reach AJ today.    No instructions provided. Unable to leave voicemail. - Attempted patient multiple times today, line has continued to be busy. Will send BeTheBeastt message to boost and call [facility] back.     Follow up required to confirm warfarin dose taken and assess for changes and discuss out of range result     Liz Gates RN  Anticoagulation Clinic  8/18/2022

## 2022-08-19 NOTE — PROGRESS NOTES
ANTICOAGULATION MANAGEMENT     Sawyer Renteria 88 year old male is on warfarin with subtherapeutic INR result. (Goal INR 2.0-3.0)    Recent labs: (last 7 days)     08/18/22  1311   INR 1.4*       ASSESSMENT       Source(s): Chart Review and Patient/Caregiver Call       Warfarin doses taken: More warfarin taken than planned which may be affecting INR and Missed dose(s) may be affecting INR     Diet: No new diet changes identified    New illness, injury, or hospitalization: No    Medication/supplement changes: None noted    Signs or symptoms of bleeding or clotting: No    Previous INR: Therapeutic last visit; previously outside of goal range    Additional findings: Pt unsure exactly what he's taken in the last week. He think's he's taken 1 pill a day but admits that he cannot confirm he hasn't missed any days.      He states he took 5 mg last night (prescribed 2.5 mg). Elected not to do another boost to decrease confusion.        PLAN     Recommended plan for temporary change(s) affecting INR     Dosing Instructions: Increase your warfarin dose (8.3% change) with next INR in 5-7 days       Summary  As of 8/18/2022    Full warfarin instructions:  2.5 mg every Sun; 5 mg all other days   Next INR check:  8/25/2022             Telephone call with AJ who agrees to plan and repeated back plan correctly    Lab visit scheduled    Education provided: Goal range and significance of current result, Importance of following up at instructed interval, Importance of taking warfarin as instructed, Monitoring for bleeding signs and symptoms and Monitoring for clotting signs and symptoms    Plan made per [facility] anticoagulation protocol    Liz Gates RN  Anticoagulation Clinic  8/19/2022    _______________________________________________________________________     Anticoagulation Episode Summary     Current INR goal:  2.0-3.0   TTR:  71.1 % (1 y)   Target end date:  Indefinite   Send INR reminders to:  [facility] HEART INR NURSE     Indications    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

## 2022-08-25 ENCOUNTER — LAB (OUTPATIENT)
Dept: LAB | Facility: CLINIC | Age: 87
End: 2022-08-25
Payer: MEDICARE

## 2022-08-25 ENCOUNTER — NURSE TRIAGE (OUTPATIENT)
Dept: NURSING | Facility: CLINIC | Age: 87
End: 2022-08-25

## 2022-08-25 ENCOUNTER — ANTICOAGULATION THERAPY VISIT (OUTPATIENT)
Dept: ANTICOAGULATION | Facility: CLINIC | Age: 87
End: 2022-08-25

## 2022-08-25 DIAGNOSIS — Z79.01 LONG TERM CURRENT USE OF ANTICOAGULANTS WITH INR GOAL OF 2.0-3.0: ICD-10-CM

## 2022-08-25 DIAGNOSIS — I51.3 LEFT VENTRICULAR THROMBUS: Primary | ICD-10-CM

## 2022-08-25 DIAGNOSIS — G45.9 TRANSIENT CEREBRAL ISCHEMIA, UNSPECIFIED TYPE: ICD-10-CM

## 2022-08-25 LAB — INR BLD: 4.6 (ref 0.9–1.1)

## 2022-08-25 PROCEDURE — 36416 COLLJ CAPILLARY BLOOD SPEC: CPT

## 2022-08-25 PROCEDURE — 85610 PROTHROMBIN TIME: CPT

## 2022-08-25 NOTE — PROGRESS NOTES
ANTICOAGULATION MANAGEMENT     Sawyer Renteria 88 year old male is on warfarin with supratherapeutic INR result. (Goal INR 2.0-3.0)    Recent labs: (last 7 days)     08/25/22  1238   INR 4.6*       ASSESSMENT       Source(s): Chart Review and Patient/Caregiver Call       Warfarin doses taken: Warfarin taken as instructed, however patient is getting more confused about dosing     Diet: No new diet changes identified    New illness, injury, or hospitalization: No    Medication/supplement changes: None noted    Signs or symptoms of bleeding or clotting: No    Previous INR: Subtherapeutic    Additional findings: None       PLAN     Recommended plan for no diet, medication or health factor changes affecting INR     Dosing Instructions: hold 2 doses then decrease your warfarin dose (7.7% change) with next INR in 1 week       Summary  As of 8/25/2022    Full warfarin instructions:  8/25: Hold; 8/26: Hold; Otherwise 2.5 mg every Sun, Thu; 5 mg all other days   Next INR check:  9/1/2022             Telephone call with DERICK who verbalizes understanding and agrees to plan    Lab visit scheduled    Education provided: Impact of vitamin K foods on INR, Goal range and significance of current result, Monitoring for bleeding signs and symptoms and Contact [phone] with any changes, questions or concerns.     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

## 2022-08-25 NOTE — TELEPHONE ENCOUNTER
Patient calling to say his INR was high today, it was 416. He wants a call back with instructions on what to do. He can be reached at:  [phone].  Shaynne Painter RN  [facility]    Reason for Disposition    Nursing judgment    Additional Information    Negative: Nursing judgment    Protocols used: INFORMATION ONLY CALL - NO TRIAGE-A-OH

## 2022-08-29 ENCOUNTER — TELEPHONE (OUTPATIENT)
Dept: ANTICOAGULATION | Facility: CLINIC | Age: 87
End: 2022-08-29

## 2022-08-29 DIAGNOSIS — I51.3 LEFT VENTRICULAR THROMBUS: Primary | ICD-10-CM

## 2022-08-29 DIAGNOSIS — G45.9 TRANSIENT CEREBRAL ISCHEMIA, UNSPECIFIED TYPE: ICD-10-CM

## 2022-08-29 DIAGNOSIS — Z79.01 LONG TERM CURRENT USE OF ANTICOAGULANTS WITH INR GOAL OF 2.0-3.0: ICD-10-CM

## 2022-08-29 NOTE — TELEPHONE ENCOUNTER
Reason for Call:  Other call back    Detailed comments: patient needs a call to go over a medication adjustment regarding INR.     Phone Number Patient can be reached at: Home number on file [phone] (home)    Best Time: Any time    Can we leave a detailed message on this number? NO    Call taken on 8/29/2022 at 2:30 PM by Pascale Arredondo

## 2022-09-01 ENCOUNTER — ANTICOAGULATION THERAPY VISIT (OUTPATIENT)
Dept: ANTICOAGULATION | Facility: CLINIC | Age: 87
End: 2022-09-01

## 2022-09-01 ENCOUNTER — LAB (OUTPATIENT)
Dept: LAB | Facility: CLINIC | Age: 87
End: 2022-09-01
Payer: MEDICARE

## 2022-09-01 DIAGNOSIS — Z79.01 LONG TERM CURRENT USE OF ANTICOAGULANTS WITH INR GOAL OF 2.0-3.0: ICD-10-CM

## 2022-09-01 DIAGNOSIS — G45.9 TRANSIENT CEREBRAL ISCHEMIA, UNSPECIFIED TYPE: ICD-10-CM

## 2022-09-01 DIAGNOSIS — I51.3 LEFT VENTRICULAR THROMBUS: Primary | ICD-10-CM

## 2022-09-01 LAB — INR BLD: 1.2 (ref 0.9–1.1)

## 2022-09-01 PROCEDURE — 85610 PROTHROMBIN TIME: CPT

## 2022-09-01 PROCEDURE — 36416 COLLJ CAPILLARY BLOOD SPEC: CPT

## 2022-09-01 NOTE — PROGRESS NOTES
ANTICOAGULATION MANAGEMENT     Sawyer Renteria 88 year old male is on warfarin with subtherapeutic INR result. (Goal INR 2.0-3.0)    Recent labs: (last 7 days)     09/01/22  1307   INR 1.2*       ASSESSMENT       Source(s): Chart Review and Patient/Caregiver Call       Warfarin doses taken: Less warfarin taken than planned which may be affecting INR, patient held 3 doses instead of 2 doses last week     Diet: Increased greens/vitamin K in diet; plans to resume previous intake    New illness, injury, or hospitalization: No    Medication/supplement changes: None noted    Signs or symptoms of bleeding or clotting: No    Previous INR: Supratherapeutic    Additional findings: gets easily confused about dosing        PLAN     Recommended plan for temporary change(s) affecting INR     Dosing Instructions: booster dose then continue your current warfarin dose with next INR in 1 week       Summary  As of 9/1/2022    Full warfarin instructions:  9/1: 5 mg; Otherwise 2.5 mg every Sun, Thu; 5 mg all other days   Next INR check:  9/8/2022             Telephone call with DERICK who verbalizes understanding and agrees to plan    Lab visit scheduled    Education provided: Goal range and significance of current result and Contact [phone] with any changes, questions or concerns.     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

## 2022-09-06 ENCOUNTER — TELEPHONE (OUTPATIENT)
Dept: ANTICOAGULATION | Facility: CLINIC | Age: 87
End: 2022-09-06

## 2022-09-06 DIAGNOSIS — I51.3 LEFT VENTRICULAR THROMBUS: Primary | ICD-10-CM

## 2022-09-06 DIAGNOSIS — Z79.01 LONG TERM CURRENT USE OF ANTICOAGULANTS WITH INR GOAL OF 2.0-3.0: ICD-10-CM

## 2022-09-06 DIAGNOSIS — G45.9 TRANSIENT CEREBRAL ISCHEMIA, UNSPECIFIED TYPE: ICD-10-CM

## 2022-09-06 NOTE — TELEPHONE ENCOUNTER
Called and spoke to the patient.  He is confused about his warfarin pill size and dosing.  Told him that he would need to take his pills to his pharmacy to review and double check the pill size.  Reviewed that dosing is 5mg today and 5mg tomorrow and INR check on 9/8.  Will update PCP regarding cognitive issues and see if home care could be an option.  Turner GANDHI

## 2022-09-06 NOTE — TELEPHONE ENCOUNTER
Called PCP office and passed on message that I am concerned about his cognitive decline and ability to manage his medications, specifically warfarin dosing.  Turner GANDHI

## 2022-09-08 ENCOUNTER — LAB (OUTPATIENT)
Dept: LAB | Facility: CLINIC | Age: 87
End: 2022-09-08
Payer: MEDICARE

## 2022-09-08 ENCOUNTER — ANTICOAGULATION THERAPY VISIT (OUTPATIENT)
Dept: ANTICOAGULATION | Facility: CLINIC | Age: 87
End: 2022-09-08

## 2022-09-08 DIAGNOSIS — G45.9 TRANSIENT CEREBRAL ISCHEMIA, UNSPECIFIED TYPE: ICD-10-CM

## 2022-09-08 DIAGNOSIS — I51.3 LEFT VENTRICULAR THROMBUS: Primary | ICD-10-CM

## 2022-09-08 DIAGNOSIS — Z79.01 LONG TERM CURRENT USE OF ANTICOAGULANTS WITH INR GOAL OF 2.0-3.0: ICD-10-CM

## 2022-09-08 LAB — INR BLD: 3.6 (ref 0.9–1.1)

## 2022-09-08 PROCEDURE — 36416 COLLJ CAPILLARY BLOOD SPEC: CPT

## 2022-09-08 PROCEDURE — 85610 PROTHROMBIN TIME: CPT

## 2022-09-08 NOTE — PROGRESS NOTES
ANTICOAGULATION MANAGEMENT     Sawyer Renteria 88 year old male is on warfarin with supratherapeutic INR result. (Goal INR 2.0-3.0)    Recent labs: (last 7 days)     09/08/22  1037   INR 3.6*       ASSESSMENT       Source(s): Chart Review and Patient/Caregiver Call       Warfarin doses taken: Warfarin taken as instructed but chance of dosing mistakes since he gets easily confused about dosing    Diet: not consistent with diet    New illness, injury, or hospitalization: No    Medication/supplement changes: None noted    Signs or symptoms of bleeding or clotting: No    Previous INR: Subtherapeutic    Additional findings: 9/6/22 called and updated PCP regarding cognitive concerns and inability to manage his own meds       PLAN     Recommended plan for no diet, medication or health factor changes affecting INR     Dosing Instructions: hold dose then decrease your warfarin dose (8.3% change) with next INR in 1 week       Summary  As of 9/8/2022    Full warfarin instructions:  9/8: Hold; Otherwise 2.5 mg every Sun, Tue, Thu; 5 mg all other days   Next INR check:  9/15/2022             Telephone call with DERICK who verbalizes understanding and agrees to plan    Lab visit scheduled    Education provided: Goal range and significance of current result and Contact [phone] with any changes, questions or concerns.     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

## 2022-09-19 ENCOUNTER — LAB (OUTPATIENT)
Dept: LAB | Facility: CLINIC | Age: 87
End: 2022-09-19
Payer: MEDICARE

## 2022-09-19 ENCOUNTER — ANTICOAGULATION THERAPY VISIT (OUTPATIENT)
Dept: ANTICOAGULATION | Facility: CLINIC | Age: 87
End: 2022-09-19

## 2022-09-19 DIAGNOSIS — G45.9 TRANSIENT CEREBRAL ISCHEMIA, UNSPECIFIED TYPE: ICD-10-CM

## 2022-09-19 DIAGNOSIS — Z79.01 LONG TERM CURRENT USE OF ANTICOAGULANTS WITH INR GOAL OF 2.0-3.0: ICD-10-CM

## 2022-09-19 DIAGNOSIS — I51.3 LEFT VENTRICULAR THROMBUS: Primary | ICD-10-CM

## 2022-09-19 DIAGNOSIS — G45.9 TRANSIENT CEREBRAL ISCHEMIA: ICD-10-CM

## 2022-09-19 LAB — INR BLD: 2.9 (ref 0.9–1.1)

## 2022-09-19 PROCEDURE — 85610 PROTHROMBIN TIME: CPT

## 2022-09-19 PROCEDURE — 36416 COLLJ CAPILLARY BLOOD SPEC: CPT

## 2022-09-19 NOTE — PROGRESS NOTES
ANTICOAGULATION MANAGEMENT     Sawyer Renteria 88 year old male is on warfarin with therapeutic INR result. (Goal INR 2.0-3.0)    Recent labs: (last 7 days)     09/19/22  1128   INR 2.9*       ASSESSMENT       Source(s): Chart Review and Patient/Caregiver Call       Warfarin doses taken: More warfarin taken than planned which may be affecting INR. AJ is adamant that he was not instructed to reduce his dose last time. Noted recent [facility]N documentaiton regarding medication conpliance/cognitive decline and confusion regarding dosing. AJ states he has been taking 1 tablet daily since his last INR.    Diet: No new diet changes identified    New illness, injury, or hospitalization: No    Medication/supplement changes: None noted    Signs or symptoms of bleeding or clotting: No    Previous INR: Supratherapeutic    Additional findings: None       PLAN     Recommended plan for no diet, medication or health factor changes affecting INR     Dosing Instructions: Continue your current warfarin dose with next INR in 1 week       Summary  As of 9/19/2022    Full warfarin instructions:  5 mg every day   Next INR check:  10/3/2022             Telephone call with DERICK who verbalizes understanding and agrees to plan    Lab visit scheduled    Education provided: Goal range and significance of current result and Importance of taking warfarin as instructed    Plan made per [facility] anticoagulation protocol    Melanie Lanza, RN  Anticoagulation Clinic  9/19/2022    _______________________________________________________________________     Anticoagulation Episode Summary     Current INR goal:  2.0-3.0   TTR:  64.8 % (1 y)   Target end date:  Indefinite   Send INR reminders to:  [facility] HEART INR NURSE    Indications    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

## 2022-10-03 ENCOUNTER — HOSPITAL ENCOUNTER (OUTPATIENT)
Dept: CARDIOLOGY | Facility: CLINIC | Age: 87
Discharge: HOME | End: 2022-10-03
Attending: NURSE PRACTITIONER | Admitting: NURSE PRACTITIONER
Payer: MEDICARE

## 2022-10-03 DIAGNOSIS — I50.22 CHRONIC SYSTOLIC HEART FAILURE (H): ICD-10-CM

## 2022-10-03 LAB — LVEF ECHO: NORMAL

## 2022-10-03 PROCEDURE — 999N000208 ECHOCARDIOGRAM COMPLETE

## 2022-10-03 PROCEDURE — 255N000002 HC RX 255 OP 636: Performed by: NURSE PRACTITIONER

## 2022-10-03 PROCEDURE — 93306 TTE W/DOPPLER COMPLETE: CPT | Mod: 26 | Performed by: INTERNAL MEDICINE

## 2022-10-03 RX ADMIN — HUMAN ALBUMIN MICROSPHERES AND PERFLUTREN 9 ML: 10; .22 INJECTION, SOLUTION INTRAVENOUS at 11:24

## 2022-10-04 ENCOUNTER — TELEPHONE (OUTPATIENT)
Dept: CARDIOLOGY | Facility: CLINIC | Age: 87
End: 2022-10-04

## 2022-10-04 NOTE — TELEPHONE ENCOUNTER
Please call Sawyer and clarify his INR level and medication with him. He has some concerns. He is scheduled 10/6 for INR lab.  Roxane Wilder

## 2022-10-04 NOTE — TELEPHONE ENCOUNTER
Called Pt he was confused about next INR check and last time it was checked. Went over upcoming INR time and he did write it down. TIANA Painter RN

## 2022-10-06 ENCOUNTER — ANTICOAGULATION THERAPY VISIT (OUTPATIENT)
Dept: ANTICOAGULATION | Facility: CLINIC | Age: 87
End: 2022-10-06

## 2022-10-06 ENCOUNTER — LAB (OUTPATIENT)
Dept: LAB | Facility: CLINIC | Age: 87
End: 2022-10-06
Payer: MEDICARE

## 2022-10-06 DIAGNOSIS — Z79.01 LONG TERM CURRENT USE OF ANTICOAGULANTS WITH INR GOAL OF 2.0-3.0: ICD-10-CM

## 2022-10-06 DIAGNOSIS — I51.3 LEFT VENTRICULAR THROMBUS: Primary | ICD-10-CM

## 2022-10-06 DIAGNOSIS — G45.9 TRANSIENT CEREBRAL ISCHEMIA, UNSPECIFIED TYPE: ICD-10-CM

## 2022-10-06 LAB — INR BLD: 6.5 (ref 0.9–1.1)

## 2022-10-06 PROCEDURE — 85610 PROTHROMBIN TIME: CPT

## 2022-10-06 PROCEDURE — 36416 COLLJ CAPILLARY BLOOD SPEC: CPT

## 2022-10-06 NOTE — PROGRESS NOTES
ANTICOAGULATION MANAGEMENT     Sawyer Renteria 88 year old male is on warfarin with supratherapeutic INR result. (Goal INR 2.0-3.0)    Recent labs: (last 7 days)     10/06/22  1207   INR 6.5*       ASSESSMENT       Source(s): Chart Review and Patient/Caregiver Call       Warfarin doses taken: patient is very confused, he said dosing has been 5mg/day but he could have made a mistake    Diet: No new diet changes identified    New illness, injury, or hospitalization: No    Medication/supplement changes: None noted    Signs or symptoms of bleeding or clotting: No    Previous INR: Therapeutic last visit; previously outside of goal range    Additional findings: he possibly had a knee injection yesterday, he isn't taking any pain meds per patient, poor historian        PLAN     Recommended plan for temporary change(s) affecting INR     Dosing Instructions: hold 2 doses then decrease your warfarin dose (14.3% change) with next INR in 5 days       Summary  As of 10/6/2022    Full warfarin instructions:  10/6: Hold; 10/7: Hold; Otherwise 2.5 mg every Wed, Sat; 5 mg all other days   Next INR check:  10/11/2022             Telephone call with AJ who verbalizes understanding and agrees to plan.  Instructions were given to patient multiple times and he read back the instructions wrong several times until he had it written down correctly.  Will send message to Dr. Newton with an update.      Lab visit scheduled check before Dr. Newton appointment     Education provided: Goal range and significance of current result and Contact [phone] with any changes, questions or concerns.     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

## 2022-10-10 ENCOUNTER — HEALTH MAINTENANCE LETTER (OUTPATIENT)
Age: 87
End: 2022-10-10

## 2022-10-11 ENCOUNTER — TELEPHONE (OUTPATIENT)
Dept: CARDIOLOGY | Facility: CLINIC | Age: 87
End: 2022-10-11

## 2022-10-11 ENCOUNTER — OFFICE VISIT (OUTPATIENT)
Dept: CARDIOLOGY | Facility: CLINIC | Age: 87
End: 2022-10-11
Attending: NURSE PRACTITIONER
Payer: MEDICARE

## 2022-10-11 ENCOUNTER — LAB (OUTPATIENT)
Dept: LAB | Facility: CLINIC | Age: 87
End: 2022-10-11
Payer: MEDICARE

## 2022-10-11 ENCOUNTER — ANTICOAGULATION THERAPY VISIT (OUTPATIENT)
Dept: ANTICOAGULATION | Facility: CLINIC | Age: 87
End: 2022-10-11

## 2022-10-11 VITALS
HEIGHT: 63 IN | DIASTOLIC BLOOD PRESSURE: 48 MMHG | OXYGEN SATURATION: 98 % | WEIGHT: 121.6 LBS | SYSTOLIC BLOOD PRESSURE: 135 MMHG | HEART RATE: 66 BPM | BODY MASS INDEX: 21.55 KG/M2

## 2022-10-11 DIAGNOSIS — I51.3 LV (LEFT VENTRICULAR) MURAL THROMBUS: ICD-10-CM

## 2022-10-11 DIAGNOSIS — G45.9 TRANSIENT CEREBRAL ISCHEMIA, UNSPECIFIED TYPE: ICD-10-CM

## 2022-10-11 DIAGNOSIS — Z79.01 LONG TERM CURRENT USE OF ANTICOAGULANTS WITH INR GOAL OF 2.0-3.0: ICD-10-CM

## 2022-10-11 DIAGNOSIS — I63.419 CEREBROVASCULAR ACCIDENT (CVA) DUE TO EMBOLISM OF MIDDLE CEREBRAL ARTERY, UNSPECIFIED BLOOD VESSEL LATERALITY (H): ICD-10-CM

## 2022-10-11 DIAGNOSIS — I25.10 CORONARY ARTERY DISEASE INVOLVING NATIVE CORONARY ARTERY OF NATIVE HEART WITHOUT ANGINA PECTORIS: ICD-10-CM

## 2022-10-11 DIAGNOSIS — Z79.01 LONG TERM CURRENT USE OF ANTICOAGULANT THERAPY: ICD-10-CM

## 2022-10-11 DIAGNOSIS — I50.22 CHRONIC SYSTOLIC HEART FAILURE (H): ICD-10-CM

## 2022-10-11 DIAGNOSIS — I25.5 CARDIOMYOPATHY, ISCHEMIC: Primary | ICD-10-CM

## 2022-10-11 DIAGNOSIS — I51.3 LEFT VENTRICULAR THROMBUS: Primary | ICD-10-CM

## 2022-10-11 LAB — INR BLD: 1.3 (ref 0.9–1.1)

## 2022-10-11 PROCEDURE — 85610 PROTHROMBIN TIME: CPT

## 2022-10-11 PROCEDURE — 36416 COLLJ CAPILLARY BLOOD SPEC: CPT

## 2022-10-11 PROCEDURE — 99215 OFFICE O/P EST HI 40 MIN: CPT | Performed by: INTERNAL MEDICINE

## 2022-10-11 RX ORDER — METOPROLOL SUCCINATE 25 MG/1
25 TABLET, EXTENDED RELEASE ORAL DAILY
Qty: 90 TABLET | Refills: 3 | Status: SHIPPED | OUTPATIENT
Start: 2022-10-11

## 2022-10-11 NOTE — LETTER
10/11/2022    Francisco Doshi MD  [address]  [facility] [address]    RE: Sawyer Renteria       Dear Colleague,     I had the pleasure of seeing Sawyer Renteria in the [facility].  HPI and Plan:   Sawyer Renteria is a very pleasant 88 year old male with a history of HFrEF, coronary artery disease, ischemic cardiomyopathy, dyslipidemia, hypertension and CKD.    I have seen him since 2016 at the anterior MI and underwent stenting of the LAD and angioplasty of the diagonal.  He had 60% OM disease at this time.  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    He has ischemic cardiomyopathy.  We are titrating his cardiac meds and now is on Entresto 24-26 mg twice daily and low-dose metoprolol XL.  He is tolerating it well.  We repeated an echo today and reviewed the results with him.  EF is now 40 to 45% and stable.  No thrombus was noted.    He follows with the INR clinic for his INR management.  I have had several calls from the INR nursing and messages saying that they are worried about his ability to comprehend and remember things.  He has memory loss and he sometimes gets confused and takes more doses than required.  His INR therefore is labile.  I also was approached by our  earlier today who said that he got confused and when his time for the appointment was not aware he needed to go for INR clinic.  Clearly, there is memory impairment and given the medications he is on and requirement for very close follow-up, I am concerned about him able to take these medications safely.  Therefore I have asked him to stop the warfarin.  Instead we will use Eliquis.    I will also connect with Dr. Chow his primary care physician regarding need for neuropsychiatric testing and assess for memory loss and may be get assistance with  to help him.    He is still driving and is able to get to and fro from my  appointments to his home.  When I asked him about his son, he says that his son and him have a follow-up regarding some financial issues and house.    He denies any chest pain or shortness of breath.  No orthopnea or PND.    Exam, regular rate and rhythm.  Blood pressure 135/48.  Card examination regular S1-S2 with a 2 x 6 systolic murmur in the left parasternal border.  Chest was clear to auscultation.    Impression  1.  Ischemic cardiomyopathy  His EF is stable at 40 to 45%.  He is doing well on Entresto and low-dose metoprolol Excell.  Eventually would like to increase Entresto but because of other changes made today I did not want to consider additional change today.  I will suggest he follow-up with my midlevel provider in the [facility] in 3 months with a BMP.  At that time if he is stable, Entresto can be increased further.    2.  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  He is also has had difficulty keeping his INR appointment sometimes.  His INR values are labile.  Therefore I suggested stopping the warfarin Coumadin.  I made the clear instructions for him in the after visit summary and underlined it and highlighted it.  I told him that he does not need to restrict any green lymph leafy vegetable intake over need to follow for INRs.  At one point he was confused about what warfarin or Coumadin was not that reinforced my concern about him taking that medication.    In addition, I have suggested he start Eliquis 2.5 mg twice daily given the renal failure and his age, we have modified the dose.  This should be started next week in October 18 to get warfarin out of the system.  I specifically made that note for him to not start until next week.  I will also asked my nurse to call him tomorrow to make sure he is  following instructions.    3.  Hyperlipidemia, continue Repatha  Last LDL was 68.    4.  Chronic renal insufficiency, creatinine stable at 1.66    5.  Memory impairment, I suspect he may be having early dementia.  I am worried about there is progressive memory loss that have observed over the last 6 months to a year.  I was able to connect with Dr. Doshi today who also was aware of some concerns regarding his memory.  According to the primary care provider, patient visited with him last time with his son and concern raised concerns about his father being monitored scam arteries and that he was having some memory issues.  We discussed the possibility of neuropsychiatric testing and involving the family and social workers.      Today's clinic visit entailed:    45 minutes spent on the date of the encounter doing chart review, review of test results, patient visit, documentation and discussion with other provider(s)   Provider  Link to [facility] Help Grid     The level of medical decision making during this visit was of high complexity.      No orders of the defined types were placed in this encounter.      No orders of the defined types were placed in this encounter.      There are no discontinued medications.      Encounter Diagnoses   Name Primary?     Chronic systolic heart failure (H)      Cardiomyopathy, ischemic        CURRENT MEDICATIONS:  Current Outpatient Medications   Medication Sig Dispense Refill     aspirin 81 MG tablet Take 81 mg by mouth daily       Cholecalciferol (VITAMIN D3) 2000 UNITS CAPS Take by mouth daily       dutasteride (AVODART) 0.5 MG capsule Take 1 capsule (0.5 mg) by mouth daily 30 capsule 11     evolocumab (REPATHA) 140 MG/ML prefilled autoinjector Inject 1 mL (140 mg) Subcutaneous every 14 days 6 mL 3     Fish Oil-Cholecalciferol (FISH OIL + D3) 8982-9978 MG-UNIT CAPS Take 2 tablets by mouth daily       metoprolol succinate ER (TOPROL-XL) 25 MG 24 hr tablet Take 1 tablet (25 mg) by mouth  daily 90 tablet 3     nitroGLYcerin (NITROSTAT) 0.4 MG sublingual tablet Place 1 tablet (0.4 mg) under the tongue every 5 minutes as needed for chest pain Take as directed 25 tablet 1     sacubitril-valsartan (ENTRESTO) 24-26 MG per tablet Take 1 tablet by mouth 2 times daily 180 tablet 3     tamsulosin (FLOMAX) 0.4 MG capsule Take 0.4 mg by mouth daily       warfarin ANTICOAGULANT (COUMADIN) 5 MG tablet Take 1/2 tab on Mondays, Wednesdays and Fridays and 1 tab all other days or as directed by INR clinic 90 tablet 1       ALLERGIES     Allergies   Allergen Reactions     Flomax [Tamsulosin]      Weakness and dizzyness     Aldactone [Spironolactone]      High potassium and worsening creat when on lisinopril and spironalactone     Carvedilol      dizziness     Colesevelam      Epigastric pain     Ezetimibe      Lisinopril Itching     Hyperkalemia and inc. Creat. At 20 mg daily, itching , skin red when dose goes above 2.5 mg a day--elevated creat      Pravastatin      Abdominal pain     Rosuvastatin      Simvastatin        PAST MEDICAL HISTORY:  Past Medical History:   Diagnosis Date     BPH (benign prostatic hyperplasia)      Cardiomyopathy, ischemic     EF 49% by cardiac MRI 1/15/2014     Coronary artery disease 1/24/06    Cypher CANDIDA to LAD     Gastro-oesophageal reflux disease      Hyperlipidaemia      Hypertension      Left ventricular thrombus      Myocardial infarction (H) 1/2006    Anterior     TIA (transient ischaemic attack)        PAST SURGICAL HISTORY:  Past Surgical History:   Procedure Laterality Date     CORONARY ANGIOGRAPHY ADULT ORDER  1/24/06    Cypher CANDIDA to LAD     HEART CATH, ANGIOPLASTY  1/2006    Cypher CANDIDA to LAD     TONSILLECTOMY & ADENOIDECTOMY         FAMILY HISTORY:  Family History   Problem Relation Age of Onset     Heart Disease Mother         heart failure     Heart Disease Brother        SOCIAL HISTORY:  Social History     Socioeconomic History     Marital status:      Spouse name:  "None     Number of children: None     Years of education: None     Highest education level: None   Tobacco Use     Smoking status: Never     Smokeless tobacco: Never   Substance and Sexual Activity     Alcohol use: No     Drug use: Never   Other Topics Concern     Parent/sibling w/ CABG, MI or angioplasty before 65F 55M? No     Caffeine Concern No     Sleep Concern No     Stress Concern No     Weight Concern Yes     Comment: weight loss     Special Diet No     Exercise Yes     Comment: bicycle 10 min, walking, 3 days week     Seat Belt Yes       Review of Systems:  Skin:          Eyes:         ENT:         Respiratory:  Positive for wheezing     Cardiovascular:  Negative;palpitations;chest pain;syncope or near-syncope;cyanosis;dizziness;lightheadedness;edema   energy level improved after starting Entresto  Gastroenterology:        Genitourinary:         Musculoskeletal:  Positive for joint pain    Neurologic:         Psychiatric:         Heme/Lymph/Imm:         Endocrine:  Negative        Physical Exam:  Vitals: /48   Pulse 66   Ht 1.6 m (5' 3\")   Wt 55.2 kg (121 lb 9.6 oz)   SpO2 98%   BMI 21.54 kg/m      Constitutional:    frail;thin      Skin:             Head:  not assessed this visit        Eyes:  sclera white        Lymph:      ENT:  no pallor or cyanosis        Neck:  carotid pulses are full and equal bilaterally        Respiratory:  clear to auscultation         Cardiac: normal S1 and S2   S4   systolic murmur;grade 2;apical        not assessed this visit                                        GI:  not assessed this visit        Extremities and Muscular Skeletal:  no edema         ecchymosis over left flank following fall at home    Neurological:  no gross motor deficits        Psych:  Alert and Oriented x 3        CC  Farrah Bernstein, APRN CNP  [address]  [place] [address]    Thank you for allowing me to participate in the care of your patient.      Sincerely,     Satya Newton, " MD     [facility]

## 2022-10-11 NOTE — PROGRESS NOTES
ANTICOAGULATION  MANAGEMENT    Sawyer Renteria is being discharged from the [facility] Anticoagulation Management Program ([facility]).    Reason for discharge: warfarin replaced by alternate therapy, Eliquis    Anticoagulation episode resolved, [facility] referral closed and Standing order discontinued    If patient needs warfarin management in the future, please send a new referral    Tayla Antonio RN

## 2022-10-11 NOTE — PATIENT INSTRUCTIONS
You do not need to take Warfarin or Coumadin anymore.  Do not need to go for INR testing anymore.  You do not need to restrict eating green vegetables.      You should start the new medication Eliquis next Tuesday on the 18th.

## 2022-10-11 NOTE — TELEPHONE ENCOUNTER
[facility] Call Center    Phone Message    May a detailed message be left on voicemail: yes     Reason for Call: Other: Bozena from [facility] calling to report that Dr. Doshi could not get ahold of Dr. Newton using the phone number he was given. Please call him back at [phone] to discuss patient. Bozena says that Dr. Doshi is pretty busy and if he cannot be reached,  can send callers to his voicemail.    Thank you!  [facility]      Action Taken: Other: Cardiology    Travel Screening: Not Applicable

## 2022-10-11 NOTE — TELEPHONE ENCOUNTER
Will message PA team if prior auth for Eliquis Needed?  Did speak with Pt and will send discharge card to him. Went over dose instructions and does not start till 10/18/22  Per  's note. TIANA Painter RN

## 2022-10-11 NOTE — TELEPHONE ENCOUNTER
PRIOR AUTHORIZATION DENIED    Medication: apixaban ANTICOAGULANT (ELIQUIS ANTICOAGULANT) 2.5 MG tablet - EPA DENIED    Denial Date: 10/11/2022    Denial Rational:       Appeal Information:

## 2022-10-11 NOTE — PROGRESS NOTES
HPI and Plan:   Sawyer Renteria is a very pleasant 88 year old male with a history of HFrEF, coronary artery disease, ischemic cardiomyopathy, dyslipidemia, hypertension and CKD.    I have seen him since 2016 at the anterior MI and underwent stenting of the LAD and angioplasty of the diagonal.  He had 60% OM disease at this time.  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    He has ischemic cardiomyopathy.  We are titrating his cardiac meds and now is on Entresto 24-26 mg twice daily and low-dose metoprolol XL.  He is tolerating it well.  We repeated an echo today and reviewed the results with him.  EF is now 40 to 45% and stable.  No thrombus was noted.    He follows with the INR clinic for his INR management.  I have had several calls from the INR nursing and messages saying that they are worried about his ability to comprehend and remember things.  He has memory loss and he sometimes gets confused and takes more doses than required.  His INR therefore is labile.  I also was approached by our  earlier today who said that he got confused and when his time for the appointment was not aware he needed to go for INR clinic.  Clearly, there is memory impairment and given the medications he is on and requirement for very close follow-up, I am concerned about him able to take these medications safely.  Therefore I have asked him to stop the warfarin.  Instead we will use Eliquis.    I will also connect with Dr. Chow his primary care physician regarding need for neuropsychiatric testing and assess for memory loss and may be get assistance with  to help him.    He is still driving and is able to get to and fro from my appointments to his home.  When I asked him about his son, he says that his son and him have a follow-up regarding some financial issues and house.    He denies any chest pain or shortness of breath.  No orthopnea or  PND.    Exam, regular rate and rhythm.  Blood pressure 135/48.  Card examination regular S1-S2 with a 2 x 6 systolic murmur in the left parasternal border.  Chest was clear to auscultation.    Impression  1.  Ischemic cardiomyopathy  His EF is stable at 40 to 45%.  He is doing well on Entresto and low-dose metoprolol Excell.  Eventually would like to increase Entresto but because of other changes made today I did not want to consider additional change today.  I will suggest he follow-up with my midlevel provider in the [facility] in 3 months with a BMP.  At that time if he is stable, Entresto can be increased further.    2.  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  He is also has had difficulty keeping his INR appointment sometimes.  His INR values are labile.  Therefore I suggested stopping the warfarin Coumadin.  I made the clear instructions for him in the after visit summary and underlined it and highlighted it.  I told him that he does not need to restrict any green lymph leafy vegetable intake over need to follow for INRs.  At one point he was confused about what warfarin or Coumadin was not that reinforced my concern about him taking that medication.    In addition, I have suggested he start Eliquis 2.5 mg twice daily given the renal failure and his age, we have modified the dose.  This should be started next week in October 18 to get warfarin out of the system.  I specifically made that note for him to not start until next week.  I will also asked my nurse to call him tomorrow to make sure he is following instructions.    3.  Hyperlipidemia, continue Repatha  Last LDL was 68.    4.  Chronic renal insufficiency, creatinine stable at 1.66    5.  Memory impairment, I suspect he may be having early dementia.  I am  worried about there is progressive memory loss that have observed over the last 6 months to a year.  I was able to connect with Dr. Doshi today who also was aware of some concerns regarding his memory.  According to the primary care provider, patient visited with him last time with his son and concern raised concerns about his father being monitored scam arteries and that he was having some memory issues.  We discussed the possibility of neuropsychiatric testing and involving the family and social workers.      Today's clinic visit entailed:    45 minutes spent on the date of the encounter doing chart review, review of test results, patient visit, documentation and discussion with other provider(s)   Provider  Link to [facility] Help Grid     The level of medical decision making during this visit was of high complexity.      No orders of the defined types were placed in this encounter.      No orders of the defined types were placed in this encounter.      There are no discontinued medications.      Encounter Diagnoses   Name Primary?     Chronic systolic heart failure (H)      Cardiomyopathy, ischemic        CURRENT MEDICATIONS:  Current Outpatient Medications   Medication Sig Dispense Refill     aspirin 81 MG tablet Take 81 mg by mouth daily       Cholecalciferol (VITAMIN D3) 2000 UNITS CAPS Take by mouth daily       dutasteride (AVODART) 0.5 MG capsule Take 1 capsule (0.5 mg) by mouth daily 30 capsule 11     evolocumab (REPATHA) 140 MG/ML prefilled autoinjector Inject 1 mL (140 mg) Subcutaneous every 14 days 6 mL 3     Fish Oil-Cholecalciferol (FISH OIL + D3) 1797-0508 MG-UNIT CAPS Take 2 tablets by mouth daily       metoprolol succinate ER (TOPROL-XL) 25 MG 24 hr tablet Take 1 tablet (25 mg) by mouth daily 90 tablet 3     nitroGLYcerin (NITROSTAT) 0.4 MG sublingual tablet Place 1 tablet (0.4 mg) under the tongue every 5 minutes as needed for chest pain Take as directed 25 tablet 1     sacubitril-valsartan (ENTRESTO)  24-26 MG per tablet Take 1 tablet by mouth 2 times daily 180 tablet 3     tamsulosin (FLOMAX) 0.4 MG capsule Take 0.4 mg by mouth daily       warfarin ANTICOAGULANT (COUMADIN) 5 MG tablet Take 1/2 tab on Mondays, Wednesdays and Fridays and 1 tab all other days or as directed by INR clinic 90 tablet 1       ALLERGIES     Allergies   Allergen Reactions     Flomax [Tamsulosin]      Weakness and dizzyness     Aldactone [Spironolactone]      High potassium and worsening creat when on lisinopril and spironalactone     Carvedilol      dizziness     Colesevelam      Epigastric pain     Ezetimibe      Lisinopril Itching     Hyperkalemia and inc. Creat. At 20 mg daily, itching , skin red when dose goes above 2.5 mg a day--elevated creat      Pravastatin      Abdominal pain     Rosuvastatin      Simvastatin        PAST MEDICAL HISTORY:  Past Medical History:   Diagnosis Date     BPH (benign prostatic hyperplasia)      Cardiomyopathy, ischemic     EF 49% by cardiac MRI 1/15/2014     Coronary artery disease 1/24/06    Cypher CANDIDA to LAD     Gastro-oesophageal reflux disease      Hyperlipidaemia      Hypertension      Left ventricular thrombus      Myocardial infarction (H) 1/2006    Anterior     TIA (transient ischaemic attack)        PAST SURGICAL HISTORY:  Past Surgical History:   Procedure Laterality Date     CORONARY ANGIOGRAPHY ADULT ORDER  1/24/06    Cypher CANDIDA to LAD     HEART CATH, ANGIOPLASTY  1/2006    Cypher CANDIDA to LAD     TONSILLECTOMY & ADENOIDECTOMY         FAMILY HISTORY:  Family History   Problem Relation Age of Onset     Heart Disease Mother         heart failure     Heart Disease Brother        SOCIAL HISTORY:  Social History     Socioeconomic History     Marital status:      Spouse name: None     Number of children: None     Years of education: None     Highest education level: None   Tobacco Use     Smoking status: Never     Smokeless tobacco: Never   Substance and Sexual Activity     Alcohol use: No  "    Drug use: Never   Other Topics Concern     Parent/sibling w/ CABG, MI or angioplasty before 65F 55M? No     Caffeine Concern No     Sleep Concern No     Stress Concern No     Weight Concern Yes     Comment: weight loss     Special Diet No     Exercise Yes     Comment: bicycle 10 min, walking, 3 days week     Seat Belt Yes       Review of Systems:  Skin:          Eyes:         ENT:         Respiratory:  Positive for wheezing     Cardiovascular:  Negative;palpitations;chest pain;syncope or near-syncope;cyanosis;dizziness;lightheadedness;edema   energy level improved after starting Entresto  Gastroenterology:        Genitourinary:         Musculoskeletal:  Positive for joint pain    Neurologic:         Psychiatric:         Heme/Lymph/Imm:         Endocrine:  Negative        Physical Exam:  Vitals: /48   Pulse 66   Ht 1.6 m (5' 3\")   Wt 55.2 kg (121 lb 9.6 oz)   SpO2 98%   BMI 21.54 kg/m      Constitutional:    frail;thin      Skin:             Head:  not assessed this visit        Eyes:  sclera white        Lymph:      ENT:  no pallor or cyanosis        Neck:  carotid pulses are full and equal bilaterally        Respiratory:  clear to auscultation         Cardiac: normal S1 and S2   S4   systolic murmur;grade 2;apical        not assessed this visit                                        GI:  not assessed this visit        Extremities and Muscular Skeletal:  no edema         ecchymosis over left flank following fall at home    Neurological:  no gross motor deficits        Psych:  Alert and Oriented x 3        CC  Farrah Bernstein, JAE CNP  [address]  [place] [address]              "

## 2022-10-11 NOTE — TELEPHONE ENCOUNTER
OK with Xarelto instead of eliquis. We have to ensure he stops coumadin today and then starts xarelto next Tuesday (15mg per day)

## 2022-10-12 DIAGNOSIS — I63.419 CEREBROVASCULAR ACCIDENT (CVA) DUE TO EMBOLISM OF MIDDLE CEREBRAL ARTERY, UNSPECIFIED BLOOD VESSEL LATERALITY (H): Primary | ICD-10-CM

## 2022-10-12 DIAGNOSIS — I63.419 CEREBROVASCULAR ACCIDENT (CVA) DUE TO EMBOLISM OF MIDDLE CEREBRAL ARTERY, UNSPECIFIED BLOOD VESSEL LATERALITY (H): ICD-10-CM

## 2022-10-12 NOTE — TELEPHONE ENCOUNTER
Called Pt and had him write down he will be starting xarelto 15 mg a day on oct 18. Pt has stopped warfarin and asked him to give old medication to pharmacy to dispense of. Went over side effects, and take with supper, and has this nurses phone number to call her. New RX sent to pharmacy. TIANA Painter RN

## 2022-10-12 NOTE — PROGRESS NOTES
Insurance covers Xarelto . Per DR Newton did change to xarelto 15 mg once a day with supper. Pt has stopped coumadin and will start xarelto 15 mg a day 10/18/22. Pt on lower dose for decreased renal function. Reviewed change with Pt and had him write down all instructions. Went over side effects and Pt to call if any concerns. TIANA Painter RN

## 2022-10-19 ENCOUNTER — TELEPHONE (OUTPATIENT)
Dept: CARDIOLOGY | Facility: CLINIC | Age: 87
End: 2022-10-19

## 2022-10-19 NOTE — TELEPHONE ENCOUNTER
Return call to pharmacy Pt was confused on medication he should . Pharmacy did review every thing with him and he did get Xarelto 15 mg a day. Placed call to Pt he is to take xarelto 15 mg a day with meals as previously discussed. TIANA Painter RN

## 2022-11-07 ENCOUNTER — TELEPHONE (OUTPATIENT)
Dept: CARDIOLOGY | Facility: CLINIC | Age: 87
End: 2022-11-07

## 2022-11-07 NOTE — TELEPHONE ENCOUNTER
Return Pt call he said he had bloody nose, and had a HA. They are both gone now. He said he ok now. TIANA Painter RN

## 2022-11-10 ENCOUNTER — TELEPHONE (OUTPATIENT)
Dept: CARDIOLOGY | Facility: CLINIC | Age: 87
End: 2022-11-10

## 2022-11-10 DIAGNOSIS — I63.419 CEREBROVASCULAR ACCIDENT (CVA) DUE TO EMBOLISM OF MIDDLE CEREBRAL ARTERY, UNSPECIFIED BLOOD VESSEL LATERALITY (H): ICD-10-CM

## 2022-11-10 DIAGNOSIS — G45.9 TRANSIENT CEREBRAL ISCHEMIA, UNSPECIFIED TYPE: ICD-10-CM

## 2022-11-10 DIAGNOSIS — G45.9 TRANSIENT CEREBRAL ISCHEMIA, UNSPECIFIED TYPE: Primary | ICD-10-CM

## 2022-11-10 DIAGNOSIS — I51.3 LEFT VENTRICULAR THROMBUS: ICD-10-CM

## 2022-11-10 NOTE — PROGRESS NOTES
Pt medication change per DR Newton from Xarelto to Eliquis 5 mg twice daily Pt did not tolerate Xarelto very upset stomach. TIANA Painter rN

## 2022-11-10 NOTE — TELEPHONE ENCOUNTER
Spoke with Dr. Newton in clinic.  Due to patient's intolerance to Xarelto will start PA process for Eliquis.    Albania Sevilla RN on 11/10/2022 at 10:22 AM

## 2022-11-10 NOTE — TELEPHONE ENCOUNTER
"Patient presented to clinic with concerns regarding medication reaction to Xarelto.  Patient states that since he has started taking it he feels \"horrible.\"  Patient tearful in clinic stating it is making him nauseated, he has had bloody noses and headaches.  Patient states he can no longer take this medication.  RN discussed with patient that message will be sent to provider and nursing team would call him with further recommendations.  Albania Sevilla RN on 11/10/2022 at 10:16 AM    "

## 2022-11-11 ENCOUNTER — TELEPHONE (OUTPATIENT)
Dept: CARDIOLOGY | Facility: CLINIC | Age: 87
End: 2022-11-11

## 2022-11-11 NOTE — TELEPHONE ENCOUNTER
Return Pt call he has not received eliquis. Called pharmacy they have not filled and state they do not have medication to fill it. Asked how this is to be resloved. Pharmacy said they would send across street to [address] to fill. Called [facility] [address] and spoke with pharmacist. She did pull order and will fill it for Pt to . Called pt told him to stop at clinic to  coupon and go to [address] [facility] to pick medication up. Went over this information with Pt 4 times. Confirmed with  coupon there for Pt to . TIANA Painter RN

## 2022-11-11 NOTE — TELEPHONE ENCOUNTER
I believe he is not able to take Xarelto for side effects other than nose bleeds. If that is the case, eliquis may be option. If he has nose blees on eliquis, we may need to send him to ENT.  We need preapproval for eliquis as he cannot tolerate xarelto.

## 2022-11-11 NOTE — TELEPHONE ENCOUNTER
What do I need to do for appeal?  Pt cannot take warfarin because of inconsistency and confusion and was not theraputic. Cannot take xaralto because of nose bleeds and stomach distress. TIANA Painter RN

## 2022-11-14 NOTE — TELEPHONE ENCOUNTER
Not sure where you want me to document it.  Maybe, have a nurse draft a letter and I can sign off.

## 2022-11-15 NOTE — TELEPHONE ENCOUNTER
Letter should say that patient was unable to keep up for an INR appointments and therefore was having labile INRs and was switched to Xarelto.  However with Xarelto he has significant side effects and therefore we need to get preapproval for Eliquis.  He cannot elaborate on the side effects he had with Xarelto other than occasional nose bleed.

## 2022-11-17 NOTE — TELEPHONE ENCOUNTER
Medication Appeal Initiation    We have initiated an appeal for the requested medication:  Medication: apixaban ANTICOAGULANT (ELIQUIS ANTICOAGULANT) 2.5 MG tablet - APPEAL INITIATED  Appeal Start Date:  11/17/2022  Insurance Company: [employer] - Phone [phone] Fax [phone]  Comments:

## 2022-12-19 ENCOUNTER — TELEPHONE (OUTPATIENT)
Dept: CARDIOLOGY | Facility: CLINIC | Age: 87
End: 2022-12-19

## 2023-01-05 NOTE — TELEPHONE ENCOUNTER
MEDICATION APPEAL APPROVED    Medication: apixaban ANTICOAGULANT (ELIQUIS ANTICOAGULANT) 2.5 MG tablet - APPEAL APPROVED  Authorization Effective Date: 11/23/2022  Authorization Expiration Date: 12/21/2023  Approved Dose/Quantity:   Reference #:     Insurance Company: [employer] - Phone [phone] Fax [phone]  Expected CoPay:       CoPay Card Available:      Foundation Assistance Needed:    Which Pharmacy is filling the prescription (Not needed for infusion/clinic administered): [facility] [id number] [facility] - [place] - [address]

## 2023-01-17 ENCOUNTER — TRANSFERRED RECORDS (OUTPATIENT)
Dept: HEALTH INFORMATION MANAGEMENT | Facility: CLINIC | Age: 88
End: 2023-01-17
Payer: MEDICARE

## 2023-01-30 NOTE — PROGRESS NOTES
ANTICOAGULATION MANAGEMENT     Sawyer S Myra 87 year old male is on warfarin with supratherapeutic INR result. (Goal INR 2.0-3.0)    Recent labs: (last 7 days)     02/16/22  1300   INR 3.3*       ASSESSMENT     Source(s): Chart Review and Patient/Caregiver Call       Warfarin doses taken: Warfarin taken as instructed    Diet: Decreased greens/vitamin K in diet; plans to resume previous intake    New illness, injury, or hospitalization: No    Medication/supplement changes: repatha every 14 days No interaction anticipated    Signs or symptoms of bleeding or clotting: No    Previous INR: Therapeutic last 2(+) visits    Additional findings: None     PLAN     Recommended plan for temporary change(s) affecting INR     Dosing Instructions: Continue your current warfarin dose with next INR in 2 weeks       Summary  As of 2/16/2022    Full warfarin instructions:  2.5 mg every Mon, Wed, Fri; 5 mg all other days   Next INR check:  3/2/2022             Telephone call with Sawyer who verbalizes understanding and agrees to plan    Lab visit scheduled    Education provided: Importance of consistent vitamin K intake, Impact of vitamin K foods on INR, Vitamin K content of foods, Importance of therapeutic range, Importance of following up at instructed interval, Importance of taking warfarin as instructed and Contact [phone] with any changes, questions or concerns.     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 LMOM. If patient calls back, schedule with labs, orders will be placed after scheduling.    Please remind patient to bring all medications and blood pressure log to next visit.       Comments:  03/21 new home phone number: [phone]          Anticoagulation Care Providers     Provider Role Specialty Phone number    Satya Newton MD Referring Cardiovascular Disease [phone]

## 2023-02-01 ENCOUNTER — TELEPHONE (OUTPATIENT)
Dept: CARDIOLOGY | Facility: CLINIC | Age: 88
End: 2023-02-01
Payer: MEDICARE

## 2023-02-01 NOTE — TELEPHONE ENCOUNTER
Call out to [facility] pharmacy and left message for return call. Per Pt they told him he owes 5000.00 for prescription. Will get clarification from pharmacy.      Spoke with pharmacy his co pay 500.00. Informed Pt of this and he needs to check with his insurance if he has a large deductible. Pt said he has picked up the medication the last two months with coupon.        Pt also has itching he said started some time ago ,but changed time span when started first said few days then few weeks. Pt denies any raised welts, swelling for angio-edema. Pt said he took zyrtec and it resolved. Gave Pt number for clinic he wanted to speak to NP's nurse about itching.  TIANA Painter RN

## 2023-02-13 ENCOUNTER — HOSPITAL ENCOUNTER (EMERGENCY)
Facility: CLINIC | Age: 88
Discharge: HOME | End: 2023-02-13
Attending: EMERGENCY MEDICINE | Admitting: EMERGENCY MEDICINE
Payer: MEDICARE

## 2023-02-13 VITALS
BODY MASS INDEX: 20.38 KG/M2 | WEIGHT: 115 LBS | SYSTOLIC BLOOD PRESSURE: 136 MMHG | DIASTOLIC BLOOD PRESSURE: 62 MMHG | HEART RATE: 103 BPM | RESPIRATION RATE: 16 BRPM | OXYGEN SATURATION: 100 % | HEIGHT: 63 IN | TEMPERATURE: 97.9 F

## 2023-02-13 DIAGNOSIS — L30.9 DERMATITIS: ICD-10-CM

## 2023-02-13 PROCEDURE — 99282 EMERGENCY DEPT VISIT SF MDM: CPT

## 2023-02-13 RX ORDER — BENZOCAINE/MENTHOL 6 MG-10 MG
LOZENGE MUCOUS MEMBRANE 2 TIMES DAILY
Qty: 30 G | Refills: 0 | Status: SHIPPED | OUTPATIENT
Start: 2023-02-13

## 2023-02-13 NOTE — ED TRIAGE NOTES
Pt complains of a scabbing rash for on his torso for the last 2 weeks.     Triage Assessment     Row Name 02/13/23 0810       Triage Assessment (Adult)    Airway WDL WDL       Respiratory WDL    Respiratory WDL WDL       Skin Circulation/Temperature WDL    Skin Circulation/Temperature WDL WDL       Cardiac WDL    Cardiac WDL WDL       Peripheral/Neurovascular WDL    Peripheral Neurovascular WDL WDL       Cognitive/Neuro/Behavioral WDL    Cognitive/Neuro/Behavioral WDL WDL

## 2023-02-13 NOTE — ED PROVIDER NOTES
"  History     Chief Complaint:  Rash       HPI   Sawyer Renteria is a 88 year old male with a history of atrial fibrillation who presents with 2 weeks of pruritic rash to his torso primarily but also to his upper extremities.  Bilateral in location.  Nonpainful.. The patient reports that he had onset of rash to his truck and back starting 10 days ago he states he has bot been evaluated for this rash prior. He reports the rash is itchy and he has been scratching it. The patient denies any new medications. He denies any recent illness including cough and fever.  No new soaps or detergents.    Independent Historian:   None - Patient Only    Review of External Notes: Telephone call from 2/1/2023    ROS:  Review of Systems   ROS: 10 point ROS neg other than the symptoms noted above in the HPI.    Allergies:  Flomax   Aldactone   Carvedilol  Colesevelam  Ezetimibe  Lisinopril  Pravastatin  Rosuvastatin  Simvastatin     Medications:    Eliquis   Aspirin   Avodart   Repatha   Toprol   Nitrostat   Entresto   Flomax    Past Medical History:    BPH   CAD   GERD   Hyperlipidemia   Hypertension   MI   TIA     Past Surgical History:    Heart CATH   Tonsillectomy and adenoidectomy      Family History:    family history includes Heart Disease in his brother and mother.    Social History:  The patient presents alone.    reports that he has never smoked. He has never used smokeless tobacco. He reports that he does not drink alcohol and does not use drugs.  PCP: Francisco Doshi     Physical Exam     Patient Vitals for the past 24 hrs:   BP Temp Temp src Pulse Resp SpO2 Height Weight   02/13/23 0756 136/62 97.9  F (36.6  C) Temporal 103 16 100 % 1.6 m (5' 3\") 52.2 kg (115 lb)        Physical Exam  General: Patient is alert and normal appearing.  HEENT: Head atraumatic    Eyes: pupils equal and reactive. Conjunctiva clear   Nares: patent   Oropharynx: no lesions, uvula midline, no palatal draping, normal voice, no trismus  Neck: Supple " without lymphadenopathy, no meningismus  Chest: Heart regular rate and rhythm.   Lungs: Equal clear to auscultation with no wheeze or rales  Abdomen: Soft, non tender, nondistended, normal bowel sounds  Back: No costovertebral angle tenderness, no midline C, T or L spine tenderness  Neuro: Grossly nonfocal, normal speech, strength equal bilaterally, CN 2-12 intact  Extremities: No deformities, equal radial and DP pulses. No clubbing, cyanosis.  No edema  Skin: Warm and dry with rash that is noted to have excoriations and appear as shown below.  He also has some lesions on his arms as well as his lower abdomen              Emergency Department Course       Emergency Department Course & Assessments:    Assessments:  ED Course as of 02/13/23 1003   Mon Feb 13, 2023   0821 I obtained history and examined the patient as noted above.   0830 I returned to check on patient. We discussed findings and plan of care.        Disposition:  The patient was discharged to home.     Impression & Plan      Medical Decision Making:  Sawyer Renteria is a 88 year old male who presents for evaluation of a rash.  This appears consistent with a dermatitis.  A broad differential was considered including infection associated with rash, SBI, cellulitis, atopic dermatitis, allergic dermatitis, contact dermatitis, chemical dermatitis, lice, scabies, environmental, etc.  I am favoring atopic dermatitis at this time given time frame and locations on the body.  Outpatient regimen as noted above.  See primary this week for recheck.  Patient with no new medications, detergents, soaps.  He has no oropharynx involvement and there is no skin sloughing.  He is afebrile and hemodynamically stable.  He has no Demix signs of infection.  Doubt a vasculitis at this time.  We will attempt steroids and Eucerin cream and follow-up with both primary care and dermatology was discussed.  Patient is agreeable with this plan and all questions and concerns  addressed    Diagnosis:    ICD-10-CM    1. Dermatitis  L30.9            Discharge Medications:  Discharge Medication List as of 2/13/2023  8:35 AM      START taking these medications    Details   hydrocortisone (CORTAID) 1 % external cream Apply topically 2 times dailyDisp-30 g, R-0Local Print      Skin Protectants, Misc. (EUCERIN) cream Apply topically as needed for dry skinDisp-113 g, R-0Local Print              Scribe Disclosure:  I, Melissa Simpson, am serving as a scribe at 8:21 AM on 2/13/2023 to document services personally performed by Melissa Robert MD based on my observations and the provider's statements to me.     2/13/2023   Melissa Robert MD Neuner, Maria Bea, MD  02/13/23 9403

## 2023-02-14 ENCOUNTER — PATIENT OUTREACH (OUTPATIENT)
Dept: CARE COORDINATION | Facility: CLINIC | Age: 88
End: 2023-02-14
Payer: MEDICARE

## 2023-02-14 NOTE — PROGRESS NOTES
Clinic Care Coordination Contact    Situation: Patient chart reviewed by care coordinator.    Background: Sawyer Renteria is a 88 year old male with a history of atrial fibrillation who presents with 2 weeks of pruritic rash to his torso primarily but also to his upper extremities.  Bilateral in location    Assessment:atopic dermatitis    Plan/Recommendations:Begin steroids and eurcerin, follow up with pcp and dermatology. PCP notified.    JOAQUÍN Ramos  , Care Coordination  [facility]  [phone]  [email or web address]

## 2023-02-20 ENCOUNTER — TELEPHONE (OUTPATIENT)
Dept: CARDIOLOGY | Facility: CLINIC | Age: 88
End: 2023-02-20
Payer: MEDICARE

## 2023-02-20 NOTE — TELEPHONE ENCOUNTER
Returned Pt call he left 6 messages on Sunday that he has rash. Pt was in ED 2/13 for rash and given creams for contact dermatitis. Pt said he was still in bed and would call later. TIANA Painter RN

## 2023-02-21 ENCOUNTER — APPOINTMENT (OUTPATIENT)
Dept: URBAN - METROPOLITAN AREA CLINIC 256 | Age: 88
Setting detail: DERMATOLOGY
End: 2023-02-21

## 2023-02-21 VITALS — WEIGHT: 115 LBS | HEIGHT: 63 IN

## 2023-02-21 DIAGNOSIS — F42.4 EXCORIATION (SKIN-PICKING) DISORDER: ICD-10-CM

## 2023-02-21 DIAGNOSIS — L30.8 OTHER SPECIFIED DERMATITIS: ICD-10-CM

## 2023-02-21 PROCEDURE — OTHER PRESCRIPTION: OTHER

## 2023-02-21 PROCEDURE — 99203 OFFICE O/P NEW LOW 30 MIN: CPT

## 2023-02-21 PROCEDURE — OTHER MIPS QUALITY: OTHER

## 2023-02-21 PROCEDURE — OTHER COUNSELING: OTHER

## 2023-02-21 PROCEDURE — OTHER PRESCRIPTION MEDICATION MANAGEMENT: OTHER

## 2023-02-21 RX ORDER — TRIAMCINOLONE ACETONIDE 1 MG/G
0.1% OINTMENT TOPICAL BID
Qty: 80 | Refills: 0 | Status: ERX | COMMUNITY
Start: 2023-02-21

## 2023-02-21 ASSESSMENT — LOCATION SIMPLE DESCRIPTION DERM
LOCATION SIMPLE: RIGHT SHOULDER
LOCATION SIMPLE: ABDOMEN
LOCATION SIMPLE: LEFT SHOULDER

## 2023-02-21 ASSESSMENT — LOCATION DETAILED DESCRIPTION DERM
LOCATION DETAILED: RIGHT POSTERIOR SHOULDER
LOCATION DETAILED: PERIUMBILICAL SKIN
LOCATION DETAILED: LEFT POSTERIOR SHOULDER

## 2023-02-21 ASSESSMENT — LOCATION ZONE DERM
LOCATION ZONE: ARM
LOCATION ZONE: TRUNK

## 2023-02-21 NOTE — PROCEDURE: MIPS QUALITY
Quality 226: Preventive Care And Screening: Tobacco Use: Screening And Cessation Intervention: Patient screened for tobacco use and is an ex/non-smoker
Quality 130: Documentation Of Current Medications In The Medical Record: Current Medications Documented
Quality 110: Preventive Care And Screening: Influenza Immunization: Influenza immunization was not ordered or administered, reason not given
Detail Level: Detailed
Quality 111:Pneumonia Vaccination Status For Older Adults: Pneumococcal vaccine (PPSV23) was not administered on or after patient’s 60th birthday and before the end of the measurement period, reason not otherwise specified
Quality 431: Preventive Care And Screening: Unhealthy Alcohol Use - Screening: Patient not identified as an unhealthy alcohol user when screened for unhealthy alcohol use using a systematic screening method

## 2023-02-21 NOTE — HPI: RASH
What Type Of Note Output Would You Prefer (Optional)?: Standard Output
How Severe Is Your Rash?: mild
Is This A New Presentation, Or A Follow-Up?: Rash
Additional History: He is currently using Eucerin cream and OTC hydrocortisone 1% cream which has provided some relief. He states the itching keeps him up at night. He did receive steroid and lubricant injections in his knees about 3 months ago and developed sores on his legs. He is wondering if his current rash is related to the reaction he has to the injections.

## 2023-02-21 NOTE — PROCEDURE: COUNSELING
Detail Level: Detailed
Moisturizer Recommendations: Eucerin eczema relief
Detail Level: Zone
Patient Specific Counseling (Will Not Stick From Patient To Patient): Discussed with patient I do not believe his dermatitis is related to the knee injections he received, but likely from dry skin and scratching that irritates the areas. Advised patient to not use hot baths/showers, only use soap on the groin and armpits to prevent dryness and moisturize immediately after showering. Will also have him start Triamcinolone on the rash.

## 2023-02-21 NOTE — PROCEDURE: PRESCRIPTION MEDICATION MANAGEMENT
Detail Level: Zone
Render In Strict Bullet Format?: No
Initiate Treatment: Triamcinolone 0.1%, apply to affected areas BID for no more than 2 weeks then as needed for flares.

## 2023-02-27 ENCOUNTER — TRANSFERRED RECORDS (OUTPATIENT)
Dept: HEALTH INFORMATION MANAGEMENT | Facility: CLINIC | Age: 88
End: 2023-02-27

## 2023-02-27 LAB
ALT SERPL-CCNC: 9 IU/L (ref 0–44)
AST SERPL-CCNC: 16 IU/L (ref 0–40)
CREATININE (EXTERNAL): 1.64 MG/DL (ref 0.76–0.27)
GFR ESTIMATED (EXTERNAL): 40 ML/MIN/1.73M2
GLUCOSE (EXTERNAL): 97 MG/DL (ref 70–99)
POTASSIUM (EXTERNAL): 4.7 MMOL/L (ref 3.5–5.2)
TSH SERPL-ACNC: 3.61 UIU/ML (ref 0.45–4.5)

## 2023-04-15 NOTE — PROGRESS NOTES
ANTICOAGULATION FOLLOW-UP CLINIC VISIT    Patient Name:  Sawyer Renteria  Date:  1/3/2020  Contact Type:  Face to Face    SUBJECTIVE:  Patient Findings     Positives:   Change in diet/appetite (States is no longer drinking Protein drinks)        Clinical Outcomes     Negatives:   Major bleeding event, Thromboembolic event, Anticoagulation-related hospital admission, Anticoagulation-related ED visit, Anticoagulation-related fatality           OBJECTIVE    INR Protime   Date Value Ref Range Status   2020 2.3 (A) 0.86 - 1.14 Final       ASSESSMENT / PLAN  No question data found.  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]  Transient cerebral ischemia [G45.9]  Long term current use of anticoagulants with INR goal of 2.0-3.0 [Z79.01]             Anticoagulation Episode Summary     INR check location:       Preferred lab:       Send INR reminders to:   [facility] HEART INR NURSE    Comments:         Anticoagulation Care Providers     Provider Role Specialty Phone number    Satya Newton MD Responsible Cardiology [phone]            See the Encounter Report to view Anticoagulation Flowsheet and Dosing Calendar (Go to Encounters tab in chart review, and find the Anticoagulation Therapy Visit)    INR was 2.3 today. Pt denies any abnormal bleeding or bruising. Denies any recent medication or dietary changes or recent illness. Eats approximately 2 servings of greens weekly. Will  continue current dosing of 2.5 mg every MWF and 5 mg all other days of the week. Next INR check is scheduled in 3 weeks. Pt self administered Praluent injection into right thigh. Pt verbalized understanding.    Alexandra Rust RN                  No

## 2023-04-17 ENCOUNTER — TELEPHONE (OUTPATIENT)
Dept: CARDIOLOGY | Facility: CLINIC | Age: 88
End: 2023-04-17
Payer: MEDICARE

## 2023-04-17 NOTE — TELEPHONE ENCOUNTER
[facility] Call Center    Phone Message    May a detailed message be left on voicemail: yes     Reason for Call: Other: AJ called to see if there is a sooner appt for him to see Farrah but there is nothing available. AJ is getting concerned about his heart on his new medications and would like to be seen sooner. AJ says he hears a noise when he puts his hand on his chest. Added AJ to wait list. Please reach out to AJ if there are any concerns and he should be seen sooner. Thank you!     Action Taken: Other: Cardiology    Travel Screening: Not Applicable     Thank you!  [facility]

## 2023-04-17 NOTE — TELEPHONE ENCOUNTER
Return Pt call. /62 Heart rate 60-90. Pt denies any issues, but admits he is nervous. Discussed medications , VS and how he is doing . Gave reassurance and he does have Office visit 5/30/23. Pt hs this nurses phone number if concerns. TIANA Painter rN

## 2023-05-17 ENCOUNTER — TELEPHONE (OUTPATIENT)
Dept: CARDIOLOGY | Facility: CLINIC | Age: 88
End: 2023-05-17
Payer: MEDICARE

## 2023-05-17 NOTE — TELEPHONE ENCOUNTER
Returned Pt call informed him would have scheduling call him to make an appointment. Also gave him scheduling desk number. TIANA Painter RN

## 2023-05-30 ENCOUNTER — CARE COORDINATION (OUTPATIENT)
Dept: CARDIOLOGY | Facility: CLINIC | Age: 88
End: 2023-05-30

## 2023-05-30 ENCOUNTER — OFFICE VISIT (OUTPATIENT)
Dept: CARDIOLOGY | Facility: CLINIC | Age: 88
End: 2023-05-30
Attending: INTERNAL MEDICINE
Payer: MEDICARE

## 2023-05-30 ENCOUNTER — HOSPITAL ENCOUNTER (EMERGENCY)
Facility: CLINIC | Age: 88
Discharge: HOME | End: 2023-05-30
Payer: MEDICARE

## 2023-05-30 VITALS
WEIGHT: 125 LBS | BODY MASS INDEX: 22.15 KG/M2 | HEART RATE: 85 BPM | OXYGEN SATURATION: 98 % | TEMPERATURE: 97.4 F | DIASTOLIC BLOOD PRESSURE: 61 MMHG | SYSTOLIC BLOOD PRESSURE: 144 MMHG | HEIGHT: 63 IN | RESPIRATION RATE: 18 BRPM

## 2023-05-30 VITALS
HEART RATE: 52 BPM | WEIGHT: 119.6 LBS | HEIGHT: 63 IN | SYSTOLIC BLOOD PRESSURE: 130 MMHG | BODY MASS INDEX: 21.19 KG/M2 | DIASTOLIC BLOOD PRESSURE: 65 MMHG

## 2023-05-30 DIAGNOSIS — I10 PRIMARY HYPERTENSION: ICD-10-CM

## 2023-05-30 DIAGNOSIS — N18.31 CHRONIC RENAL IMPAIRMENT, STAGE 3A (H): ICD-10-CM

## 2023-05-30 DIAGNOSIS — R41.89 COGNITIVE IMPAIRMENT: ICD-10-CM

## 2023-05-30 DIAGNOSIS — I51.3 LEFT VENTRICULAR THROMBUS: ICD-10-CM

## 2023-05-30 DIAGNOSIS — E78.2 MIXED HYPERLIPIDEMIA: ICD-10-CM

## 2023-05-30 DIAGNOSIS — I25.10 CORONARY ARTERY DISEASE INVOLVING NATIVE CORONARY ARTERY OF NATIVE HEART WITHOUT ANGINA PECTORIS: ICD-10-CM

## 2023-05-30 DIAGNOSIS — I50.22 CHRONIC SYSTOLIC HEART FAILURE (H): Primary | ICD-10-CM

## 2023-05-30 PROCEDURE — 99215 OFFICE O/P EST HI 40 MIN: CPT | Performed by: NURSE PRACTITIONER

## 2023-05-30 NOTE — PROGRESS NOTES
Cardiology Clinic Progress Note  Sawyer Renteria MRN# [id number]   YOB: 1934 Age: 89 year old   Primary Cardiologist: Dr. Newton Reason for visit: CORE follow up            Assessment and Plan:   Sawyer Renteria is a very pleasant 88 year old male whom I am seeing today for CORE follow up.       1. Heart failure with reduced ejection fraction, ischemic cardiomyopathy - LVEF 35-40% per echo 12/2021              - NYHA class II, stage C              - Etiology - ischemic              - Guideline directed medical therapy                          - Betablocker: Continue metoprolol XL 25mg daily                           - ACEI/ARB/ARNI: Continue Entresto 24/26mg BID                          - Aldactone antagonist: none, due to CKD  2. Coronary artery disease - s/p stenting to LAD and angioplasty of diagonal in 2006. Stable, no ischemic symptoms              - Continue aspirin, metoprolol and repatha  3. Dyslipidemia with statin intolerance - LDL 81 and HDL 68              - Continue Repatha   4. Hypertension - controlled  5. CKD stage III - baseline creatinine 1.3-1.7, no recent labs  6. Apical wall motion abnormality, with history of apical thrombus and cardioembolic stroke              - Continue eliquis (appropriate low dose)  7. Noncompliance with medications - unclear what medications patient is taking.   8. Cognitive impairment - per prior notes appears PCP and family aware, limited history about his per patient today. Continued concerns regarding his cognition and safety to live independently.     I saw patient today for CORE follow up, from a cardiac standpoint he is stable, compensated/euvolemic and no anginal symptoms. Continued concerns about his progressive cognitive impairment. He is uncertain on any of his medications today, bringing a medication list from April 2022 for reference which still lists warfarin which was stopped in October due to cognitive concerns. Per notes appears PCP and family  are aware. Patient reports living independently. At this time I cannot safely titrate any of his GDMT given unclear what medications he is taking. Recommend MTM visit to see if we can clarify what medications he is taking.     Recommend continued evaluation of his cognitive impairment, concerns about his ability to live independently, drive and manage his medications.      Requested BMP/hemoglobin today but unfortuantely unable to get drawn at our office or hospital lab today. Will request BMP/hemoglobin prior to next CORE visit.       Changes today: none    Follow up plan:     MTM visit to help clarify medications currently taking     CORE follow up with me in 2 months (consider further titration of GDMT, increase Entresto).     ADDENDUM 5/30/23 10:10am  Patient refusing to our clinic waiting room stating he needs to see me for his OV, pointing to his scheduled OV in 2 months in July. Multiple staff members explained he already saw me for a visit and patient is adamant he needs to see me and refuses to leave. Patient drove to clinic visit by himself today. No family present. No consent to communicate on file.     Concerns regarding his continued progressive cognitive impairment. Tells staff it is 2013 and he doesn't have a son. Do not feel he can safely drive himself home, given he doesn't remember seeing me for an OV less than an hour ago. We have no social service support in our clinic. Recommend ED evaluation given continued cognitive impairment and inability to safely drive home.    Called ED and provided update on patient.     Vulnerable adult filed.          History of Presenting Illness:    Sawyer Renteria is a very pleasant 89 year old male with a history of HFrEF, coronary artery disease, ischemic cardiomyopathy, dyslipidemia, hypertension, CKD and memory loss.     He had an anterior MI in 2006 and underwent stenting of his LAD and angioplasty of his diagonal. He has residual 60% OM 1 stenosis. He developed  an apical thrombus with a cardioembolic CVA and has remained on warfarin.      Echocardiogram 12/2021 showed LVEF 35-40%, Severe hypokinesis of the mid-apical inferior, mid-apical anterior, mid anteroseptal, apical septal, and apical lateral segments. RV normal size/function. Similar when compared to prior study 6/2021.     Cardiac MRI 1/2020 showed LVEF 52%,no evidence of thrombus. EF and wall motion similar to prior study in 2014.      He was hospitalized in December 2021 with some complaints of transient left arm numbness/tinging, suspected to be secondary to paresthesia from reduced blood flow.      I first met patient for CORE enrollment in April 2022 at which time he was doing well. We reviewed his cardiomyopathy and consideration for titration of GDMT. Also reached out to pharmacy liaison to price out Entresto pricing and was a financially feasible option for patient. He has been following in CORE clinic for optimization of GDMT, he has been started on entresto and continued on his metoprolol XL.     Echocardiogram 10/3/2022 showed LVEF 40-45%, RV normal size/function, no significant valvular disease, no significant change when compared to prior study.     He most recently saw Dr. Newton in October 2022. There has been concerns for his memory impairment. His warfarin was changed to eliquis (appropriate low dose) due to memory issue concerns, which he has been on due to LV apical thrombus secondary to apical scar.      Patient is here today for CORE follow up.     Patient reports feeling good. Eenies chest pain or chest tightness. Denies shortness of breath at rest. Denies exertional dyspnea. Denies orthopnea or PND. Denies dizziness, lightheadedness or other presyncopal symptoms. Denies tachycardia or palpitations. Continued concerns about medication compliance, he has an old medication list from April 2022, unclear what medications he is taking and warfarin is on the list he shows me today. Unclear where his  kids live, he lives in large house independently in [place].     No recent labs. Blood pressure 130/65 and HR 52 in clinic today.    Appetite good. Cooks occasionally. Also getting take out from family friends restaurant, for the past year. No set exercise routine, used to have walking routine. Notes bilateral knee pain makes activity difficult. Wife . Denies alcohol use. Denies tobacco use.         Social History      Social History     Socioeconomic History     Marital status:      Spouse name: Not on file     Number of children: Not on file     Years of education: Not on file     Highest education level: Not on file   Occupational History     Not on file   Tobacco Use     Smoking status: Never     Smokeless tobacco: Never   Vaping Use     Vaping status: Not on file   Substance and Sexual Activity     Alcohol use: No     Drug use: Never     Sexual activity: Not on file   Other Topics Concern     Parent/sibling w/ CABG, MI or angioplasty before 65F 55M? No      Service Not Asked     Blood Transfusions Not Asked     Caffeine Concern No     Occupational Exposure Not Asked     Hobby Hazards Not Asked     Sleep Concern No     Stress Concern No     Weight Concern Yes     Comment: weight loss     Special Diet No     Back Care Not Asked     Exercise Yes     Comment: bicycle 10 min, walking, 3 days week     Bike Helmet Not Asked     Seat Belt Yes     Self-Exams Not Asked   Social History Narrative     Not on file     Social Determinants of Health     Financial Resource Strain: Not on file   Food Insecurity: Not on file   Transportation Needs: Not on file   Physical Activity: Not on file   Stress: Not on file   Social Connections: Not on file   Intimate Partner Violence: Not on file   Housing Stability: Not on file          Review of Systems:    ROS: 10 point ROS neg other than the symptoms noted above in the HPI.         Physical Exam:   Vitals: There were no vitals taken for this visit.   Wt Readings  from Last 4 Encounters:   02/13/23 52.2 kg (115 lb)   10/11/22 55.2 kg (121 lb 9.6 oz)   06/13/22 53.8 kg (118 lb 9.6 oz)   05/12/22 53.4 kg (117 lb 11.2 oz)     GEN: well nourished, in no acute distress.  HEENT:  Pupils equal, round. Sclerae nonicteric.   NECK: Supple, no masses appreciated. JVP appears normal.  C/V:  Regular rate and rhythm, 2/6 systolic murmur  RESP: Respirations are unlabored. Clear to auscultation bilaterally without wheezing, rales, or rhonchi.  GI: Abdomen soft, nontender.  EXTREM: No LE edema.  NEURO: Alert and cooperative, memory impairment  SKIN: Warm and dry       Data:     LIPID RESULTS:  Lab Results   Component Value Date    CHOL 156 08/16/2021    CHOL 162 01/28/2019    HDL 66 08/16/2021    HDL 60 01/28/2019    LDL 68 08/16/2021    LDL 77 01/28/2019    TRIG 89 08/11/2022    TRIG 124 01/28/2019    CHOLHDLRATIO 4.3 10/13/2015     LIVER ENZYME RESULTS:  Lab Results   Component Value Date    AST 21 07/25/2017    ALT 19 08/16/2021    ALT <5 (L) 01/28/2019     CBC RESULTS:  Lab Results   Component Value Date    WBC 8.2 12/05/2021    WBC 5.4 03/11/2020    RBC 3.70 (L) 12/05/2021    RBC 3.96 (L) 03/11/2020    HGB 10.6 (L) 12/05/2021    HGB 12.0 (L) 03/11/2020    HCT 33.3 (L) 12/05/2021    HCT 35.4 (L) 03/11/2020    MCV 90 12/05/2021    MCV 89 03/11/2020    MCH 28.6 12/05/2021    MCH 30.3 03/11/2020    MCHC 31.8 12/05/2021    MCHC 33.9 03/11/2020    RDW 13.4 12/05/2021    RDW 13.7 03/11/2020     12/05/2021     03/11/2020     BMP RESULTS:  Lab Results   Component Value Date     06/13/2022     03/11/2020    POTASSIUM 4.4 06/13/2022    POTASSIUM 4.6 03/11/2020    CHLORIDE 105 02/27/2023    CHLORIDE 116 (H) 03/11/2020    CO2 22 06/13/2022    CO2 28 03/11/2020    ANIONGAP 5 06/13/2022    ANIONGAP <1 (L) 03/11/2020    GLC 97 06/13/2022    GLC 98 03/11/2020    BUN 22 06/13/2022    BUN 27 03/11/2020    CR 1.66 (H) 06/13/2022    CR 1.31 (H) 03/11/2020    GFRESTIMATED 39 (L)  06/13/2022    GFRESTIMATED 49 (L) 03/11/2020    GFRESTBLACK 57 (L) 03/11/2020    AURORA 8.5 06/13/2022    AURORA 8.5 03/11/2020      INR RESULTS:  Lab Results   Component Value Date    INR 1.3 (H) 10/11/2022    INR 6.5 (HH) 10/06/2022    INR 2.82 (H) 12/06/2021    INR 2.82 (H) 12/05/2021    INR 3.50 (H) 07/08/2021    INR 1.80 (H) 06/28/2021            Medications     Current Outpatient Medications   Medication Sig Dispense Refill     apixaban ANTICOAGULANT (ELIQUIS) 5 MG tablet Take 1 tablet (5 mg) by mouth 2 times daily 60 tablet 11     aspirin 81 MG tablet Take 81 mg by mouth daily       Cholecalciferol (VITAMIN D3) 2000 UNITS CAPS Take by mouth daily       dutasteride (AVODART) 0.5 MG capsule Take 1 capsule (0.5 mg) by mouth daily 30 capsule 11     evolocumab (REPATHA) 140 MG/ML prefilled autoinjector Inject 1 mL (140 mg) Subcutaneous every 14 days 6 mL 3     Fish Oil-Cholecalciferol (FISH OIL + D3) 7855-9393 MG-UNIT CAPS Take 2 tablets by mouth daily       hydrocortisone (CORTAID) 1 % external cream Apply topically 2 times daily 30 g 0     metoprolol succinate ER (TOPROL XL) 25 MG 24 hr tablet Take 1 tablet (25 mg) by mouth daily 90 tablet 3     nitroGLYcerin (NITROSTAT) 0.4 MG sublingual tablet Place 1 tablet (0.4 mg) under the tongue every 5 minutes as needed for chest pain Take as directed 25 tablet 1     sacubitril-valsartan (ENTRESTO) 24-26 MG per tablet Take 1 tablet by mouth 2 times daily 180 tablet 3     Skin Protectants, Misc. (EUCERIN) cream Apply topically as needed for dry skin 113 g 0     tamsulosin (FLOMAX) 0.4 MG capsule Take 0.4 mg by mouth daily          Past Medical History     Past Medical History:   Diagnosis Date     BPH (benign prostatic hyperplasia)      Cardiomyopathy, ischemic     EF 49% by cardiac MRI 1/15/2014     Coronary artery disease 1/24/06    Cypher CANDIDA to LAD     Gastro-oesophageal reflux disease      Hyperlipidaemia      Hypertension      Left ventricular thrombus      Myocardial  infarction (H) 1/2006    Anterior     TIA (transient ischaemic attack)      Past Surgical History:   Procedure Laterality Date     CORONARY ANGIOGRAPHY ADULT ORDER  1/24/06    Cypher CANDIDA to LAD     HEART CATH, ANGIOPLASTY  1/2006    Cypher CANDIDA to LAD     TONSILLECTOMY & ADENOIDECTOMY       Family History   Problem Relation Age of Onset     Heart Disease Mother         heart failure     Heart Disease Brother             Allergies   Flomax [tamsulosin], Aldactone [spironolactone], Carvedilol, Colesevelam, Ezetimibe, Lisinopril, Pravastatin, Rosuvastatin, and Simvastatin    40 minutes spent on the date of the encounter doing chart review, history and exam, documentation and further activities as noted above    JAE Marcos [facility] HEART CARE  Pager: [phone]

## 2023-05-30 NOTE — LETTER
5/30/2023    Francisco Doshi MD  [address]  [facility] [address]    RE: Sawyer Renteria       Dear Colleague,     I had the pleasure of seeing Sawyer Renteria in the [facility].  Cardiology Clinic Progress Note  Sawyer Renteria MRN# [id number]   YOB: 1934 Age: 89 year old   Primary Cardiologist: Dr. Newton Reason for visit: CORE follow up            Assessment and Plan:   Sawyer Renteria is a very pleasant 88 year old male whom I am seeing today for CORE follow up.       1. Heart failure with reduced ejection fraction, ischemic cardiomyopathy - LVEF 35-40% per echo 12/2021              - NYHA class II, stage C              - Etiology - ischemic              - Guideline directed medical therapy                          - Betablocker: Continue metoprolol XL 25mg daily                           - ACEI/ARB/ARNI: Continue Entresto 24/26mg BID                          - Aldactone antagonist: none, due to CKD  2. Coronary artery disease - s/p stenting to LAD and angioplasty of diagonal in 2006. Stable, no ischemic symptoms              - Continue aspirin, metoprolol and repatha  3. Dyslipidemia with statin intolerance - LDL 81 and HDL 68              - Continue Repatha   4. Hypertension - controlled  5. CKD stage III - baseline creatinine 1.3-1.7, no recent labs  6. Apical wall motion abnormality, with history of apical thrombus and cardioembolic stroke              - Continue eliquis (appropriate low dose)  7. Noncompliance with medications - unclear what medications patient is taking.   8. Cognitive impairment - per prior notes appears PCP and family aware, limited history about his per patient today. Continued concerns regarding his cognition and safety to live independently.     I saw patient today for CORE follow up, from a cardiac standpoint he is stable, compensated/euvolemic and no anginal symptoms. Continued concerns about his progressive cognitive impairment. He is uncertain  on any of his medications today, bringing a medication list from April 2022 for reference which still lists warfarin which was stopped in October due to cognitive concerns. Per notes appears PCP and family are aware. Patient reports living independently. At this time I cannot safely titrate any of his GDMT given unclear what medications he is taking. Recommend MTM visit to see if we can clarify what medications he is taking.     Recommend continued evaluation of his cognitive impairment, concerns about his ability to live independently, drive and manage his medications.      Requested BMP/hemoglobin today but unfortuantely unable to get drawn at our office or hospital lab today. Will request BMP/hemoglobin prior to next CORE visit.       Changes today: none    Follow up plan:     MTM visit to help clarify medications currently taking     CORE follow up with me in 2 months (consider further titration of GDMT, increase Entresto).     ADDENDUM 5/30/23 10:10am  Patient refusing to our clinic waiting room stating he needs to see me for his OV, pointing to his scheduled OV in 2 months in July. Multiple staff members explained he already saw me for a visit and patient is adamant he needs to see me and refuses to leave. Patient drove to clinic visit by himself today. No family present. No consent to communicate on file.     Concerns regarding his continued progressive cognitive impairment. Tells staff it is 2013 and he doesn't have a son. Do not feel he can safely drive himself home, given he doesn't remember seeing me for an OV less than an hour ago. We have no social service support in our clinic. Recommend ED evaluation given continued cognitive impairment and inability to safely drive home.    Called ED and provided update on patient.     Vulnerable adult filed.          History of Presenting Illness:    Sawyer Renteria is a very pleasant 89 year old male with a history of HFrEF, coronary artery disease, ischemic  cardiomyopathy, dyslipidemia, hypertension, CKD and memory loss.     He had an anterior MI in 2006 and underwent stenting of his LAD and angioplasty of his diagonal. He has residual 60% OM 1 stenosis. He developed an apical thrombus with a cardioembolic CVA and has remained on warfarin.      Echocardiogram 12/2021 showed LVEF 35-40%, Severe hypokinesis of the mid-apical inferior, mid-apical anterior, mid anteroseptal, apical septal, and apical lateral segments. RV normal size/function. Similar when compared to prior study 6/2021.     Cardiac MRI 1/2020 showed LVEF 52%,no evidence of thrombus. EF and wall motion similar to prior study in 2014.      He was hospitalized in December 2021 with some complaints of transient left arm numbness/tinging, suspected to be secondary to paresthesia from reduced blood flow.      I first met patient for CORE enrollment in April 2022 at which time he was doing well. We reviewed his cardiomyopathy and consideration for titration of GDMT. Also reached out to pharmacy liaison to price out Entresto pricing and was a financially feasible option for patient. He has been following in CORE clinic for optimization of GDMT, he has been started on entresto and continued on his metoprolol XL.     Echocardiogram 10/3/2022 showed LVEF 40-45%, RV normal size/function, no significant valvular disease, no significant change when compared to prior study.     He most recently saw Dr. Newton in October 2022. There has been concerns for his memory impairment. His warfarin was changed to eliquis (appropriate low dose) due to memory issue concerns, which he has been on due to LV apical thrombus secondary to apical scar.      Patient is here today for CORE follow up.     Patient reports feeling good. Eenies chest pain or chest tightness. Denies shortness of breath at rest. Denies exertional dyspnea. Denies orthopnea or PND. Denies dizziness, lightheadedness or other presyncopal symptoms. Denies tachycardia or  palpitations. Continued concerns about medication compliance, he has an old medication list from 2022, unclear what medications he is taking and warfarin is on the list he shows me today. Unclear where his kids live, he lives in large house independently in [place].     No recent labs. Blood pressure 130/65 and HR 52 in clinic today.    Appetite good. Cooks occasionally. Also getting take out from family friends restaurant, for the past year. No set exercise routine, used to have walking routine. Notes bilateral knee pain makes activity difficult. Wife . Denies alcohol use. Denies tobacco use.         Social History      Social History     Socioeconomic History    Marital status:      Spouse name: Not on file    Number of children: Not on file    Years of education: Not on file    Highest education level: Not on file   Occupational History    Not on file   Tobacco Use    Smoking status: Never    Smokeless tobacco: Never   Vaping Use    Vaping status: Not on file   Substance and Sexual Activity    Alcohol use: No    Drug use: Never    Sexual activity: Not on file   Other Topics Concern    Parent/sibling w/ CABG, MI or angioplasty before 65F 55M? No     Service Not Asked    Blood Transfusions Not Asked    Caffeine Concern No    Occupational Exposure Not Asked    Hobby Hazards Not Asked    Sleep Concern No    Stress Concern No    Weight Concern Yes     Comment: weight loss    Special Diet No    Back Care Not Asked    Exercise Yes     Comment: bicycle 10 min, walking, 3 days week    Bike Helmet Not Asked    Seat Belt Yes    Self-Exams Not Asked   Social History Narrative    Not on file     Social Determinants of Health     Financial Resource Strain: Not on file   Food Insecurity: Not on file   Transportation Needs: Not on file   Physical Activity: Not on file   Stress: Not on file   Social Connections: Not on file   Intimate Partner Violence: Not on file   Housing Stability: Not on file           Review of Systems:    ROS: 10 point ROS neg other than the symptoms noted above in the HPI.         Physical Exam:   Vitals: There were no vitals taken for this visit.   Wt Readings from Last 4 Encounters:   02/13/23 52.2 kg (115 lb)   10/11/22 55.2 kg (121 lb 9.6 oz)   06/13/22 53.8 kg (118 lb 9.6 oz)   05/12/22 53.4 kg (117 lb 11.2 oz)     GEN: well nourished, in no acute distress.  HEENT:  Pupils equal, round. Sclerae nonicteric.   NECK: Supple, no masses appreciated. JVP appears normal.  C/V:  Regular rate and rhythm, 2/6 systolic murmur  RESP: Respirations are unlabored. Clear to auscultation bilaterally without wheezing, rales, or rhonchi.  GI: Abdomen soft, nontender.  EXTREM: No LE edema.  NEURO: Alert and cooperative, memory impairment  SKIN: Warm and dry       Data:     LIPID RESULTS:  Lab Results   Component Value Date    CHOL 156 08/16/2021    CHOL 162 01/28/2019    HDL 66 08/16/2021    HDL 60 01/28/2019    LDL 68 08/16/2021    LDL 77 01/28/2019    TRIG 89 08/11/2022    TRIG 124 01/28/2019    CHOLHDLRATIO 4.3 10/13/2015     LIVER ENZYME RESULTS:  Lab Results   Component Value Date    AST 21 07/25/2017    ALT 19 08/16/2021    ALT <5 (L) 01/28/2019     CBC RESULTS:  Lab Results   Component Value Date    WBC 8.2 12/05/2021    WBC 5.4 03/11/2020    RBC 3.70 (L) 12/05/2021    RBC 3.96 (L) 03/11/2020    HGB 10.6 (L) 12/05/2021    HGB 12.0 (L) 03/11/2020    HCT 33.3 (L) 12/05/2021    HCT 35.4 (L) 03/11/2020    MCV 90 12/05/2021    MCV 89 03/11/2020    MCH 28.6 12/05/2021    MCH 30.3 03/11/2020    MCHC 31.8 12/05/2021    MCHC 33.9 03/11/2020    RDW 13.4 12/05/2021    RDW 13.7 03/11/2020     12/05/2021     03/11/2020     BMP RESULTS:  Lab Results   Component Value Date     06/13/2022     03/11/2020    POTASSIUM 4.4 06/13/2022    POTASSIUM 4.6 03/11/2020    CHLORIDE 105 02/27/2023    CHLORIDE 116 (H) 03/11/2020    CO2 22 06/13/2022    CO2 28 03/11/2020    ANIONGAP 5 06/13/2022     ANIONGAP <1 (L) 03/11/2020    GLC 97 06/13/2022    GLC 98 03/11/2020    BUN 22 06/13/2022    BUN 27 03/11/2020    CR 1.66 (H) 06/13/2022    CR 1.31 (H) 03/11/2020    GFRESTIMATED 39 (L) 06/13/2022    GFRESTIMATED 49 (L) 03/11/2020    GFRESTBLACK 57 (L) 03/11/2020    AURORA 8.5 06/13/2022    AURORA 8.5 03/11/2020      INR RESULTS:  Lab Results   Component Value Date    INR 1.3 (H) 10/11/2022    INR 6.5 (HH) 10/06/2022    INR 2.82 (H) 12/06/2021    INR 2.82 (H) 12/05/2021    INR 3.50 (H) 07/08/2021    INR 1.80 (H) 06/28/2021            Medications     Current Outpatient Medications   Medication Sig Dispense Refill    apixaban ANTICOAGULANT (ELIQUIS) 5 MG tablet Take 1 tablet (5 mg) by mouth 2 times daily 60 tablet 11    aspirin 81 MG tablet Take 81 mg by mouth daily      Cholecalciferol (VITAMIN D3) 2000 UNITS CAPS Take by mouth daily      dutasteride (AVODART) 0.5 MG capsule Take 1 capsule (0.5 mg) by mouth daily 30 capsule 11    evolocumab (REPATHA) 140 MG/ML prefilled autoinjector Inject 1 mL (140 mg) Subcutaneous every 14 days 6 mL 3    Fish Oil-Cholecalciferol (FISH OIL + D3) 8942-8023 MG-UNIT CAPS Take 2 tablets by mouth daily      hydrocortisone (CORTAID) 1 % external cream Apply topically 2 times daily 30 g 0    metoprolol succinate ER (TOPROL XL) 25 MG 24 hr tablet Take 1 tablet (25 mg) by mouth daily 90 tablet 3    nitroGLYcerin (NITROSTAT) 0.4 MG sublingual tablet Place 1 tablet (0.4 mg) under the tongue every 5 minutes as needed for chest pain Take as directed 25 tablet 1    sacubitril-valsartan (ENTRESTO) 24-26 MG per tablet Take 1 tablet by mouth 2 times daily 180 tablet 3    Skin Protectants, Misc. (EUCERIN) cream Apply topically as needed for dry skin 113 g 0    tamsulosin (FLOMAX) 0.4 MG capsule Take 0.4 mg by mouth daily          Past Medical History     Past Medical History:   Diagnosis Date    BPH (benign prostatic hyperplasia)     Cardiomyopathy, ischemic     EF 49% by cardiac MRI 1/15/2014    Coronary  artery disease 1/24/06    Cypher CANDIDA to LAD    Gastro-oesophageal reflux disease     Hyperlipidaemia     Hypertension     Left ventricular thrombus     Myocardial infarction (H) 1/2006    Anterior    TIA (transient ischaemic attack)      Past Surgical History:   Procedure Laterality Date    CORONARY ANGIOGRAPHY ADULT ORDER  1/24/06    Cypher CANDIDA to LAD    HEART CATH, ANGIOPLASTY  1/2006    Cypher CANDIDA to LAD    TONSILLECTOMY & ADENOIDECTOMY       Family History   Problem Relation Age of Onset    Heart Disease Mother         heart failure    Heart Disease Brother             Allergies   Flomax [tamsulosin], Aldactone [spironolactone], Carvedilol, Colesevelam, Ezetimibe, Lisinopril, Pravastatin, Rosuvastatin, and Simvastatin    40 minutes spent on the date of the encounter doing chart review, history and exam, documentation and further activities as noted above    JAE Marcos CNP  [facility]  Pager: [phone]        Thank you for allowing me to participate in the care of your patient.      Sincerely,     JAE Marcos CNP     [facility]  cc:   Satya Newton MD  [address]  [place]  [place] [address]

## 2023-05-30 NOTE — PROGRESS NOTES
Vulnerable Adult report submitted to the [place] [facility] ([facility]).   Report Number:  [id number]    KSfroyr BARRETT([facility]) 05/30/23  4:14 PM

## 2023-05-30 NOTE — ED TRIAGE NOTES
Pt brought down from heart center for confusion. Pt refused to be seen, states he drove himself here, awake, alert and orientated X 4. Refused all care and walked out

## 2023-05-30 NOTE — PATIENT INSTRUCTIONS
No medication changes  Meet with pharmacist to review medications  Get labs drawn today  Follow up with Farrah in 2 months  Please call with any questions/concerns [phone]

## 2023-06-08 NOTE — PROGRESS NOTES
Janee with [facility] ([phone]) called requesting call back to discuss vulnerable adult report made by our team.    Janee can be reached before 2:30 pm today, or tomorrow during business hours.    Will ask Farrah Bernstein CNP to please reach out.    Future Appointments   Date Time Provider Department Center   7/11/2023 11:00 AM Nya Valdez, PharmD [facility]   [facility]20[facility]2023  [facility]   7/20/2023 10:30 AM [facility] Bernstein[facility]       Jessica Conway RN BSN   12:21 PM 06/08/23  CORE nurse line M-F 8a-4p: [phone]

## 2023-06-08 NOTE — PROGRESS NOTES
Called Janee with [facility] back to discuss vulnerable adult report.     JAE Marcos [facility]  Pager: [phone]

## 2023-07-13 ENCOUNTER — TELEPHONE (OUTPATIENT)
Dept: CARDIOLOGY | Facility: CLINIC | Age: 88
End: 2023-07-13
Payer: MEDICARE

## 2023-07-13 NOTE — TELEPHONE ENCOUNTER
MTM appointment no showed, we made one more attempt to reschedule.     Routing back to referring provider and MTM pharmacist.       Guillermo Jackson, MTM

## 2023-07-20 ENCOUNTER — CARE COORDINATION (OUTPATIENT)
Dept: CARDIOLOGY | Facility: CLINIC | Age: 88
End: 2023-07-20

## 2023-07-20 ENCOUNTER — LAB (OUTPATIENT)
Dept: LAB | Facility: CLINIC | Age: 88
End: 2023-07-20
Payer: MEDICARE

## 2023-07-20 ENCOUNTER — OFFICE VISIT (OUTPATIENT)
Dept: CARDIOLOGY | Facility: CLINIC | Age: 88
End: 2023-07-20
Attending: NURSE PRACTITIONER
Payer: MEDICARE

## 2023-07-20 VITALS
WEIGHT: 115 LBS | SYSTOLIC BLOOD PRESSURE: 154 MMHG | HEIGHT: 64 IN | DIASTOLIC BLOOD PRESSURE: 75 MMHG | HEART RATE: 67 BPM | BODY MASS INDEX: 19.63 KG/M2 | OXYGEN SATURATION: 99 %

## 2023-07-20 DIAGNOSIS — I50.22 CHRONIC SYSTOLIC HEART FAILURE (H): Primary | ICD-10-CM

## 2023-07-20 DIAGNOSIS — N18.31 CHRONIC RENAL IMPAIRMENT, STAGE 3A (H): ICD-10-CM

## 2023-07-20 DIAGNOSIS — I50.22 CHRONIC SYSTOLIC HEART FAILURE (H): ICD-10-CM

## 2023-07-20 DIAGNOSIS — I25.10 CORONARY ARTERY DISEASE INVOLVING NATIVE CORONARY ARTERY OF NATIVE HEART WITHOUT ANGINA PECTORIS: ICD-10-CM

## 2023-07-20 DIAGNOSIS — E78.2 MIXED HYPERLIPIDEMIA: ICD-10-CM

## 2023-07-20 LAB
ANION GAP SERPL CALCULATED.3IONS-SCNC: 10 MMOL/L (ref 7–15)
BUN SERPL-MCNC: 37.2 MG/DL (ref 8–23)
CALCIUM SERPL-MCNC: 9 MG/DL (ref 8.8–10.2)
CHLORIDE SERPL-SCNC: 104 MMOL/L (ref 98–107)
CREAT SERPL-MCNC: 2.03 MG/DL (ref 0.67–1.17)
DEPRECATED HCO3 PLAS-SCNC: 22 MMOL/L (ref 22–29)
GFR SERPL CREATININE-BSD FRML MDRD: 31 ML/MIN/1.73M2
GLUCOSE SERPL-MCNC: 101 MG/DL (ref 70–99)
HGB BLD-MCNC: 10.7 G/DL (ref 13.3–17.7)
POTASSIUM SERPL-SCNC: 4.5 MMOL/L (ref 3.4–5.3)
SODIUM SERPL-SCNC: 136 MMOL/L (ref 136–145)

## 2023-07-20 PROCEDURE — 85018 HEMOGLOBIN: CPT | Performed by: NURSE PRACTITIONER

## 2023-07-20 PROCEDURE — 99215 OFFICE O/P EST HI 40 MIN: CPT | Performed by: NURSE PRACTITIONER

## 2023-07-20 PROCEDURE — 80048 BASIC METABOLIC PNL TOTAL CA: CPT | Performed by: NURSE PRACTITIONER

## 2023-07-20 PROCEDURE — 36415 COLL VENOUS BLD VENIPUNCTURE: CPT | Performed by: NURSE PRACTITIONER

## 2023-07-20 NOTE — PATIENT INSTRUCTIONS
Call CORE nurse for any questions or concerns:  [phone]   *If you have concerns after hours, please call [phone], option 2 to speak with on call Cardiologist.    1. Medication changes and/or recommendations from today:  none     2. Follow up plan:    - blood draw in 2 weeks to check kidneys function   - Schedule pharmacy appt   - Follow up with Farrah in 2 months with labs prior     3. Weigh yourself daily and write it down.     4. Call CORE nurse if your weight is up more than 2 pounds in one day or 5 pounds in one week.     5. Call CORE nurse if you feel more short of breath, have more abdominal bloating, or leg swelling.     6. Continue low sodium diet (less than 2000 mg daily). If you eat less salt, you will retain less fluid.     7. Alcohol can weaken your heart further. You should avoid alcohol or limit its use to special times, such as a holiday or birthday.      8. Do NOT take Aleve or ibuprofen without talking to your doctor first.      9. Lab Results:   Component      Latest Ref Rng 7/20/2023  8:59 AM   Sodium      136 - 145 mmol/L 136    Potassium      3.4 - 5.3 mmol/L 4.5    Chloride      98 - 107 mmol/L 104    Carbon Dioxide (CO2)      22 - 29 mmol/L 22    Anion Gap      7 - 15 mmol/L 10    Urea Nitrogen      8.0 - 23.0 mg/dL 37.2 (H)    Creatinine      0.67 - 1.17 mg/dL 2.03 (H)    Calcium      8.8 - 10.2 mg/dL 9.0    Glucose      70 - 99 mg/dL 101 (H)    GFR Estimate      >60 mL/min/1.73m2 31 (L)       Legend:  (H) High  (L) Low     CORE Clinic: Cardiomyopathy, Optimization, Rehabilitation, Education  The CORE Clinic is a heart failure specialty clinic within the [facility] where you will work with specialized nurse practitioners, physician assistants, doctors, and registered nurses. They are dedicated to helping patients with heart failure to carefully adjust medications, receive education, and learn who and when to call if symptoms develop. They specialize in helping you better  understand your condition, slow the progression of your disease, improve the length and quality of your life, help you detect future heart problems before they become life threatening, and avoid hospitalizations.

## 2023-07-20 NOTE — PROGRESS NOTES
[facility] [place] [facility]  Visit today with STACEY Mccullough:    1. Continued concerns regarding cognition and safety to live independently. Per notes, PCP and family are aware. Per STACEY Mccullouhg's request.   --Called PCP/Dr. Doshi's office regarding continued concerns about cognitive impairment and safety. Spoke with , nurse busy at the moment. Left message with  with pt info, CORE RN line, and message to have RN for Dr. Doshi call back to discuss continued concerns with cognition.        2. BMP today showed decline in renal function. Pt states he is only taking Apixiban and Entresto. STACEY Mccullough requested update be sent to Dr. Oneill with results and asked they reach out to us if any other recommendations, otherwise, no changes made today with plan to repeat BMP in 2 weeks on 8/3/23.   --Reminder sent to CORE board to make sure pt has labs done per Farrah's request. Also faxed office note from today with labs and asked Dr. Oneill review and call or fax back any new recommendations.       Future Appointments   Date Time Provider Department Center   8/3/2023  2:15 PM [facility]   9/21/2023  9:30 [facility]   9/21/2023 10:30 AM Farrah Bernstein, APRN CNP [facility]     Radhika Strong RN, BSN  07/20/23 at 3:47 PM

## 2023-07-20 NOTE — PROGRESS NOTES
Cardiology Clinic Progress Note  Sawyer Renteria MRN# [id number]   YOB: 1934 Age: 89 year old   Primary Cardiologist: Dr. Newton  Reason for visit: CORE follow up             Assessment and Plan:   Sawyer Renteria is a very pleasant 89 year old male whom I am seeing today for CORE follow up.       1. Heart failure with reduced ejection fraction, ischemic cardiomyopathy - LVEF 35-40% per echo 12/2021              - NYHA class II, stage C              - Etiology - ischemic              - Guideline directed medical therapy                          - Betablocker: Continue metoprolol XL 25mg daily                           - ACEI/ARB/ARNI: Continue Entresto 24/26mg BID                          - Aldactone antagonist: none, due to CKD  2. Coronary artery disease - s/p stenting to LAD and angioplasty of diagonal in 2006. Stable, no ischemic symptoms              - Continue aspirin, metoprolol and repatha  3. Dyslipidemia with statin intolerance - LDL 81 and HDL 68              - Continue Repatha   4. Hypertension - controlled  5. CKD stage III - baseline creatinine 1.3-1.7, labs today show decline in renal function, unclear etiology, notes he hasn't ate/drank anything yet today which is likely contributing.   6. Apical wall motion abnormality, with history of apical thrombus and cardioembolic stroke              - Continue eliquis (appropriate low dose)  7. Noncompliance with medications - unclear what medications patient is taking, states that he is only taking his apixaban and entresto. MTM referral placed, patient no showed to visit, patient is willing to proceed with MTM, referral placed. .   8. Cognitive impairment - per prior notes appears PCP and family aware,  Continued concerns regarding his cognition and safety to live independently. During our last OV vulnerable adult filed and spoke with [facility] employee regarding concerns.    - No consent to communicate with son on file.     I saw patient today for  CORE follow up. From a cardiac perspective he continues to do well, he appears compensated and euvolemic, no anginal symptoms. His cognitive impairment continues to be a concern. He is unclear what medications he is taking, states he is only taking apixaban and entresto, he no showed to previously scheduled [facility] evaluation, but again willing to meet with them, referral placed. During our last office visit vulnerable adult was filed, I spoke with the county after this submission and provided information per their request. At this time will update primary care provider, Dr. Doshi, regarding continued concerns about cognitive impairment and safety.     BMP today shows decline in renal function, creatinine 2.03, he has not ate or drank anything yet today which is likely contributing. Unclear if medications are contributing, as unable to determine what medications he is taking. At this time will update his nephrology provider Dr. Oneill and plan to repeat labs in 2 weeks. Encouraged patient to eat/drink like normal prior to labs.       Changes today: none, unable to make any medication adjustments secondary to concerns with compliance, unclear what he is actually taking.     Follow up plan:     Sent additional referral to [facility] patient willing to meet with them    Send labs to nephrology for review due to decline in renal function    Repeat BMP in 2 weeks     Reach out to PCP given concerns with cognitive decline, worries about safety with medication management.     CORE follow up in 2 months with labs prior        History of Presenting Illness:    Sawyer Renteria is a very pleasant 89 year old male with a history of HFrEF, coronary artery disease, ischemic cardiomyopathy, dyslipidemia, hypertension, CKD and memory loss.     He had an anterior MI in 2006 and underwent stenting of his LAD and angioplasty of his diagonal. He has residual 60% OM 1 stenosis. He developed an apical thrombus with a cardioembolic CVA and has remained  on warfarin.      Echocardiogram 12/2021 showed LVEF 35-40%, Severe hypokinesis of the mid-apical inferior, mid-apical anterior, mid anteroseptal, apical septal, and apical lateral segments. RV normal size/function. Similar when compared to prior study 6/2021.     Cardiac MRI 1/2020 showed LVEF 52%,no evidence of thrombus. EF and wall motion similar to prior study in 2014.      He was hospitalized in December 2021 with some complaints of transient left arm numbness/tinging, suspected to be secondary to paresthesia from reduced blood flow.      I first met patient for CORE enrollment in April 2022 at which time he was doing well. We reviewed his cardiomyopathy and consideration for titration of GDMT. Also reached out to pharmacy liaison to price out Entresto pricing and was a financially feasible option for patient. He has been following in CORE clinic for optimization of GDMT, he has been started on entresto and continued on his metoprolol XL.      Echocardiogram 10/3/2022 showed LVEF 40-45%, RV normal size/function, no significant valvular disease, no significant change when compared to prior study.      He saw Dr. Newton in October 2022 at which time there was conttinued concerns about his memory impairment. His warfarin was changed to eliquis (appropriate low dose) due to memory issue concerns, which he has been on due to LV apical thrombus secondary to apical scar.     I saw patient in May at which time he as doing well from a cardiac perspective, compensated/euvolemic and no anginal symptoms. At that time noted continued concerns about progressive cognitive impairment, recommended cognitive evaluation. Concerns with medication management, MTM referral placed. Ultimately sent patient to the ED as after his office visit with me he was refusing to leave our waiting room stating he needs to see me, left ED before being seen. Vulnerable adult filed.      Patient is here today for CORE follow up.     Patient reports  "feeling good. Monitoring weights daily a home, states stable 112-114#. Denies LE edema. Denies abdominal distention/bloating. Denies shortness of breath at rest. Denies exertional dyspnea. Sleeping good. Denies orthopnea or PND. Denies chest pain or chest tightness. Denies dizziness, lightheadedness or other presyncopal symptoms. Denies tachycardia or palpitations. Denies episodes of bleeding. Has not taken medications yet today, states he is taking apixaban and entresto, but not taking other medication. Feels Entresto and Apixaban are most important.     Labs today show decline in renal function, creatinine 2.03, BUN 37.2, stable electrolytes, hemoglobin 10.7. Blood pressure 154/75 and HR 67 in clinic today. Monitoring blood pressures at home, ranging 93-/71. Has not ate or drank anything today. Notes he has not urinated yet today and it is 11am, states the last time he urinated was prior to going to bed.     Appetite good. Cooks occasionally. Getting most meals as take out from family friends restaurant. No set exercise routine, used to have walking routine. Notes bilateral knee pain makes activity difficult. Wife . Denies alcohol use. Denies tobacco use.         Social History    Has one son and one daughter, notes my \"daughter lives somewhere in the [place]\". Notes not in contact with his children.  Social History     Socioeconomic History     Marital status:      Spouse name: Not on file     Number of children: Not on file     Years of education: Not on file     Highest education level: Not on file   Occupational History     Not on file   Tobacco Use     Smoking status: Never     Smokeless tobacco: Never   Substance and Sexual Activity     Alcohol use: No     Drug use: Never     Sexual activity: Not on file   Other Topics Concern     Parent/sibling w/ CABG, MI or angioplasty before 65F 55M? No      Service Not Asked     Blood Transfusions Not Asked     Caffeine Concern No     " Occupational Exposure Not Asked     Hobby Hazards Not Asked     Sleep Concern No     Stress Concern No     Weight Concern Yes     Comment: weight loss     Special Diet No     Back Care Not Asked     Exercise Yes     Comment: bicycle 10 min, walking, 3 days week     Bike Helmet Not Asked     Seat Belt Yes     Self-Exams Not Asked   Social History Narrative     Not on file     Social Determinants of Health     Financial Resource Strain: Not on file   Food Insecurity: Not on file   Transportation Needs: Not on file   Physical Activity: Not on file   Stress: Not on file   Social Connections: Not on file   Intimate Partner Violence: Not on file   Housing Stability: Not on file          Review of Systems:   10 point ROS neg other than the symptoms noted above in the HPI.         Physical Exam:   Vitals: There were no vitals taken for this visit.   Wt Readings from Last 4 Encounters:   05/30/23 54.3 kg (119 lb 9.6 oz)   02/13/23 52.2 kg (115 lb)   10/11/22 55.2 kg (121 lb 9.6 oz)   06/13/22 53.8 kg (118 lb 9.6 oz)     GEN: well nourished, in no acute distress.  HEENT:  Pupils equal, round. Sclerae nonicteric.   NECK: Supple, no masses appreciated. JVP appears normal.   C/V:  Regular rate and rhythm, no murmur, rub or gallop  RESP: Respirations are unlabored. Clear to auscultation bilaterally without wheezing, rales, or rhonchi.  GI: Abdomen soft, nontender.  EXTREM: No LE edema.  NEURO: Alert and cooperative  SKIN: Warm and dry       Data:     LIPID RESULTS:  Lab Results   Component Value Date    CHOL 156 08/16/2021    CHOL 162 01/28/2019    HDL 66 08/16/2021    HDL 60 01/28/2019    LDL 68 08/16/2021    LDL 77 01/28/2019    TRIG 89 08/11/2022    TRIG 124 01/28/2019    CHOLHDLRATIO 4.3 10/13/2015     LIVER ENZYME RESULTS:  Lab Results   Component Value Date    AST 21 07/25/2017    ALT 19 08/16/2021    ALT <5 (L) 01/28/2019     CBC RESULTS:  Lab Results   Component Value Date    WBC 8.2 12/05/2021    WBC 5.4 03/11/2020     RBC 3.70 (L) 12/05/2021    RBC 3.96 (L) 03/11/2020    HGB 10.6 (L) 12/05/2021    HGB 12.0 (L) 03/11/2020    HCT 33.3 (L) 12/05/2021    HCT 35.4 (L) 03/11/2020    MCV 90 12/05/2021    MCV 89 03/11/2020    MCH 28.6 12/05/2021    MCH 30.3 03/11/2020    MCHC 31.8 12/05/2021    MCHC 33.9 03/11/2020    RDW 13.4 12/05/2021    RDW 13.7 03/11/2020     12/05/2021     03/11/2020     BMP RESULTS:  Lab Results   Component Value Date     06/13/2022     03/11/2020    POTASSIUM 4.4 06/13/2022    POTASSIUM 4.6 03/11/2020    CHLORIDE 105 02/27/2023    CHLORIDE 116 (H) 03/11/2020    CO2 22 06/13/2022    CO2 28 03/11/2020    ANIONGAP 5 06/13/2022    ANIONGAP <1 (L) 03/11/2020    GLC 97 06/13/2022    GLC 98 03/11/2020    BUN 22 06/13/2022    BUN 27 03/11/2020    CR 1.66 (H) 06/13/2022    CR 1.31 (H) 03/11/2020    GFRESTIMATED 39 (L) 06/13/2022    GFRESTIMATED 49 (L) 03/11/2020    GFRESTBLACK 57 (L) 03/11/2020    AURORA 8.5 06/13/2022    AURORA 8.5 03/11/2020      INR RESULTS:  Lab Results   Component Value Date    INR 1.3 (H) 10/11/2022    INR 6.5 (HH) 10/06/2022    INR 2.82 (H) 12/06/2021    INR 2.82 (H) 12/05/2021    INR 3.50 (H) 07/08/2021    INR 1.80 (H) 06/28/2021          Medications     Current Outpatient Medications   Medication Sig Dispense Refill     apixaban ANTICOAGULANT (ELIQUIS) 5 MG tablet Take 1 tablet (5 mg) by mouth 2 times daily 60 tablet 11     aspirin 81 MG tablet Take 81 mg by mouth daily       Cholecalciferol (VITAMIN D3) 2000 UNITS CAPS Take by mouth daily       dutasteride (AVODART) 0.5 MG capsule Take 1 capsule (0.5 mg) by mouth daily 30 capsule 11     evolocumab (REPATHA) 140 MG/ML prefilled autoinjector Inject 1 mL (140 mg) Subcutaneous every 14 days 6 mL 3     Fish Oil-Cholecalciferol (FISH OIL + D3) 8103-8289 MG-UNIT CAPS Take 2 tablets by mouth daily       hydrocortisone (CORTAID) 1 % external cream Apply topically 2 times daily 30 g 0     metoprolol succinate ER (TOPROL XL) 25 MG 24 hr  tablet Take 1 tablet (25 mg) by mouth daily (Patient taking differently: Take 25 mg by mouth daily as needed) 90 tablet 3     nitroGLYcerin (NITROSTAT) 0.4 MG sublingual tablet Place 1 tablet (0.4 mg) under the tongue every 5 minutes as needed for chest pain Take as directed 25 tablet 1     sacubitril-valsartan (ENTRESTO) 24-26 MG per tablet Take 1 tablet by mouth 2 times daily 180 tablet 3     Skin Protectants, Misc. (EUCERIN) cream Apply topically as needed for dry skin 113 g 0     tamsulosin (FLOMAX) 0.4 MG capsule Take 0.4 mg by mouth daily (Patient not taking: Reported on 5/30/2023)          Past Medical History     Past Medical History:   Diagnosis Date     BPH (benign prostatic hyperplasia)      Cardiomyopathy, ischemic     EF 49% by cardiac MRI 1/15/2014     Coronary artery disease 1/24/06    Cypher CANDIDA to LAD     Gastro-oesophageal reflux disease      Hyperlipidaemia      Hypertension      Left ventricular thrombus      Myocardial infarction (H) 1/2006    Anterior     TIA (transient ischaemic attack)      Past Surgical History:   Procedure Laterality Date     CORONARY ANGIOGRAPHY ADULT ORDER  1/24/06    Cypher CANDIDA to LAD     HEART CATH, ANGIOPLASTY  1/2006    Cypher CANDIDA to LAD     TONSILLECTOMY & ADENOIDECTOMY       Family History   Problem Relation Age of Onset     Heart Disease Mother         heart failure     Heart Disease Brother           Allergies   Flomax [tamsulosin], Aldactone [spironolactone], Carvedilol, Colesevelam, Ezetimibe, Lisinopril, Pravastatin, Rosuvastatin, and Simvastatin    40 minutes spent on the date of the encounter doing chart review, history and exam, documentation and further activities as noted above      JAE [facility] HEART CARE  Pager: [phone]

## 2023-07-24 NOTE — PROGRESS NOTES
[facility]-CORE Clinic  Have not heard back from Dr. Doshi's office. Attempted to call [facility] again, nurse unavailable.  can see message from last week she sent when I called previously. She will send a second message and rom high priority to call me.     Radhika Strong RN, BSN  07/24/23 at 2:47 PM

## 2023-07-25 ENCOUNTER — PATIENT OUTREACH (OUTPATIENT)
Dept: CARE COORDINATION | Facility: CLINIC | Age: 88
End: 2023-07-25
Payer: MEDICARE

## 2023-07-25 NOTE — PROGRESS NOTES
Clinic Care Coordination Contact  Clinic Care Coordination Contact  OUTREACH    Referral Information:  Referral Source: Care Team. SIMONE CC notified by PCP clinic that pt's cardiology clinic had called expressing concern about pt's cognition and self neglect during pt's recent appointment on 7/20. PCP clinic requested that writer reach out to patient to further assess.    Primary Diagnosis: Cognitive Impairment    Chief Complaint   Patient presents with    Clinic Care Coordination - Initial     SW Outreach        [facility] Utilization:   Clinic Utilization  Compliance Concerns: Yes  No PCP office visit in Past Year: No  Utilization      Hospital Admissions  0             ED Visits  1             No Show Count (past year)  5                    Current as of: 7/22/2023  1:10 AM                Clinical Concerns:  Current Medical Concerns:  Heart failure with reduced ejection fracture, CAD, dyslipidemia, kidney disease stage 3, HTN  Current Behavioral Concerns: Concerns noted regarding non-compliance with medication, concerns noted regarding limited fluid/food intake.    Education Provided to patient: SIMONE role      Medication Management:  Medication review status: Per notes,: He is unclear what medications he is taking, states he is only taking apixaban and entresto. Unclear whether pt is taking eliquis, chart also notes that pt's Warfarin was changed to low dose eliquis due to memory impairment concerns. Medical team has tried to get pt to meet with MTM but pt declined and another MTM referral has been made and an appointment is scheduled for 8/28.     Functional Status:  Bed or wheelchair confined:: No  Mobility Status: Independent  Fallen 2 or more times in the past year?: No  Any fall with injury in the past year?: No    Living Situation:  Current living arrangement:: I live in a private home, I live alone  Type of residence:: Private home - stairs    Lifestyle & Psychosocial Needs:  Pt is a 89 yr old  male. Pt  "lives alone in a large private home and reports that he has assistance with housecleaning once per month, he reports that the  had been coming twice per month but they determined he did not need it that often because it is just him. He does report that he has assistance with outdoor maintenance. Pt reports that he has support from his neighbors. Writer asked about his children, pt reports that his daughter (Yesenia) lives in [place] and  \"an Englishman\" and he is in touch with her. Writer asked about his son, he stated that he is in occasional contact with his son who he states does live in [place].     Pt reports that he does continue to drive. SW offered to help schedule a PCP appointment with Dr. Doshi, pt agreeable and writer scheduled appointment for 8/10 at noon. Pt asked that writer call him back tomorrow to discuss appointment time as he was in the middle of cooking londono.     Chart review indicates that an adult protection report was filed following concerns from a 5/2023 clinic visit. SIMONE has left message with adult protection worker: Janee phone: [phone] who had been the assigned investigator to get an update on her findings.     Social Determinants of Health     Tobacco Use: Low Risk  (7/20/2023)    Patient History     Smoking Tobacco Use: Never     Smokeless Tobacco Use: Never     Passive Exposure: Not on file   Alcohol Use: Not on file   Financial Resource Strain: Not on file   Food Insecurity: Not on file   Transportation Needs: Not on file   Physical Activity: Not on file   Stress: Not on file   Social Connections: Not on file   Intimate Partner Violence: Not on file   Depression: Not on file   Housing Stability: Not on file     Inadequate nutrition (GOAL):: Yes- chart notes from clinic visit on 7/20 indicate that it was 11AM and pt reported that he had not ate/drank or urinated yet that day  Tube Feeding: No  Inadequate activity/exercise (GOAL):: No  Significant changes in sleep " pattern (GOAL): No  Transportation means:: Regular car     Informal Support system:: Neighbors     Called and spoke with pt, communication was somewhat limited as pt is [facility]. Pt agreeable to writer assisting with scheduling PCP appointment but he was busy cooking so asked writer to call back tomorrow.     Resources and Interventions:  Current Resources: Housecleaning/outdoor maintenance assistance        Employment Status: retired     Advance Care Plan/Directive  Advanced Care Plans/Directives on file:: No    Patient/Caregiver understanding: Yes       Future Appointments                In 1 week [facility]    In 1 month Nya Valdez, PharmD [facility], CS    In 1 month [facility], [facility]    In 1 month Farrah Bernstein, APRN CNP [facility], [facility]            Plan:  CC will await call back from adult protection and f/up with pt as planned tomorrow.      Claudine Fernandez [facility]  Social Work Care Coordinator  Phone: [phone]

## 2023-07-25 NOTE — PROGRESS NOTES
[facility]CORE [facility]   Spoke with nurse for Dr. Doshi and reviewed STACEY Mccullough's concerns. Nurse will have their  reach out and they know pt well and understand dynamics with pt's children as well. They will follow up with social work and patient and see if they can get pt any help in home via home care, etc.     FYI to STACEY Mccullough.     Rahdika Strong RN, BSN  07/25/23 at 12:33 PM

## 2023-07-26 DIAGNOSIS — I25.5 CARDIOMYOPATHY, ISCHEMIC: ICD-10-CM

## 2023-07-26 RX ORDER — SACUBITRIL AND VALSARTAN 24; 26 MG/1; MG/1
1 TABLET, FILM COATED ORAL 2 TIMES DAILY
Qty: 180 TABLET | Refills: 3 | Status: SHIPPED | OUTPATIENT
Start: 2023-07-26 | End: 2024-06-26

## 2023-07-26 NOTE — PROGRESS NOTES
Clinic Care Coordination Contact  Follow Up Progress Note      Assessment: Follow-up call placed to pt today to remind him of upcoming PCP appointment. Pt wrote down appointment information. Pt feels that he is doing well, reports that he is eating/drinking and reports taking 2 medications: eliquis and entresto.     Throughout our conversation pt did repeat himself and questions. Pt told writer that he moved to [place] in 1957 from [place]. Pt still drives himself to [place] grocery store and cooks his own food. When writer expressed concern about pt's lack of eating/drinking enough water according to his report during cardiology, pt reports that his weight has been stable and reports drinking plenty of water throughout the day.     Per PCP, there have been many conversations and attempts to encourage pt to have more assistance in the home or move into a assisted care facility but pt has not been open to this.  has not received a call back from the adult protection worker that had been doing an investigation last month.     Care Gaps:    Health Maintenance Due   Topic Date Due    MICROALBUMIN  Never done    ADVANCE CARE PLANNING  Never done    URINALYSIS  Never done    Pneumococcal Vaccine: 65+ Years (2 - PCV) 10/11/2007    DTAP/TDAP/TD IMMUNIZATION (2 - Td or Tdap) 10/11/2016    COVID-19 Vaccine (4 - Pfizer series) 01/06/2022    PHQ-2 (once per calendar year)  Never done    MEDICARE ANNUAL WELLNESS VISIT  08/11/2023    LIPID  08/11/2023         Intervention/Education provided during outreach: Assessing safety, providing emotional support and supportive check-in     Outreach Frequency: monthly      Plan:   Pt plans to attend PCP appointment on 8/10 with Dr. Doshi.   Pt is out of scope for  CC outreach, however will continue to follow for a few more outreaches for now.     Claudine Fernandez, [facility]  Social Work Care Coordinator  Phone: [phone]

## 2023-08-14 ENCOUNTER — PATIENT OUTREACH (OUTPATIENT)
Dept: CARE COORDINATION | Facility: CLINIC | Age: 88
End: 2023-08-14
Payer: MEDICARE

## 2023-08-14 NOTE — PROGRESS NOTES
Clinic Care Coordination Contact  Follow Up Progress Note      Assessment: Pt was seen by PCP on 8/10. PCP (Dr. Doshi) requested home safety/PT evaluation as pt scored 21/30 on mini cognitive screening. Pt lives alone in large house and does not have help aside from housekeeping/outdoor maintenance and has been resistant to discussions about moving into assisted living or more supportive house environment.     SW CC placed call to pt to check-in and discuss home safety evaluation, pt stated that it is fine for writer to make home safety evaluation referral. Pt mentioned that he was having chest pain when he pushed on his chest which is new for him, he denies falling or bumping his chest. Writer suggested calling and speaking with the cardiology clinic nurse, he stated that he didn't know the number, writer offered to look up the number, while writer was looking up the number pt asked if he could come in to see Dr. Doshi, writer looked and Dr. Doshi did have an opening this afternoon so writer scheduled pt for 3:45PM this afternoon with Dr. Doshi and sent Dr. Doshi message.     Care Gaps:    Health Maintenance Due   Topic Date Due    HF ACTION PLAN  Never done    MICROALBUMIN  Never done    ADVANCE CARE PLANNING  Never done    URINALYSIS  Never done    Pneumococcal Vaccine: 65+ Years (2 - PCV) 10/11/2007    DTAP/TDAP/TD IMMUNIZATION (2 - Td or Tdap) 10/11/2016    COVID-19 Vaccine (4 - Pfizer series) 01/06/2022    PHQ-2 (once per calendar year)  Never done    LIPID  08/11/2023    MEDICARE ANNUAL WELLNESS VISIT  08/11/2023       Intervention/Education provided during outreach: Home safety evaluation education and referral made to [facility] phone: [phone], fax: [phone] and PCP appointment made for this afternoon.     Outreach Frequency: monthly      Plan:   Will await call back from [facility] re: home safety eval referral.   Care Coordinator will follow up with pt in 1 month.    Update: Spoke with clinic  "staff, pt did not show for his appointment at 3:45PM. Writer and clinic staff tried to call pt at home and he did not answer, no cell phone or other contact on file. SIMONE CC called [facility] non-emergency to request a welfare check. They stated that they would send someone to pt's home to check on him.    Update:  Received call back from [place] Police, they were able to talk with pt in his home and report that he is \"fine\" and pt did not feel he needed emergent care. Pt told police that he wasn't feeling well and this is why he did not attend the appointment. SIMONE MCLAUGHLIN will coordination with clinic triage staff to establish follow-up plan with patient.     Claudine Fernandez [facility]  Social Work Care Coordinator  Phone: [phone]   "

## 2023-08-29 DIAGNOSIS — E78.2 MIXED HYPERLIPIDEMIA: ICD-10-CM

## 2023-09-12 ENCOUNTER — TELEPHONE (OUTPATIENT)
Dept: CARDIOLOGY | Facility: CLINIC | Age: 88
End: 2023-09-12
Payer: MEDICARE

## 2023-09-12 NOTE — TELEPHONE ENCOUNTER
"Patient has Medicare D through [employer].  Patient's drug costs have exceeded $4660, and as such will now pay a 25% coinsurance on all covered drugs.  (Also called the \"coverage gap\" or \"donut hole.\")      Farxiga: Not covered.    Jardiance  --$161/mo.  --If total out-of-pocket costs exceed $7,400, coinsurance will be reduced to a 5%, equivalent to $32/mo.    Please reconsult if income-based resources for free or discounted medication are needed.    Patient will pay lower drug costs by choosing a Medicare D plan for 2024 that has flat copays as instead of coinsurances (percentages) in the Initial Coverage phase.  Plans can be reviewed at medicare.gov October 15-December 7.  The Senior Linkage Line provides free assistance in choosing a plan by calling [phone].    Makenna Person  Pharmacy Technician/Liaison, Discharge Pharmacy   [phone] (voice or text)  [email or web address]    "

## 2023-09-12 NOTE — TELEPHONE ENCOUNTER
Pt is scheduled for a [facility] appt on 09/21/23.      Pt with a history of cardiomyopathy..  Medication list reviewed and pt is not currently on Jardiance, Farxiga, and Invokana.    Please initiate coverage check for the above medications.  Celine Palencia CMA (AAMA)  09/12/23 2:09pm

## 2023-09-14 ENCOUNTER — PATIENT OUTREACH (OUTPATIENT)
Dept: CARE COORDINATION | Facility: CLINIC | Age: 88
End: 2023-09-14
Payer: MEDICARE

## 2023-09-14 NOTE — PROGRESS NOTES
"Clinic Care Coordination Contact  Follow Up Progress Note      Assessment: Call placed to patient to check-in, assess safety and for additional resource needs. During our last contact, pt had reported chest pain, SW CC requested wellness check and pt was found safe in his house. Pt has recently missed MTM appointment as well.    Today, pt reports that he is feeling \"good\". He does not recall having chest pain and states today that he does not have chest pain. Pt asked if he could come and see writer at clinic, writer explained that I am there every Wed. Pt stated that he would plan to come visit next Wed.     Care Gaps:    Health Maintenance Due   Topic Date Due    HF ACTION PLAN  Never done    MICROALBUMIN  Never done    ADVANCE CARE PLANNING  Never done    URINALYSIS  Never done    Pneumococcal Vaccine: 65+ Years (2 - PCV) 10/11/2007    DTAP/TDAP/TD IMMUNIZATION (2 - Td or Tdap) 10/11/2016    COVID-19 Vaccine (4 - Pfizer series) 01/06/2022    PHQ-2 (once per calendar year)  Never done    MEDICARE ANNUAL WELLNESS VISIT  08/11/2023    LIPID  08/11/2023    INFLUENZA VACCINE (1) 09/01/2023         Intervention/Education provided during outreach: Assessing for safety due to cognitive impairment, assess for resources.     Outreach Frequency: monthly      Plan:   Pt may come and visit writer in clinic next week.  Otherwise, care Coordinator will follow up in 1 month.    Claudine Fernandez [facility]  Social Work Care Coordinator  Phone: [phone]   "

## 2023-09-14 NOTE — Clinical Note
He may come and me in clinic next Wed if you wanted to try to reschedule something with him, thanks!

## 2023-09-26 ENCOUNTER — APPOINTMENT (OUTPATIENT)
Dept: CT IMAGING | Facility: CLINIC | Age: 88
End: 2023-09-26
Attending: EMERGENCY MEDICINE
Payer: MEDICARE

## 2023-09-26 ENCOUNTER — HOSPITAL ENCOUNTER (EMERGENCY)
Facility: CLINIC | Age: 88
Discharge: HOME | End: 2023-09-26
Attending: EMERGENCY MEDICINE | Admitting: EMERGENCY MEDICINE
Payer: MEDICARE

## 2023-09-26 VITALS
OXYGEN SATURATION: 97 % | HEIGHT: 63 IN | WEIGHT: 115 LBS | BODY MASS INDEX: 20.38 KG/M2 | SYSTOLIC BLOOD PRESSURE: 137 MMHG | DIASTOLIC BLOOD PRESSURE: 64 MMHG | RESPIRATION RATE: 16 BRPM | TEMPERATURE: 97.4 F | HEART RATE: 74 BPM

## 2023-09-26 DIAGNOSIS — R41.89 COGNITIVE IMPAIRMENT: ICD-10-CM

## 2023-09-26 LAB
ALBUMIN SERPL BCG-MCNC: 3.6 G/DL (ref 3.5–5.2)
ALBUMIN UR-MCNC: 10 MG/DL
ALP SERPL-CCNC: 76 U/L (ref 40–129)
ALT SERPL W P-5'-P-CCNC: 9 U/L (ref 0–70)
ANION GAP SERPL CALCULATED.3IONS-SCNC: 11 MMOL/L (ref 7–15)
APPEARANCE UR: CLEAR
AST SERPL W P-5'-P-CCNC: 18 U/L (ref 0–45)
BASOPHILS # BLD AUTO: 0 10E3/UL (ref 0–0.2)
BASOPHILS NFR BLD AUTO: 0 %
BILIRUB SERPL-MCNC: 0.4 MG/DL
BILIRUB UR QL STRIP: NEGATIVE
BUN SERPL-MCNC: 37.1 MG/DL (ref 8–23)
CALCIUM SERPL-MCNC: 9 MG/DL (ref 8.8–10.2)
CHLORIDE SERPL-SCNC: 107 MMOL/L (ref 98–107)
COLOR UR AUTO: ABNORMAL
CREAT SERPL-MCNC: 2.04 MG/DL (ref 0.67–1.17)
DEPRECATED HCO3 PLAS-SCNC: 21 MMOL/L (ref 22–29)
EGFRCR SERPLBLD CKD-EPI 2021: 31 ML/MIN/1.73M2
EOSINOPHIL # BLD AUTO: 0.1 10E3/UL (ref 0–0.7)
EOSINOPHIL NFR BLD AUTO: 2 %
ERYTHROCYTE [DISTWIDTH] IN BLOOD BY AUTOMATED COUNT: 13.2 % (ref 10–15)
GLUCOSE SERPL-MCNC: 129 MG/DL (ref 70–99)
GLUCOSE UR STRIP-MCNC: NEGATIVE MG/DL
HCT VFR BLD AUTO: 35 % (ref 40–53)
HGB BLD-MCNC: 11.3 G/DL (ref 13.3–17.7)
HGB UR QL STRIP: NEGATIVE
HOLD SPECIMEN: NORMAL
HOLD SPECIMEN: NORMAL
HYALINE CASTS: 2 /LPF
IMM GRANULOCYTES # BLD: 0 10E3/UL
IMM GRANULOCYTES NFR BLD: 0 %
KETONES UR STRIP-MCNC: NEGATIVE MG/DL
LEUKOCYTE ESTERASE UR QL STRIP: NEGATIVE
LYMPHOCYTES # BLD AUTO: 1.4 10E3/UL (ref 0.8–5.3)
LYMPHOCYTES NFR BLD AUTO: 20 %
MCH RBC QN AUTO: 29.4 PG (ref 26.5–33)
MCHC RBC AUTO-ENTMCNC: 32.3 G/DL (ref 31.5–36.5)
MCV RBC AUTO: 91 FL (ref 78–100)
MONOCYTES # BLD AUTO: 0.4 10E3/UL (ref 0–1.3)
MONOCYTES NFR BLD AUTO: 6 %
MUCOUS THREADS #/AREA URNS LPF: PRESENT /LPF
NEUTROPHILS # BLD AUTO: 5 10E3/UL (ref 1.6–8.3)
NEUTROPHILS NFR BLD AUTO: 72 %
NITRATE UR QL: NEGATIVE
NRBC # BLD AUTO: 0 10E3/UL
NRBC BLD AUTO-RTO: 0 /100
PH UR STRIP: 5.5 [PH] (ref 5–7)
PLATELET # BLD AUTO: 288 10E3/UL (ref 150–450)
POTASSIUM SERPL-SCNC: 4.6 MMOL/L (ref 3.4–5.3)
PROT SERPL-MCNC: 7 G/DL (ref 6.4–8.3)
RBC # BLD AUTO: 3.84 10E6/UL (ref 4.4–5.9)
RBC URINE: 0 /HPF
SODIUM SERPL-SCNC: 139 MMOL/L (ref 135–145)
SP GR UR STRIP: 1.02 (ref 1–1.03)
SQUAMOUS EPITHELIAL: <1 /HPF
UROBILINOGEN UR STRIP-MCNC: NORMAL MG/DL
WBC # BLD AUTO: 7 10E3/UL (ref 4–11)
WBC URINE: 1 /HPF

## 2023-09-26 PROCEDURE — 70450 CT HEAD/BRAIN W/O DYE: CPT | Mod: MG

## 2023-09-26 PROCEDURE — 85025 COMPLETE CBC W/AUTO DIFF WBC: CPT | Performed by: SOCIAL WORKER

## 2023-09-26 PROCEDURE — 99284 EMERGENCY DEPT VISIT MOD MDM: CPT | Mod: 25

## 2023-09-26 PROCEDURE — 81001 URINALYSIS AUTO W/SCOPE: CPT | Performed by: EMERGENCY MEDICINE

## 2023-09-26 PROCEDURE — 80053 COMPREHEN METABOLIC PANEL: CPT | Performed by: EMERGENCY MEDICINE

## 2023-09-26 PROCEDURE — 80053 COMPREHEN METABOLIC PANEL: CPT | Performed by: SOCIAL WORKER

## 2023-09-26 PROCEDURE — 36415 COLL VENOUS BLD VENIPUNCTURE: CPT | Performed by: SOCIAL WORKER

## 2023-09-26 PROCEDURE — 85025 COMPLETE CBC W/AUTO DIFF WBC: CPT | Performed by: EMERGENCY MEDICINE

## 2023-09-26 ASSESSMENT — ACTIVITIES OF DAILY LIVING (ADL)
ADLS_ACUITY_SCORE: 35

## 2023-09-26 NOTE — ED TRIAGE NOTES
Patient brought in by EMS from home. Patient lives alone. EMS reports patient called 911 because he believed there was someone else in his house. PD brought patient to bathroom and patient saw himself in the mirror and said that that astrid wants him to leave the house. Patient alert to self and place. Patient is ambulatory upon arrival. Patient states wife  last year and has been living by himself since then.    Patient was placed on a hold by police.

## 2023-09-26 NOTE — ED NOTES
Bed: ED20  Expected date:   Expected time:   Means of arrival:   Comments:  Daniella - 89 M hallucinating eta 1050

## 2023-09-26 NOTE — ED PROVIDER NOTES
"  History     Chief Complaint:  Altered Mental Status     The history is provided by the patient and the EMS personnel.      Sawyer Renteria is a 89 year old male on Eliquis with a history of MI, TIA, CAD, hyperlipidemia, hypertension, and CKD stage 3b who presents to the emergency department for altered mental status. EMS states that the patient called the police because he believed there was someone else in his house. They report that the police brought the patient into the bathroom and he claimed \"that astrid wants him to leave the house\" when looking at himself in the mirror. The patient notes that he lives alone. He denies any recent falls, trauma, or injury. Denies pain anywhere. Denies any changes to bowel movements or urinary symptoms. He reports that he takes medications and is on Eliquis. He is aware of his location but struggled to name the year and month.    Independent Historian:   EMS and the patient provide the history as noted above  I spoke with the patient's son, Renae, who confirms that there is suspicion for developing dementia.  Patient has lived independently for the last year and a half.    Review of External Notes:   N/A    Medications:    ELIQUIS  Aspirin 81 mg  Dutasteride  Evolocumab  Metoprolol succinate  Nitroglycerin  Sacubitril-valsartan  Tamsulosin    Past Medical History:    BPH  Cardiomyopathy, ischemic  CAD  GERD  Hyperlipidemia  Hypertension  Left ventricular thrombus  MI  TIA  CKD stage 3B  Anemia  Arthrosclerosis heart disease    Past Surgical History:    CANDIDA to LAD coronary angiography   Heart cath, angioplasty  Tonsillectomy  Adenoidectomy    Physical Exam   Patient Vitals for the past 24 hrs:   BP Temp Temp src Pulse Resp SpO2 Height Weight   09/26/23 1104 137/64 97.4  F (36.3  C) Oral 74 16 97 % 1.6 m (5' 3\") 52.2 kg (115 lb)      Physical Exam  General: Alert and cooperative with exam. Patient in mild distress.  Baseline mentation.  Head:  Scalp is NC/AT  Eyes:  No scleral " icterus, PERRL  ENT:  The external nose and ears are normal. The oropharynx is normal and without erythema; mucus membranes are moist.   Neck:  Normal range of motion without rigidity.  CV:  Regular rate and rhythm  Resp:  Breath sounds are clear bilaterally    Non-labored, no retractions or accessory muscle use  GI:  Abdomen is soft, no distension, no tenderness. No peritoneal signs  MS:  No lower extremity edema   Skin:  Warm and dry, No rash or lesions noted.  Neuro: Oriented to person and place but not time. No gross motor deficits.    Emergency Department Course     Imaging:  Head CT w/o contrast   Final Result   IMPRESSION:      1. No evidence of acute intracranial hemorrhage, mass, or herniation.   2. Diffuse parenchymal volume loss and white matter changes likely due   to chronic microvascular ischemic change.       PASHA CAIN MD            SYSTEM ID:  [id number]         Report per radiology    Laboratory:  Labs Ordered and Resulted from Time of ED Arrival to Time of ED Departure   COMPREHENSIVE METABOLIC PANEL - Abnormal       Result Value    Sodium 139      Potassium 4.6      Carbon Dioxide (CO2) 21 (*)     Anion Gap 11      Urea Nitrogen 37.1 (*)     Creatinine 2.04 (*)     GFR Estimate 31 (*)     Calcium 9.0      Chloride 107      Glucose 129 (*)     Alkaline Phosphatase 76      AST 18      ALT 9      Protein Total 7.0      Albumin 3.6      Bilirubin Total 0.4     CBC WITH PLATELETS AND DIFFERENTIAL - Abnormal    WBC Count 7.0      RBC Count 3.84 (*)     Hemoglobin 11.3 (*)     Hematocrit 35.0 (*)     MCV 91      MCH 29.4      MCHC 32.3      RDW 13.2      Platelet Count 288      % Neutrophils 72      % Lymphocytes 20      % Monocytes 6      % Eosinophils 2      % Basophils 0      % Immature Granulocytes 0      NRBCs per 100 WBC 0      Absolute Neutrophils 5.0      Absolute Lymphocytes 1.4      Absolute Monocytes 0.4      Absolute Eosinophils 0.1      Absolute Basophils 0.0      Absolute Immature  Granulocytes 0.0      Absolute NRBCs 0.0     ROUTINE UA WITH MICROSCOPIC REFLEX TO CULTURE - Abnormal    Color Urine Light Yellow      Appearance Urine Clear      Glucose Urine Negative      Bilirubin Urine Negative      Ketones Urine Negative      Specific Gravity Urine 1.020      Blood Urine Negative      pH Urine 5.5      Protein Albumin Urine 10 (*)     Urobilinogen Urine Normal      Nitrite Urine Negative      Leukocyte Esterase Urine Negative      Mucus Urine Present (*)     RBC Urine 0      WBC Urine 1      Squamous Epithelials Urine <1      Hyaline Casts Urine 2        Emergency Department Course & Assessments:    Interventions:  None     Assessments:  1305 I obtained history and examined the patient as noted above.   1445 I discussed findings and discharge with the patient. All questions answered.     Independent Interpretation (X-rays, CTs, rhythm strip):  I reviewed the patient's head CT, no evidence of intracranial bleeding.    Consultations/Discussion of Management or Tests:  None     Social Determinants of Health affecting care:   None    Disposition:  The patient was discharged to home.     Impression & Plan      Medical Decision Making:  Patient is a 89-year-old male who presents with concern for altered mental status/confusion after patient called the police concerned that there was someone in his home.  On evaluation he is without complaint with normal vital signs.  No history or evidence of trauma on exam.  As patient is anticoagulated on Eliquis and there is concern for potential altered mental status, head CT was obtained and fortunately unremarkable.  Labs without significant acute findings and UA without evidence of infection.  I discussed the patient's care with his son, Renae, who confirms that there is concern for developing dementia.  Maci, our ED care coordinator, discussed options for ongoing care with the patient's son who felt that he continues to be safe living independently as he has  done for the last 1.5 years.  Referral information was provided to family for additional home support.  No evidence of emergent medical issue or indication for hospitalization.  Patient was discharged from the ED and provided transportation back to his residence.  Close follow-up with PCP was recommended.    Diagnosis:    ICD-10-CM    1. Cognitive impairment  R41.89          Scribe Disclosure:  I, Skip Pringle, am serving as a scribe at 1:30 PM on 9/26/2023 to document services personally performed by Phill Mora DO based on my observations and the provider's statements to me.     9/26/2023   Phill Mora Christopher Warren, DO  09/26/23 2140

## 2023-09-26 NOTE — ED NOTES
Patient states he does not want his doctor to know he is here because he does not want to live in an old folks home. Patient also requesting to go as soon as possible. Patient refused to get into a gown upon arrival.

## 2023-09-26 NOTE — ED NOTES
HE was called for w/c ride back to his residence. HE refused to have pt return home in a w/c van due to slight confusion even though pt lives independently and has for the past year since wife's passing. HE stretcher is to be here in 1.5 to 2 hours
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
## 2023-09-29 ENCOUNTER — PATIENT OUTREACH (OUTPATIENT)
Dept: CARE COORDINATION | Facility: CLINIC | Age: 88
End: 2023-09-29
Payer: MEDICARE

## 2023-09-29 NOTE — PROGRESS NOTES
"Clinic Care Coordination Contact  Follow Up Progress Note      Assessment: Pt was recently in the ED on 9/26 with confusion after calling 911 and reported to EMS that he thought someone was in his home. He was thought to be having hallucinations/delusions as he brought EMS into bathroom, looked in mirror, pointed as himself and told them that \"this astrid want leave and thinks he own house\". During the ED visit there was concern that pt anticoagulation was causing altered mental status but lab work within normal limits. Pt's son was contacted who provided collateral information and explained that there is concern that pt has dementia. Pt's son told staff that pt has driven family away. Hospital staff did file a [facility] report.    Called and spoke with pt. He was able to tell writer that he was recently in the hospital with confusion and they were concerned this was being caused by his anticoagulation but his lab work was okay. Today pt reports that he feels better and more clear. He stated that his son is not helping him but he has lots of friends who come over to check on him. Reviewed with pt that he has a PCP with Dr. Doshi on 10/10 at 2PM, writer needed to repeat self a few times, encouraged pt to write down on calendar and also offered to call him the morning of or day before the appointment and pt stated that this would be good.     Care Gaps:    Health Maintenance Due   Topic Date Due    HF ACTION PLAN  Never done    MICROALBUMIN  Never done    ADVANCE CARE PLANNING  Never done    Pneumococcal Vaccine: 65+ Years (2 - PCV) 10/11/2007    DTAP/TDAP/TD IMMUNIZATION (2 - Td or Tdap) 10/11/2016    COVID-19 Vaccine (4 - Pfizer series) 01/06/2022    PHQ-2 (once per calendar year)  Never done    MEDICARE ANNUAL WELLNESS VISIT  08/11/2023    LIPID  08/11/2023    INFLUENZA VACCINE (1) 09/01/2023       Intervention/Education provided during outreach: Follow-up appointment and safety check in.      Plan:   Care Coordinator will " contact pt the day before his appointment on 10/9 to remind him of appointment on 10/10.    Claudine Fernandez [facility]  Social Work Care Coordinator  Phone: [phone]

## 2023-10-09 ENCOUNTER — PATIENT OUTREACH (OUTPATIENT)
Dept: CARE COORDINATION | Facility: CLINIC | Age: 88
End: 2023-10-09
Payer: MEDICARE

## 2023-10-09 NOTE — PROGRESS NOTES
Clinic Care Coordination Contact  Follow Up Progress Note      Assessment: Call placed to patient to check-in and remind him of appointment tomorrow with PCP: Dr. Doshi. Pt verbalized understanding of appointment and plans to attend.     Pt was somewhat confused who writer was or where writer was calling from. Pt wondered if writer was calling from dental clinic, he mentioned that he waiting to hear back re: something related to dental care, when writer asked who is dental provider is and pt was unsure. Pt also asked if writer was the person helping him get services at home for medication (management) and writer explained that I was calling from the PCP office. Reminded pt that I am in clinic on Wednesday's if he would like to come and visit with writer.    Pt sounded to be in good spirits and denied having add'l questions/concerns for SW at this time.    Care Gaps:    Health Maintenance Due   Topic Date Due    HF ACTION PLAN  Never done    MICROALBUMIN  Never done    ADVANCE CARE PLANNING  Never done    Pneumococcal Vaccine: 65+ Years (2 - PCV) 10/11/2007    DTAP/TDAP/TD IMMUNIZATION (2 - Td or Tdap) 10/11/2016    PHQ-2 (once per calendar year)  Never done    MEDICARE ANNUAL WELLNESS VISIT  08/11/2023    LIPID  08/11/2023    INFLUENZA VACCINE (1) 09/01/2023    COVID-19 Vaccine (4 - 2023-24 season) 09/01/2023       Intervention/Education provided during outreach: Supportive check-in and appointment reminder.     Outreach Frequency: monthly      Plan:   Care Coordinator will follow up in 1 month.    Claudine Fernandez [facility]  Social Work Care Coordinator  Phone: [phone]

## 2023-10-11 ENCOUNTER — PATIENT OUTREACH (OUTPATIENT)
Dept: CARE COORDINATION | Facility: CLINIC | Age: 88
End: 2023-10-11
Payer: MEDICARE
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
## 2023-10-29 ENCOUNTER — HEALTH MAINTENANCE LETTER (OUTPATIENT)
Age: 88
End: 2023-10-29

## 2023-12-05 ENCOUNTER — LAB (OUTPATIENT)
Dept: LAB | Facility: CLINIC | Age: 88
End: 2023-12-05
Payer: MEDICARE

## 2023-12-05 ENCOUNTER — OFFICE VISIT (OUTPATIENT)
Dept: CARDIOLOGY | Facility: CLINIC | Age: 88
End: 2023-12-05
Attending: NURSE PRACTITIONER
Payer: MEDICARE

## 2023-12-05 VITALS
DIASTOLIC BLOOD PRESSURE: 62 MMHG | HEART RATE: 86 BPM | BODY MASS INDEX: 19.94 KG/M2 | HEIGHT: 64 IN | WEIGHT: 116.8 LBS | SYSTOLIC BLOOD PRESSURE: 142 MMHG

## 2023-12-05 DIAGNOSIS — I50.22 CHRONIC SYSTOLIC HEART FAILURE (H): ICD-10-CM

## 2023-12-05 DIAGNOSIS — R41.89 COGNITIVE IMPAIRMENT: ICD-10-CM

## 2023-12-05 DIAGNOSIS — I50.22 CHRONIC SYSTOLIC HEART FAILURE (H): Primary | ICD-10-CM

## 2023-12-05 DIAGNOSIS — I10 PRIMARY HYPERTENSION: ICD-10-CM

## 2023-12-05 DIAGNOSIS — N18.32 CHRONIC RENAL IMPAIRMENT, STAGE 3B (H): ICD-10-CM

## 2023-12-05 DIAGNOSIS — Z79.01 LONG TERM CURRENT USE OF ANTICOAGULANT THERAPY: ICD-10-CM

## 2023-12-05 LAB
ANION GAP SERPL CALCULATED.3IONS-SCNC: 11 MMOL/L (ref 7–15)
BUN SERPL-MCNC: 32.4 MG/DL (ref 8–23)
CALCIUM SERPL-MCNC: 9 MG/DL (ref 8.8–10.2)
CHLORIDE SERPL-SCNC: 107 MMOL/L (ref 98–107)
CREAT SERPL-MCNC: 2.07 MG/DL (ref 0.67–1.17)
DEPRECATED HCO3 PLAS-SCNC: 23 MMOL/L (ref 22–29)
EGFRCR SERPLBLD CKD-EPI 2021: 30 ML/MIN/1.73M2
GLUCOSE SERPL-MCNC: 113 MG/DL (ref 70–99)
POTASSIUM SERPL-SCNC: 5 MMOL/L (ref 3.4–5.3)
SODIUM SERPL-SCNC: 141 MMOL/L (ref 135–145)

## 2023-12-05 PROCEDURE — 99214 OFFICE O/P EST MOD 30 MIN: CPT | Performed by: INTERNAL MEDICINE

## 2023-12-05 PROCEDURE — 80048 BASIC METABOLIC PNL TOTAL CA: CPT | Performed by: NURSE PRACTITIONER

## 2023-12-05 PROCEDURE — 36415 COLL VENOUS BLD VENIPUNCTURE: CPT | Performed by: NURSE PRACTITIONER

## 2023-12-05 NOTE — LETTER
12/5/2023    Francisco Doshi MD  [address]  [facility] [address]    RE: Sawyer Renteria       Dear Colleague,     I had the pleasure of seeing Sawyer Renteria in the [facility].  HPI and Plan:   Sawyer Renteria is a very pleasant 89 year old male with a history of HFrEF, coronary artery disease, ischemic cardiomyopathy, dyslipidemia, hypertension and CKD.     I have seen him since 2016 at the anterior MI and underwent stenting of the LAD and angioplasty of the diagonal.  He had 60% OM disease at this time.  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    He has ischemic cardiomyopathy.  We are titrating his cardiac meds and now is on Entresto 24-26 mg twice daily and low-dose metoprolol XL.     Today, he was doing well.  He denies any chest pain or shortness of breath.  We have had some issues and concerns regarding his memory and his ability to make decisions.  In the past have already talked to his primary care provider conveying my concerns about his cognitive ability.  Our staff here also have noticed that sometimes he forgets his appointments and then after the appointment he does not remember that he was already seen.  In the past, we have also connected with appropriate service to report vulnerable adult.      He has previously not allowed us to talk to his next of kin.  Despite all this, he remarkably manages his medications and also drives.  He does not admit that he has any issues with memory.        He denies any chest pain or shortness of breath.  No orthopnea or PND.     Exam, regular rate and rhythm.   Card examination regular S1-S2 with a 2 x 6 systolic murmur in the left parasternal border.  Chest was clear to auscultation.     Impression  1.  Ischemic cardiomyopathy  His EF is stable at 40 to 45%.  He is doing well on Entresto and low-dose metoprolol XL.  He does not have any evidence of fluid retention.  I would  continue these medications.  He does have elevated creatinine but is stable over the last 3 readings since July.  Creatinine today was 2.07.  Potassium 5.  Discussed importance of lowering intake of potassium containing foods.    2.  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  Hyperlipidemia, continue Repatha  Last LDL was 81, intolerant to statins     4.  Chronic renal insufficiency, creatinine stable at 2     5.  Memory impairment, I suspect he may be having early dementia.  We have previously communicate with his primary care provider to address this in pursuing further evaluation.  With ongoing concerns, or clinical also has filed an adult vulnerable patient report.  This issue is ongoing and we will have to watch it closely.    Thank you for allowing us to personally care of this nice patient.  At today's visit I reviewed the recent labs, the previous cardiology notes.    Sincerely,    Satya Newton MD        Today's clinic visit entailed:    32 minutes spent by me on the date of the encounter doing chart review, review of test results, patient visit, and documentation   Provider  Link to [facility] Help Grid           No orders of the defined types were placed in this encounter.      No orders of the defined types were placed in this encounter.      There are no discontinued medications.      Encounter Diagnosis   Name Primary?    Chronic systolic heart failure (H)        CURRENT MEDICATIONS:  Current Outpatient Medications   Medication Sig Dispense Refill    apixaban ANTICOAGULANT (ELIQUIS) 5 MG tablet Take 1 tablet (5 mg) by mouth 2 times daily 60 tablet 11    aspirin 81 MG tablet Take 81 mg by mouth daily      Cholecalciferol (VITAMIN D3) 2000 UNITS CAPS Take by mouth daily      dutasteride (AVODART) 0.5 MG capsule Take 1 capsule (0.5 mg) by mouth daily 30 capsule 11    evolocumab (REPATHA) 140 MG/ML prefilled autoinjector Inject 1 mL (140 mg) Subcutaneous every 14  days 6 mL 0    Fish Oil-Cholecalciferol (FISH OIL + D3) 1650-5085 MG-UNIT CAPS Take 2 tablets by mouth daily (Patient not taking: Reported on 7/20/2023)      hydrocortisone (CORTAID) 1 % external cream Apply topically 2 times daily (Patient not taking: Reported on 7/20/2023) 30 g 0    metoprolol succinate ER (TOPROL XL) 25 MG 24 hr tablet Take 1 tablet (25 mg) by mouth daily (Patient taking differently: Take 25 mg by mouth daily as needed) 90 tablet 3    nitroGLYcerin (NITROSTAT) 0.4 MG sublingual tablet Place 1 tablet (0.4 mg) under the tongue every 5 minutes as needed for chest pain Take as directed (Patient not taking: Reported on 7/20/2023) 25 tablet 1    sacubitril-valsartan (ENTRESTO) 24-26 MG per tablet Take 1 tablet by mouth 2 times daily 180 tablet 3    Skin Protectants, Misc. (EUCERIN) cream Apply topically as needed for dry skin 113 g 0    tamsulosin (FLOMAX) 0.4 MG capsule Take 0.4 mg by mouth daily (Patient not taking: Reported on 5/30/2023)         ALLERGIES     Allergies   Allergen Reactions    Flomax [Tamsulosin]      Weakness and dizzyness    Aldactone [Spironolactone]      High potassium and worsening creat when on lisinopril and spironalactone    Carvedilol      dizziness    Colesevelam      Epigastric pain    Ezetimibe     Lisinopril Itching     Hyperkalemia and inc. Creat. At 20 mg daily, itching , skin red when dose goes above 2.5 mg a day--elevated creat     Pravastatin      Abdominal pain    Rosuvastatin     Simvastatin        PAST MEDICAL HISTORY:  Past Medical History:   Diagnosis Date    BPH (benign prostatic hyperplasia)     Cardiomyopathy, ischemic     EF 49% by cardiac MRI 1/15/2014    Coronary artery disease 1/24/06    Cypher CANDIDA to LAD    Gastro-oesophageal reflux disease     Hyperlipidaemia     Hypertension     Left ventricular thrombus     Myocardial infarction (H) 1/2006    Anterior    TIA (transient ischaemic attack)        PAST SURGICAL HISTORY:  Past Surgical History:  "  Procedure Laterality Date    CORONARY ANGIOGRAPHY ADULT ORDER  1/24/06    Cypher CANDIDA to LAD    HEART CATH, ANGIOPLASTY  1/2006    Cypher CANDIDA to LAD    TONSILLECTOMY & ADENOIDECTOMY         FAMILY HISTORY:  Family History   Problem Relation Age of Onset    Heart Disease Mother         heart failure    Heart Disease Brother        SOCIAL HISTORY:  Social History     Socioeconomic History    Marital status:    Tobacco Use    Smoking status: Never    Smokeless tobacco: Never   Substance and Sexual Activity    Alcohol use: No    Drug use: Never   Other Topics Concern    Parent/sibling w/ CABG, MI or angioplasty before 65F 55M? No    Caffeine Concern No    Sleep Concern No    Stress Concern No    Weight Concern Yes     Comment: weight loss    Special Diet No    Exercise Yes     Comment: bicycle 10 min, walking, 3 days week    Seat Belt Yes       Review of Systems:  Skin:          Eyes:         ENT:         Respiratory:          Cardiovascular:         Gastroenterology:        Genitourinary:         Musculoskeletal:         Neurologic:         Psychiatric:         Heme/Lymph/Imm:         Endocrine:           Physical Exam:  Vitals: BP (!) 152/70 (BP Location: Left arm, Patient Position: Sitting)   Pulse 86   Ht 1.626 m (5' 4\")   Wt 53 kg (116 lb 12.8 oz)   BMI 20.05 kg/m      CC  Farrah Bernstein, APRN CNP  [address]  [place] [address]    Thank you for allowing me to participate in the care of your patient.      Sincerely,     Satya Newton MD     [facility]  "

## 2023-12-05 NOTE — PROGRESS NOTES
HPI and Plan:   Sawyer Renteria is a very pleasant 89 year old male with a history of HFrEF, coronary artery disease, ischemic cardiomyopathy, dyslipidemia, hypertension and CKD.     I have seen him since 2016 at the anterior MI and underwent stenting of the LAD and angioplasty of the diagonal.  He had 60% OM disease at this time.  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    He has ischemic cardiomyopathy.  We are titrating his cardiac meds and now is on Entresto 24-26 mg twice daily and low-dose metoprolol XL.     Today, he was doing well.  He denies any chest pain or shortness of breath.  We have had some issues and concerns regarding his memory and his ability to make decisions.  In the past have already talked to his primary care provider conveying my concerns about his cognitive ability.  Our staff here also have noticed that sometimes he forgets his appointments and then after the appointment he does not remember that he was already seen.  In the past, we have also connected with appropriate service to report vulnerable adult.      He has previously not allowed us to talk to his next of kin.  Despite all this, he remarkably manages his medications and also drives.  He does not admit that he has any issues with memory.        He denies any chest pain or shortness of breath.  No orthopnea or PND.     Exam, regular rate and rhythm.   Card examination regular S1-S2 with a 2 x 6 systolic murmur in the left parasternal border.  Chest was clear to auscultation.     Impression  1.  Ischemic cardiomyopathy  His EF is stable at 40 to 45%.  He is doing well on Entresto and low-dose metoprolol XL.  He does not have any evidence of fluid retention.  I would continue these medications.  He does have elevated creatinine but is stable over the last 3 readings since July.  Creatinine today was 2.07.  Potassium 5.  Discussed importance of lowering intake of  potassium containing foods.    2.  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  Hyperlipidemia, continue Repatha  Last LDL was 81, intolerant to statins     4.  Chronic renal insufficiency, creatinine stable at 2     5.  Memory impairment, I suspect he may be having early dementia.  We have previously communicate with his primary care provider to address this in pursuing further evaluation.  With ongoing concerns, or clinical also has filed an adult vulnerable patient report.  This issue is ongoing and we will have to watch it closely.    Thank you for allowing us to personally care of this nice patient.  At today's visit I reviewed the recent labs, the previous cardiology notes.    Sincerely,    Satya Newton MD        Today's clinic visit entailed:    32 minutes spent by me on the date of the encounter doing chart review, review of test results, patient visit, and documentation   Provider  Link to [facility] Help Grid           No orders of the defined types were placed in this encounter.      No orders of the defined types were placed in this encounter.      There are no discontinued medications.      Encounter Diagnosis   Name Primary?    Chronic systolic heart failure (H)        CURRENT MEDICATIONS:  Current Outpatient Medications   Medication Sig Dispense Refill    apixaban ANTICOAGULANT (ELIQUIS) 5 MG tablet Take 1 tablet (5 mg) by mouth 2 times daily 60 tablet 11    aspirin 81 MG tablet Take 81 mg by mouth daily      Cholecalciferol (VITAMIN D3) 2000 UNITS CAPS Take by mouth daily      dutasteride (AVODART) 0.5 MG capsule Take 1 capsule (0.5 mg) by mouth daily 30 capsule 11    evolocumab (REPATHA) 140 MG/ML prefilled autoinjector Inject 1 mL (140 mg) Subcutaneous every 14 days 6 mL 0    Fish Oil-Cholecalciferol (FISH OIL + D3) 3973-5277 MG-UNIT CAPS Take 2 tablets by mouth daily (Patient not taking: Reported on 7/20/2023)      hydrocortisone (CORTAID) 1 % external  cream Apply topically 2 times daily (Patient not taking: Reported on 7/20/2023) 30 g 0    metoprolol succinate ER (TOPROL XL) 25 MG 24 hr tablet Take 1 tablet (25 mg) by mouth daily (Patient taking differently: Take 25 mg by mouth daily as needed) 90 tablet 3    nitroGLYcerin (NITROSTAT) 0.4 MG sublingual tablet Place 1 tablet (0.4 mg) under the tongue every 5 minutes as needed for chest pain Take as directed (Patient not taking: Reported on 7/20/2023) 25 tablet 1    sacubitril-valsartan (ENTRESTO) 24-26 MG per tablet Take 1 tablet by mouth 2 times daily 180 tablet 3    Skin Protectants, Misc. (EUCERIN) cream Apply topically as needed for dry skin 113 g 0    tamsulosin (FLOMAX) 0.4 MG capsule Take 0.4 mg by mouth daily (Patient not taking: Reported on 5/30/2023)         ALLERGIES     Allergies   Allergen Reactions    Flomax [Tamsulosin]      Weakness and dizzyness    Aldactone [Spironolactone]      High potassium and worsening creat when on lisinopril and spironalactone    Carvedilol      dizziness    Colesevelam      Epigastric pain    Ezetimibe     Lisinopril Itching     Hyperkalemia and inc. Creat. At 20 mg daily, itching , skin red when dose goes above 2.5 mg a day--elevated creat     Pravastatin      Abdominal pain    Rosuvastatin     Simvastatin        PAST MEDICAL HISTORY:  Past Medical History:   Diagnosis Date    BPH (benign prostatic hyperplasia)     Cardiomyopathy, ischemic     EF 49% by cardiac MRI 1/15/2014    Coronary artery disease 1/24/06    Cypher CANDIDA to LAD    Gastro-oesophageal reflux disease     Hyperlipidaemia     Hypertension     Left ventricular thrombus     Myocardial infarction (H) 1/2006    Anterior    TIA (transient ischaemic attack)        PAST SURGICAL HISTORY:  Past Surgical History:   Procedure Laterality Date    CORONARY ANGIOGRAPHY ADULT ORDER  1/24/06    Cypher CANDIDA to LAD    HEART CATH, ANGIOPLASTY  1/2006    Cypher CANDIDA to LAD    TONSILLECTOMY & ADENOIDECTOMY         FAMILY  "HISTORY:  Family History   Problem Relation Age of Onset    Heart Disease Mother         heart failure    Heart Disease Brother        SOCIAL HISTORY:  Social History     Socioeconomic History    Marital status:    Tobacco Use    Smoking status: Never    Smokeless tobacco: Never   Substance and Sexual Activity    Alcohol use: No    Drug use: Never   Other Topics Concern    Parent/sibling w/ CABG, MI or angioplasty before 65F 55M? No    Caffeine Concern No    Sleep Concern No    Stress Concern No    Weight Concern Yes     Comment: weight loss    Special Diet No    Exercise Yes     Comment: bicycle 10 min, walking, 3 days week    Seat Belt Yes       Review of Systems:  Skin:          Eyes:         ENT:         Respiratory:          Cardiovascular:         Gastroenterology:        Genitourinary:         Musculoskeletal:         Neurologic:         Psychiatric:         Heme/Lymph/Imm:         Endocrine:           Physical Exam:  Vitals: BP (!) 152/70 (BP Location: Left arm, Patient Position: Sitting)   Pulse 86   Ht 1.626 m (5' 4\")   Wt 53 kg (116 lb 12.8 oz)   BMI 20.05 kg/m            LISSETTE Bernstein, APRN CNP  [address]  [place] [id number]                "

## 2023-12-06 PROBLEM — I50.22 CHRONIC SYSTOLIC HEART FAILURE (H): Status: ACTIVE | Noted: 2023-12-06

## 2023-12-06 PROBLEM — R41.89 COGNITIVE IMPAIRMENT: Status: ACTIVE | Noted: 2023-12-06

## 2023-12-23 NOTE — LETTER
11/9/2020    Francisco Doshi MD  [address]  [facility] [address]    RE: Sawyer Renteria       Dear Colleague,    I had the pleasure of seeing Sawyer Renteria in the [facility].    HPI and Plan:   Sawyer Renteria is a 86 year old male with history of coronary artery disease.  He has a history of an anterior wall MI in 2006 resulting in an LAD stent and angioplasty to the diagonal. He has an ongoing OM-1 with 60% stenosis. 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     He returns for a follow-up.  He is doing well.  He has had no chest pain or shortness of breath.  He has been following social distancing guidelines due to ongoing coronavirus pandemic.  He had some urination issues with post void dripping.  Talk to his primary care office and was given some local ointment has helped.  Is also on Avodart.    This January, he had a stress MRI attempted.  He was unable to tolerate it.  However function could not be obtained and revealed EF of around 52%.  IV contrast was not administrated.     He has statin intolerant and has been on Repatha which he tolerates well.  His recent lipid profile numbers are not available as it was done by his primary care physician.      He has chronic renal insufficiency with creatinine stable at 1.31.  He follows with internal medicine consultants and nephrology      Physical exam  See below     Impression/Plan:       1. Coronary artery disease with anterior wall MI status post LAD stenting with the balloon angioplasty to the diagonal branch.   Mild ischemic cardiomyopathy   ejection fraction is stable at 45-50% on echo.  Last evaluation with cardiac MRI revealed EF of 52%.  He has no signs or symptoms of congestive heart failure.2.  Ischemic cardiomyopathy with no heart failure symptoms currently.     2. 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 He uses brand  name warfarin.      3. Dyslipidemia with statin intolerance. He is tolerating Praluent with HDL of 60 and LDL of 77.  -recheck lipids in January 2019.  Recent lipid numbers not available.     4.  Hypertension  - controlled     5.  Chronic kidney disease  -Overall stable baseline 1.3 to 1.5.  - follow-up with Dr. Oneill annually     6.  Likely benign prostate hypertrophy        He will return to see us in follow-up in 6 months with my nurse practitioner and an echocardiogram prior to visit with her.  Have asked him to call if there is worsening chest pain or shortness of breath    Sincerely,    Satya Newton MD      Orders Placed This Encounter   Procedures     Follow-Up with Cardiac Advanced Practice Provider     Echocardiogram Complete       No orders of the defined types were placed in this encounter.      There are no discontinued medications.      Encounter Diagnoses   Name Primary?     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      Benign prostatic hyperplasia with post-void dribbling      Statin intolerance      Cerebrovascular accident (CVA) due to embolism of middle cerebral artery, unspecified blood vessel laterality (H)        CURRENT MEDICATIONS:  Current Outpatient Medications   Medication Sig Dispense Refill     aspirin 81 MG tablet Take 81 mg by mouth daily       Cholecalciferol (VITAMIN D3) 2000 UNITS CAPS Take by mouth daily       dutasteride (AVODART) 0.5 MG capsule Take 1 capsule (0.5 mg) by mouth daily 30 capsule 11     evolocumab (REPATHA) 140 MG/ML prefilled autoinjector Inject 1 mL (140 mg) Subcutaneous every 14 days 2 mL 11     Fish Oil-Cholecalciferol (FISH OIL + D3) 5949-0528 MG-UNIT CAPS Take 2 tablets by mouth  daily       lisinopril (ZESTRIL) 2.5 MG tablet One tablet twice per day 180 tablet 0     melatonin 5 MG tablet Take 5 mg by mouth nightly as needed for sleep       metoprolol succinate ER (TOPROL-XL) 25 MG 24 hr tablet Take 0.5 tablets (12.5 mg) by mouth daily 45 tablet 1     nitroGLYcerin (NITROSTAT) 0.4 MG sublingual tablet Place 1 tablet (0.4 mg) under the tongue every 5 minutes as needed for chest pain Take as directed 25 tablet 3     warfarin ANTICOAGULANT (COUMADIN) 5 MG tablet Take 1/2 tab on Mondays, Wednesdays and Fridays and 1 tab all other days or as directed by INR clinic, Coumadin Dispense as written 90 tablet 1       ALLERGIES     Allergies   Allergen Reactions     Flomax [Tamsulosin]      Weakness and dizzyness     Aldactone [Spironolactone]      High potassium and worsening creat when on lisinopril and spironalactone     Carvedilol      dizziness     Colesevelam      Epigastric pain     Ezetimibe      Lisinopril Itching     Hyperkalemia and inc. Creat. At 20 mg daily, itching , skin red when dose goes above 2.5 mg a day--elevated creat      Pravastatin      Abdominal pain     Rosuvastatin      Simvastatin        PAST MEDICAL HISTORY:  Past Medical History:   Diagnosis Date     BPH (benign prostatic hyperplasia)      Cardiomyopathy, ischemic     EF 49% by cardiac MRI 1/15/2014     Coronary artery disease 1/24/06    Cypher CANDIDA to LAD     Gastro-oesophageal reflux disease      Hyperlipidaemia      Hypertension      Left ventricular thrombus      Myocardial infarction (H) 1/2006    Anterior     TIA (transient ischaemic attack)        PAST SURGICAL HISTORY:  Past Surgical History:   Procedure Laterality Date     CORONARY ANGIOGRAPHY ADULT ORDER  1/24/06    Cypher CANDIDA to LAD     HEART CATH, ANGIOPLASTY  1/2006    Cypher CANDIDA to LAD     TONSILLECTOMY & ADENOIDECTOMY         FAMILY HISTORY:  Family History   Problem Relation Age of Onset     Heart Disease Mother         heart failure     Heart Disease Brother         SOCIAL HISTORY:  Social History     Socioeconomic History     Marital status:      Spouse name: None     Number of children: None     Years of education: None     Highest education level: None   Occupational History     None   Social Needs     Financial resource strain: None     Food insecurity     Worry: None     Inability: None     Transportation needs     Medical: None     Non-medical: None   Tobacco Use     Smoking status: Never Smoker     Smokeless tobacco: Never Used   Substance and Sexual Activity     Alcohol use: No     Drug use: Never     Sexual activity: None   Lifestyle     Physical activity     Days per week: None     Minutes per session: None     Stress: None   Relationships     Social connections     Talks on phone: None     Gets together: None     Attends [employer] service: None     Active member of club or organization: None     Attends meetings of clubs or organizations: None     Relationship status: None     Intimate partner violence     Fear of current or ex partner: None     Emotionally abused: None     Physically abused: None     Forced sexual activity: None   Other Topics Concern     Parent/sibling w/ CABG, MI or angioplasty before 65F 55M? No      Service Not Asked     Blood Transfusions Not Asked     Caffeine Concern No     Occupational Exposure Not Asked     Hobby Hazards Not Asked     Sleep Concern No     Stress Concern No     Weight Concern Yes     Comment: weight loss     Special Diet No     Back Care Not Asked     Exercise Yes     Comment: bicycle 10 min, walking, 3 days week     Bike Helmet Not Asked     Seat Belt Yes     Self-Exams Not Asked   Social History Narrative     None       Review of Systems:  Skin:  Negative       Eyes:  Negative      ENT:  Negative      Respiratory:  Negative       Cardiovascular:  Negative;palpitations;chest pain;lightheadedness;dizziness;syncope or near-syncope;cyanosis;fatigue;edema      Gastroenterology: Negative      Genitourinary:  " Negative      Musculoskeletal:  Negative      Neurologic:  Negative      Psychiatric:  Negative      Heme/Lymph/Imm:  Negative      Endocrine:  Negative        Physical Exam:  Vitals: /52   Pulse 80   Ht 1.575 m (5' 2\")   Wt 52.2 kg (115 lb)   BMI 21.03 kg/m      Constitutional:  well developed        Skin:  warm and dry to the touch          Head:  not assessed this visit        Eyes:  sclera white        Lymph:      ENT:  no pallor or cyanosis        Neck:  carotid pulses are full and equal bilaterally        Respiratory:  clear to auscultation         Cardiac: normal S1 and S2   S4 no presence of murmur          not assessed this visit                                        GI:  not assessed this visit        Extremities and Muscular Skeletal:  no edema         ecchymosis over left flank following fall at home    Neurological:  no gross motor deficits        Psych:  Alert and Oriented x 3            Thank you for allowing me to participate in the care of your patient.    Sincerely,     Satya Newton MD     [facility]  " I have a steady place to live

## 2024-02-15 ENCOUNTER — TRANSFERRED RECORDS (OUTPATIENT)
Dept: HEALTH INFORMATION MANAGEMENT | Facility: CLINIC | Age: 89
End: 2024-02-15
Payer: MEDICARE

## 2024-04-24 DIAGNOSIS — G45.9 TRANSIENT CEREBRAL ISCHEMIA, UNSPECIFIED TYPE: ICD-10-CM

## 2024-04-24 DIAGNOSIS — I63.419 CEREBROVASCULAR ACCIDENT (CVA) DUE TO EMBOLISM OF MIDDLE CEREBRAL ARTERY, UNSPECIFIED BLOOD VESSEL LATERALITY (H): ICD-10-CM

## 2024-04-24 DIAGNOSIS — I51.3 LEFT VENTRICULAR THROMBUS: ICD-10-CM

## 2024-06-10 NOTE — TELEPHONE ENCOUNTER
Called patient and reviewed dosing instructions.  He is very forgetful and gets confused about dosing very easily.  INR already scheduled for 9/1/22.  He was supposed to hold dose for 2 days but he held for 3 days.  Turner GANDHI  
Patient calling to speak with his anticoagulation nurse, Turner, to discuss his Warfarin dosing and INR results.  RN offered to assist pt but he would prefer a call back from Turner tomorrow when the clinic opens.      Pt can be reached at # on file.      Nita Lozano RN  [facility] Nurse Advisor    
Spoke to patient.  Turner GANDHI  
yes

## 2024-06-13 NOTE — PROGRESS NOTES
ANTICOAGULATION FOLLOW-UP CLINIC VISIT    Patient Name:  Sawyer Renteria  Date:  4/15/2019  Contact Type:  Face to Face    SUBJECTIVE:     Patient Findings     Comments:   Praulent in right thigh today            OBJECTIVE    INR Protime   Date Value Ref Range Status   04/15/2019 2.3 (A) 0.86 - 1.14 Final       ASSESSMENT / PLAN  INR assessment THER    Recheck INR In: 2 WEEKS    INR Location Clinic      Anticoagulation Summary  As of 4/15/2019    INR goal:   2.0-3.0   TTR:   66.2 % (2.8 y)   INR used for dosin.3 (4/15/2019)   Warfarin maintenance plan:   2.5 mg (5 mg x 0.5) every Mon, Wed, Fri; 5 mg (5 mg x 1) all other days   Full warfarin instructions:   2.5 mg every Mon, Wed, Fri; 5 mg all other days   Weekly warfarin total:   27.5 mg   No change documented:   Tayla Antonio RN   Plan last modified:   Donna Colby RN (2018)   Next INR check:   5/3/2019   Target end date:   Indefinite    Indications    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]  Transient cerebral ischemia [G45.9]  Long term current use of anticoagulants with INR goal of 2.0-3.0 [Z79.01]             Anticoagulation Episode Summary     INR check location:       Preferred lab:       Send INR reminders to:   [facility] HEART INR NURSE    Comments:         Anticoagulation Care Providers     Provider Role Specialty Phone number    Satya Newton MD Responsible Cardiology [phone]            See the Encounter Report to view Anticoagulation Flowsheet and Dosing Calendar (Go to Encounters tab in chart review, and find the Anticoagulation Therapy Visit)    INR 2.3.  No missed doses.  He did his Praulent injection in his right thigh today.  Continue same schedule and recheck in about 2 weeks per patient request on Friday.  Turner Antonio RN                  good balance

## 2024-06-26 ENCOUNTER — TELEPHONE (OUTPATIENT)
Dept: CARDIOLOGY | Facility: CLINIC | Age: 89
End: 2024-06-26
Payer: MEDICARE

## 2024-06-26 DIAGNOSIS — I25.5 CARDIOMYOPATHY, ISCHEMIC: ICD-10-CM

## 2024-06-26 RX ORDER — SACUBITRIL AND VALSARTAN 24; 26 MG/1; MG/1
1 TABLET, FILM COATED ORAL 2 TIMES DAILY
Qty: 180 TABLET | Refills: 1 | Status: SHIPPED | OUTPATIENT
Start: 2024-06-26

## 2024-06-27 ENCOUNTER — TELEPHONE (OUTPATIENT)
Dept: CARDIOLOGY | Facility: CLINIC | Age: 89
End: 2024-06-27
Payer: MEDICARE

## 2024-06-27 NOTE — TELEPHONE ENCOUNTER
Pt's son did call stating Pt has lost his entresto. Informed son new RX was sent , but to get more medication they will have to probable pay full cash price , and explain medication lost. Son will make appt with provider to discuss Pt's care and medications. TIANA Painter RN

## 2024-09-10 ENCOUNTER — APPOINTMENT (OUTPATIENT)
Dept: GENERAL RADIOLOGY | Facility: CLINIC | Age: 89
End: 2024-09-10
Attending: EMERGENCY MEDICINE
Payer: MEDICARE

## 2024-09-10 ENCOUNTER — HOSPITAL ENCOUNTER (EMERGENCY)
Facility: CLINIC | Age: 89
Discharge: HOME | End: 2024-09-10
Attending: EMERGENCY MEDICINE | Admitting: EMERGENCY MEDICINE
Payer: MEDICARE

## 2024-09-10 VITALS
OXYGEN SATURATION: 97 % | BODY MASS INDEX: 18.78 KG/M2 | HEIGHT: 64 IN | TEMPERATURE: 97.6 F | RESPIRATION RATE: 16 BRPM | HEART RATE: 70 BPM | SYSTOLIC BLOOD PRESSURE: 127 MMHG | WEIGHT: 110 LBS | DIASTOLIC BLOOD PRESSURE: 45 MMHG

## 2024-09-10 DIAGNOSIS — M79.604 RIGHT LEG PAIN: ICD-10-CM

## 2024-09-10 LAB
ANION GAP SERPL CALCULATED.3IONS-SCNC: 10 MMOL/L (ref 7–15)
BASOPHILS # BLD AUTO: 0 10E3/UL (ref 0–0.2)
BASOPHILS NFR BLD AUTO: 0 %
BUN SERPL-MCNC: 43.1 MG/DL (ref 8–23)
CALCIUM SERPL-MCNC: 8.6 MG/DL (ref 8.8–10.4)
CHLORIDE SERPL-SCNC: 100 MMOL/L (ref 98–107)
CREAT SERPL-MCNC: 2.9 MG/DL (ref 0.67–1.17)
EGFRCR SERPLBLD CKD-EPI 2021: 20 ML/MIN/1.73M2
EOSINOPHIL # BLD AUTO: 0.5 10E3/UL (ref 0–0.7)
EOSINOPHIL NFR BLD AUTO: 9 %
ERYTHROCYTE [DISTWIDTH] IN BLOOD BY AUTOMATED COUNT: 13.5 % (ref 10–15)
GLUCOSE SERPL-MCNC: 108 MG/DL (ref 70–99)
HCO3 SERPL-SCNC: 22 MMOL/L (ref 22–29)
HCT VFR BLD AUTO: 28.2 % (ref 40–53)
HGB BLD-MCNC: 9.1 G/DL (ref 13.3–17.7)
IMM GRANULOCYTES # BLD: 0 10E3/UL
IMM GRANULOCYTES NFR BLD: 1 %
LYMPHOCYTES # BLD AUTO: 1.3 10E3/UL (ref 0.8–5.3)
LYMPHOCYTES NFR BLD AUTO: 22 %
MCH RBC QN AUTO: 30 PG (ref 26.5–33)
MCHC RBC AUTO-ENTMCNC: 32.3 G/DL (ref 31.5–36.5)
MCV RBC AUTO: 93 FL (ref 78–100)
MONOCYTES # BLD AUTO: 0.7 10E3/UL (ref 0–1.3)
MONOCYTES NFR BLD AUTO: 11 %
NEUTROPHILS # BLD AUTO: 3.3 10E3/UL (ref 1.6–8.3)
NEUTROPHILS NFR BLD AUTO: 57 %
NRBC # BLD AUTO: 0 10E3/UL
NRBC BLD AUTO-RTO: 0 /100
PLATELET # BLD AUTO: 244 10E3/UL (ref 150–450)
POTASSIUM SERPL-SCNC: 4.3 MMOL/L (ref 3.4–5.3)
RBC # BLD AUTO: 3.03 10E6/UL (ref 4.4–5.9)
SODIUM SERPL-SCNC: 132 MMOL/L (ref 135–145)
WBC # BLD AUTO: 5.8 10E3/UL (ref 4–11)

## 2024-09-10 PROCEDURE — 85025 COMPLETE CBC W/AUTO DIFF WBC: CPT | Performed by: EMERGENCY MEDICINE

## 2024-09-10 PROCEDURE — 73552 X-RAY EXAM OF FEMUR 2/>: CPT | Mod: RT

## 2024-09-10 PROCEDURE — 80048 BASIC METABOLIC PNL TOTAL CA: CPT | Performed by: EMERGENCY MEDICINE

## 2024-09-10 PROCEDURE — 250N000013 HC RX MED GY IP 250 OP 250 PS 637: Performed by: EMERGENCY MEDICINE

## 2024-09-10 PROCEDURE — 36415 COLL VENOUS BLD VENIPUNCTURE: CPT | Performed by: EMERGENCY MEDICINE

## 2024-09-10 PROCEDURE — 99284 EMERGENCY DEPT VISIT MOD MDM: CPT

## 2024-09-10 RX ORDER — ACETAMINOPHEN 325 MG/1
650 TABLET ORAL ONCE
Status: COMPLETED | OUTPATIENT
Start: 2024-09-10 | End: 2024-09-10

## 2024-09-10 RX ADMIN — ACETAMINOPHEN 650 MG: 325 TABLET ORAL at 01:20

## 2024-09-10 ASSESSMENT — ACTIVITIES OF DAILY LIVING (ADL)
ADLS_ACUITY_SCORE: 35
ADLS_ACUITY_SCORE: 35

## 2024-09-10 ASSESSMENT — COLUMBIA-SUICIDE SEVERITY RATING SCALE - C-SSRS
2. HAVE YOU ACTUALLY HAD ANY THOUGHTS OF KILLING YOURSELF IN THE PAST MONTH?: NO
6. HAVE YOU EVER DONE ANYTHING, STARTED TO DO ANYTHING, OR PREPARED TO DO ANYTHING TO END YOUR LIFE?: NO
1. IN THE PAST MONTH, HAVE YOU WISHED YOU WERE DEAD OR WISHED YOU COULD GO TO SLEEP AND NOT WAKE UP?: NO

## 2024-09-10 NOTE — DISCHARGE INSTRUCTIONS
Follow-up with your primary care doctor in 1 week for repeat lab work and to follow your pain.    Return to the ER for any new or worsening symptoms.

## 2024-09-10 NOTE — ED NOTES
Bed: ED28  Expected date: 9/9/24  Expected time: 12:05 AM  Means of arrival: Ambulance  Comments:  Daniella 2 89M knee pain  
Unknown

## 2024-09-10 NOTE — ED TRIAGE NOTES
"Pt arrived via EMS after calling for R leg pain due to \"someone pinching him.\" EMS got in contact with son, there is new onset dementia. VSS. Room air. . Pt lives alone.     Triage Assessment (Adult)       Row Name 09/10/24 0027          Triage Assessment    Airway WDL WDL        Respiratory WDL    Respiratory WDL WDL        Skin Circulation/Temperature WDL    Skin Circulation/Temperature WDL WDL        Cardiac WDL    Cardiac WDL WDL        Peripheral/Neurovascular WDL    Peripheral Neurovascular WDL WDL        Cognitive/Neuro/Behavioral WDL    Cognitive/Neuro/Behavioral WDL orientation     Orientation disoriented to;situation;time;place                     "

## 2024-09-10 NOTE — ED PROVIDER NOTES
"  Emergency Department Note      History of Present Illness     Chief Complaint   Leg Pain      HPI   Sawyer Renteria is a 89 year old male anticoagulated on Eliquis, who presents after experiencing a pinching sensation on his right leg approximately one hour ago.  The patient says he was walking outside his house and felt a sudden pain.  He acknowledges that there was no one there who pinched him but that the pain felt like that.  No prior episodes.  He has not noticed any redness.  He did not notice any insect bites.  No swelling in this area.  No changes to his medication regimen.  No trauma or falls.    Independent Historian   I spoke with the patient's son who says the patient does have some cognitive changes but has been stable and the plan is to try to keep him in his home as long as possible.    Review of External Notes   EMS who report the patient's complaints of pinching sensation pain    Past Medical History     Medical History and Problem List   Benign prostatic hyperplasia  Cardiomyopathy, ischemic  Coronary artery disease  GERD   Hyperlipidemia  Hypertension  Left ventricular thrombus  Myocardial infarction  TIA    Medications   Eliquis  Asa  Avodart  Toprol  Nitrostat  Entresto  Flomax    Surgical History   Coronary angiography - [facility] to LAD  Tonsillectomy and adenoidectomy    Physical Exam     Patient Vitals for the past 24 hrs:   BP Temp Temp src Pulse Resp SpO2 Height Weight   09/10/24 0021 127/45 97.6  F (36.4  C) Oral 70 16 97 % 1.626 m (5' 4\") 49.9 kg (110 lb)     Physical Exam  Constitutional:       General: He is not in acute distress.     Appearance: Normal appearance. He is not toxic-appearing.   HENT:      Head: Atraumatic.   Eyes:      General: No scleral icterus.     Conjunctiva/sclera: Conjunctivae normal.   Cardiovascular:      Rate and Rhythm: Normal rate and regular rhythm.      Heart sounds: Normal heart sounds.   Pulmonary:      Effort: Pulmonary effort is normal. No " respiratory distress.      Breath sounds: Normal breath sounds.   Abdominal:      Palpations: Abdomen is soft.      Tenderness: There is no abdominal tenderness.   Musculoskeletal:         General: No deformity.      Cervical back: Neck supple.      Comments: The right lower extremity is without any swelling or tenderness over the thigh area where the patient complains of pain.  Full range of motion of the hip and knee without effusion.  No evidence of trauma or ecchymosis.  Perfusion to the lower extremities normal.   Skin:     General: Skin is warm.      Capillary Refill: Capillary refill takes less than 2 seconds.   Neurological:      General: No focal deficit present.      Mental Status: He is alert.      Comments: Oriented to person and time.  He knows that he is in the hospital but could not state which 1.   Psychiatric:         Mood and Affect: Mood normal.         Behavior: Behavior normal.           Diagnostics     Lab Results   Labs Ordered and Resulted from Time of ED Arrival to Time of ED Departure   BASIC METABOLIC PANEL - Abnormal       Result Value    Sodium 132 (*)     Potassium 4.3      Chloride 100      Carbon Dioxide (CO2) 22      Anion Gap 10      Urea Nitrogen 43.1 (*)     Creatinine 2.90 (*)     GFR Estimate 20 (*)     Calcium 8.6 (*)     Glucose 108 (*)    CBC WITH PLATELETS AND DIFFERENTIAL - Abnormal    WBC Count 5.8      RBC Count 3.03 (*)     Hemoglobin 9.1 (*)     Hematocrit 28.2 (*)     MCV 93      MCH 30.0      MCHC 32.3      RDW 13.5      Platelet Count 244      % Neutrophils 57      % Lymphocytes 22      % Monocytes 11      % Eosinophils 9      % Basophils 0      % Immature Granulocytes 1      NRBCs per 100 WBC 0      Absolute Neutrophils 3.3      Absolute Lymphocytes 1.3      Absolute Monocytes 0.7      Absolute Eosinophils 0.5      Absolute Basophils 0.0      Absolute Immature Granulocytes 0.0      Absolute NRBCs 0.0       Imaging   XR Femur Right 2 Views   Final Result    IMPRESSION:    1. No visualized acute fracture, malalignment, or other acute osseous abnormality of the right femur.   2. Moderate degenerative changes in the right hip joint.   3. Moderate degenerative changes in the right knee joint.   4. No visualized right knee joint effusion.         Independent Interpretation   X-ray of the right femur shows no fracture    ED Course      Medications Administered   Medications   acetaminophen (TYLENOL) tablet 650 mg (650 mg Oral $Given 9/10/24 0120)     ED Course   ED Course as of 09/10/24 0501   Tue Sep 10, 2024   0052 I obtained history and examined the patient as noted above.     0053 I spoke to the patient's son, Renae.      Medical Decision Making / Diagnosis     MDM   Sawyer Renteria is a 89 year old male who presents to the ED for evaluation of pain in the right leg.  He is now asymptomatic.  X-ray is negative.  Laboratory workup does not show acute changes.  He has had a slow rise in his creatinine over the last few years.  He is mildly anemic but denies any bleeding or dark stools.  I spoke with his son who is taking him home.  They will continue to follow through the outpatient clinic as well.    Disposition   The patient was discharged.     Diagnosis     ICD-10-CM    1. Right leg pain  M79.604              Scribe Disclosure:  I, Linda Roa, am serving as a scribe at 12:36 AM on 9/10/2024 to document services personally performed by Alexys Anguiano MD based on my observations and the provider's statements to me.        Alexys Anguiano MD  09/10/24 0500

## 2024-10-04 ENCOUNTER — TRANSFERRED RECORDS (OUTPATIENT)
Dept: HEALTH INFORMATION MANAGEMENT | Facility: CLINIC | Age: 89
End: 2024-10-04
Payer: MEDICARE

## 2024-10-27 ENCOUNTER — HOSPITAL ENCOUNTER (OUTPATIENT)
Facility: CLINIC | Age: 89
Setting detail: OBSERVATION
Discharge: HOME | End: 2024-10-28
Attending: EMERGENCY MEDICINE | Admitting: INTERNAL MEDICINE
Payer: MEDICARE

## 2024-10-27 DIAGNOSIS — R55 SYNCOPE, UNSPECIFIED SYNCOPE TYPE: ICD-10-CM

## 2024-10-27 DIAGNOSIS — K59.00 CONSTIPATION, UNSPECIFIED CONSTIPATION TYPE: Primary | ICD-10-CM

## 2024-10-27 DIAGNOSIS — I95.9 HYPOTENSION, UNSPECIFIED HYPOTENSION TYPE: ICD-10-CM

## 2024-10-27 LAB
ABO/RH(D): NORMAL
ALBUMIN SERPL BCG-MCNC: 3.1 G/DL (ref 3.5–5.2)
ALBUMIN UR-MCNC: NEGATIVE MG/DL
ALP SERPL-CCNC: 58 U/L (ref 40–150)
ALT SERPL W P-5'-P-CCNC: 7 U/L (ref 0–70)
ANION GAP SERPL CALCULATED.3IONS-SCNC: 8 MMOL/L (ref 7–15)
ANTIBODY SCREEN: NEGATIVE
APPEARANCE UR: CLEAR
APTT PPP: 36 SECONDS (ref 22–38)
AST SERPL W P-5'-P-CCNC: 12 U/L (ref 0–45)
ATRIAL RATE - MUSE: 74 BPM
BACTERIA #/AREA URNS HPF: ABNORMAL /HPF
BASOPHILS # BLD AUTO: 0 10E3/UL (ref 0–0.2)
BASOPHILS NFR BLD AUTO: 0 %
BILIRUB DIRECT SERPL-MCNC: <0.2 MG/DL (ref 0–0.3)
BILIRUB SERPL-MCNC: 0.2 MG/DL
BILIRUB UR QL STRIP: NEGATIVE
BUN SERPL-MCNC: 51.8 MG/DL (ref 8–23)
CALCIUM SERPL-MCNC: 8.1 MG/DL (ref 8.8–10.4)
CHLORIDE SERPL-SCNC: 110 MMOL/L (ref 98–107)
COLOR UR AUTO: ABNORMAL
CREAT SERPL-MCNC: 1.87 MG/DL (ref 0.67–1.17)
DIASTOLIC BLOOD PRESSURE - MUSE: NORMAL MMHG
EGFRCR SERPLBLD CKD-EPI 2021: 34 ML/MIN/1.73M2
EOSINOPHIL # BLD AUTO: 0.4 10E3/UL (ref 0–0.7)
EOSINOPHIL NFR BLD AUTO: 6 %
ERYTHROCYTE [DISTWIDTH] IN BLOOD BY AUTOMATED COUNT: 14.3 % (ref 10–15)
GLUCOSE SERPL-MCNC: 130 MG/DL (ref 70–99)
GLUCOSE UR STRIP-MCNC: NEGATIVE MG/DL
HCO3 SERPL-SCNC: 21 MMOL/L (ref 22–29)
HCT VFR BLD AUTO: 23.6 % (ref 40–53)
HGB BLD-MCNC: 7.6 G/DL (ref 13.3–17.7)
HGB UR QL STRIP: NEGATIVE
HOLD SPECIMEN: NORMAL
HYALINE CASTS: 1 /LPF
IMM GRANULOCYTES # BLD: 0 10E3/UL
IMM GRANULOCYTES NFR BLD: 1 %
INR PPP: 1.98 (ref 0.85–1.15)
INTERPRETATION ECG - MUSE: NORMAL
KETONES UR STRIP-MCNC: NEGATIVE MG/DL
LEUKOCYTE ESTERASE UR QL STRIP: NEGATIVE
LYMPHOCYTES # BLD AUTO: 1.8 10E3/UL (ref 0.8–5.3)
LYMPHOCYTES NFR BLD AUTO: 32 %
MCH RBC QN AUTO: 30.3 PG (ref 26.5–33)
MCHC RBC AUTO-ENTMCNC: 32.2 G/DL (ref 31.5–36.5)
MCV RBC AUTO: 94 FL (ref 78–100)
MONOCYTES # BLD AUTO: 0.4 10E3/UL (ref 0–1.3)
MONOCYTES NFR BLD AUTO: 7 %
MUCOUS THREADS #/AREA URNS LPF: PRESENT /LPF
NEUTROPHILS # BLD AUTO: 3.1 10E3/UL (ref 1.6–8.3)
NEUTROPHILS NFR BLD AUTO: 54 %
NITRATE UR QL: NEGATIVE
NRBC # BLD AUTO: 0 10E3/UL
NRBC BLD AUTO-RTO: 0 /100
P AXIS - MUSE: NORMAL DEGREES
PH UR STRIP: 5 [PH] (ref 5–7)
PLATELET # BLD AUTO: 248 10E3/UL (ref 150–450)
POTASSIUM SERPL-SCNC: 4.8 MMOL/L (ref 3.4–5.3)
PR INTERVAL - MUSE: 174 MS
PROT SERPL-MCNC: 5.1 G/DL (ref 6.4–8.3)
QRS DURATION - MUSE: 76 MS
QT - MUSE: 416 MS
QTC - MUSE: 461 MS
R AXIS - MUSE: 94 DEGREES
RBC # BLD AUTO: 2.51 10E6/UL (ref 4.4–5.9)
RBC URINE: <1 /HPF
SODIUM SERPL-SCNC: 139 MMOL/L (ref 135–145)
SP GR UR STRIP: 1.01 (ref 1–1.03)
SPECIMEN EXPIRATION DATE: NORMAL
SYSTOLIC BLOOD PRESSURE - MUSE: NORMAL MMHG
T AXIS - MUSE: 167 DEGREES
TROPONIN T SERPL HS-MCNC: 33 NG/L
TROPONIN T SERPL HS-MCNC: 33 NG/L
TROPONIN T SERPL HS-MCNC: 35 NG/L
UROBILINOGEN UR STRIP-MCNC: NORMAL MG/DL
VENTRICULAR RATE- MUSE: 74 BPM
WBC # BLD AUTO: 5.7 10E3/UL (ref 4–11)
WBC URINE: <1 /HPF

## 2024-10-27 PROCEDURE — 85730 THROMBOPLASTIN TIME PARTIAL: CPT | Performed by: EMERGENCY MEDICINE

## 2024-10-27 PROCEDURE — G0378 HOSPITAL OBSERVATION PER HR: HCPCS

## 2024-10-27 PROCEDURE — 82310 ASSAY OF CALCIUM: CPT | Performed by: EMERGENCY MEDICINE

## 2024-10-27 PROCEDURE — 85025 COMPLETE CBC W/AUTO DIFF WBC: CPT | Performed by: EMERGENCY MEDICINE

## 2024-10-27 PROCEDURE — 93005 ELECTROCARDIOGRAM TRACING: CPT

## 2024-10-27 PROCEDURE — 85610 PROTHROMBIN TIME: CPT | Performed by: EMERGENCY MEDICINE

## 2024-10-27 PROCEDURE — 86901 BLOOD TYPING SEROLOGIC RH(D): CPT | Performed by: EMERGENCY MEDICINE

## 2024-10-27 PROCEDURE — 84484 ASSAY OF TROPONIN QUANT: CPT | Performed by: HOSPITALIST

## 2024-10-27 PROCEDURE — 36415 COLL VENOUS BLD VENIPUNCTURE: CPT | Performed by: EMERGENCY MEDICINE

## 2024-10-27 PROCEDURE — 82248 BILIRUBIN DIRECT: CPT | Performed by: EMERGENCY MEDICINE

## 2024-10-27 PROCEDURE — 84484 ASSAY OF TROPONIN QUANT: CPT | Performed by: EMERGENCY MEDICINE

## 2024-10-27 PROCEDURE — 86900 BLOOD TYPING SEROLOGIC ABO: CPT | Performed by: EMERGENCY MEDICINE

## 2024-10-27 PROCEDURE — 250N000013 HC RX MED GY IP 250 OP 250 PS 637: Performed by: HOSPITALIST

## 2024-10-27 PROCEDURE — 36415 COLL VENOUS BLD VENIPUNCTURE: CPT | Performed by: HOSPITALIST

## 2024-10-27 PROCEDURE — 83550 IRON BINDING TEST: CPT | Performed by: PHYSICIAN ASSISTANT

## 2024-10-27 PROCEDURE — 81001 URINALYSIS AUTO W/SCOPE: CPT | Performed by: EMERGENCY MEDICINE

## 2024-10-27 PROCEDURE — 96361 HYDRATE IV INFUSION ADD-ON: CPT

## 2024-10-27 PROCEDURE — 85014 HEMATOCRIT: CPT | Performed by: EMERGENCY MEDICINE

## 2024-10-27 PROCEDURE — 99223 1ST HOSP IP/OBS HIGH 75: CPT | Mod: AI | Performed by: HOSPITALIST

## 2024-10-27 PROCEDURE — 80048 BASIC METABOLIC PNL TOTAL CA: CPT | Performed by: EMERGENCY MEDICINE

## 2024-10-27 PROCEDURE — 84466 ASSAY OF TRANSFERRIN: CPT | Performed by: PHYSICIAN ASSISTANT

## 2024-10-27 PROCEDURE — 99285 EMERGENCY DEPT VISIT HI MDM: CPT | Mod: 25

## 2024-10-27 PROCEDURE — 96360 HYDRATION IV INFUSION INIT: CPT

## 2024-10-27 PROCEDURE — 258N000003 HC RX IP 258 OP 636: Performed by: EMERGENCY MEDICINE

## 2024-10-27 RX ORDER — ONDANSETRON 4 MG/1
4 TABLET, ORALLY DISINTEGRATING ORAL EVERY 6 HOURS PRN
Status: DISCONTINUED | OUTPATIENT
Start: 2024-10-27 | End: 2024-10-28 | Stop reason: HOSPADM

## 2024-10-27 RX ORDER — UREA 10 %
1 LOTION (ML) TOPICAL EVERY OTHER DAY
COMMUNITY

## 2024-10-27 RX ORDER — LIDOCAINE 40 MG/G
CREAM TOPICAL
Status: DISCONTINUED | OUTPATIENT
Start: 2024-10-27 | End: 2024-10-28 | Stop reason: HOSPADM

## 2024-10-27 RX ORDER — UREA 10 %
500 LOTION (ML) TOPICAL EVERY OTHER DAY
Status: DISCONTINUED | OUTPATIENT
Start: 2024-10-27 | End: 2024-10-28 | Stop reason: HOSPADM

## 2024-10-27 RX ORDER — ACETAMINOPHEN 650 MG/1
650 SUPPOSITORY RECTAL EVERY 4 HOURS PRN
Status: DISCONTINUED | OUTPATIENT
Start: 2024-10-27 | End: 2024-10-28 | Stop reason: HOSPADM

## 2024-10-27 RX ORDER — ONDANSETRON 2 MG/ML
4 INJECTION INTRAMUSCULAR; INTRAVENOUS EVERY 6 HOURS PRN
Status: DISCONTINUED | OUTPATIENT
Start: 2024-10-27 | End: 2024-10-28 | Stop reason: HOSPADM

## 2024-10-27 RX ORDER — QUETIAPINE FUMARATE 25 MG/1
25 TABLET, FILM COATED ORAL DAILY PRN
COMMUNITY
Start: 2024-10-02

## 2024-10-27 RX ORDER — AMOXICILLIN 250 MG
1 CAPSULE ORAL 2 TIMES DAILY
Status: DISCONTINUED | OUTPATIENT
Start: 2024-10-27 | End: 2024-10-28 | Stop reason: HOSPADM

## 2024-10-27 RX ORDER — ASPIRIN 81 MG/1
81 TABLET ORAL DAILY
Status: DISCONTINUED | OUTPATIENT
Start: 2024-10-28 | End: 2024-10-28 | Stop reason: HOSPADM

## 2024-10-27 RX ORDER — METOPROLOL SUCCINATE 25 MG/1
12.5 TABLET, EXTENDED RELEASE ORAL DAILY
COMMUNITY

## 2024-10-27 RX ORDER — UREA 10 %
45 LOTION (ML) TOPICAL EVERY OTHER DAY
Status: DISCONTINUED | OUTPATIENT
Start: 2024-10-27 | End: 2024-10-28 | Stop reason: HOSPADM

## 2024-10-27 RX ORDER — NITROGLYCERIN 0.4 MG/1
0.4 TABLET SUBLINGUAL EVERY 5 MIN PRN
Status: DISCONTINUED | OUTPATIENT
Start: 2024-10-27 | End: 2024-10-27

## 2024-10-27 RX ORDER — NITROGLYCERIN 0.4 MG/1
0.4 TABLET SUBLINGUAL EVERY 5 MIN PRN
Status: DISCONTINUED | OUTPATIENT
Start: 2024-10-27 | End: 2024-10-28 | Stop reason: HOSPADM

## 2024-10-27 RX ORDER — ACETAMINOPHEN 325 MG/1
650 TABLET ORAL EVERY 4 HOURS PRN
Status: DISCONTINUED | OUTPATIENT
Start: 2024-10-27 | End: 2024-10-28 | Stop reason: HOSPADM

## 2024-10-27 RX ADMIN — APIXABAN 5 MG: 5 TABLET, FILM COATED ORAL at 21:18

## 2024-10-27 RX ADMIN — SENNOSIDES AND DOCUSATE SODIUM 1 TABLET: 50; 8.6 TABLET ORAL at 21:18

## 2024-10-27 RX ADMIN — Medication 45 MG: at 21:18

## 2024-10-27 RX ADMIN — CYANOCOBALAMIN TAB 500 MCG 500 MCG: 500 TAB at 21:18

## 2024-10-27 RX ADMIN — SODIUM CHLORIDE 1000 ML: 9 INJECTION, SOLUTION INTRAVENOUS at 15:50

## 2024-10-27 ASSESSMENT — ACTIVITIES OF DAILY LIVING (ADL)
ADLS_ACUITY_SCORE: 0

## 2024-10-27 ASSESSMENT — COLUMBIA-SUICIDE SEVERITY RATING SCALE - C-SSRS
1. IN THE PAST MONTH, HAVE YOU WISHED YOU WERE DEAD OR WISHED YOU COULD GO TO SLEEP AND NOT WAKE UP?: NO
1. IN THE PAST MONTH, HAVE YOU WISHED YOU WERE DEAD OR WISHED YOU COULD GO TO SLEEP AND NOT WAKE UP?: NO
2. HAVE YOU ACTUALLY HAD ANY THOUGHTS OF KILLING YOURSELF IN THE PAST MONTH?: NO
6. HAVE YOU EVER DONE ANYTHING, STARTED TO DO ANYTHING, OR PREPARED TO DO ANYTHING TO END YOUR LIFE?: NO
2. HAVE YOU ACTUALLY HAD ANY THOUGHTS OF KILLING YOURSELF IN THE PAST MONTH?: NO

## 2024-10-27 NOTE — PHARMACY-ADMISSION MEDICATION HISTORY
Pharmacist Admission Medication History    Admission medication history is complete. The information provided in this note is only as accurate as the sources available at the time of the update.    Information Source(s): Family member and CareEverywhere/SureScripts via in-person    Pertinent Information: Son manages the patient's meds.     Changes made to PTA medication list:  Added: b12, iron, seroquel  Deleted: flomax, avodart  Changed: toprol, fish oil. Marked to not taking: entresto on hold since toprol started 10/17/24.     Allergies reviewed with patient and updates made in EHR:  yes, but son was unfamiliar with the allergies    Medication History Completed By: Wenceslao Coulter RPH 10/27/2024 5:12 PM    PTA Med List   Medication Sig Last Dose/Taking    apixaban ANTICOAGULANT (ELIQUIS) 5 MG tablet Take 1 tablet (5 mg) by mouth 2 times daily 10/27/2024 Morning    aspirin 81 MG tablet Take 81 mg by mouth daily 10/26/2024    Cholecalciferol (VITAMIN D3) 2000 UNITS CAPS Take by mouth daily 10/26/2024    ferrous sulfate 45 MG TBCR CR tablet Take 1 tablet by mouth every other day. Past Week    Fish Oil-Cholecalciferol (FISH OIL + D3) 2725-1846 MG-UNIT CAPS Take 1 tablet by mouth every other day. Past Week    metoprolol succinate ER (TOPROL XL) 25 MG 24 hr tablet Take 12.5 mg by mouth daily. 10/27/2024 Morning    nitroGLYcerin (NITROSTAT) 0.4 MG sublingual tablet Place 1 tablet (0.4 mg) under the tongue every 5 minutes as needed for chest pain Take as directed Unknown    QUEtiapine (SEROQUEL) 25 MG tablet Take 25 mg by mouth daily as needed (anxiety/sleep). Past Week    vitamin B-12 (CYANOCOBALAMIN) 500 MCG tablet Take 500 mcg by mouth every other day. Past Week

## 2024-10-27 NOTE — ED NOTES
[facility]  ED Nurse Handoff Report    ED Chief complaint: Syncope      ED Diagnosis:   Final diagnoses:   Syncope, unspecified syncope type   Hypotension, unspecified hypotension type       Code Status: To be determined by admitting provider.     Allergies:   Allergies   Allergen Reactions    Flomax [Tamsulosin]      Weakness and dizzyness    Aldactone [Spironolactone]      High potassium and worsening creat when on lisinopril and spironalactone    Carvedilol      dizziness    Colesevelam      Epigastric pain    Ezetimibe     Lisinopril Itching     Hyperkalemia and inc. Creat. At 20 mg daily, itching , skin red when dose goes above 2.5 mg a day--elevated creat     Pravastatin      Abdominal pain    Rosuvastatin     Simvastatin        Patient Story:  Sawyer Renteria is a 89 year old male who presents to the emergency room with appears to be 2 syncopal episodes earlier today.  Last week he had his normal blood pressure medication changed and then was started on a dose of metoprolol that was new, also last week had labs done that showed a hemoglobin of about 9.  The patient was doing okay until today when he was noted to be lightheaded, and stated he felt very dizzy and the patient was sat on the couch and he had a syncopal episode on the couch.  He did not hit his head.  Eventually was able to stand up and walk to the kitchen but there he had a second syncopal episode and fell to the ground in a controlled fashion as his son was at the bedside to hold him and help him down.  EMS arrived, he had a very low blood pressure at that time and strange  orthostatic blood pressures, but eventually pressure came up with some fluid.       He denies any vomiting or chest pain or abdominal pain, denies any palpitations, does state that he has had to strain lately on bowel movements and notices some blood in his stool but denies any black stoolsPatient's son.  Though family does note he was started on iron  recently.      Focused Assessment:    Patient A&Ox4. Breathing rate, rhythm and SPO2 WDL. No cough or SOB. Denies chest pain. Dizziness while walking. HR WDL.     Labs Ordered and Resulted from Time of ED Arrival to Time of ED Departure   BASIC METABOLIC PANEL - Abnormal       Result Value    Sodium 139      Potassium 4.8      Chloride 110 (*)     Carbon Dioxide (CO2) 21 (*)     Anion Gap 8      Urea Nitrogen 51.8 (*)     Creatinine 1.87 (*)     GFR Estimate 34 (*)     Calcium 8.1 (*)     Glucose 130 (*)    TROPONIN T, HIGH SENSITIVITY - Abnormal    Troponin T, High Sensitivity 33 (*)    CBC WITH PLATELETS AND DIFFERENTIAL - Abnormal    WBC Count 5.7      RBC Count 2.51 (*)     Hemoglobin 7.6 (*)     Hematocrit 23.6 (*)     MCV 94      MCH 30.3      MCHC 32.2      RDW 14.3      Platelet Count 248      % Neutrophils 54      % Lymphocytes 32      % Monocytes 7      % Eosinophils 6      % Basophils 0      % Immature Granulocytes 1      NRBCs per 100 WBC 0      Absolute Neutrophils 3.1      Absolute Lymphocytes 1.8      Absolute Monocytes 0.4      Absolute Eosinophils 0.4      Absolute Basophils 0.0      Absolute Immature Granulocytes 0.0      Absolute NRBCs 0.0     HEPATIC FUNCTION PANEL - Abnormal    Protein Total 5.1 (*)     Albumin 3.1 (*)     Bilirubin Total 0.2      Alkaline Phosphatase 58      AST 12      ALT 7      Bilirubin Direct <0.20     INR - Abnormal    INR 1.98 (*)    ROUTINE UA WITH MICROSCOPIC REFLEX TO CULTURE - Abnormal    Color Urine Light Yellow      Appearance Urine Clear      Glucose Urine Negative      Bilirubin Urine Negative      Ketones Urine Negative      Specific Gravity Urine 1.015      Blood Urine Negative      pH Urine 5.0      Protein Albumin Urine Negative      Urobilinogen Urine Normal      Nitrite Urine Negative      Leukocyte Esterase Urine Negative      Bacteria Urine Few (*)     Mucus Urine Present (*)     RBC Urine <1      WBC Urine <1      Hyaline Casts Urine 1     PARTIAL  THROMBOPLASTIN TIME - Normal    aPTT 36     OCCULT BLOOD STOOL   TYPE AND SCREEN, ADULT    SPECIMEN EXPIRATION DATE [id number]     ABO/RH TYPE AND SCREEN       No orders to display         Treatments and/or interventions provided:  Medications   sodium chloride 0.9% BOLUS 1,000 mL (1,000 mLs Intravenous $New Bag 10/27/24 4400)       Patient's response to treatments and/or interventions:  Patient remains stable.     To be done/followed up on inpatient unit:   See any in-patient orders.     Does this patient have any cognitive concerns?:  N/A    Activity level - Baseline/Home:    Stand with Assist    Activity Level - Current:    Stand with Assist    Patient's Preferred language: English     Needed?: No    Isolation: None  Infection: Not Applicable  Patient tested for COVID 19 prior to admission: NO    Bariatric?: No    Vital Signs:   Vitals:    10/27/24 1432 10/27/24 1515 10/27/24 1517   BP: 119/49 111/52    Pulse: 83 63    Resp: 18 18    Temp:   98.6  F (37  C)   TempSrc:   Oral   SpO2: 99% 99%        Cardiac Rhythm:     Was the PSS-3 completed:   Yes  What interventions are required if any?                 Family Comments: At bedside.     OBS brochure/video discussed/provided to patient/family: N/A              Name of person given brochure if not patient: N/A              Relationship to patient: N/A    For the majority of the shift this patient's behavior was Green.  Behavioral interventions performed were N/A.    ED NURSE PHONE NUMBER: *[phone]

## 2024-10-27 NOTE — ED PROVIDER NOTES
Emergency Department Note      History of Present Illness     Chief Complaint  Syncope    HPI  Sawyer Renteria is a 89 year old male who presents to the emergency room with appears to be 2 syncopal episodes earlier today.  Last week he had his normal blood pressure medication changed and then was started on a dose of metoprolol that was new, also last week had labs done that showed a hemoglobin of about 9.  The patient was doing okay until today when he was noted to be lightheaded, and stated he felt very dizzy and the patient was sat on the couch and he had a syncopal episode on the couch.  He did not hit his head.  Eventually was able to stand up and walk to t yes I reviewed the patient's last office visit note with his nephrologist on 4 October of 2024 with the patient was seen for he kitchen but there he had a second syncopal episode and fell to the ground in a controlled fashion as his son was at the bedside to hold him and help him down.  EMS arrived, he had a very low blood pressure at that time and strange  orthostatic blood pressures, but eventually pressure came up with some fluid.      He denies any vomiting or chest pain or abdominal pain, denies any palpitations, does state that he has had to strain lately on bowel movements and notices some blood in his stool but denies any black stoolsPatient's son.  Though family does note he was started on iron recently.        Independent Historian  Yes and daughter-in-law are at the bedside and confirm the above history.    Review of External Notes  Hypertensive chronic kidney disease and anemia.      Past Medical History   Medical History and Problem List  Past Medical History:   Diagnosis Date    BPH (benign prostatic hyperplasia)     Cardiomyopathy, ischemic     Coronary artery disease 1/24/06    Gastro-oesophageal reflux disease     Hyperlipidaemia     Hypertension     Left ventricular thrombus     Myocardial infarction (H) 1/2006    TIA (transient ischaemic  attack)        Medications  apixaban ANTICOAGULANT (ELIQUIS) 5 MG tablet  aspirin 81 MG tablet  Cholecalciferol (VITAMIN D3) 2000 UNITS CAPS  dutasteride (AVODART) 0.5 MG capsule  evolocumab (REPATHA) 140 MG/ML prefilled autoinjector  Fish Oil-Cholecalciferol (FISH OIL + D3) 0346-8572 MG-UNIT CAPS  hydrocortisone (CORTAID) 1 % external cream  metoprolol succinate ER (TOPROL XL) 25 MG 24 hr tablet  nitroGLYcerin (NITROSTAT) 0.4 MG sublingual tablet  sacubitril-valsartan (ENTRESTO) 24-26 MG per tablet  Skin Protectants, Misc. (EUCERIN) cream  tamsulosin (FLOMAX) 0.4 MG capsule        Surgical History   Past Surgical History:   Procedure Laterality Date    CORONARY ANGIOGRAPHY ADULT ORDER  1/24/06    Cypher CANDIDA to LAD    HEART CATH, ANGIOPLASTY  1/2006    Cypher CANDIDA to LAD    TONSILLECTOMY & ADENOIDECTOMY           Physical Exam   Patient Vitals for the past 24 hrs:   BP Temp Temp src Pulse Resp SpO2   10/27/24 1517 -- 98.6  F (37  C) Oral -- -- --   10/27/24 1515 111/52 -- -- 63 18 99 %   10/27/24 1432 119/49 -- -- 83 18 99 %       Physical Exam  Vitals: reviewed by me  General: [facility] on [facility], cooperative, and alert to conversation.  Quite pale appearing.  Eyes: Tracking well, clear conjunctiva BL  ENT: MMM, midline trachea.   Lungs: No tachypnea, no accessory muscle use. No respiratory distress.   CV: Rate as above  Abd: Soft, non tender, no guarding, no rebound. Non distended   MSK: no joint effusion.  No evidence of trauma  Skin: No rash  Neuro: Clear speech and no facial droop.  Psych: Not RIS, no e/o AH/      Diagnostics   Lab Results   Labs Ordered and Resulted from Time of ED Arrival to Time of ED Departure   BASIC METABOLIC PANEL - Abnormal       Result Value    Sodium 139      Potassium 4.8      Chloride 110 (*)     Carbon Dioxide (CO2) 21 (*)     Anion Gap 8      Urea Nitrogen 51.8 (*)     Creatinine 1.87 (*)     GFR Estimate 34 (*)     Calcium 8.1 (*)     Glucose 130 (*)    TROPONIN  T, HIGH SENSITIVITY - Abnormal    Troponin T, High Sensitivity 33 (*)    CBC WITH PLATELETS AND DIFFERENTIAL - Abnormal    WBC Count 5.7      RBC Count 2.51 (*)     Hemoglobin 7.6 (*)     Hematocrit 23.6 (*)     MCV 94      MCH 30.3      MCHC 32.2      RDW 14.3      Platelet Count 248      % Neutrophils 54      % Lymphocytes 32      % Monocytes 7      % Eosinophils 6      % Basophils 0      % Immature Granulocytes 1      NRBCs per 100 WBC 0      Absolute Neutrophils 3.1      Absolute Lymphocytes 1.8      Absolute Monocytes 0.4      Absolute Eosinophils 0.4      Absolute Basophils 0.0      Absolute Immature Granulocytes 0.0      Absolute NRBCs 0.0     HEPATIC FUNCTION PANEL - Abnormal    Protein Total 5.1 (*)     Albumin 3.1 (*)     Bilirubin Total 0.2      Alkaline Phosphatase 58      AST 12      ALT 7      Bilirubin Direct <0.20     INR - Abnormal    INR 1.98 (*)    ROUTINE UA WITH MICROSCOPIC REFLEX TO CULTURE - Abnormal    Color Urine Light Yellow      Appearance Urine Clear      Glucose Urine Negative      Bilirubin Urine Negative      Ketones Urine Negative      Specific Gravity Urine 1.015      Blood Urine Negative      pH Urine 5.0      Protein Albumin Urine Negative      Urobilinogen Urine Normal      Nitrite Urine Negative      Leukocyte Esterase Urine Negative      Bacteria Urine Few (*)     Mucus Urine Present (*)     RBC Urine <1      WBC Urine <1      Hyaline Casts Urine 1     PARTIAL THROMBOPLASTIN TIME - Normal    aPTT 36     OCCULT BLOOD STOOL   TYPE AND SCREEN, ADULT    ABO/RH(D) B POS      Antibody Screen Negative      SPECIMEN EXPIRATION DATE [id number]     ABO/RH TYPE AND SCREEN       Imaging  No orders to display       EKG   ECG results from 10/27/24   EKG 12-lead, tracing only     Value    Systolic Blood Pressure     Diastolic Blood Pressure     Ventricular Rate 74    Atrial Rate 74    NH Interval 174    QRS Duration 76        QTc 461    P Axis     R AXIS 94    T Axis 167     Interpretation ECG      Sinus rhythm  Rightward axis  Low voltage QRS  Cannot rule out Anteroseptal infarct (cited on or before 23-Jan-2006)  ST & T wave abnormality, consider lateral ischemia  Abnormal ECG  Reviewed by Jerrell LYNN               ED Course      Medications Administered   Medications   sodium chloride 0.9% BOLUS 1,000 mL (1,000 mLs Intravenous $New Bag 10/27/24 6226)            Optional/Additional Documentation  Stress/Adjustment Disorders       Medical Decision Making / Diagnosis         MDM  This is a very pleasant 89-year-old male who presents the emergency room after multiple syncopal episodes today.  Thankfully he did not hit his head against the ground, and there is no evidence of trauma.  He is mentating appropriately and I do not think that a CT scan would be particularly helpful.  He has 2 reasons to possibly have had a syncopal episode, one is the starting of his metoprolol, and the other is that his blood hemoglobin has actually dropped somewhat as well.  He does appear to be dehydrated as well, and we have given him a liter of IV fluids.  He does not endorse any obvious hematochezia but definitely there is some possibly hemorrhoidal pattern bleeding that needs to be investigated.  He is on a blood thinner as well and this needs to be reassessed.  I do not think that he needs a transfusion just yet, however I do think following his CBC is prudent and I have ordered a repeat test while he is down here in the ER.  I spoke to the hospitalist team who very generously agreed to accept care of the patient for continued workup, thankfully his EKG does not show any evidence of a cardiac etiology for his lightheadedness or fainting and we have not seen any cardiac tachyarrhythmia on the monitor.  I would hold the metoprolol for the time being, and reassess and defer the rest of the workup to the inpatient team      ICD-10 Codes:    ICD-10-CM    1. Syncope, unspecified syncope type  R55       2.  Hypotension, unspecified hypotension type  I95.9                         Chacorta Allan MD  10/27/24 5671

## 2024-10-27 NOTE — ED NOTES
Bed: ED01  Expected date:   Expected time:   Means of arrival:   Comments:  Daniella 2 89 M hypotension syncope

## 2024-10-27 NOTE — PROGRESS NOTES
RECEIVING UNIT ED HANDOFF REVIEW    ED Nurse Handoff Report was reviewed by: Thania Stallworth RN on October 27, 2024 at 5:26 PM

## 2024-10-27 NOTE — ED TRIAGE NOTES
Pt presented to ED via EMS to be evaluated for a syncopal episode and hypotension. EMS report as follows: pt was home with family and when they went to check his BP he was pale and started to pass out, family assisted him to the ground. His BP was 98/50 laying. He was taken off of his BP meds and recently restarted on his metoprolol.  When EMS did orthostatic blood pressures his sitting was 70/40 and pt was lowered back down. HR maintained in the 60s. BG was 133. 500mL of fluid was given in route. Pt currently denies chest pain, BP was 119/49     Triage Assessment (Adult)       Row Name 10/27/24 1437          Triage Assessment    Airway WDL WDL        Respiratory WDL    Respiratory WDL WDL

## 2024-10-27 NOTE — H&P
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    He lives with his son. At home he was feeling slightly lightheaded and felt dizzy and sat on the couch. His son later tried to walk him around and he felt very weak and was cautiously lowered to ground by his son and EMS was called. Per EMS report his systolic blood pressure was in 70s. Of note during recent nephrology visit on 10/4, his Entresto was d/anali was recently started on Toprol XL 12.5 mg po daily.    Syncope, likely vasovagal versus orthostatic hypotension  Hypertension  CAD (h/o LAD stent)  ischemic cardiomyopathy (not on diuretics)  H/o LV Thrombus (on Eliquis)  -will admit for observation  -initial workup with slightly better troponin 33, BP per EMS report was in 70s; currently in 130s  -EKG normal sinus rhythm, low voltage  -hemoglobin 7.6; UA unremarkable    -Was given 1 lt fluid bolus in ED  -will monitor on telemetry; no clinical concern for ACS ; slightly elevated troponin likely in the setting of CKD ; will follow serial troponin  -will obtain an ECHO  -will check orthostatic vitals  -will continue with PTA Toprol-XL with hold parameters  -will get PT/OT evaluation and  for disposition planning  -see discussion on anemia below; since no clinical concern for active bleed, will continue with PTA Eliquis for now    Likely acute on chronic anemia of chronic disease  -his recent baseline hemoglobin has been around 9-10; last hemoglobin from September was  9  -current hemoglobin lower than baseline at 7.6 but with no suggestion of any active bleed  -his stools has been dark since starting iron tablets recently  -currently hemodynamically stable  -will monitor hemoglobin Q6 hours and transfuse PRN for HB<7  -can probably consider IV iron infusion prior to discharge  -will hold anticoagulation and consult G.I. if has any suggestion of bleeding    Constipation  -reports being constipated, likely due to iron tablets  -will start scheduled bowel regimen to avoid straining with constipation which might contribute to vasovagal syncope    CKD stage III-IV  -creatinine 1.87; seems stable around recent baseline of 2    Cognitive impairment  -lives with his son  -no behavioral disturbances  -will get PT/OT/ for disposition planning  -fall precautions    Clinically Significant Risk Factors Present on Admission          # Hyperchloremia: Highest Cl = 110 mmol/L in last 2 days, will monitor as appropriate          # Hypoalbuminemia: Lowest albumin = 3.1 g/dL at 10/27/2024  2:45 PM, will monitor as appropriate  # Drug Induced Coagulation Defect: home medication list includes an anticoagulant medication    # Drug Induced Platelet Defect: home medication list includes an antiplatelet medication       # Hypertension: Noted on problem list        # Anemia: based on hgb <11                         DVT prophylaxis: on Eliquis  Code status: full code (discussed with patient and family)    Disposition:   Medically Ready for Discharge: Anticipated Tomorrow       Care plan discussed with ED provider, patient and his family present in room           Primary Care Physician:   Francisco Doshi [phone]         Chief Complaint:     Syncope    History is obtained from the patient and family         History of Present Illness:     Sawyer Renteria is a 89 year old male with PMH significant for hypertension, dyslipidemia, CAD (h/o LAD stent), ischemic cardiomyopathy, CKD stage III-IV,  BPH, 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    He lives with his son. At home he was feeling slightly lightheaded and felt dizzy and sat on the couch. His son later tried to walk him around and he felt very weak and was cautiously lowered to ground by his son and EMS was called. Per EMS report his systolic blood pressure was in 70s. Of note during recent nephrology visit on 10/4, his Entresto was d/anali was recently started on Toprol XL 12.5 mg po daily.    In the ED he was seen by Dr. Allan.    -initial workup with slightly better troponin 33, BP per EMS report was in 70s; currently in 130s  -EKG normal sinus rhythm, low voltage  -hemoglobin 7.6; UA unremarkable    The patient denies any fever, chills, rigors, chest pain or shortness of breath.  Denies pain abdomen.  No bowel or bladder disturbances.           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 Taking?   Cholecalciferol (VITAMIN D3) 2000 UNITS CAPS   Yes No   Sig: Take by mouth daily   Fish Oil-Cholecalciferol (FISH OIL + D3) 2220-4295 MG-UNIT CAPS   Yes No   Sig: Take 2 tablets by mouth daily   Patient not taking: Reported on 7/20/2023   Skin Protectants, Misc. (EUCERIN) cream   No No   Sig: Apply topically as needed for dry skin   apixaban ANTICOAGULANT (ELIQUIS) 5 MG tablet   No No   Sig: Take 1 tablet (5 mg) by mouth 2 times daily   aspirin 81 MG tablet   Yes No   Sig: Take  81 mg by mouth daily   dutasteride (AVODART) 0.5 MG capsule   No No   Sig: Take 1 capsule (0.5 mg) by mouth daily   evolocumab (REPATHA) 140 MG/ML prefilled autoinjector   No No   Sig: Inject 1 mL (140 mg) Subcutaneous every 14 days   hydrocortisone (CORTAID) 1 % external cream   No No   Sig: Apply topically 2 times daily   Patient not taking: Reported on 7/20/2023   metoprolol succinate ER (TOPROL XL) 25 MG 24 hr tablet   No No   Sig: Take 1 tablet (25 mg) by mouth daily   Patient taking differently: Take 25 mg by mouth daily as needed   nitroGLYcerin (NITROSTAT) 0.4 MG sublingual tablet   No No   Sig: Place 1 tablet (0.4 mg) under the tongue every 5 minutes as needed for chest pain Take as directed   Patient not taking: Reported on 7/20/2023   sacubitril-valsartan (ENTRESTO) 24-26 MG per tablet   No No   Sig: Take 1 tablet by mouth 2 times daily   tamsulosin (FLOMAX) 0.4 MG capsule   Yes No   Sig: Take 0.4 mg by mouth daily   Patient not taking: Reported on 5/30/2023      Facility-Administered Medications: None            Allergies:     Allergies   Allergen Reactions    Flomax [Tamsulosin]      Weakness and dizzyness    Aldactone [Spironolactone]      High potassium and worsening creat when on lisinopril and spironalactone    Carvedilol      dizziness    Colesevelam      Epigastric pain    Ezetimibe     Lisinopril Itching     Hyperkalemia and inc. Creat. At 20 mg daily, itching , skin red when dose goes above 2.5 mg a day--elevated creat     Pravastatin      Abdominal pain    Rosuvastatin     Simvastatin             Social History:   Sawyer Renteria  reports that he has never smoked. He has never used smokeless tobacco. He reports that he does not drink alcohol and does not use drugs.              Family History:   Sawyer Renteria family history includes Heart Disease in his brother and mother.    Family history was reviewed by myself and not pertinent to current presentation.           Review of Systems:   A10  point Review of Systems was done and were negative other than noted in the HPI.             Physical Exam:   Blood pressure 111/52, pulse 63, temperature 98.6  F (37  C), temperature source Oral, resp. rate 18, SpO2 99%.  0 lbs 0 oz        Constitutional: Alert, awake and oriented X 2; confused with dates at baseline; lying comfortably in bed in no apparent distress   HEENT: Pupils equal and reactive to light and accomodation, EOMI intact; neck supple no raised JVD or rigidity    Oral cavity: Moist mucosa   Cardiovascular: Normal s1 s2, regular rate and rhythm, no murmur   Lungs: B/l clear to auscultation, no wheezes or crepitations   Abdomen: Soft, nt, nd, no guarding, rigidity or rebound; BS +   LE : No edema   Musculoskeletal: Power 5/5 in all extremities   Neuro: No focal neurological deficits noted, CN II to XII grossly intact   Psychiatry: normal mood and affect         Data:   All new lab and imaging data was reviewed in Epic.   Significant labs and imagings include:    CMP notable for BUN 51, creatinine 1.87, glucose 130, troponin T 33  CBC with WBC 5.7, hemoglobin 7.6  EKG normal sinus rhythm, low voltage  UA unremarkable             Abiodun Lyons MD  Hospitalist

## 2024-10-28 ENCOUNTER — APPOINTMENT (OUTPATIENT)
Dept: CARDIOLOGY | Facility: CLINIC | Age: 89
End: 2024-10-28
Attending: PHYSICIAN ASSISTANT
Payer: MEDICARE

## 2024-10-28 ENCOUNTER — APPOINTMENT (OUTPATIENT)
Dept: PHYSICAL THERAPY | Facility: CLINIC | Age: 89
End: 2024-10-28
Attending: HOSPITALIST
Payer: MEDICARE

## 2024-10-28 VITALS
HEIGHT: 64 IN | WEIGHT: 107.14 LBS | DIASTOLIC BLOOD PRESSURE: 56 MMHG | TEMPERATURE: 97.6 F | BODY MASS INDEX: 18.29 KG/M2 | SYSTOLIC BLOOD PRESSURE: 152 MMHG | HEART RATE: 78 BPM | OXYGEN SATURATION: 99 % | RESPIRATION RATE: 16 BRPM

## 2024-10-28 LAB
HGB BLD-MCNC: 8 G/DL (ref 13.3–17.7)
IRON BINDING CAPACITY (ROCHE): 164 UG/DL (ref 240–430)
IRON SATN MFR SERPL: 22 % (ref 15–46)
IRON SERPL-MCNC: 36 UG/DL (ref 61–157)
LVEF ECHO: NORMAL
TRANSFERRIN SERPL-MCNC: 136 MG/DL (ref 200–360)

## 2024-10-28 PROCEDURE — G0378 HOSPITAL OBSERVATION PER HR: HCPCS

## 2024-10-28 PROCEDURE — 97530 THERAPEUTIC ACTIVITIES: CPT | Mod: GP | Performed by: PHYSICAL THERAPIST

## 2024-10-28 PROCEDURE — 93306 TTE W/DOPPLER COMPLETE: CPT | Mod: 26 | Performed by: INTERNAL MEDICINE

## 2024-10-28 PROCEDURE — 97116 GAIT TRAINING THERAPY: CPT | Mod: GP | Performed by: PHYSICAL THERAPIST

## 2024-10-28 PROCEDURE — 36415 COLL VENOUS BLD VENIPUNCTURE: CPT | Performed by: HOSPITALIST

## 2024-10-28 PROCEDURE — 250N000013 HC RX MED GY IP 250 OP 250 PS 637: Performed by: PHYSICIAN ASSISTANT

## 2024-10-28 PROCEDURE — 999N000128 HC STATISTIC PERIPHERAL IV START W/O US GUIDANCE

## 2024-10-28 PROCEDURE — 97161 PT EVAL LOW COMPLEX 20 MIN: CPT | Mod: GP | Performed by: PHYSICAL THERAPIST

## 2024-10-28 PROCEDURE — 255N000002 HC RX 255 OP 636: Performed by: PHYSICIAN ASSISTANT

## 2024-10-28 PROCEDURE — 99239 HOSP IP/OBS DSCHRG MGMT >30: CPT | Performed by: PHYSICIAN ASSISTANT

## 2024-10-28 PROCEDURE — 999N000111 HC STATISTIC OT IP EVAL DEFER

## 2024-10-28 PROCEDURE — 85018 HEMOGLOBIN: CPT | Performed by: HOSPITALIST

## 2024-10-28 PROCEDURE — 250N000013 HC RX MED GY IP 250 OP 250 PS 637: Performed by: HOSPITALIST

## 2024-10-28 RX ORDER — AMOXICILLIN 250 MG
1 CAPSULE ORAL 2 TIMES DAILY
COMMUNITY
Start: 2024-10-28

## 2024-10-28 RX ADMIN — SENNOSIDES AND DOCUSATE SODIUM 1 TABLET: 50; 8.6 TABLET ORAL at 08:28

## 2024-10-28 RX ADMIN — PERFLUTREN 2 ML: 6.52 INJECTION, SUSPENSION INTRAVENOUS at 11:17

## 2024-10-28 RX ADMIN — OLANZAPINE 2.5 MG: 5 TABLET, ORALLY DISINTEGRATING ORAL at 08:27

## 2024-10-28 RX ADMIN — APIXABAN 5 MG: 5 TABLET, FILM COATED ORAL at 08:27

## 2024-10-28 RX ADMIN — METOPROLOL SUCCINATE 12.5 MG: 25 TABLET, EXTENDED RELEASE ORAL at 08:27

## 2024-10-28 RX ADMIN — ASPIRIN 81 MG: 81 TABLET, COATED ORAL at 08:27

## 2024-10-28 ASSESSMENT — ACTIVITIES OF DAILY LIVING (ADL)
ADLS_ACUITY_SCORE: 0

## 2024-10-28 NOTE — DISCHARGE SUMMARY
[facility]  Hospitalist Discharge Summary      Date of Admission:  10/27/2024  Date of Discharge:  10/28/2024  Discharging Provider: Jacklyn Gilbert PA-C  Discharge Service: Hospitalist Service    Discharge Diagnoses   Syncope, likely vasovagal versus orthostatic hypotension  Hypertension  CAD (h/o LAD stent)  Ischemic cardiomyopathy (not on diuretics)  H/o LV Thrombus (on Eliquis)  Likely acute on chronic anemia of chronic disease  Constipation  CKD III-IV  Cognitive impairment    Clinically Significant Risk Factors          Follow-ups Needed After Discharge   Follow-up Appointments       Follow-up and recommended labs and tests       Follow up with primary care provider, Francisco Doshi, within 7 days for hospital follow- up.  The following labs/tests are recommended: CBC.              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     He lives with his son. At home he was feeling slightly lightheaded and felt dizzy and sat on the couch. His son later tried to walk him around and he felt very weak and was cautiously lowered to ground by his son and EMS was called. Per EMS report his systolic blood pressure was in 70s. Of note during recent nephrology visit on 10/4, his Entresto was d/anali was recently started on Toprol XL 12.5 mg po daily.     Syncope, likely vasovagal versus orthostatic hypotension  Hypertension  CAD (h/o LAD  stent)  Ischemic cardiomyopathy (not on diuretics)  H/o LV Thrombus (on Eliquis)  - S/p IVF 1 L bolus in ED with SBP improved to 120s  - Monitored on telemetry; no clinical concern for ACS ; slightly elevated troponin likely in the setting of CKD ; will follow serial troponin -> flat at 33, 35, 33  - Echocardiogram showed EF 40% with distal anteroseptal, apical, and distal inferior severe hypokinesis. Moderate mitral regurgitation. Moderate pulmonary hypertension. Compared to echo 10/2022, similar EF and WMA but at that time 1+ MR and RSVP 35 mmHg  - Continue PTA Toprol-XL  - Continue PTA Eliquis  - Evaluated by PT this morning, noted to have BP dip after standing for a few minutes but was asymptomatic. Able to ambulate and practice stairs, felt to be at his baseline to discharge home with assistance from son.  - Encourage oral intake and slow, deliberate movements     Likely acute on chronic anemia of chronic disease  *His recent baseline hemoglobin has been around 9-10; last hemoglobin from September was 9. Hgb on admission was 7.6. No s/s bleeding. He has had dark stools since starting iron supplement.  *Hgb this morning stable at 8.0. Iron studies suggest anemia of chronic disease.  - Defer ongoing iron replacement therapy to patient's outpatient nephrologist. Son reports patient was recently started on iron supplement every other day.     Constipation  *Reports being constipated, likely due to iron tablets  - Started Senna-Docusate 1 tablet BID on 10/27     CKD stage III-IV  *Creatinine 1.87; seems stable around recent baseline of 2     Cognitive impairment  *Patient lives with son    Consultations This Hospital Stay   PHYSICAL THERAPY ADULT IP CONSULT  CARE MANAGEMENT / SOCIAL WORK IP CONSULT  OCCUPATIONAL THERAPY ADULT IP CONSULT  VASCULAR ACCESS ADULT IP CONSULT    Code Status   Full Code    Time Spent on this Encounter   Jacklyn NAGY PA-C, personally saw the patient today and spent greater than 30  minutes discharging this patient.       Jacklyn Gilbert PA-C  [facility] ORTHOPEDICS  [address] [facility] [address]  Phone: [phone]  Fax: [phone]  ______________________________________________________________________    Physical Exam   Vital Signs: Temp: 97.6  F (36.4  C) Temp src: Oral BP: (!) 152/56 Pulse: 78   Resp: 16 SpO2: 99 % O2 Device: None (Room air)    Weight: 107 lbs 2.3 oz  Constitutional: Awake, alert, cooperative, no apparent distress.    ENT: Normocephalic, without obvious abnormality, atraumatic. Normal sclera.    Neck: Supple, symmetrical, trachea midline  Pulmonary: No increased work of breathing, diminished  Cardiovascular: Regular rate and rhythm  GI: Normal bowel sounds, soft, non-distended, non-tender.   Skin: Visualized skin appeared clear.  Neuro: Oriented x 1, declined to answer remaining orientation questions. Moves all extremities spontaneously.  Psych:  irritated   Extremities: Normal muscle tone. No gross deformities noted. Calves non-tender b/l. No pitting edema b/l LE       Primary Care Physician   Francisco Doshi    Discharge Orders      Reason for your hospital stay    You were hospitalized for low blood pressure and treated with IV fluids with improvement of symptoms. You were also noted to be iron deficient, likely related to chronic kidney disease.     Follow-up and recommended labs and tests     Follow up with primary care provider, Francisco Doshi, within 7 days for hospital follow- up.  The following labs/tests are recommended: CBC.     Activity    Your activity upon discharge: activity as tolerated     When to contact your care team    Contact your care team for syncope, chest pain, shortness of breath, palpitations, fall with injury, blood in the stool, or any other new or worsening symptoms.     Diet    Follow this diet upon discharge: Current Diet:Orders Placed This Encounter      Regular Diet Adult       Significant Results and  Procedures   Results for orders placed or performed during the hospital encounter of 10/27/24   Echocardiogram Complete     Value    LVEF  40%    [facility]    [id number]  [id number]  [id number]^ROBSON^JAMARCUS^LESLIE     [facility]  Echocardiography Laboratory  [address] [place] [address], [place] [address]     Name: DAVIE RIVERA  MRN: [id number]  : 1934  Study Date: 10/28/2024 10:18 AM  Age: 89 yrs  Gender: Male  Patient Location: [facility]  Reason For Study: Syncope  Ordering Physician: JAMARCUS CHANCE  Performed By: AG     BSA: 1.5 m2  Height: 64 in  Weight: 107 lb  HR: 84  BP: 125/50 mmHg  ______________________________________________________________________________  Procedure  Complete Portable Echo Adult. Definity (NDC #39080-460) given intravenously.  ______________________________________________________________________________  Interpretation Summary     1. The left ventricle is normal in size. The visual ejection fraction is  estimated at 40%. Distal anteroseptal, apical, and distal inferior severe  hypokinesis.  2. The right ventricle is normal in structure, function and size.  3. There is moderate (2+) mitral regurgitation.  4. Moderate (46-55mmHg) pulmonary hypertension is present. The right  ventricular systolic pressure is approximated at 48mmHg plus the right atrial  pressure.     Echo 10/2022 showed EF 40-45% with similar WMA, 1+ MR, RVSP 35mmHg.  ______________________________________________________________________________  Left Ventricle  The left ventricle is normal in size. There is normal left ventricular wall  thickness. The visual ejection fraction is estimated at 40%. Left ventricular  diastolic function is indeterminate. Distal anteroseptal, apical, and distal  inferior severe hypokinesis. There is no thrombus seen in the left ventricle.     Right Ventricle  The right ventricle is normal in structure, function and size.     Atria  Normal left atrial size. Right  atrial size is normal. There is no atrial shunt  seen.     Mitral Valve  There is moderate (2+) mitral regurgitation.     Tricuspid Valve  There is mild (1+) tricuspid regurgitation. Moderate (46-55mmHg) pulmonary  hypertension is present. The right ventricular systolic pressure is  approximated at 48mmHg plus the right atrial pressure.     Aortic Valve  There is mild (1+) aortic regurgitation.     Pulmonic Valve  The pulmonic valve is normal in structure and function.     Vessels  Normal ascending, transverse (arch), and descending aorta. The inferior vena  cava was normal in size with preserved respiratory variability.     Pericardium  There is no pericardial effusion.     Rhythm  Sinus rhythm was noted.  ______________________________________________________________________________  MMode/2D Measurements & Calculations     IVSd: 0.92 cm  LVIDd: 5.0 cm  LVIDs: 3.1 cm  LVPWd: 0.85 cm  FS: 38.8 %  LV mass(C)d: 153.7 grams  LV mass(C)dI: 102.5 grams/m2  Ao root diam: 3.2 cm  LA dimension: 3.2 cm  asc Aorta Diam: 2.6 cm  LA/Ao: 1.00  Ao root diam index Ht(cm/m): 2.0  Ao root diam index BSA (cm/m2): 2.1  Asc Ao diam index BSA (cm/m2): 1.7  Asc Ao diam index Ht(cm/m): 1.6  EF Biplane: 57.8 %  LA Volume (BP): 37.6 ml     LA Volume Index (BP): 25.1 ml/m2  RWT: 0.34  TAPSE: 2.4 cm     Doppler Measurements & Calculations  MV E max arnol: 88.0 cm/sec  MV A max arnol: 105.0 cm/sec  MV E/A: 0.84  MV dec time: 0.13 sec  AI P1/2t: 275.4 msec  MR PISA: 2.0 cm2  MR ERO: 0.09 cm2  MR volume: 17.7 ml  PA acc time: 0.18 sec  TR max arnol: 346.5 cm/sec  TR max P.0 mmHg  RV S Arnol: 14.0 cm/sec     ______________________________________________________________________________  Report approved by: Javier Chávez 10/28/2024 12:14 PM             Discharge Medications   Current Discharge Medication List        START taking these medications    Details   senna-docusate (SENOKOT-S/PERICOLACE) 8.6-50 MG tablet Take 1 tablet by mouth 2  times daily.    Associated Diagnoses: Constipation, unspecified constipation type           CONTINUE these medications which have NOT CHANGED    Details   apixaban ANTICOAGULANT (ELIQUIS) 5 MG tablet Take 1 tablet (5 mg) by mouth 2 times daily  Qty: 180 tablet, Refills: 2    Associated Diagnoses: Transient cerebral ischemia, unspecified type; Cerebrovascular accident (CVA) due to embolism of middle cerebral artery, unspecified blood vessel laterality (H); Left ventricular thrombus      aspirin 81 MG tablet Take 81 mg by mouth daily      Cholecalciferol (VITAMIN D3) 2000 UNITS CAPS Take 2,000 Units by mouth daily.      ferrous sulfate 45 MG TBCR CR tablet Take 1 tablet by mouth every other day.      Fish Oil-Cholecalciferol (FISH OIL + D3) 7880-2949 MG-UNIT CAPS Take 1 tablet by mouth every other day.      metoprolol succinate ER (TOPROL XL) 25 MG 24 hr tablet Take 12.5 mg by mouth daily.      nitroGLYcerin (NITROSTAT) 0.4 MG sublingual tablet Place 1 tablet (0.4 mg) under the tongue every 5 minutes as needed for chest pain Take as directed  Qty: 25 tablet, Refills: 1    Associated Diagnoses: Cardiomyopathy, ischemic      QUEtiapine (SEROQUEL) 25 MG tablet Take 25 mg by mouth daily as needed (anxiety/sleep).      vitamin B-12 (CYANOCOBALAMIN) 500 MCG tablet Take 500 mcg by mouth every other day.      evolocumab (REPATHA) 140 MG/ML prefilled autoinjector Inject 1 mL (140 mg) Subcutaneous every 14 days  Qty: 6 mL, Refills: 0    Associated Diagnoses: Mixed hyperlipidemia      hydrocortisone (CORTAID) 1 % external cream Apply topically 2 times daily  Qty: 30 g, Refills: 0      sacubitril-valsartan (ENTRESTO) 24-26 MG per tablet Take 1 tablet by mouth 2 times daily  Qty: 180 tablet, Refills: 1    Associated Diagnoses: Cardiomyopathy, ischemic      Skin Protectants, Misc. (EUCERIN) cream Apply topically as needed for dry skin  Qty: 113 g, Refills: 0           Allergies   Allergies   Allergen Reactions    Flomax  [Tamsulosin]      Weakness and dizzyness    Aldactone [Spironolactone]      High potassium and worsening creat when on lisinopril and spironalactone    Carvedilol      dizziness    Colesevelam      Epigastric pain    Ezetimibe     Lisinopril Itching     Hyperkalemia and inc. Creat. At 20 mg daily, itching , skin red when dose goes above 2.5 mg a day--elevated creat     Pravastatin      Abdominal pain    Rosuvastatin     Simvastatin

## 2024-10-28 NOTE — PLAN OF CARE
Alert to self, confused, impulsive.  Up with assist of 1 and gaitbelt.  Tolerating diet.  Denies pain.  Patient son at bedside.  Discharge packet reviewed and sent home with the patient's son.

## 2024-10-28 NOTE — PLAN OF CARE
Goal Outcome Evaluation:           Diagnosis: Syncope, Confusion  POD#: NA  Mental Status: A&Ox2 not situation/time   Activity/dangle SBA with GB  Diet: reg  Pain: denies pain   Jose/Voiding: BR  Tele/Restraints/Iso: tele NSR, removed tele this am   02/LDA: RA, removed PIV   D/C Date: possible today   Other Info: echo later today, impulsive setting off bed alarm, no BM overnight                 show

## 2024-10-28 NOTE — PLAN OF CARE
Goal Outcome Evaluation:      Plan of Care Reviewed With: patient    Overall Patient Progress: no changeOverall Patient Progress: no change    Date/Time 10/27 jose    Trauma/Ortho/Medical (Choose one) medical    Diagnosis:syncope/confusion  Mental Status:confused. Impulsive. Jumps OOB quicklyregular  Activity/dangle up with SBA and belt  Diet:regular  Pain:denies  Jose/Voiding:voiding BRP  Tele/Restraints/Iso:tele NSR  02/LDA:none  D/C Date:?  Other Info:none3

## 2024-10-28 NOTE — PROGRESS NOTES
10/28/24 5546   Appointment Info   Signing Clinician's Name / Credentials (PT) Keshia Rossi DPT   Living Environment   People in Home child(virgil), adult  (Per chart pt lives with his son. Pt reports during evaluation he lives alone.)   Current Living Arrangements house   Home Accessibility stairs to enter home;stairs within home   Number of Stairs, Main Entrance 3   Stair Railings, Main Entrance none   Number of Stairs, Within Home, Primary none   Stair Railings, Within Home, Primary none   Transportation Anticipated family or friend will provide   Self-Care   Usual Activity Tolerance good   Current Activity Tolerance moderate   Equipment Currently Used at Home none   Fall history within last six months no   General Information   Onset of Illness/Injury or Date of Surgery 10/27/24   Referring Physician Abiodun Lyons MD   Patient/Family Therapy Goals Statement (PT) Return home.   Pertinent History of Current Problem (include personal factors and/or comorbidities that impact the POC) 89 y/o male admitted for syncopal episode. 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   Existing Precautions/Restrictions fall   General Observations Pt sitting up in recliner chair upon arrival of therapist.   Cognition   Affect/Mental Status (Cognition) confused   Orientation Status (Cognition) person;place;oriented to   Follows Commands (Cognition) WFL   Pain Assessment   Patient Currently in Pain No   Posture    Posture Comments Noted forward head and shoulder posture sitting in recliner and standing at recliner.   Range of Motion (ROM)   ROM Comment B LEs WFL.   Strength (Manual Muscle Testing)   Strength Comments Not formally assessed, pt demonstrates at least 3/5 grossly in B LEs with functional mobility.   Bed Mobility   Comment, (Bed Mobility) NT. Pt sitting up in recliner upon  arrival of therapist.   Transfers   Comment, (Transfers) Sit <> stand with no AD and CGA.   Gait/Stairs (Locomotion)   Comment, (Gait/Stairs) Pt amb 10' with no AD and CGA, noted pt reaching for hallway railing and furniture to stabilize self.   Balance   Balance Comments Noted good seated balance and fair standing/dynamic balance with UE support.   Sensory Examination   Sensory Perception Comments Pt denies numbness/tingling in B LEs.   Clinical Impression   Criteria for Skilled Therapeutic Intervention Yes, treatment indicated   PT Diagnosis (PT) Difficulty with functional mobility   Influenced by the following impairments Generalized weakness, Decreased activity tolerance, Impaired balance   Functional limitations due to impairments Limited functional mobility requiring assist.   Clinical Presentation (PT Evaluation Complexity) stable   Clinical Presentation Rationale Based on PMH, current presentation and social support.   Clinical Decision Making (Complexity) low complexity   Planned Therapy Interventions (PT) bed mobility training;gait training;stair training;strengthening;transfer training   Risk & Benefits of therapy have been explained patient   PT Total Evaluation Time   PT Eval, Low Complexity Minutes (06485) 5   Physical Therapy Goals   PT Frequency 3x/week   PT Predicted Duration/Target Date for Goal Attainment 11/02/24   PT Goals Bed Mobility;Transfers;Gait;Stairs   PT: Bed Mobility Modified independent;Supine to/from sit   PT: Transfers Modified independent;Sit to/from stand   PT: Gait Supervision/stand-by assist;150 feet;Assistive device  (Least restrictive AD)   PT: Stairs Minimal assist;3 stairs;Assistive device  (Least restrictive AD)   Interventions   Interventions Quick Adds Gait Training;Therapeutic Activity   Therapeutic Activity   Therapeutic Activities: dynamic activities to improve functional performance Minutes (22828) 20   Symptoms Noted During/After Treatment Fatigue   Treatment  Detail/Skilled Intervention PT: Pt sitting up in recliner chair upon arrival of therapist, noted pt is confused and requires frequent cues for redirection and staying on task. Pt performed sit <> stand multiple times from recliner chair with CGA d/t unsteadiness upon standing. Close monitoring of BP throughout session, see VS FS for details. Noted hypotension standing at window, pt denied dizziness/lightheadedness. Pt sitting up in recliner chair at end of session, chair alarm on and needs within reach.   Gait Training   Gait Training Minutes (73174) 10   Symptoms Noted During/After Treatment (Gait Training) fatigue   Treatment Detail/Skilled Intervention PT: Gait training of 10' x 2 and 60' x 2 with CGA and HHA d/t unsteadiness and pt reaching out for railing in hallway. Pt navigated 3 stairs with B railings and CGA. Pt denied dizziness with mobility.   Distance in Feet 10' x 2, 60' x 2   PT Discharge Planning   PT Plan Monitor BP, progress transfers, gait and navigation of stairs   PT Discharge Recommendation (DC Rec) home with assist   PT Rationale for DC Rec Anticipate pt will continue to progress towards independence in order to safely return home with assist of family for mobility as needed. Pt is currently requiring CGA without use of an AD.   PT Brief overview of current status SBA-CGA without AD, Goals of therapy will be to address safe mobility and make recs for d/c to next level of care. Pt and RN will continue to follow all falls risk precautions as documented by RN staff while hospitalized   Physical Therapy Time and Intention   Timed Code Treatment Minutes 30   Total Session Time (sum of timed and untimed services) 35

## 2024-10-28 NOTE — PLAN OF CARE
Occupational Therapy: Orders received. Chart reviewed and discussed with care team. Visited room and met w/ pt and son. Son reports he lives w/ pt and can assist as needed. Feels his father is near his baseline in terms of cognition and ADL tasks, no concerns for OT. Defer discharge recommendations to PT.?Will complete orders.

## 2024-10-29 ENCOUNTER — PATIENT OUTREACH (OUTPATIENT)
Dept: CARE COORDINATION | Facility: CLINIC | Age: 89
End: 2024-10-29
Payer: MEDICARE

## 2024-10-29 NOTE — PROGRESS NOTES
"Clinic Care Coordination Contact  Clinic Care Coordination Contact  OUTREACH    Referral Information:  Referral Source: IP Report: Pt was hospitalized from 10/27-10/28 with syncope/lightheadedness, found to be hypotensive.     Primary Diagnosis: Other (include Comment box) (Hypotension)    Chief Complaint   Patient presents with    Clinic Care Coordination - Post Hospital     SW Outreach        [place] Utilization:   Clinic Utilization  No PCP office visit in Past Year: No  Utilization      No Show Count (past year)  0             ED Visits  2             Hospital Admissions  1                    Current as of: 10/29/2024  2:07 PM                Clinical Concerns:  Current Medical Concerns:    Syncope, likely vasovagal versus orthostatic hypotension  Hypertension  CAD (h/o LAD stent)  Ischemic cardiomyopathy (not on diuretics)  H/o LV Thrombus (on Eliquis)  Likely acute on chronic anemia of chronic disease  Constipation  CKD III-IV  Cognitive impairment  Current Behavioral Concerns: None reported today    Education Provided to patient: SW role and recommended f/up        Medication Management:  Medication review status: Son Renae reported that pt's kidney doctor recently made some medication changes and it was felt that this could have possibly contributed to the hypotension.     Functional Status:  Bed or wheelchair confined:: No    Living Situation:  Current living arrangement:: I live in a private home with family  Type of residence:: Private home - stairs    Lifestyle & Psychosocial Needs:  Pt is a 89 yr old  male who resides in [place]. Pt's son Renae now has guardianship due to pt's cognitive impairment. Renae now lives with pt. Renae reports that it is doing okay, they are managing okay although he stated that pt can be \"a bit much sometimes\". Overall they are adjusting to residing together.    Social Drivers of Health     Food Insecurity: Not on file   Depression: Not on file   Housing Stability: Not on " file   Tobacco Use: Low Risk  (10/4/2024)    Received from [facility] [employer]    Patient History     Smoking Tobacco Use: Never     Smokeless Tobacco Use: Never     Passive Exposure: Not on file   Financial Resource Strain: Not on file   Alcohol Use: Not on file   Transportation Needs: Not on file   Physical Activity: Not on file   Interpersonal Safety: Not on file   Stress: Not on file   Social Connections: Not on file   Health Literacy: Not on file     Tube Feeding: No  Transportation means:: Family     Chemical Dependency Status: No Current Concerns  Informal Support system:: Children      Resources and Interventions:  Current Resources: Pt has housecleaning assistance     Community Resources: None     Equipment Currently Used at Home: none  Employment Status: retired       Call placed to pt's son Renae. Renae reports that pt is doing okay. Assisted Renae in scheduling f/up with PCP: Dr. Doshi on 11/12 at 12:15PM. Pt also has a nephrology appointment that day. Renae denied having add'l questions/concerns for [facility] at this time. Encouraged him to reach out if he has questions/concerns arise in future.       Patient/Caregiver understanding: Yes       Future Appointments                Tomorrow Keshia Rossi PT [facility]    In 2 months Satya Newton MD [facility] [place], [facility]            Plan: [facility] will not plan further outreach at this time.    Claudine Fernandez [facility]  Social Work Care Coordinator  Phone: [phone]

## 2024-10-30 NOTE — PLAN OF CARE
Physical Therapy Discharge Summary    Reason for therapy discharge:    Discharged to home with assist of family.    Progress towards therapy goal(s). See goals on Care Plan in [facility] electronic health record for goal details.  Goals not met.  Barriers to achieving goals:   discharge on same date as initial evaluation.    Therapy recommendation(s):    No further therapy is recommended.

## 2024-11-12 ENCOUNTER — TRANSFERRED RECORDS (OUTPATIENT)
Dept: HEALTH INFORMATION MANAGEMENT | Facility: CLINIC | Age: 89
End: 2024-11-12
Payer: MEDICARE

## 2024-12-21 ENCOUNTER — HEALTH MAINTENANCE LETTER (OUTPATIENT)
Age: 89
End: 2024-12-21

## 2025-01-27 ENCOUNTER — OFFICE VISIT (OUTPATIENT)
Dept: CARDIOLOGY | Facility: CLINIC | Age: OVER 89
End: 2025-01-27
Payer: MEDICARE

## 2025-01-27 VITALS
DIASTOLIC BLOOD PRESSURE: 68 MMHG | HEIGHT: 64 IN | WEIGHT: 114 LBS | BODY MASS INDEX: 19.46 KG/M2 | HEART RATE: 93 BPM | SYSTOLIC BLOOD PRESSURE: 155 MMHG

## 2025-01-27 DIAGNOSIS — I51.3 LEFT VENTRICULAR THROMBUS: ICD-10-CM

## 2025-01-27 DIAGNOSIS — R41.89 COGNITIVE IMPAIRMENT: ICD-10-CM

## 2025-01-27 DIAGNOSIS — N40.1 BENIGN PROSTATIC HYPERPLASIA WITH POST-VOID DRIBBLING: ICD-10-CM

## 2025-01-27 DIAGNOSIS — I95.9 HYPOTENSION, UNSPECIFIED HYPOTENSION TYPE: ICD-10-CM

## 2025-01-27 DIAGNOSIS — R55 SYNCOPE, UNSPECIFIED SYNCOPE TYPE: ICD-10-CM

## 2025-01-27 DIAGNOSIS — N18.32 CHRONIC RENAL IMPAIRMENT, STAGE 3B (H): ICD-10-CM

## 2025-01-27 DIAGNOSIS — N39.43 BENIGN PROSTATIC HYPERPLASIA WITH POST-VOID DRIBBLING: ICD-10-CM

## 2025-01-27 DIAGNOSIS — I25.5 CARDIOMYOPATHY, ISCHEMIC: Primary | ICD-10-CM

## 2025-01-27 PROCEDURE — 99214 OFFICE O/P EST MOD 30 MIN: CPT | Performed by: INTERNAL MEDICINE

## 2025-01-27 RX ORDER — MIRTAZAPINE 7.5 MG/1
7.5 TABLET, FILM COATED ORAL AT BEDTIME
COMMUNITY

## 2025-01-27 RX ORDER — SODIUM BICARBONATE 650 MG/1
650 TABLET ORAL 2 TIMES DAILY
COMMUNITY

## 2025-01-27 NOTE — LETTER
1/27/2025    Francisco Doshi MD  [address]  [facility] [address]    RE: Sawyer Renteria       Dear Colleague,     I had the pleasure of seeing Sawyer Renteria in the [facility].  HPI and Plan:   Sawyer Renteria is a very pleasant  89  year old male with a history of HFrEF, coronary artery disease, ischemic cardiomyopathy, dyslipidemia, hypertension and CKD.  He also has dementia and cognitive dysfunction.  He is now in assisted living at [facility] and is accompanied by his son.  He is now in a wheelchair    He declines any symptoms of chest pain or shortness of breath he has some cough.  However given his underlying cognitive dysfunction, sometimes his history is not always reliable.  His son gives me good perspective.  According to son, he has been in the assisted living but not very active.  He has had occasional cough.    He has a past medical history of anterior MI in 2016 underwent LAD stent and diagonal angioplasty.  He had 60% OM disease at this time.  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  He has been on long-term anticoagulation and now Eliquis.    His ejection fraction has been stable around 45%.  He used to be on Entresto and metoprolol XL.  However last October he had a syncopal episode due to low blood pressure and worsening creatinine.  He was seen by nephrology and now [facility]  The nephrologist stopped his Entresto as well as lisinopril.  Because of soft blood pressures amlodipine was also discontinued.  He is now only on metoprolol.  Blood pressure here is elevated.    Creatinine was as high as 2.9 in September and since then it has been 1.87 October.  He has had few more tests at the nephrology office.    On exam, regular rate and rhythm.  Soft ejection stock murmur in the left sternal border.  Chest was clear to auscultation.          Impression  1.  Ischemic cardiomyopathy  His EF is stable  at 40 to 45%.  Unfortunately he could not tolerate Entresto because of low blood pressure as well as worsening creatinine.  Now only on metoprolol XL.  Blood pressure actually is on the higher side today.  The son tells me that the patient has a appoint with nephrologist in 1 and half weeks.  I will defer to them if they want to initiate lisinopril if the creatinine is stable over amlodipine for blood pressure control.  Ideally if he is not on ACE inhibitors or Entresto, we could can consider hydralazine and nitrates but that will increase the risk of vasodilatation and low blood pressure and falls.  Therefore at this time, we should pursue gentle blood pressure control without increasing the risk of low blood pressures or falls     2.  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  Patient has infected tooth and may need dental procedures and extraction.  He will need to stop 7 for 3 days prior to the dental procedure.        3.  Hyperlipidemia, continue Repatha       4.  Chronic renal insufficiency, follows with nephrology     5.  Pulmonary dysfunction, likely dementia without behavioral disturbance, now in assisted living       At today's visit, reviewed the recent admission notes from October.  Nephrology notes reviewed.  Today's medical decision making was of moderate complexity.  Patient might need a bit more blood pressure control will defer to the nephrologist for the best antihypertensive they would recommend based on the underlying renal function.  Plan discussed with the son.     Sincerely,     Satya Newton MD    Provider  Link to [facility] Help Grid     The level of medical decision making during this visit was of moderate complexity.      Orders Placed This Encounter   Procedures     Follow-Up with Cardiology RAHEEL       Orders Placed This Encounter   Medications     mirtazapine (REMERON) 7.5 MG tablet     Sig: Take 7.5 mg by mouth at bedtime.     sodium bicarbonate 650 MG  tablet     Sig: Take 650 mg by mouth 2 times daily.       Medications Discontinued During This Encounter   Medication Reason     sacubitril-valsartan (ENTRESTO) 24-26 MG per tablet Med Rec(No AVS / No eCancel)     senna-docusate (SENOKOT-S/PERICOLACE) 8.6-50 MG tablet Med Rec(No AVS / No eCancel)         Encounter Diagnoses   Name Primary?     Cardiomyopathy, ischemic Yes     Left ventricular thrombus      Hypotension, unspecified hypotension type      Benign prostatic hyperplasia with post-void dribbling      Cognitive impairment      Syncope, unspecified syncope type      Chronic renal impairment, stage 3b (H)        CURRENT MEDICATIONS:  Current Outpatient Medications   Medication Sig Dispense Refill     apixaban ANTICOAGULANT (ELIQUIS) 5 MG tablet Take 1 tablet (5 mg) by mouth 2 times daily 180 tablet 2     aspirin 81 MG tablet Take 81 mg by mouth daily       Cholecalciferol (VITAMIN D3) 2000 UNITS CAPS Take 2,000 Units by mouth daily.       ferrous sulfate 45 MG TBCR CR tablet Take 1 tablet by mouth every other day.       Fish Oil-Cholecalciferol (FISH OIL + D3) 9365-9457 MG-UNIT CAPS Take 1 tablet by mouth every other day.       metoprolol succinate ER (TOPROL XL) 25 MG 24 hr tablet Take 12.5 mg by mouth daily.       mirtazapine (REMERON) 7.5 MG tablet Take 7.5 mg by mouth at bedtime.       nitroGLYcerin (NITROSTAT) 0.4 MG sublingual tablet Place 1 tablet (0.4 mg) under the tongue every 5 minutes as needed for chest pain Take as directed 25 tablet 1     QUEtiapine (SEROQUEL) 25 MG tablet Take 25 mg by mouth at bedtime.       sodium bicarbonate 650 MG tablet Take 650 mg by mouth 2 times daily.       vitamin B-12 (CYANOCOBALAMIN) 500 MCG tablet Take 500 mcg by mouth every other day.       evolocumab (REPATHA) 140 MG/ML prefilled autoinjector Inject 1 mL (140 mg) Subcutaneous every 14 days (Patient not taking: Reported on 1/27/2025) 6 mL 0     hydrocortisone (CORTAID) 1 % external cream Apply topically 2 times  daily (Patient not taking: Reported on 1/27/2025) 30 g 0     Skin Protectants, Misc. (EUCERIN) cream Apply topically as needed for dry skin 113 g 0       ALLERGIES     Allergies   Allergen Reactions     Flomax [Tamsulosin]      Weakness and dizzyness     Aldactone [Spironolactone]      High potassium and worsening creat when on lisinopril and spironalactone     Carvedilol      dizziness     Colesevelam      Epigastric pain     Ezetimibe      Lisinopril Itching     Hyperkalemia and inc. Creat. At 20 mg daily, itching , skin red when dose goes above 2.5 mg a day--elevated creat      Pravastatin      Abdominal pain     Rosuvastatin      Simvastatin        PAST MEDICAL HISTORY:  Past Medical History:   Diagnosis Date     BPH (benign prostatic hyperplasia)      Cardiomyopathy, ischemic     EF 49% by cardiac MRI 1/15/2014     Coronary artery disease 1/24/06    Cypher CANDIDA to LAD     Gastro-oesophageal reflux disease      Hyperlipidaemia      Hypertension      Left ventricular thrombus      Myocardial infarction (H) 1/2006    Anterior     TIA (transient ischaemic attack)        PAST SURGICAL HISTORY:  Past Surgical History:   Procedure Laterality Date     CORONARY ANGIOGRAPHY ADULT ORDER  1/24/06    Cypher CANDIDA to LAD     HEART CATH, ANGIOPLASTY  1/2006    Cypher CANDIDA to LAD     TONSILLECTOMY & ADENOIDECTOMY         FAMILY HISTORY:  Family History   Problem Relation Age of Onset     Heart Disease Mother         heart failure     Heart Disease Brother        SOCIAL HISTORY:  Social History     Socioeconomic History     Marital status:      Spouse name: None     Number of children: None     Years of education: None     Highest education level: None   Tobacco Use     Smoking status: Never     Smokeless tobacco: Never   Substance and Sexual Activity     Alcohol use: No     Drug use: Never   Other Topics Concern     Parent/sibling w/ CABG, MI or angioplasty before 65F 55M? No     Caffeine Concern No     Sleep Concern No      "Stress Concern No     Weight Concern Yes     Comment: weight loss     Special Diet No     Exercise Yes     Comment: bicycle 10 min, walking, 3 days week     Seat Belt Yes     Social Drivers of Health     Financial Resource Strain: Low Risk  (10/29/2024)    Financial Resource Strain      Within the past 12 months, have you or your family members you live with been unable to get utilities (heat, electricity) when it was really needed?: No   Food Insecurity: Low Risk  (10/29/2024)    Food Insecurity      Within the past 12 months, did you worry that your food would run out before you got money to buy more?: No      Within the past 12 months, did the food you bought just not last and you didn t have money to get more?: No   Transportation Needs: Low Risk  (10/29/2024)    Transportation Needs      Within the past 12 months, has lack of transportation kept you from medical appointments, getting your medicines, non-medical meetings or appointments, work, or from getting things that you need?: No   Housing Stability: Low Risk  (10/29/2024)    Housing Stability      Do you have housing? : Yes      Are you worried about losing your housing?: No       Review of Systems:  Skin:          Eyes:         ENT:         Respiratory:  Positive for cough     Cardiovascular:  Negative      Gastroenterology:        Genitourinary:         Musculoskeletal:         Neurologic:         Psychiatric:         Heme/Lymph/Imm:  Positive for allergies    Endocrine:  Negative        Physical Exam:  Vitals: BP (!) 155/68   Pulse 93   Ht 1.626 m (5' 4\")   Wt 51.7 kg (114 lb)   BMI 19.57 kg/m          CC  Francisco Doshi MD  [address]  [place] [address]                Thank you for allowing me to participate in the care of your patient.      Sincerely,     Satya Newton MD     [facility]  cc:   Francisco Doshi MD  [address]  [place] [address]      "

## 2025-01-27 NOTE — PROGRESS NOTES
HPI and Plan:   Sawyer Renteria is a very pleasant  89  year old male with a history of HFrEF, coronary artery disease, ischemic cardiomyopathy, dyslipidemia, hypertension and CKD.  He also has dementia and cognitive dysfunction.  He is now in assisted living at [facility] and is accompanied by his son.  He is now in a wheelchair    He declines any symptoms of chest pain or shortness of breath he has some cough.  However given his underlying cognitive dysfunction, sometimes his history is not always reliable.  His son gives me good perspective.  According to son, he has been in the assisted living but not very active.  He has had occasional cough.    He has a past medical history of anterior MI in 2016 underwent LAD stent and diagonal angioplasty.  He had 60% OM disease at this time.  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  He has been on long-term anticoagulation and now Eliquis.    His ejection fraction has been stable around 45%.  He used to be on Entresto and metoprolol XL.  However last October he had a syncopal episode due to low blood pressure and worsening creatinine.  He was seen by nephrology and now follows with [facility].  The nephrologist stopped his Entresto as well as lisinopril.  Because of soft blood pressures amlodipine was also discontinued.  He is now only on metoprolol.  Blood pressure here is elevated.    Creatinine was as high as 2.9 in September and since then it has been 1.87 October.  He has had few more tests at the nephrology office.    On exam, regular rate and rhythm.  Soft ejection stock murmur in the left sternal border.  Chest was clear to auscultation.          Impression  1.  Ischemic cardiomyopathy  His EF is stable at 40 to 45%.  Unfortunately he could not tolerate Entresto because of low blood pressure as well as worsening creatinine.  Now only on metoprolol XL.  Blood pressure actually is on the higher side today.   The son tells me that the patient has a appoint with nephrologist in 1 and half weeks.  I will defer to them if they want to initiate lisinopril if the creatinine is stable over amlodipine for blood pressure control.  Ideally if he is not on ACE inhibitors or Entresto, we could can consider hydralazine and nitrates but that will increase the risk of vasodilatation and low blood pressure and falls.  Therefore at this time, we should pursue gentle blood pressure control without increasing the risk of low blood pressures or falls     2.  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  Patient has infected tooth and may need dental procedures and extraction.  He will need to stop 7 for 3 days prior to the dental procedure.        3.  Hyperlipidemia, continue Repatha       4.  Chronic renal insufficiency, follows with nephrology     5.  Pulmonary dysfunction, likely dementia without behavioral disturbance, now in assisted living       At today's visit, reviewed the recent admission notes from October.  Nephrology notes reviewed.  Today's medical decision making was of moderate complexity.  Patient might need a bit more blood pressure control will defer to the nephrologist for the best antihypertensive they would recommend based on the underlying renal function.  Plan discussed with the son.     Sincerely,     Satya Newton MD    Provider  Link to [facility] Help Grid     The level of medical decision making during this visit was of moderate complexity.      Orders Placed This Encounter   Procedures    Follow-Up with Cardiology RAHEEL       Orders Placed This Encounter   Medications    mirtazapine (REMERON) 7.5 MG tablet     Sig: Take 7.5 mg by mouth at bedtime.    sodium bicarbonate 650 MG tablet     Sig: Take 650 mg by mouth 2 times daily.       Medications Discontinued During This Encounter   Medication Reason    sacubitril-valsartan (ENTRESTO) 24-26 MG per tablet Med Rec(No AVS / No eCancel)     senna-docusate (SENOKOT-S/PERICOLACE) 8.6-50 MG tablet Med Rec(No AVS / No eCancel)         Encounter Diagnoses   Name Primary?    Cardiomyopathy, ischemic Yes    Left ventricular thrombus     Hypotension, unspecified hypotension type     Benign prostatic hyperplasia with post-void dribbling     Cognitive impairment     Syncope, unspecified syncope type     Chronic renal impairment, stage 3b (H)        CURRENT MEDICATIONS:  Current Outpatient Medications   Medication Sig Dispense Refill    apixaban ANTICOAGULANT (ELIQUIS) 5 MG tablet Take 1 tablet (5 mg) by mouth 2 times daily 180 tablet 2    aspirin 81 MG tablet Take 81 mg by mouth daily      Cholecalciferol (VITAMIN D3) 2000 UNITS CAPS Take 2,000 Units by mouth daily.      ferrous sulfate 45 MG TBCR CR tablet Take 1 tablet by mouth every other day.      Fish Oil-Cholecalciferol (FISH OIL + D3) 5766-8007 MG-UNIT CAPS Take 1 tablet by mouth every other day.      metoprolol succinate ER (TOPROL XL) 25 MG 24 hr tablet Take 12.5 mg by mouth daily.      mirtazapine (REMERON) 7.5 MG tablet Take 7.5 mg by mouth at bedtime.      nitroGLYcerin (NITROSTAT) 0.4 MG sublingual tablet Place 1 tablet (0.4 mg) under the tongue every 5 minutes as needed for chest pain Take as directed 25 tablet 1    QUEtiapine (SEROQUEL) 25 MG tablet Take 25 mg by mouth at bedtime.      sodium bicarbonate 650 MG tablet Take 650 mg by mouth 2 times daily.      vitamin B-12 (CYANOCOBALAMIN) 500 MCG tablet Take 500 mcg by mouth every other day.      evolocumab (REPATHA) 140 MG/ML prefilled autoinjector Inject 1 mL (140 mg) Subcutaneous every 14 days (Patient not taking: Reported on 1/27/2025) 6 mL 0    hydrocortisone (CORTAID) 1 % external cream Apply topically 2 times daily (Patient not taking: Reported on 1/27/2025) 30 g 0    Skin Protectants, Misc. (EUCERIN) cream Apply topically as needed for dry skin 113 g 0       ALLERGIES     Allergies   Allergen Reactions    Flomax [Tamsulosin]       Weakness and dizzyness    Aldactone [Spironolactone]      High potassium and worsening creat when on lisinopril and spironalactone    Carvedilol      dizziness    Colesevelam      Epigastric pain    Ezetimibe     Lisinopril Itching     Hyperkalemia and inc. Creat. At 20 mg daily, itching , skin red when dose goes above 2.5 mg a day--elevated creat     Pravastatin      Abdominal pain    Rosuvastatin     Simvastatin        PAST MEDICAL HISTORY:  Past Medical History:   Diagnosis Date    BPH (benign prostatic hyperplasia)     Cardiomyopathy, ischemic     EF 49% by cardiac MRI 1/15/2014    Coronary artery disease 1/24/06    Cypher CANDIDA to LAD    Gastro-oesophageal reflux disease     Hyperlipidaemia     Hypertension     Left ventricular thrombus     Myocardial infarction (H) 1/2006    Anterior    TIA (transient ischaemic attack)        PAST SURGICAL HISTORY:  Past Surgical History:   Procedure Laterality Date    CORONARY ANGIOGRAPHY ADULT ORDER  1/24/06    Cypher CANDIDA to LAD    HEART CATH, ANGIOPLASTY  1/2006    Cypher CANDIDA to LAD    TONSILLECTOMY & ADENOIDECTOMY         FAMILY HISTORY:  Family History   Problem Relation Age of Onset    Heart Disease Mother         heart failure    Heart Disease Brother        SOCIAL HISTORY:  Social History     Socioeconomic History    Marital status:      Spouse name: None    Number of children: None    Years of education: None    Highest education level: None   Tobacco Use    Smoking status: Never    Smokeless tobacco: Never   Substance and Sexual Activity    Alcohol use: No    Drug use: Never   Other Topics Concern    Parent/sibling w/ CABG, MI or angioplasty before 65F 55M? No    Caffeine Concern No    Sleep Concern No    Stress Concern No    Weight Concern Yes     Comment: weight loss    Special Diet No    Exercise Yes     Comment: bicycle 10 min, walking, 3 days week    Seat Belt Yes     Social Drivers of Health     Financial Resource Strain: Low Risk  (10/29/2024)     "Financial Resource Strain     Within the past 12 months, have you or your family members you live with been unable to get utilities (heat, electricity) when it was really needed?: No   Food Insecurity: Low Risk  (10/29/2024)    Food Insecurity     Within the past 12 months, did you worry that your food would run out before you got money to buy more?: No     Within the past 12 months, did the food you bought just not last and you didn t have money to get more?: No   Transportation Needs: Low Risk  (10/29/2024)    Transportation Needs     Within the past 12 months, has lack of transportation kept you from medical appointments, getting your medicines, non-medical meetings or appointments, work, or from getting things that you need?: No   Housing Stability: Low Risk  (10/29/2024)    Housing Stability     Do you have housing? : Yes     Are you worried about losing your housing?: No       Review of Systems:  Skin:          Eyes:         ENT:         Respiratory:  Positive for cough     Cardiovascular:  Negative      Gastroenterology:        Genitourinary:         Musculoskeletal:         Neurologic:         Psychiatric:         Heme/Lymph/Imm:  Positive for allergies    Endocrine:  Negative        Physical Exam:  Vitals: BP (!) 155/68   Pulse 93   Ht 1.626 m (5' 4\")   Wt 51.7 kg (114 lb)   BMI 19.57 kg/m          CC  Francisco Doshi MD  [address]  [place] [address]                "

## 2025-04-15 DIAGNOSIS — I63.419 CEREBROVASCULAR ACCIDENT (CVA) DUE TO EMBOLISM OF MIDDLE CEREBRAL ARTERY, UNSPECIFIED BLOOD VESSEL LATERALITY (H): ICD-10-CM

## 2025-04-15 DIAGNOSIS — G45.9 TRANSIENT CEREBRAL ISCHEMIA, UNSPECIFIED TYPE: ICD-10-CM

## 2025-04-15 DIAGNOSIS — I51.3 LEFT VENTRICULAR THROMBUS: ICD-10-CM

## 2025-08-26 ENCOUNTER — DOCUMENTATION ONLY (OUTPATIENT)
Dept: OTHER | Facility: CLINIC | Age: OVER 89
End: 2025-08-26
Payer: MEDICARE